# Patient Record
Sex: FEMALE | Race: WHITE | NOT HISPANIC OR LATINO | Employment: OTHER | URBAN - METROPOLITAN AREA
[De-identification: names, ages, dates, MRNs, and addresses within clinical notes are randomized per-mention and may not be internally consistent; named-entity substitution may affect disease eponyms.]

---

## 2017-08-22 ENCOUNTER — OFFICE VISIT (OUTPATIENT)
Dept: INTERNAL MEDICINE | Facility: CLINIC | Age: 62
End: 2017-08-22
Payer: COMMERCIAL

## 2017-08-22 VITALS
HEART RATE: 97 BPM | BODY MASS INDEX: 42.31 KG/M2 | DIASTOLIC BLOOD PRESSURE: 83 MMHG | HEIGHT: 62 IN | WEIGHT: 229.94 LBS | SYSTOLIC BLOOD PRESSURE: 130 MMHG | TEMPERATURE: 98 F

## 2017-08-22 DIAGNOSIS — M54.12 CERVICAL RADICULOPATHY: ICD-10-CM

## 2017-08-22 DIAGNOSIS — F17.200 SMOKER: ICD-10-CM

## 2017-08-22 DIAGNOSIS — M62.838 MUSCLE SPASMS OF NECK: ICD-10-CM

## 2017-08-22 DIAGNOSIS — Z11.59 NEED FOR HEPATITIS C SCREENING TEST: ICD-10-CM

## 2017-08-22 DIAGNOSIS — F17.210 CIGARETTE NICOTINE DEPENDENCE WITHOUT COMPLICATION: ICD-10-CM

## 2017-08-22 DIAGNOSIS — Z91.09 ENVIRONMENTAL ALLERGIES: ICD-10-CM

## 2017-08-22 DIAGNOSIS — Z12.11 SCREEN FOR COLON CANCER: ICD-10-CM

## 2017-08-22 DIAGNOSIS — E78.5 HYPERLIPIDEMIA, UNSPECIFIED HYPERLIPIDEMIA TYPE: ICD-10-CM

## 2017-08-22 DIAGNOSIS — Z00.00 ANNUAL PHYSICAL EXAM: Primary | ICD-10-CM

## 2017-08-22 DIAGNOSIS — E66.01 MORBID OBESITY, UNSPECIFIED OBESITY TYPE: ICD-10-CM

## 2017-08-22 DIAGNOSIS — J45.20 MILD INTERMITTENT ASTHMA WITHOUT COMPLICATION: ICD-10-CM

## 2017-08-22 DIAGNOSIS — I10 HYPERTENSION, ESSENTIAL: ICD-10-CM

## 2017-08-22 DIAGNOSIS — Z12.31 SCREENING MAMMOGRAM FOR HIGH-RISK PATIENT: ICD-10-CM

## 2017-08-22 PROCEDURE — 90732 PPSV23 VACC 2 YRS+ SUBQ/IM: CPT | Mod: S$GLB,,, | Performed by: INTERNAL MEDICINE

## 2017-08-22 PROCEDURE — 99999 PR PBB SHADOW E&M-EST. PATIENT-LVL IV: CPT | Mod: PBBFAC,,, | Performed by: INTERNAL MEDICINE

## 2017-08-22 PROCEDURE — 90471 IMMUNIZATION ADMIN: CPT | Mod: S$GLB,,, | Performed by: INTERNAL MEDICINE

## 2017-08-22 PROCEDURE — 99396 PREV VISIT EST AGE 40-64: CPT | Mod: S$GLB,,, | Performed by: INTERNAL MEDICINE

## 2017-08-22 RX ORDER — HYDROCODONE BITARTRATE AND ACETAMINOPHEN 5; 325 MG/1; MG/1
1 TABLET ORAL EVERY 8 HOURS PRN
Qty: 30 TABLET | Refills: 0 | Status: SHIPPED | OUTPATIENT
Start: 2017-08-22 | End: 2017-11-22 | Stop reason: SDUPTHER

## 2017-08-22 RX ORDER — FEXOFENADINE HCL 60 MG
60 TABLET ORAL DAILY PRN
COMMUNITY
End: 2020-11-11

## 2017-08-22 RX ORDER — IBUPROFEN 200 MG
1 TABLET ORAL DAILY
Qty: 28 PATCH | Refills: 3 | Status: SHIPPED | OUTPATIENT
Start: 2017-08-22 | End: 2019-08-05

## 2017-08-22 RX ORDER — ALBUTEROL SULFATE 90 UG/1
2 AEROSOL, METERED RESPIRATORY (INHALATION) EVERY 6 HOURS PRN
Qty: 18 G | Refills: 11 | Status: SHIPPED | OUTPATIENT
Start: 2017-08-22 | End: 2019-11-05 | Stop reason: SDUPTHER

## 2017-08-22 RX ORDER — LISINOPRIL AND HYDROCHLOROTHIAZIDE 10; 12.5 MG/1; MG/1
1 TABLET ORAL DAILY
COMMUNITY
End: 2017-08-22 | Stop reason: ALTCHOICE

## 2017-08-22 RX ORDER — HYDROCHLOROTHIAZIDE 25 MG/1
25 TABLET ORAL DAILY
Qty: 30 TABLET | Refills: 2 | Status: SHIPPED | OUTPATIENT
Start: 2017-08-22 | End: 2017-12-08

## 2017-08-22 RX ORDER — HYDROXYZINE HYDROCHLORIDE 25 MG/1
25 TABLET, FILM COATED ORAL 3 TIMES DAILY PRN
Qty: 30 TABLET | Refills: 2 | Status: SHIPPED | OUTPATIENT
Start: 2017-08-22 | End: 2018-01-11 | Stop reason: SDUPTHER

## 2017-08-22 RX ORDER — LOSARTAN POTASSIUM 50 MG/1
50 TABLET ORAL DAILY
Qty: 30 TABLET | Refills: 0 | Status: SHIPPED | OUTPATIENT
Start: 2017-08-22 | End: 2017-12-08

## 2017-08-22 RX ORDER — METHOCARBAMOL 500 MG/1
500 TABLET, FILM COATED ORAL 3 TIMES DAILY PRN
Qty: 60 TABLET | Refills: 0 | Status: SHIPPED | OUTPATIENT
Start: 2017-08-22 | End: 2018-04-30

## 2017-08-22 RX ORDER — FLUOCINONIDE 0.5 MG/G
CREAM TOPICAL DAILY PRN
Qty: 60 G | Refills: 2 | Status: ON HOLD | OUTPATIENT
Start: 2017-08-22 | End: 2018-01-24

## 2017-08-22 RX ORDER — CETIRIZINE HYDROCHLORIDE 10 MG/1
10 TABLET ORAL DAILY PRN
COMMUNITY
End: 2022-05-18

## 2017-08-22 RX ORDER — FAMOTIDINE 20 MG/1
20 TABLET, FILM COATED ORAL 2 TIMES DAILY PRN
COMMUNITY
End: 2022-05-18

## 2017-08-22 NOTE — PROGRESS NOTES
Subjective:      Kaleigh Solo is a 62 y.o. female who presents for annual exam.    Family History:  family history includes Aneurysm in her father; Cancer in her mother; Heart disease in her father.    Health Maintenance:  Health Maintenance       Date Due Completion Date    Hepatitis C Screening 1955 ---    TETANUS VACCINE 06/29/1975 ---    Pap Smear with HPV Cotest 06/29/1978 ---    Colonoscopy 06/29/2007 ---    Mammogram 03/25/2016 3/25/2015    Lipid Panel 10/13/2016 10/13/2015    Zoster Vaccine 06/29/2017 ---    Influenza Vaccine 08/01/2017 ---        Eye exam: 2 years  Dental Exam: 1 year ago    Exercise: minimal  Body mass index is 42.06 kg/m².      Meds:   Current Outpatient Prescriptions:     albuterol (PROAIR HFA) 90 mcg/actuation inhaler, Inhale 2 puffs into the lungs every 6 (six) hours as needed for Wheezing., Disp: 18 g, Rfl: 11    cetirizine (ZYRTEC) 10 MG tablet, Take 10 mg by mouth once daily., Disp: , Rfl:     diphenhydrAMINE (BENADRYL) 50 MG tablet, Take 50 mg by mouth nightly as needed for Itching., Disp: , Rfl:     famotidine (PEPCID) 20 MG tablet, Take 20 mg by mouth 2 (two) times daily., Disp: , Rfl:     fexofenadine (ALLEGRA) 60 MG tablet, Take 60 mg by mouth once daily., Disp: , Rfl:     fluocinonide 0.05% (LIDEX) 0.05 % cream, Apply topically daily as needed., Disp: 60 g, Rfl: 2    hydrOXYzine HCl (ATARAX) 25 MG tablet, Take 1 tablet (25 mg total) by mouth 3 (three) times daily as needed for Itching., Disp: 30 tablet, Rfl: 2    MULTIVITAMIN W-MINERALS/LUTEIN (CENTRUM SILVER ORAL), Take 1 tablet by mouth once daily. Centrum Silver 50 plus, Disp: , Rfl:     potassium 99 mg Tab, Take by mouth., Disp: , Rfl:     hydrochlorothiazide (HYDRODIURIL) 25 MG tablet, Take 1 tablet (25 mg total) by mouth once daily., Disp: 30 tablet, Rfl: 2    hydrocodone-acetaminophen 5-325mg (NORCO) 5-325 mg per tablet, Take 1 tablet by mouth every 8 (eight) hours as needed for Pain., Disp: 30  tablet, Rfl: 0    hydrocortisone 1 % cream, Apply topically 2 (two) times daily., Disp: , Rfl:     losartan (COZAAR) 50 MG tablet, Take 1 tablet (50 mg total) by mouth once daily., Disp: 30 tablet, Rfl: 0    methocarbamol (ROBAXIN) 500 MG Tab, Take 1 tablet (500 mg total) by mouth 3 (three) times daily as needed., Disp: 60 tablet, Rfl: 0    nicotine (NICODERM CQ) 21 mg/24 hr, Place 1 patch onto the skin once daily., Disp: 28 patch, Rfl: 3    pramoxine-hydrocortisone 1-1 % lotion, Apply topically 3 (three) times daily., Disp: , Rfl:     PMHx:   Past Medical History:   Diagnosis Date    Arthritis     Asthma     Cervicalgia     2013 tx by chiropractor    Closed dislocation of acromioclavicular joint 2013    Degenerative disc disease     Fibromyalgia     HPV (human papilloma virus) anogenital infection     conization in past-remote history-1990    HTN (hypertension)     Sciatica     Tobacco use     Ulcer 2007    stomach ulcer       PSHx:  Past Surgical History:   Procedure Laterality Date    acdf  4/2014    C5-6    CERVICAL CONIZATION   W/ LASER      KNEE ARTHROSCOPY      right shoulder surger      arthroscopic surgery Dec 2013       SocHx:   Social History     Social History    Marital status:      Spouse name: N/A    Number of children: N/A    Years of education: N/A     Social History Main Topics    Smoking status: Current Some Day Smoker     Packs/day: 0.00     Years: 40.00     Types: Cigarettes    Smokeless tobacco: Never Used      Comment: tobacco use for 40 years- 1 pack per day average    Alcohol use Yes      Comment: occasional    Drug use: No    Sexual activity: Not Asked     Other Topics Concern    None     Social History Narrative    , not working, 3 children (1 passed at 9 mo old), tobacco daily, ETOH socially, GYN 2013 [facility]         Review of Systems   Constitutional: Negative for activity change, chills, fatigue, fever and unexpected weight change.    HENT: Positive for rhinorrhea. Negative for congestion, dental problem, ear discharge, ear pain, hearing loss, mouth sores, sinus pressure, tinnitus and trouble swallowing.    Eyes: Negative for discharge and visual disturbance.   Respiratory: Positive for cough (dry) and wheezing. Negative for chest tightness and shortness of breath.    Cardiovascular: Negative for chest pain, palpitations and leg swelling.   Gastrointestinal: Negative for abdominal pain, constipation, diarrhea, nausea and vomiting.   Endocrine: Negative for polydipsia and polyuria.   Genitourinary: Negative for difficulty urinating, dysuria, hematuria and menstrual problem.   Musculoskeletal: Positive for arthralgias, joint swelling and neck pain. Negative for myalgias.   Skin: Positive for rash (reports hives at times).   Allergic/Immunologic: Positive for environmental allergies and food allergies.   Neurological: Positive for weakness and headaches (improving). Negative for dizziness, light-headedness and numbness (in fingertips).   Hematological: Negative for adenopathy.   Psychiatric/Behavioral: Positive for dysphoric mood (has tried zoloft, wellbutrin and lexapro in past). Negative for confusion. The patient is not nervous/anxious.        Objective:      Physical Exam   Constitutional: She is oriented to person, place, and time. Vital signs are normal. She appears well-developed and well-nourished. No distress.   HENT:   Head: Normocephalic and atraumatic.   Right Ear: Hearing, tympanic membrane, external ear and ear canal normal. Tympanic membrane is not erythematous and not bulging.   Left Ear: Hearing, tympanic membrane, external ear and ear canal normal. Tympanic membrane is not erythematous and not bulging.   Nose: Nose normal.   Mouth/Throat: Uvula is midline, oropharynx is clear and moist and mucous membranes are normal. No oropharyngeal exudate or posterior oropharyngeal erythema.   Eyes: Conjunctivae, EOM and lids are normal.  Pupils are equal, round, and reactive to light. No scleral icterus.   Neck: Normal range of motion. Neck supple. No thyroid mass and no thyromegaly present.   Cardiovascular: Normal rate, regular rhythm, normal heart sounds and intact distal pulses.    No murmur heard.  Pulmonary/Chest: Effort normal and breath sounds normal. She has no wheezes.   Abdominal: Soft. Bowel sounds are normal. She exhibits no distension. There is no hepatosplenomegaly. There is no tenderness. There is no rigidity, no rebound and no guarding.   Musculoskeletal: Normal range of motion. She exhibits no edema.   Lymphadenopathy:     She has no cervical adenopathy.        Right: No supraclavicular adenopathy present.        Left: No supraclavicular adenopathy present.   Neurological: She is alert and oriented to person, place, and time. She has normal strength and normal reflexes. No sensory deficit. Coordination and gait normal.   Skin: Skin is warm, dry and intact. No rash noted. She is not diaphoretic.   Psychiatric: She has a normal mood and affect.   Vitals reviewed.      Assessment:       1. Annual physical exam    2. Hypertension, essential    3. Hyperlipidemia, unspecified hyperlipidemia type    4. Cervical radiculopathy    5. Muscle spasms of neck    6. Mild intermittent asthma without complication    7. Cigarette nicotine dependence without complication    8. Smoker    9. Morbid obesity, unspecified obesity type    10. Screen for colon cancer    11. Need for hepatitis C screening test    12. Screening mammogram for high-risk patient        Plan:       1,11,12,13. Ordered CBC, CMP, TSH, HbA1c, screening hepatitis C, lipid panel and U/A. Given Zostavax prescription. Ordered MMG and colonoscopy.Given pneumococcal vaccine.   2.  BP controlled with lisinopril-HCTZ but has persistent cough, will change to losartan 50mg daily and HCTZ 25mg daily. Advised to monitor BP very closely and call with readings, will adjust as needed.  3. Elevated  total cholesterol in the past, previously on pravastatin but stopped. Will check lipid panel  4,5. Uses Norco infrequently as needed for neck pain, refilled. Sees chiropractor  6. Albuterol as needed, no recent flare ups  7. In smoking cessation program, uses nicotine patches, planning to try hypnosis for smoking cessation  8. Low dose screening CT  9. Discussed need to increase physical activity  10. Hydrocortisone, famotidine, cetirizine for allergic reactions, sees allergy specialist who is also a chiroprator, Dr. Sadie Ozuna    RTC in 3 months or sooner if needed    Felicity Middleton MD          Answers for HPI/ROS submitted by the patient on 8/22/2017   activity change: No  unexpected weight change: No  neck pain: Yes  hearing loss: No  rhinorrhea: Yes  trouble swallowing: No  eye discharge: No  visual disturbance: No  chest tightness: No  wheezing: Yes  chest pain: No  palpitations: No  blood in stool: No  constipation: No  vomiting: No  diarrhea: No  polydipsia: No  polyuria: No  difficulty urinating: No  hematuria: No  menstrual problem: No  dysuria: No  joint swelling: Yes  arthralgias: Yes  headaches: Yes  weakness: Yes  confusion: No  dysphoric mood: Yes

## 2017-08-23 ENCOUNTER — LAB VISIT (OUTPATIENT)
Dept: LAB | Facility: HOSPITAL | Age: 62
End: 2017-08-23
Attending: INTERNAL MEDICINE
Payer: COMMERCIAL

## 2017-08-23 DIAGNOSIS — Z11.59 NEED FOR HEPATITIS C SCREENING TEST: ICD-10-CM

## 2017-08-23 DIAGNOSIS — Z00.00 ANNUAL PHYSICAL EXAM: ICD-10-CM

## 2017-08-23 LAB
ALBUMIN SERPL BCP-MCNC: 3.6 G/DL
ALP SERPL-CCNC: 90 U/L
ALT SERPL W/O P-5'-P-CCNC: 20 U/L
ANION GAP SERPL CALC-SCNC: 11 MMOL/L
AST SERPL-CCNC: 13 U/L
BASOPHILS # BLD AUTO: 0.04 K/UL
BASOPHILS NFR BLD: 0.7 %
BILIRUB SERPL-MCNC: 0.5 MG/DL
BUN SERPL-MCNC: 9 MG/DL
CALCIUM SERPL-MCNC: 9.5 MG/DL
CHLORIDE SERPL-SCNC: 104 MMOL/L
CHOLEST/HDLC SERPL: 4.3 {RATIO}
CO2 SERPL-SCNC: 27 MMOL/L
CREAT SERPL-MCNC: 0.8 MG/DL
DIFFERENTIAL METHOD: ABNORMAL
EOSINOPHIL # BLD AUTO: 0.2 K/UL
EOSINOPHIL NFR BLD: 3.1 %
ERYTHROCYTE [DISTWIDTH] IN BLOOD BY AUTOMATED COUNT: 13.7 %
EST. GFR  (AFRICAN AMERICAN): >60 ML/MIN/1.73 M^2
EST. GFR  (NON AFRICAN AMERICAN): >60 ML/MIN/1.73 M^2
ESTIMATED AVG GLUCOSE: 97 MG/DL
GLUCOSE SERPL-MCNC: 100 MG/DL
HBA1C MFR BLD HPLC: 5 %
HCT VFR BLD AUTO: 44.3 %
HDL/CHOLESTEROL RATIO: 23.1 %
HDLC SERPL-MCNC: 221 MG/DL
HDLC SERPL-MCNC: 51 MG/DL
HGB BLD-MCNC: 14.7 G/DL
LDLC SERPL CALC-MCNC: 147.6 MG/DL
LYMPHOCYTES # BLD AUTO: 1.4 K/UL
LYMPHOCYTES NFR BLD: 23.1 %
MCH RBC QN AUTO: 31.5 PG
MCHC RBC AUTO-ENTMCNC: 33.2 G/DL
MCV RBC AUTO: 95 FL
MONOCYTES # BLD AUTO: 0.5 K/UL
MONOCYTES NFR BLD: 7.8 %
NEUTROPHILS # BLD AUTO: 4 K/UL
NEUTROPHILS NFR BLD: 65 %
NONHDLC SERPL-MCNC: 170 MG/DL
PLATELET # BLD AUTO: 217 K/UL
PMV BLD AUTO: 9.8 FL
POTASSIUM SERPL-SCNC: 4 MMOL/L
PROT SERPL-MCNC: 6.8 G/DL
RBC # BLD AUTO: 4.66 M/UL
SODIUM SERPL-SCNC: 142 MMOL/L
TRIGL SERPL-MCNC: 112 MG/DL
TSH SERPL DL<=0.005 MIU/L-ACNC: 2.63 UIU/ML
WBC # BLD AUTO: 6.15 K/UL

## 2017-08-23 PROCEDURE — 86803 HEPATITIS C AB TEST: CPT

## 2017-08-23 PROCEDURE — 84443 ASSAY THYROID STIM HORMONE: CPT

## 2017-08-23 PROCEDURE — 83036 HEMOGLOBIN GLYCOSYLATED A1C: CPT

## 2017-08-23 PROCEDURE — 80053 COMPREHEN METABOLIC PANEL: CPT

## 2017-08-23 PROCEDURE — 80061 LIPID PANEL: CPT

## 2017-08-23 PROCEDURE — 36415 COLL VENOUS BLD VENIPUNCTURE: CPT | Mod: PO

## 2017-08-23 PROCEDURE — 85025 COMPLETE CBC W/AUTO DIFF WBC: CPT

## 2017-08-24 LAB — HCV AB SERPL QL IA: NEGATIVE

## 2017-08-28 PROBLEM — Z91.89 CANDIDATE FOR STATIN THERAPY DUE TO RISK OF FUTURE CARDIOVASCULAR EVENT: Status: ACTIVE | Noted: 2017-08-28

## 2017-11-20 NOTE — PROGRESS NOTES
CC: LBP and HTN     62 y.o. female presents today for LBP  Started-2006  Trauma- s/p Louise carrying furniture and equipment around  Pain level- 8-10/ most days  Now on disability    Tx-vibrating pad ( similar TENS unit)  Associated factors- sees Dr. Ortiz, arm numbness and decreased strength  Numbness LE alt right and left leg w/ radiation into both legs  Seeing chiropractor    HTN, tx HCTZ 25mg, losartan 50mg  ( has taken avapro, valsartan, and lisinopril)  MM spasms started after the Avapro, ? Co-relation  BP quite elevated, denied HA or dizzness    Answers for HPI/ROS submitted by the patient on 11/16/2017   Back pain  Chronicity: chronic  Onset: more than 1 year ago  Frequency: constantly  Progression since onset: waxing and waning  Pain location: gluteal  Pain quality: aching  Radiates to: left foot, left knee, right knee  Pain - numeric: 6/10  Pain is: the same all the time  Aggravated by: position, lying down, sitting, standing, twisting  abdominal pain: No  bladder incontinence: No  bowel incontinence: No  chest pain: No  dysuria: No  fever: No  headaches: Yes  leg pain: Yes  numbness: Yes  paresis: No  paresthesias: Yes  pelvic pain: No  perianal numbness: No  tingling: Yes  weakness: Yes  weight loss: No  genital pain: No  hematuria: No  Risk factors: history of steroid use  Pain severity: severe  Treatments tried: chiropractic manipulation  Improvement on treatment: moderate    MEDCARD: Reviewed    ROS:  No fever, chills,or night sweats  No dysphagia or chest pain  No GERD or abdominal pain  No change in bowel or bladder function  No paresthesias or limb numbness or weakness  Remainder of review negative except as previously noted    PAST MEDICAL HX: Reviewed  PAST SURGICAL HX:Reviewed  SOCIAL HX: Reviewed  FAMILY HX: Reviewed    PHYSICAL EXAM:  VS: As noted  GENERAL: WDWN, A&O, NAD, conversant and co-operative  EYES: Conj/lids unremarkable, sclera anicteric, PERRL, EOMI  NECK: Supple w/o  lymphadenopathy or thyromegaly  RESPIRATORY: Efforts unlabored. Lungs CTA  CARDIOVASCULAR: Heart RRR. 1+ pedal pulses. No LE edema  GASTROINTESTINAL: BS+, soft, NT/ND, -HSM noted  MUSCULOSKELETAL: Gait normal. No CCE noted  Back: Spine NT, paraspinal mm tender   Negative SLR   NEUROLOGIC: BLAKELY. No tremor noted  SKIN: Warm and dry    IMPRESSION:  LBP radiculopathy w/ intermittent sciatic sx  HTN, elevated off BP meds    PLAN:  Rx amlodipine 5mg - reviewed potential LE edema  Avoid salt  MRI- lumbar  Consult in progress PM  Rx Hydrocodone- pt requested, last rfl 8/2017- takes sparingly  Calll prn  RTC ` 2 wks

## 2017-11-22 ENCOUNTER — OFFICE VISIT (OUTPATIENT)
Dept: INTERNAL MEDICINE | Facility: CLINIC | Age: 62
End: 2017-11-22
Payer: COMMERCIAL

## 2017-11-22 VITALS
RESPIRATION RATE: 15 BRPM | HEIGHT: 62 IN | WEIGHT: 223.13 LBS | TEMPERATURE: 98 F | HEART RATE: 93 BPM | SYSTOLIC BLOOD PRESSURE: 172 MMHG | DIASTOLIC BLOOD PRESSURE: 83 MMHG | BODY MASS INDEX: 41.06 KG/M2

## 2017-11-22 DIAGNOSIS — I10 HYPERTENSION, ESSENTIAL: ICD-10-CM

## 2017-11-22 DIAGNOSIS — M54.16 LUMBAR RADICULOPATHY: Primary | ICD-10-CM

## 2017-11-22 PROCEDURE — 99999 PR PBB SHADOW E&M-EST. PATIENT-LVL III: CPT | Mod: PBBFAC,,, | Performed by: INTERNAL MEDICINE

## 2017-11-22 PROCEDURE — 99214 OFFICE O/P EST MOD 30 MIN: CPT | Mod: S$GLB,,, | Performed by: INTERNAL MEDICINE

## 2017-11-22 RX ORDER — AMLODIPINE BESYLATE 5 MG/1
5 TABLET ORAL DAILY
Qty: 90 TABLET | Refills: 1 | Status: SHIPPED | OUTPATIENT
Start: 2017-11-22 | End: 2017-12-08 | Stop reason: SDUPTHER

## 2017-11-22 RX ORDER — HYDROCODONE BITARTRATE AND ACETAMINOPHEN 5; 325 MG/1; MG/1
1 TABLET ORAL EVERY 8 HOURS PRN
Qty: 30 TABLET | Refills: 0 | Status: SHIPPED | OUTPATIENT
Start: 2017-11-22 | End: 2018-01-15 | Stop reason: ALTCHOICE

## 2017-11-22 RX ORDER — LISINOPRIL AND HYDROCHLOROTHIAZIDE 10; 12.5 MG/1; MG/1
TABLET ORAL
COMMUNITY
Start: 2017-11-16 | End: 2017-11-22

## 2017-11-22 RX ORDER — DULOXETIN HYDROCHLORIDE 60 MG/1
CAPSULE, DELAYED RELEASE ORAL
COMMUNITY
Start: 2017-11-16 | End: 2018-08-07

## 2017-11-28 ENCOUNTER — CLINICAL SUPPORT (OUTPATIENT)
Dept: SMOKING CESSATION | Facility: CLINIC | Age: 62
End: 2017-11-28
Payer: COMMERCIAL

## 2017-11-28 ENCOUNTER — HOSPITAL ENCOUNTER (OUTPATIENT)
Dept: RADIOLOGY | Facility: HOSPITAL | Age: 62
Discharge: HOME | End: 2017-11-28
Attending: INTERNAL MEDICINE
Payer: COMMERCIAL

## 2017-11-28 ENCOUNTER — HOSPITAL ENCOUNTER (OUTPATIENT)
Dept: RADIOLOGY | Facility: HOSPITAL | Age: 62
Discharge: HOME | End: 2017-11-28
Attending: INTERNAL MEDICINE

## 2017-11-28 DIAGNOSIS — M54.16 LUMBAR RADICULOPATHY: ICD-10-CM

## 2017-11-28 DIAGNOSIS — F17.200 NICOTINE DEPENDENCE: Primary | ICD-10-CM

## 2017-11-28 DIAGNOSIS — F17.200 SMOKER: ICD-10-CM

## 2017-11-28 DIAGNOSIS — Z12.31 ENCOUNTER FOR SCREENING MAMMOGRAM FOR HIGH-RISK PATIENT: ICD-10-CM

## 2017-11-28 PROCEDURE — 72148 MRI LUMBAR SPINE W/O DYE: CPT | Mod: TC

## 2017-11-28 PROCEDURE — 99406 BEHAV CHNG SMOKING 3-10 MIN: CPT | Mod: S$GLB,,, | Performed by: INTERNAL MEDICINE

## 2017-11-28 PROCEDURE — 77067 SCR MAMMO BI INCL CAD: CPT | Mod: TC,PO

## 2017-11-28 PROCEDURE — 76497 UNLISTED CT PROCEDURE: CPT | Mod: TC

## 2017-11-28 PROCEDURE — 77067 SCR MAMMO BI INCL CAD: CPT | Mod: 26,,, | Performed by: RADIOLOGY

## 2017-11-28 PROCEDURE — 77063 BREAST TOMOSYNTHESIS BI: CPT | Mod: 26,,, | Performed by: RADIOLOGY

## 2017-11-28 PROCEDURE — 72148 MRI LUMBAR SPINE W/O DYE: CPT | Mod: 26,,, | Performed by: RADIOLOGY

## 2017-11-29 PROBLEM — R91.1 PULMONARY NODULE: Status: ACTIVE | Noted: 2017-11-29

## 2017-11-29 PROBLEM — Z86.79 PERSONAL HISTORY OF CORONARY ATHEROSCLEROSIS: Status: ACTIVE | Noted: 2017-11-29

## 2017-11-30 ENCOUNTER — TELEPHONE (OUTPATIENT)
Dept: INTERNAL MEDICINE | Facility: CLINIC | Age: 62
End: 2017-11-30

## 2017-11-30 NOTE — TELEPHONE ENCOUNTER
----- Message from Abril Doan MD sent at 11/30/2017 10:17 AM CST -----  Pain Management should review your MRI tomorrow at your office visit.  It appears you have extensive degenerative disease, but no disc  herniation was noted.  Abril Martinez

## 2017-12-01 ENCOUNTER — OFFICE VISIT (OUTPATIENT)
Dept: PAIN MEDICINE | Facility: CLINIC | Age: 62
End: 2017-12-01
Attending: ANESTHESIOLOGY
Payer: COMMERCIAL

## 2017-12-01 VITALS
HEIGHT: 62 IN | DIASTOLIC BLOOD PRESSURE: 89 MMHG | WEIGHT: 222.69 LBS | BODY MASS INDEX: 40.98 KG/M2 | HEART RATE: 92 BPM | RESPIRATION RATE: 18 BRPM | SYSTOLIC BLOOD PRESSURE: 158 MMHG | TEMPERATURE: 98 F

## 2017-12-01 DIAGNOSIS — M51.16 LUMBAR DISC HERNIATION WITH RADICULOPATHY: ICD-10-CM

## 2017-12-01 DIAGNOSIS — M48.07 FORAMINAL STENOSIS OF LUMBOSACRAL REGION: ICD-10-CM

## 2017-12-01 DIAGNOSIS — M53.3 SACROILIAC JOINT PAIN: ICD-10-CM

## 2017-12-01 DIAGNOSIS — M47.899 FACET SYNDROME: ICD-10-CM

## 2017-12-01 DIAGNOSIS — M54.16 LUMBAR RADICULOPATHY: Primary | ICD-10-CM

## 2017-12-01 PROCEDURE — 99999 PR PBB SHADOW E&M-EST. PATIENT-LVL III: CPT | Mod: PBBFAC,,, | Performed by: ANESTHESIOLOGY

## 2017-12-01 PROCEDURE — 99214 OFFICE O/P EST MOD 30 MIN: CPT | Mod: S$GLB,,, | Performed by: ANESTHESIOLOGY

## 2017-12-01 NOTE — PROGRESS NOTES
Referring Physician: No ref. provider found    Chief Complaint:   Chief Complaint   Patient presents with    Back Pain        SUBJECTIVE:     Interval history 12/01/2017   The patient returns to the clinic for a follow up visit, she is reporting pain of 5 /10 today.  She has been doing better since cervical surgery but has been having occasional radicular symptoms.  Currently she comes in for lower extremities radiculopathy predominantly in the left lower extremity in the L4-5 distribution.  She had an MRI of the lumbar spine which shows  cyst at the encroachment on the left L5 nerve root.  Additionally the patient has significant facet arthropathy throughout the lumbar spine    Interval history 04/02/2015:  Since previous encounter patient reports low back pain radiating down both lower extremities. Patient stated that she had Synvisc injection on 03/20/15 and this helped with her knee pain. Patient stated that she still has neck pain that radiates to both upper extremities. She stated that she needs a refill on her Gabapentin and Flexeril these medications have been helping with the pain. Patient also reported that she sees a chiropractor for her back pain. Patient reports no other health changes since her last visit. Patient reports her pain 6/10 today.  Interval history 03/02/2015:  Since previous encounter the patient reported having had knee arthroscopy and was told to try euflexxa injections versus total knee replacement by her orthopedist.  She was originally scheduled to have her orthopedist inject her knee with corticosteroids this morning but due to scheduling conflict the patient was placed on my schedule for an injection.  Unfortunately the patient has also been actively treated for upper respiratory infection and is currently on antibiotics.  She has been continuing home exercises with regularity stating good days and bad days with regards to her knee pain.  Additionally she continues to have upper  neck and bilateral shoulder pain upper extremity radiculopathy and lower back pain.  Since her arthroscopic surgery the patient has been having intermittent hives and has been placed by an allergist on hydroxyzine and famotidine but has not had complete relief of these symptoms.    Interval history 10/21/2014:  Since previous encounter patient was scheduled for an CIRILO but cancelled due to her having a cold. Patient reports she still has a cold. Patient reports neck radiating down her right arm with numbness and tingling. Patient stated on the way over her arm went numb. She states that driving is becoming harder as she feels she has a decreased hand  with opening of jars. Patient reports that she's been taking Mucinex and Thera Flu for her cold symptoms. Patient reports she been having hives which has been followed by an allergist with testing pending.  Additionally she has been seen by rheumatologist who diagnosed her with fibromyalgia.She states that she takes 6 caps of Gabapentin a day however she was titrating down down she states that when she gets to 4 caps a day she breaks out in hives and then quickly re escalates back to 6 tablets per day. Patient states that she's starting to have pain at the bottom of her feet when walking on her rugs in her home she states that just started yesterday. Patient reports her pain 8/10.  She says that she has been increasing her protein intake and has been continuing to do exercises improving her range of motion in her shoulder and neck and states that overall she feels as if she is doing better.  Patient reports no other health changes since her last visit.   Interval history 8/25/2014:  Patient continues to have radicular symptoms in the neck radiating down to the right upper extremity.  Additionally since previous encounter she had a dental abscess which is being treated with accommodation of antibiotics and having had her tooth pulled.  She is still antibiotics and  "has a followup visit in mid-September with her dentist to evaluate efficacy of treatment.    Interval history 7/25/2014:  Since previous encounter the patient is on gabapentin 1800 mg per day and this is helping with her shoulder pain although she does state that she is having restless legs at night which is preventing her from being able to sleep.  Additionally she is having anterior thigh radicular pain.  She has had no other health changes she continues to do physical therapy exercises at home.  She reports her pain level is a 6/10 today.  Interval history 7/2/2014:  Patient is status post ACDF in 4/2014.  She is healing very well from surgery, but continues to have right shoulder pain which she had prior to her surgery despite having a labral repair.  Additionally she continues to have neuropathic symptoms in bilateral arms and in the axilla bilaterally.  She has been undergoing physical therapy and states that it does help her including heat ultrasound to the muscles of the neck.  She states that this is only temporary relief.  She continues to use the topical compounded pain cream which does help her.  After her surgery she discontinued taking gabapentin which was previously helping her and which she was tolerating without side effects.  She reports her pain as a 3/10 today.  She had been taking hydrocodone/acetaminophen 10/325 3 times a day when necessary as prescribed by her surgeon, but this is being discontinued.      Interval history 3/11/2014:  Since previous encounter patient reports that she had an episode where she felt as if her neck "locked up" in that now when she is waking up in the morning she has bilateral upper extremity numbness which improves throughout the day.  She does not report any weakness.  She states that she has been having persistent daily headaches from the occiput over the vertex of the supraorbital ridge bilaterally, and continues to have pain in the right side of her neck and " shoulder.  She reports that she is still taking the gabapentin 1800 mg per day, and hydrocodone/acetaminophen when necessary which helps a little.    Interval history 2/24/2014:  Patient is status post cervical interlaminar epidural steroid injection by 2 on 1/24/2014 and 2/5/2014.  Patient has persisting radicular symptoms into her right upper extremity and shoulder the anterior aspect of the neck and base of the jaw.  Patient reported approximately 10% improvement in her pain symptoms after the first injection similar improvement in her pain symptoms after the second injection no sustained relief she states that the pain relief lasted her approximately one week and will go away.  She is taking gabapentin 300 mg 3 times a day, hydrocodone/acetaminophen 7.5/325 one tablet approximately 2-3 times per day.  She states that the Vicodin helps dull the pain but the does not eliminate it entirely, and she's not having any adverse problems in the gabapentin.  Patient continues in physical therapy for her right shoulder status post arthroscopic surgery, and has good range of motion in the shoulder.  Since previous exam the patient has a sinus infection with congestion.    Interval history 1/21/2014:  Since reducing her patient was only able to have one of the cervical intralaminar epidural steroid injections which approximately help her 80% in her pain symptoms in the back of her neck, but she was also having symptoms into the right shoulder and had a rotator cuff tear which was repaired arthroscopically which prevented us from being able to perform the second cervical intralaminar epidural steroid injection on 12/23/2013.  Patient reports that she continues to do home exercises for her shoulder surgery but was unable to perform PT secondary to pain.  She states that her pain was actually better up until 1/12/2014 where she developed severe pain on the right side of her neck and posterior side of her neck going down the  back of the scapula and also across the trapezius muscles of the shoulder.  Her MRI which was done in outside facility reports that she has severe C5-6 neural foraminal stenosis.  For her pain the patient has been taking hydrocodone/acetaminophen 7.5/325 as needed she reports there are occasional days where she takes it every 4 hours and some days where she doesn't take it at all.  Resting her head on the pillow helps alleviate the pain.  Sitting and working at computers when her pain is the worst.  She denies any problems with movement of her hands or weakness in her arms.  She has had no other health changes since previous encounter.    Initial history of present illness 10/2013:    Kaleigh Solo presents to the clinic for the evaluation of neck pain with radiation in right upper shoulder to the elbow, and down the right side of the chest wall. The pain started insidiously in the back of the neck in july  and symptoms have been worsening.  The pain has begun going into the shoulder, and there was swelling in the arm that began after starting Robaxin for muscle aches, and then took predisone taper and bactrim for the possibility of infection which did not help.  Patient stopped her medications and the swelling improved.  US of the upper extremity was negative for DVT.   Patient takes vicodin for pain which she states helps only a little bit, and doesn't take the pain go away.  MRI of cervical spine revealed severe neuroforaminal stenosis on the right at C5-6 with central to right neural foraminal disc protrusion with generalized bulging discs and uncovertebral joint osteophytic change.  No abnormal signal in the cord.      Pain Description:    The pain is located in the neck and radiates to the right shoulder .  The pain is described as aching and tight band      Symptoms interfere with daily activity and sleeping.     Exacerbating factors: Sitting, Bending, Touching, Coughing/Sneezing, Eating, Night Time,  Morning, Flexing and Lifting.      Mitigating factors heat, laying down and medications.     She reports 2 hours of uninterrupted sleep per night.    Patient denies night fever/night sweats, urinary incontinence, bowel incontinence, significant weight loss, significant motor weakness and loss of sensations.  Patient denies any suicidal or homicidal ideations    Pain Medications:  Current:  cymbalta recently started    Tried in Past:  Gabapentin  Hydrocodone  ibuprofen    Physical Therapy/Home Exercise: yes       report:  Not applicable    Pain Procedures: None    Chiropractor -No treatments yet.  Right shoulder arthroscopy 12/10/2013    Imaging:     10/7/2013  MRI of cervical spine revealed severe neuroforaminal stenosis on the right at C5-6 with central to right neural foraminal disc protrusion with generalized bulging discs and uncovertebral joint osteophytic change.  No abnormal signal in the cord.  Minor disc bulging at C3-4, C4-5 without central canal stenosis, spinal cord impingement or neural foraminal stenosis.    CT chest: 10/02/2013  No definite acute process or obvious etiology of the right-sided chest pain, axillary pain, neck and supraclavicular pain.  (full report scanned into record)    Right upper extremity venous ultrasound 10/02/2013  No evidence of DVT  lumbar spine 11/28/2017  Impression     MRI LUMBAR SPINE    TECHNIQUE: MRI lumbar spine was performed without contrast. The following sequences were obtained: Localizer; sagittal T1, T2, STIR; axial T1 and T2.    COMPARISON: None.    FINDINGS:    There are 5 lumbar vertebrae.  Vertebral body heights and alignment are maintained.  Bone marrow signal is preserved.  There is multilevel disc desiccation with preservation of disc heights. Conus terminates at L1 and appears unremarkable. Limited evaluation of posterior abdominal structures is unremarkable.  Paraspinal musculature is within normal limits.  Evaluation of sacroiliac joints is  unremarkable.    L1-L2: No spinal canal stenosis or neuroforaminal narrowing.    L2-L3: Mild bilateral facet arthropathy noted.  No spinal canal stenosis or neuroforaminal narrowing.    L3-L4: Mild bilateral facet arthropathy and circumferential disc bulge noted.  No spinal canal stenosis or neuroforaminal narrowing.    L4-L5: Severe bilateral facet arthropathy and circumferential disc bulge result in moderate spinal canal stenosis.    L5-S1: Circumferential disc bulge and severe bilateral facet arthropathy result in severe spinal canal stenosis.  Note made of synovial cyst in the left foramen, contacting the exiting L5 nerve root.       Past Medical History:   Diagnosis Date    Arthritis     Asthma     Cervicalgia     2013 tx by chiropractor    Closed dislocation of acromioclavicular joint 2013    Degenerative disc disease     Fibromyalgia     HPV (human papilloma virus) anogenital infection     conization in past-remote history-1990    HTN (hypertension)     Sciatica     Tobacco use     Ulcer 2007    stomach ulcer     Past Surgical History:   Procedure Laterality Date    acdf  4/2014    C5-6    CERVICAL CONIZATION   W/ LASER      KNEE ARTHROSCOPY      right shoulder surger      arthroscopic surgery Dec 2013     Social History     Social History    Marital status:      Spouse name: N/A    Number of children: N/A    Years of education: N/A     Occupational History    Not on file.     Social History Main Topics    Smoking status: Current Some Day Smoker     Packs/day: 0.00     Years: 40.00     Types: Cigarettes    Smokeless tobacco: Never Used      Comment: tobacco use for 40 years- 1 pack per day average    Alcohol use Yes      Comment: occasional    Drug use: No    Sexual activity: Not on file     Other Topics Concern    Not on file     Social History Narrative    , not working, 3 children (1 passed at 9 mo old), tobacco daily, ETOH socially, GYN 2013 [facility]      Family History   Problem Relation Age of Onset    Cancer Mother      lung-was tobacco user  of lung cancer at 78    Aneurysm Father      abdominal burst-  of stroke at 71 years    Heart disease Father      had HTN       Allergies   Allergen Reactions    Ambien [Zolpidem] Other (See Comments)     Pt hyperactive    Lunesta [Eszopiclone] Other (See Comments)     Severely depressed    Shellfish Containing Products Swelling and Other (See Comments)     Metallic taste in mouth  Swells all over,including the tongue and throat  Feels likes insides are swollen  Allergic to crawfish,Shrimp    Ancef [Cefazolin]      Facial swelling  Swelling abdomen    Flexeril [Cyclobenzaprine] Swelling    Gabapentin Hives    Sulfa (Sulfonamide Antibiotics) Nausea Only       Current Outpatient Prescriptions   Medication Sig    albuterol (PROAIR HFA) 90 mcg/actuation inhaler Inhale 2 puffs into the lungs every 6 (six) hours as needed for Wheezing.    amLODIPine (NORVASC) 5 MG tablet Take 1 tablet (5 mg total) by mouth once daily.    cetirizine (ZYRTEC) 10 MG tablet Take 10 mg by mouth once daily.    diphenhydrAMINE (BENADRYL) 50 MG tablet Take 50 mg by mouth nightly as needed for Itching.    DULoxetine (CYMBALTA) 60 MG capsule     famotidine (PEPCID) 20 MG tablet Take 20 mg by mouth 2 (two) times daily.    fexofenadine (ALLEGRA) 60 MG tablet Take 60 mg by mouth once daily.    fluocinonide 0.05% (LIDEX) 0.05 % cream Apply topically daily as needed.    hydrocodone-acetaminophen 5-325mg (NORCO) 5-325 mg per tablet Take 1 tablet by mouth every 8 (eight) hours as needed for Pain.    hydrOXYzine HCl (ATARAX) 25 MG tablet Take 1 tablet (25 mg total) by mouth 3 (three) times daily as needed for Itching.    nicotine (NICODERM CQ) 21 mg/24 hr Place 1 patch onto the skin once daily.    pramoxine-hydrocortisone 1-1 % lotion Apply topically 3 (three) times daily.    hydrochlorothiazide (HYDRODIURIL) 25 MG tablet Take 1  "tablet (25 mg total) by mouth once daily.    hydrocortisone 1 % cream Apply topically 2 (two) times daily.    losartan (COZAAR) 50 MG tablet Take 1 tablet (50 mg total) by mouth once daily.    methocarbamol (ROBAXIN) 500 MG Tab Take 1 tablet (500 mg total) by mouth 3 (three) times daily as needed.    MULTIVITAMIN W-MINERALS/LUTEIN (CENTRUM SILVER ORAL) Take 1 tablet by mouth once daily. Centrum Silver 50 plus     No current facility-administered medications for this visit.        REVIEW OF SYSTEMS:    GENERAL:  No weight loss, malaise or fevers.  NECK:  Negative for lumps, no difficulty with swallowing.   RESPIRATORY:  No recent URI  CARDIOVASCULAR:  Negative for chest pain, leg swelling or palpitations.  GI:  Negative for abdominal discomfort, blood in stools or black stools or change in bowel habits.  Patient had a history of stomach ulcer, never bleeding.    MUSCULOSKELETAL:  See HPI.  Patient reports having restless legs at night preventing her from being able to sleep.  SKIN: no new lesions  HEMATOLOGY/LYMPHOLOGY:  Negative for prolonged bleeding, bruising easily or swollen nodes.  Patient is not currently taking any anti-coagulants  NEURO:   No history of syncope, paralysis, seizures or tremors.  All other reviewed and negative other than HPI.    OBJECTIVE:    BP (!) 158/89   Pulse 92   Temp 98.3 °F (36.8 °C) (Oral)   Resp 18   Ht 5' 2" (1.575 m)   Wt 101 kg (222 lb 10.6 oz)   BMI 40.73 kg/m²     PHYSICAL EXAMINATION:    GENERAL: Well appearing, in no acute distress, alert and oriented x3.  PSYCH:  Mood and affect appropriate.  SKIN: Skin color, texture, turgor normal, no rashes or lesions.  HEAD/FACE:  Normocephalic, atraumatic. Cranial nerves grossly intact.  CV: RRR with palpation of the radial artery.  PULM: No evidence of respiratory difficulty, symmetric chest rise.  BACK: Straight leg raising in the sitting and supine positions is negative to radicular pain. There is pain with palpation over " the facet joints of the lumbar spine bilaterally. There is decreased range of motion with extension to 15 degrees, and facet loading maneuvers cause reproducible pain.    EXTREMITIES: Peripheral joint ROM is full and pain free without obvious instability or laxity in all four extremities. No deformities, edema, or skin discoloration. Good capillary refill.  MUSCULOSKELETAL: Hip, and knee provocative maneuvers are negative.  There is  pain with palpation over the sacroiliac joints bilaterally.  There is no pain to palpation over the greater trochanteric bursa bilaterally.  FABERs test is positive.  FADIRs test is negative.   5/5 strength in right ankle with plantar and dorsiflexion, 5/5 strength in left ankle with plantar and 4/5 dorsiflexion, 5/5 strength with right knee flexion extension, 5/5 strength with knee flexion extension on the left .  No atrophy or tone abnormalities are noted.  NEURO: Bilateral lower extremity coordination and muscle stretch reflexes are physiologic and symmetric.  Plantar response are downgoing. No clonus.  No loss of sensation is noted.  GAIT: Antalgic, ambulates without assistance    ASSESSMENT: 62 y.o. year old female with neck pain, consistent with     1. Lumbar radiculopathy     2. Facet syndrome     3. Sacroiliac joint pain     4. Lumbar disc herniation with radiculopathy     5. Foraminal stenosis of lumbosacral region       PLAN:   I'll schedule patient for left-sided L4 and L5 transforaminal epidural steroid injection by one time    In the future we can consider trialing gabapentin with gradual escalation to 1800 mg per day     F/u 3 weeks after injection with APC    If > 50% improvement with functional impairment will send for second injection        Candice Lopez  12/01/2017

## 2017-12-08 ENCOUNTER — OFFICE VISIT (OUTPATIENT)
Dept: INTERNAL MEDICINE | Facility: CLINIC | Age: 62
End: 2017-12-08
Payer: COMMERCIAL

## 2017-12-08 VITALS
WEIGHT: 225.75 LBS | HEART RATE: 74 BPM | RESPIRATION RATE: 16 BRPM | DIASTOLIC BLOOD PRESSURE: 88 MMHG | TEMPERATURE: 98 F | HEIGHT: 62 IN | SYSTOLIC BLOOD PRESSURE: 152 MMHG | BODY MASS INDEX: 41.54 KG/M2

## 2017-12-08 DIAGNOSIS — E27.8 ADRENAL MASS: ICD-10-CM

## 2017-12-08 DIAGNOSIS — F17.200 SMOKER: ICD-10-CM

## 2017-12-08 DIAGNOSIS — I10 HYPERTENSION, ESSENTIAL: Primary | ICD-10-CM

## 2017-12-08 DIAGNOSIS — Z91.89 CANDIDATE FOR STATIN THERAPY DUE TO RISK OF FUTURE CARDIOVASCULAR EVENT: ICD-10-CM

## 2017-12-08 DIAGNOSIS — E78.00 HYPERCHOLESTEROLEMIA: ICD-10-CM

## 2017-12-08 DIAGNOSIS — M54.16 LUMBAR RADICULOPATHY: ICD-10-CM

## 2017-12-08 DIAGNOSIS — I25.10 ATHEROSCLEROSIS OF NATIVE CORONARY ARTERY OF NATIVE HEART WITHOUT ANGINA PECTORIS: ICD-10-CM

## 2017-12-08 DIAGNOSIS — R91.1 PULMONARY NODULE: ICD-10-CM

## 2017-12-08 PROCEDURE — 99214 OFFICE O/P EST MOD 30 MIN: CPT | Mod: S$GLB,,, | Performed by: INTERNAL MEDICINE

## 2017-12-08 PROCEDURE — 99999 PR PBB SHADOW E&M-EST. PATIENT-LVL IV: CPT | Mod: PBBFAC,,, | Performed by: INTERNAL MEDICINE

## 2017-12-08 RX ORDER — HYDROCORTISONE 1 %
CREAM (GRAM) TOPICAL 2 TIMES DAILY
Qty: 30 G | Refills: 2 | Status: SHIPPED | OUTPATIENT
Start: 2017-12-08 | End: 2018-12-13

## 2017-12-08 RX ORDER — ATORVASTATIN CALCIUM 10 MG/1
10 TABLET, FILM COATED ORAL DAILY
Qty: 90 TABLET | Refills: 0 | Status: SHIPPED | OUTPATIENT
Start: 2017-12-08 | End: 2018-01-11 | Stop reason: SDUPTHER

## 2017-12-08 RX ORDER — AMLODIPINE BESYLATE 10 MG/1
10 TABLET ORAL DAILY
Qty: 90 TABLET | Refills: 0 | Status: SHIPPED | OUTPATIENT
Start: 2017-12-08 | End: 2018-01-11 | Stop reason: SDUPTHER

## 2017-12-08 NOTE — PROGRESS NOTES
Subjective:       Patient ID: Kaleigh Solo is a 62 y.o. female who presents for Follow-up; Hypertension; Hyperlipidemia; and Back Pain      Hypertension   This is a chronic problem. The current episode started more than 1 month ago. The problem is unchanged. The problem is uncontrolled. Pertinent negatives include no chest pain, headaches, palpitations, peripheral edema or shortness of breath. There are no associated agents to hypertension. Risk factors for coronary artery disease include obesity, smoking/tobacco exposure and dyslipidemia. Past treatments include ACE inhibitors, calcium channel blockers and diuretics. The current treatment provides mild improvement. Compliance problems include exercise.  There is no history of kidney disease, heart failure or a thyroid problem. There is no history of chronic renal disease.   Back Pain   This is a chronic problem. The current episode started more than 1 month ago. The problem occurs constantly. The problem is unchanged. The quality of the pain is described as aching. The pain is severe. The symptoms are aggravated by bending. Associated symptoms include leg pain and numbness. Pertinent negatives include no abdominal pain, chest pain, fever, headaches or weakness. Treatments tried: takes Norco as needed. The treatment provided moderate relief.   Hyperlipidemia   This is a chronic problem. The current episode started more than 1 year ago. Recent lipid tests were reviewed and are high. Exacerbating diseases include obesity. She has no history of chronic renal disease or liver disease. Associated symptoms include leg pain. Pertinent negatives include no chest pain, myalgias or shortness of breath. Treatments tried: previously took pravastatin but stopped due to concern about side effects, denies experiencing side effects. There are no compliance problems.  Risk factors for coronary artery disease include obesity and hypertension.      Reports muscle aching and spasms  of abdominal wall with losartan-HCTZ medication so changed to amlodipine 5mg daily and tolerated well. Reports no history of cough with lisinopril as previous cough was allergy-related. BP at home in the 150-160's.       Review of Systems   Constitutional: Negative for chills, fatigue and fever.   HENT: Negative for congestion, rhinorrhea, sinus pressure and sore throat.    Eyes: Negative for visual disturbance.   Respiratory: Positive for chest tightness and wheezing. Negative for cough and shortness of breath.    Cardiovascular: Negative for chest pain, palpitations and leg swelling.        Denies ankle swelling   Gastrointestinal: Negative for abdominal pain, constipation, diarrhea and nausea.   Musculoskeletal: Positive for back pain. Negative for arthralgias and myalgias.   Skin: Negative for rash.   Allergic/Immunologic: Positive for environmental allergies and food allergies.   Neurological: Positive for numbness. Negative for dizziness, weakness, light-headedness and headaches.   Hematological: Negative for adenopathy.       Objective:      Physical Exam   Constitutional: She is oriented to person, place, and time. Vital signs are normal. She appears well-developed and well-nourished. No distress.   HENT:   Head: Normocephalic and atraumatic.   Right Ear: Hearing and external ear normal.   Left Ear: Hearing and external ear normal.   Nose: Nose normal.   Mouth/Throat: Uvula is midline and oropharynx is clear and moist. No oropharyngeal exudate.   Eyes: EOM and lids are normal.   Neck: Normal range of motion.   Cardiovascular: Normal rate, regular rhythm, normal heart sounds and intact distal pulses.    No murmur heard.  Pulmonary/Chest: Effort normal and breath sounds normal. She has no wheezes.   Abdominal: Soft. Bowel sounds are normal. She exhibits no distension. There is no tenderness.   Musculoskeletal: Normal range of motion. She exhibits no edema.   Neurological: She is alert and oriented to person,  place, and time. She has normal strength.   Skin: Skin is warm, dry and intact. No rash noted. She is not diaphoretic.   Psychiatric: She has a normal mood and affect.   Vitals reviewed.      Assessment:       1. Hypertension, essential    2. Pulmonary nodule    3. Smoker    4. Hypercholesterolemia    5. Candidate for statin therapy due to risk of future cardiovascular event    6. Atherosclerosis of native coronary artery of native heart without angina pectoris    7. Adrenal mass    8. Lumbar radiculopathy        Plan:       1. Hypertension, essential  - amLODIPine (NORVASC) 10 MG tablet; Take 1 tablet (10 mg total) by mouth once daily.  Dispense: 90 tablet; Refill: 0  - patient stopped losartan and HCTZ, increase amlodipine to 10mg daily, call early next week with BG readings    2. Pulmonary nodule  - Ambulatory Referral to Pulmonology  - no masses identified on chest CT in 2013 per report, unable to open disc    3. Smoker  - Ambulatory Referral to Pulmonology    4. Hypercholesterolemia  - atorvastatin (LIPITOR) 10 MG tablet; Take 1 tablet (10 mg total) by mouth once daily.  Dispense: 90 tablet; Refill: 0  - high ASCVD score, evidence of coronary atherosclerosis  - lipid panel and CMP in 3 months    5. Candidate for statin therapy due to risk of future cardiovascular event  - atorvastatin (LIPITOR) 10 MG tablet; Take 1 tablet (10 mg total) by mouth once daily.  Dispense: 90 tablet; Refill: 0    6. Atherosclerosis of native coronary artery of native heart without angina pectoris  - atorvastatin (LIPITOR) 10 MG tablet; Take 1 tablet (10 mg total) by mouth once daily.  Dispense: 90 tablet; Refill: 0    7. Adrenal mass  - reviewed CT from [facility] 10/2/13, 2.2cm left adrenal mass that is stable    8. Lumbar radiculopathy  - CIRILO scheduled 12/20    RTC in 3 months or sooner if needed    Felicity Middleton MD

## 2017-12-12 ENCOUNTER — OFFICE VISIT (OUTPATIENT)
Dept: PULMONOLOGY | Facility: CLINIC | Age: 62
End: 2017-12-12
Payer: COMMERCIAL

## 2017-12-12 VITALS
WEIGHT: 226.19 LBS | DIASTOLIC BLOOD PRESSURE: 82 MMHG | OXYGEN SATURATION: 98 % | BODY MASS INDEX: 41.62 KG/M2 | HEIGHT: 62 IN | SYSTOLIC BLOOD PRESSURE: 130 MMHG | HEART RATE: 105 BPM

## 2017-12-12 DIAGNOSIS — R91.1 PULMONARY NODULE: Primary | ICD-10-CM

## 2017-12-12 DIAGNOSIS — Z72.0 TOBACCO ABUSE DISORDER: ICD-10-CM

## 2017-12-12 PROCEDURE — 99999 PR PBB SHADOW E&M-EST. PATIENT-LVL III: CPT | Mod: PBBFAC,,, | Performed by: INTERNAL MEDICINE

## 2017-12-12 PROCEDURE — 99203 OFFICE O/P NEW LOW 30 MIN: CPT | Mod: S$GLB,,, | Performed by: INTERNAL MEDICINE

## 2017-12-12 RX ORDER — FLUCONAZOLE 150 MG/1
TABLET ORAL
Status: ON HOLD | COMMUNITY
Start: 2017-12-09 | End: 2018-01-24

## 2017-12-12 RX ORDER — HYDROCHLOROTHIAZIDE 25 MG/1
TABLET ORAL
Status: ON HOLD | COMMUNITY
Start: 2017-12-09 | End: 2018-01-24

## 2017-12-12 NOTE — PROGRESS NOTES
Subjective:       Patient ID: Kaleigh Solo is a 62 y.o. female.    Chief Complaint: Abnormal Ct Scan    HPI:   Kaleigh Solo is a 62 y.o. female who presents with for evaluation of an abnormal CT Chest.    Patient is a current smoker.  45 year smoking history/1ppd  Tried chantix previously without success, depression.  Currently enrolled in the smoking cessation program.  She is have low dose CT Chest surveillance.    No unintended weight loss.  No hemoptysis.  Family history of Lung CA-  Mom dx'd in her 60s (smoker).  Currently on disability. No prior pertinent occupational exposures.    Review of Systems   Constitutional: Negative for fever, chills and activity change.   HENT: Negative for postnasal drip, rhinorrhea, sinus pressure and congestion.    Respiratory: Negative for cough, hemoptysis, shortness of breath and dyspnea on extertion.    Cardiovascular: Negative for chest pain and leg swelling.   Genitourinary: Negative for difficulty urinating.   Endocrine: Negative for cold intolerance and heat intolerance.    Musculoskeletal: Positive for back pain (past spinal fusion). Negative for joint swelling and myalgias.        Nerve damage in the right arm.   Gastrointestinal: Negative for nausea, vomiting and abdominal pain.   Neurological: Negative for headaches.   Hematological: Negative for adenopathy. No excessive bruising.   Psychiatric/Behavioral: Negative for confusion and sleep disturbance.         Social History   Substance Use Topics    Smoking status: Current Some Day Smoker     Packs/day: 0.00     Years: 40.00     Types: Cigarettes    Smokeless tobacco: Never Used      Comment: tobacco use for 40 years- 1 pack per day average    Alcohol use Yes      Comment: occasional       Review of patient's allergies indicates:   Allergen Reactions    Ambien [zolpidem] Other (See Comments)     Pt hyperactive    Lunesta [eszopiclone] Other (See Comments)     Severely depressed    Shellfish containing  products Swelling and Other (See Comments)     Metallic taste in mouth  Swells all over,including the tongue and throat  Feels likes insides are swollen  Allergic to crawfish,Shrimp    Ancef [cefazolin]      Facial swelling  Swelling abdomen    Flexeril [cyclobenzaprine] Swelling    Gabapentin Hives    Sulfa (sulfonamide antibiotics) Nausea Only     Past Medical History:   Diagnosis Date    Arthritis     Asthma     Cervicalgia     2013 tx by chiropractor    Closed dislocation of acromioclavicular joint 2013    Degenerative disc disease     Fibromyalgia     HPV (human papilloma virus) anogenital infection     conization in past-remote history-1990    HTN (hypertension)     Sciatica     Tobacco use     Ulcer 2007    stomach ulcer     Past Surgical History:   Procedure Laterality Date    acdf  4/2014    C5-6    CERVICAL CONIZATION   W/ LASER      KNEE ARTHROSCOPY      right shoulder surger      arthroscopic surgery Dec 2013     Current Outpatient Prescriptions on File Prior to Visit   Medication Sig    albuterol (PROAIR HFA) 90 mcg/actuation inhaler Inhale 2 puffs into the lungs every 6 (six) hours as needed for Wheezing.    amLODIPine (NORVASC) 10 MG tablet Take 1 tablet (10 mg total) by mouth once daily.    atorvastatin (LIPITOR) 10 MG tablet Take 1 tablet (10 mg total) by mouth once daily.    cetirizine (ZYRTEC) 10 MG tablet Take 10 mg by mouth once daily.    diphenhydrAMINE (BENADRYL) 50 MG tablet Take 50 mg by mouth nightly as needed for Itching.    DULoxetine (CYMBALTA) 60 MG capsule     famotidine (PEPCID) 20 MG tablet Take 20 mg by mouth 2 (two) times daily.    fexofenadine (ALLEGRA) 60 MG tablet Take 60 mg by mouth once daily.    fluocinonide 0.05% (LIDEX) 0.05 % cream Apply topically daily as needed.    hydrocodone-acetaminophen 5-325mg (NORCO) 5-325 mg per tablet Take 1 tablet by mouth every 8 (eight) hours as needed for Pain.    hydrocortisone 1 % cream Apply topically 2 (two)  times daily.    hydrOXYzine HCl (ATARAX) 25 MG tablet Take 1 tablet (25 mg total) by mouth 3 (three) times daily as needed for Itching.    methocarbamol (ROBAXIN) 500 MG Tab Take 1 tablet (500 mg total) by mouth 3 (three) times daily as needed.    MULTIVITAMIN W-MINERALS/LUTEIN (CENTRUM SILVER ORAL) Take 1 tablet by mouth once daily. Centrum Silver 50 plus    nicotine (NICODERM CQ) 21 mg/24 hr Place 1 patch onto the skin once daily.    pramoxine-hydrocortisone 1-1 % lotion Apply topically 3 (three) times daily.     No current facility-administered medications on file prior to visit.        Objective:      Vitals:    12/12/17 1607   BP: 130/82   Pulse: 105     Physical Exam   Constitutional: She is oriented to person, place, and time. She appears well-developed and well-nourished. She appears distressed.   HENT:   Head: Normocephalic.   Mouth/Throat: No oropharyngeal exudate. Mallampati Score: III.   Neck: Normal range of motion. Neck supple. No tracheal deviation present.   Cardiovascular: Normal rate and regular rhythm.    No murmur heard.  Pulmonary/Chest: Normal expansion, effort normal and breath sounds normal. She has no wheezes. She has no rales.   Abdominal: Soft. Bowel sounds are normal. She exhibits no distension. There is no tenderness.   Musculoskeletal: Normal range of motion. She exhibits edema (non-pitting).   Lymphadenopathy: No supraclavicular adenopathy is present.     She has no cervical adenopathy.   Neurological: She is alert and oriented to person, place, and time.   Skin: Skin is warm and dry.   Psychiatric: She has a normal mood and affect. Her behavior is normal.   Vitals reviewed.    Personal Diagnostic Review  CT of chest performed on 11/28/17 without contrast revealed 0.2cm nodule in the right upper lobe and a stable adrenal nodule.        Assessment:     Orders Placed This Encounter   Procedures    CT Chest Without Contrast     Standing Status:   Future     Standing Expiration Date:    12/12/2018     Order Specific Question:   May the Radiologist modify the order per protocol to meet the clinical needs of the patient?     Answer:   Yes     1. Pulmonary nodule    2. Tobacco abuse disorder        Plan:       · 0.2cm nodule in a 'high risk' patient.  Per [employer] Society guideline will plan for 12 month repeat scan.  · Encouraged smoking cessation efforts.    Follow up in 12 months.

## 2017-12-20 ENCOUNTER — HOSPITAL ENCOUNTER (OUTPATIENT)
Facility: OTHER | Age: 62
Discharge: HOME | End: 2017-12-20
Attending: ANESTHESIOLOGY | Admitting: ANESTHESIOLOGY
Payer: COMMERCIAL

## 2017-12-20 ENCOUNTER — SURGERY (OUTPATIENT)
Age: 62
End: 2017-12-20

## 2017-12-20 VITALS
HEIGHT: 62 IN | DIASTOLIC BLOOD PRESSURE: 67 MMHG | SYSTOLIC BLOOD PRESSURE: 119 MMHG | WEIGHT: 230 LBS | BODY MASS INDEX: 42.33 KG/M2 | HEART RATE: 85 BPM | TEMPERATURE: 98 F | OXYGEN SATURATION: 94 % | RESPIRATION RATE: 18 BRPM

## 2017-12-20 DIAGNOSIS — M54.16 LUMBAR RADICULOPATHY: Primary | ICD-10-CM

## 2017-12-20 DIAGNOSIS — G89.29 CHRONIC PAIN: ICD-10-CM

## 2017-12-20 PROCEDURE — 63600175 PHARM REV CODE 636 W HCPCS: Performed by: ANESTHESIOLOGY

## 2017-12-20 PROCEDURE — 64484 NJX AA&/STRD TFRM EPI L/S EA: CPT | Performed by: ANESTHESIOLOGY

## 2017-12-20 PROCEDURE — 25500020 PHARM REV CODE 255: Performed by: ANESTHESIOLOGY

## 2017-12-20 PROCEDURE — 64483 NJX AA&/STRD TFRM EPI L/S 1: CPT | Mod: LT,,, | Performed by: ANESTHESIOLOGY

## 2017-12-20 PROCEDURE — 25000003 PHARM REV CODE 250: Performed by: ANESTHESIOLOGY

## 2017-12-20 PROCEDURE — 64484 NJX AA&/STRD TFRM EPI L/S EA: CPT | Mod: LT,,, | Performed by: ANESTHESIOLOGY

## 2017-12-20 PROCEDURE — 64483 NJX AA&/STRD TFRM EPI L/S 1: CPT | Performed by: ANESTHESIOLOGY

## 2017-12-20 RX ORDER — ALPRAZOLAM 0.5 MG/1
1 TABLET, ORALLY DISINTEGRATING ORAL ONCE
Status: COMPLETED | OUTPATIENT
Start: 2017-12-20 | End: 2017-12-20

## 2017-12-20 RX ORDER — LIDOCAINE HYDROCHLORIDE 10 MG/ML
INJECTION INFILTRATION; PERINEURAL
Status: DISCONTINUED | OUTPATIENT
Start: 2017-12-20 | End: 2017-12-20 | Stop reason: HOSPADM

## 2017-12-20 RX ORDER — SODIUM CHLORIDE 9 MG/ML
500 INJECTION, SOLUTION INTRAVENOUS CONTINUOUS
Status: DISCONTINUED | OUTPATIENT
Start: 2017-12-20 | End: 2017-12-20 | Stop reason: HOSPADM

## 2017-12-20 RX ORDER — METHYLPREDNISOLONE ACETATE 40 MG/ML
INJECTION, SUSPENSION INTRA-ARTICULAR; INTRALESIONAL; INTRAMUSCULAR; SOFT TISSUE
Status: DISCONTINUED | OUTPATIENT
Start: 2017-12-20 | End: 2017-12-20 | Stop reason: HOSPADM

## 2017-12-20 RX ORDER — BUPIVACAINE HYDROCHLORIDE 2.5 MG/ML
INJECTION, SOLUTION EPIDURAL; INFILTRATION; INTRACAUDAL
Status: DISCONTINUED | OUTPATIENT
Start: 2017-12-20 | End: 2017-12-20 | Stop reason: HOSPADM

## 2017-12-20 RX ADMIN — IOHEXOL 5 ML: 300 INJECTION, SOLUTION INTRAVENOUS at 09:12

## 2017-12-20 RX ADMIN — BUPIVACAINE HYDROCHLORIDE 10 ML: 2.5 INJECTION, SOLUTION EPIDURAL; INFILTRATION; INTRACAUDAL; PERINEURAL at 09:12

## 2017-12-20 RX ADMIN — METHYLPREDNISOLONE ACETATE 80 MG: 40 INJECTION, SUSPENSION INTRA-ARTICULAR; INTRALESIONAL; INTRAMUSCULAR; SOFT TISSUE at 09:12

## 2017-12-20 RX ADMIN — LIDOCAINE HYDROCHLORIDE 10 ML: 10 INJECTION, SOLUTION INFILTRATION; PERINEURAL at 09:12

## 2017-12-20 RX ADMIN — ALPRAZOLAM 1 MG: 0.5 TABLET, ORALLY DISINTEGRATING ORAL at 09:12

## 2017-12-20 NOTE — DISCHARGE INSTRUCTIONS
Home Care Instructions Pain Management:    1. DIET:   You may resume your normal diet today.   2. BATHING:   You may shower with luke warm water.  3. DRESSING:   You may remove your bandage today.   4. ACTIVITY LEVEL:   You may resume your normal activities 24 hrs after your procedure.  5. MEDICATIONS:   You may resume your normal medications today.   6. SPECIAL INSTRUCTIONS:   No heat to the injection site for 24 hrs including, bath or shower, heating pad, moist heat, or hot tubs.    Use ice pack to injection site for any pain or discomfort.  Apply ice packs for 20 minute intervals as needed.   If you have received any sedatives by mouth today you may not drive for 12 hours.    If you have received any sedation through your IV, you may not drive for 24 hrs.     PLEASE CALL YOUR DOCTOR IF:  1. Redness or swelling around the injection site.  2. Fever of 101 degrees  3. Drainage (pus) from the injection site.  4. For any continuous bleeding (some dried blood over the incision is normal.)    FOR EMERGENCIES:   If any unusual problems or difficulties occur during clinic hours, call [phone] or [phone].     Thank you for allowing us to care for you today. You may receive a survey about the care we provided. Your feedback is valuable and helps us provide excellent care throughout the community.

## 2017-12-20 NOTE — OP NOTE
INFORMED CONSENT: The procedure, risks, benefits and options were discussed with patient. There are no contraindications to the procedure. The patient expressed understanding and agreed to proceed. The personnel performing the procedure was discussed.    12/20/2017    Surgeon: Candice Lopez MD    Assistants:   Gucci Johnson DO, PGY-5, Pain Fellow  I was present and supervising all critical portions of the procedure      PROCEDURE:    1)  Left  L5 TRANSFORAMINAL EPIDURAL STEROID INJECTION    2)  Left  L4 TRANSFORAMINAL EPIDURAL STEROID INJECTION    Pre Procedure diagnosis:    Left  L4 and L5  Lumbar radiculopathy [M54.16]    Post-Procedure diagnosis:   same    Complications: None    Specimens: None      DESCRIPTION OF PROCEDURE: The patient was brought to the procedure room. IV access was obtained prior to the procedure. The patient was positioned prone on the fluoroscopy table. Continuous hemodynamic monitoring was initiated including blood pressure, EKG, and pulse oximetry. . The skin was prepped with chlorhexidine and draped in a sterile fashion. Skin anesthesia was achieved using a total of 10mL of lidocaine, 5mL over each respective injection site.     The  L4 and L5  transforaminal spaces were identified with fluoroscopy in the  AP, oblique, and lateral views.  A 22 gauge spinal quinke needle was then advanced into the area of the trans foraminal spaces bilaterally with confirmation of proper needle position using AP, oblique, and lateral fluoroscopic views. Once the needle tip was in the area of the transforaminal space, and there was no blood, CSF or paraesthesias,  1.5 mL of Omnipaque 300mg/ml was injected on each side for a total of 3mL.  Fluoroscopic imaging in the AP and lateral views revealed a clear outline of the spinal nerve with proximal spread of agent through the neural foramen into the epidural space. A total combination of 1 mL of Bupivicaine 0.25% and 40 mg depo medrol was injected on each side  for a total of 4mL of injected medications with displacement of the contrast dye confirming that the medication went into the area of the transforaminal spaces bilaterally. A sterile dressing was applied.   Patient tolerated the procedure well.    Patient was taken back to the recovery room for further observation.     The patient was discharged to home in stable condition

## 2017-12-20 NOTE — OP NOTE
INFORMED CONSENT: The procedure, risks, benefits and options were discussed with patient. There are no contraindications to the procedure. The patient expressed understanding and agreed to proceed. The personnel performing the procedure was discussed.    12/20/2017    Surgeon: Candice Lopez MD    Assistants:   Gucci Johnson DO, PGY-5, Pain Fellow  I was present and supervising all critical portions of the procedure      PROCEDURE:    1)  Left  L4 TRANSFORAMINAL EPIDURAL STEROID INJECTION    2)  Left  L5 TRANSFORAMINAL EPIDURAL STEROID INJECTION    Pre Procedure diagnosis:    Left  L4 and L5  Lumbar radiculopathy [M54.16]    Post-Procedure diagnosis:   same    Complications: None    Specimens: None      DESCRIPTION OF PROCEDURE: The patient was brought to the procedure room. IV access was obtained prior to the procedure. The patient was positioned prone on the fluoroscopy table. Continuous hemodynamic monitoring was initiated including blood pressure, EKG, and pulse oximetry. . The skin was prepped with chlorhexidine and draped in a sterile fashion. Skin anesthesia was achieved using a total of 10mL of lidocaine, 5mL over each respective injection site.     The  L4 and L5  transforaminal spaces were identified with fluoroscopy in the  AP, oblique, and lateral views.  A 22 gauge spinal quinke needle was then advanced into the area of the trans foraminal spaces bilaterally with confirmation of proper needle position using AP, oblique, and lateral fluoroscopic views. Once the needle tip was in the area of the transforaminal space, and there was no blood, CSF or paraesthesias,  1.5 mL of Omnipaque 300mg/ml was injected on each side for a total of 3mL.  Fluoroscopic imaging in the AP and lateral views revealed a clear outline of the spinal nerve with proximal spread of agent through the neural foramen into the epidural space. A total combination of 1 mL of Bupivicaine 0.25% and 40 mg depo medrol was injected on each side  for a total of 4mL of injected medications with displacement of the contrast dye confirming that the medication went into the area of the transforaminal spaces bilaterally. A sterile dressing was applied.   Patient tolerated the procedure well.    Patient was taken back to the recovery room for further observation.     The patient was discharged to home in stable condition

## 2017-12-20 NOTE — DISCHARGE SUMMARY
Discharge Note  Short Stay      SUMMARY     Admit Date: 12/20/2017    Attending Physician: Candice Lopez    Discharge Diagnosis: Lumbar radiculopathy [M54.16]    Discharge Physician: Candice Lopez      Discharge Date: 12/20/2017 10:21 AM     PROCEDURE:    1)  Left  L4 TRANSFORAMINAL EPIDURAL STEROID INJECTION    2)  Left  L5 TRANSFORAMINAL EPIDURAL STEROID INJECTION    Pre Procedure diagnosis:    Left  L4 and L5  Lumbar radiculopathy [M54.16]    Disposition: Home or self care    Patient Instructions:   Current Discharge Medication List      CONTINUE these medications which have NOT CHANGED    Details   cetirizine (ZYRTEC) 10 MG tablet Take 10 mg by mouth once daily.      hydrOXYzine HCl (ATARAX) 25 MG tablet Take 1 tablet (25 mg total) by mouth 3 (three) times daily as needed for Itching.  Qty: 30 tablet, Refills: 2      albuterol (PROAIR HFA) 90 mcg/actuation inhaler Inhale 2 puffs into the lungs every 6 (six) hours as needed for Wheezing.  Qty: 18 g, Refills: 11      amLODIPine (NORVASC) 10 MG tablet Take 1 tablet (10 mg total) by mouth once daily.  Qty: 90 tablet, Refills: 0    Associated Diagnoses: Hypertension, essential      atorvastatin (LIPITOR) 10 MG tablet Take 1 tablet (10 mg total) by mouth once daily.  Qty: 90 tablet, Refills: 0    Associated Diagnoses: Candidate for statin therapy due to risk of future cardiovascular event; Hypercholesterolemia; Atherosclerosis of native coronary artery of native heart without angina pectoris      diphenhydrAMINE (BENADRYL) 50 MG tablet Take 50 mg by mouth nightly as needed for Itching.      DULoxetine (CYMBALTA) 60 MG capsule       famotidine (PEPCID) 20 MG tablet Take 20 mg by mouth 2 (two) times daily.      fexofenadine (ALLEGRA) 60 MG tablet Take 60 mg by mouth once daily.      fluconazole (DIFLUCAN) 150 MG Tab       fluocinonide 0.05% (LIDEX) 0.05 % cream Apply topically daily as needed.  Qty: 60 g, Refills: 2      hydroCHLOROthiazide (HYDRODIURIL) 25 MG  tablet       hydrocodone-acetaminophen 5-325mg (NORCO) 5-325 mg per tablet Take 1 tablet by mouth every 8 (eight) hours as needed for Pain.  Qty: 30 tablet, Refills: 0      hydrocortisone 1 % cream Apply topically 2 (two) times daily.  Qty: 30 g, Refills: 2      methocarbamol (ROBAXIN) 500 MG Tab Take 1 tablet (500 mg total) by mouth 3 (three) times daily as needed.  Qty: 60 tablet, Refills: 0      MULTIVITAMIN W-MINERALS/LUTEIN (CENTRUM SILVER ORAL) Take 1 tablet by mouth once daily. Centrum Silver 50 plus      nicotine (NICODERM CQ) 21 mg/24 hr Place 1 patch onto the skin once daily.  Qty: 28 patch, Refills: 3    Associated Diagnoses: Cigarette nicotine dependence without complication      pramoxine-hydrocortisone 1-1 % lotion Apply topically 3 (three) times daily.             Resume home diet and activity

## 2017-12-20 NOTE — DISCHARGE SUMMARY
Discharge Note  Short Stay      SUMMARY     Admit Date: 12/20/2017    Attending Physician: Candice Lopez    Discharge Diagnosis: Lumbar radiculopathy [M54.16]    Discharge Physician: Candice Lopez      Discharge Date: 12/20/2017 9:53 AM     PROCEDURE:    1)  Left  L5 TRANSFORAMINAL EPIDURAL STEROID INJECTION    2)  Left  L4 TRANSFORAMINAL EPIDURAL STEROID INJECTION    Pre Procedure diagnosis:    Left  L4 and L5  Lumbar radiculopathy [M54.16]    Post-Procedure diagnosis:   same    Disposition: Home or self care    Patient Instructions:   Current Discharge Medication List      CONTINUE these medications which have NOT CHANGED    Details   cetirizine (ZYRTEC) 10 MG tablet Take 10 mg by mouth once daily.      hydrOXYzine HCl (ATARAX) 25 MG tablet Take 1 tablet (25 mg total) by mouth 3 (three) times daily as needed for Itching.  Qty: 30 tablet, Refills: 2      albuterol (PROAIR HFA) 90 mcg/actuation inhaler Inhale 2 puffs into the lungs every 6 (six) hours as needed for Wheezing.  Qty: 18 g, Refills: 11      amLODIPine (NORVASC) 10 MG tablet Take 1 tablet (10 mg total) by mouth once daily.  Qty: 90 tablet, Refills: 0    Associated Diagnoses: Hypertension, essential      atorvastatin (LIPITOR) 10 MG tablet Take 1 tablet (10 mg total) by mouth once daily.  Qty: 90 tablet, Refills: 0    Associated Diagnoses: Candidate for statin therapy due to risk of future cardiovascular event; Hypercholesterolemia; Atherosclerosis of native coronary artery of native heart without angina pectoris      diphenhydrAMINE (BENADRYL) 50 MG tablet Take 50 mg by mouth nightly as needed for Itching.      DULoxetine (CYMBALTA) 60 MG capsule       famotidine (PEPCID) 20 MG tablet Take 20 mg by mouth 2 (two) times daily.      fexofenadine (ALLEGRA) 60 MG tablet Take 60 mg by mouth once daily.      fluconazole (DIFLUCAN) 150 MG Tab       fluocinonide 0.05% (LIDEX) 0.05 % cream Apply topically daily as needed.  Qty: 60 g, Refills: 2       hydroCHLOROthiazide (HYDRODIURIL) 25 MG tablet       hydrocodone-acetaminophen 5-325mg (NORCO) 5-325 mg per tablet Take 1 tablet by mouth every 8 (eight) hours as needed for Pain.  Qty: 30 tablet, Refills: 0      hydrocortisone 1 % cream Apply topically 2 (two) times daily.  Qty: 30 g, Refills: 2      methocarbamol (ROBAXIN) 500 MG Tab Take 1 tablet (500 mg total) by mouth 3 (three) times daily as needed.  Qty: 60 tablet, Refills: 0      MULTIVITAMIN W-MINERALS/LUTEIN (CENTRUM SILVER ORAL) Take 1 tablet by mouth once daily. Centrum Silver 50 plus      nicotine (NICODERM CQ) 21 mg/24 hr Place 1 patch onto the skin once daily.  Qty: 28 patch, Refills: 3    Associated Diagnoses: Cigarette nicotine dependence without complication      pramoxine-hydrocortisone 1-1 % lotion Apply topically 3 (three) times daily.             Resume home diet and activity

## 2018-01-11 DIAGNOSIS — I10 HYPERTENSION, ESSENTIAL: ICD-10-CM

## 2018-01-11 DIAGNOSIS — I25.10 ATHEROSCLEROSIS OF NATIVE CORONARY ARTERY OF NATIVE HEART WITHOUT ANGINA PECTORIS: ICD-10-CM

## 2018-01-11 DIAGNOSIS — Z91.89 CANDIDATE FOR STATIN THERAPY DUE TO RISK OF FUTURE CARDIOVASCULAR EVENT: ICD-10-CM

## 2018-01-11 DIAGNOSIS — E78.00 HYPERCHOLESTEROLEMIA: ICD-10-CM

## 2018-01-11 RX ORDER — HYDROXYZINE HYDROCHLORIDE 25 MG/1
25 TABLET, FILM COATED ORAL 3 TIMES DAILY PRN
Qty: 60 TABLET | Refills: 2 | Status: SHIPPED | OUTPATIENT
Start: 2018-01-11 | End: 2018-10-01 | Stop reason: SDUPTHER

## 2018-01-11 RX ORDER — ATORVASTATIN CALCIUM 10 MG/1
10 TABLET, FILM COATED ORAL DAILY
Qty: 90 TABLET | Refills: 0 | Status: SHIPPED | OUTPATIENT
Start: 2018-01-11 | End: 2018-03-07 | Stop reason: SDUPTHER

## 2018-01-11 RX ORDER — AMLODIPINE BESYLATE 10 MG/1
10 TABLET ORAL DAILY
Qty: 90 TABLET | Refills: 0 | Status: SHIPPED | OUTPATIENT
Start: 2018-01-11 | End: 2018-03-07 | Stop reason: SDUPTHER

## 2018-01-15 ENCOUNTER — TELEPHONE (OUTPATIENT)
Dept: NEUROSURGERY | Facility: CLINIC | Age: 63
End: 2018-01-15

## 2018-01-15 ENCOUNTER — OFFICE VISIT (OUTPATIENT)
Dept: PAIN MEDICINE | Facility: CLINIC | Age: 63
End: 2018-01-15
Attending: ANESTHESIOLOGY
Payer: COMMERCIAL

## 2018-01-15 VITALS
TEMPERATURE: 98 F | WEIGHT: 228.38 LBS | SYSTOLIC BLOOD PRESSURE: 148 MMHG | BODY MASS INDEX: 42.03 KG/M2 | DIASTOLIC BLOOD PRESSURE: 89 MMHG | HEART RATE: 88 BPM | HEIGHT: 62 IN

## 2018-01-15 DIAGNOSIS — M48.07 FORAMINAL STENOSIS OF LUMBOSACRAL REGION: ICD-10-CM

## 2018-01-15 DIAGNOSIS — M51.16 LUMBAR DISC HERNIATION WITH RADICULOPATHY: ICD-10-CM

## 2018-01-15 DIAGNOSIS — M47.899 FACET SYNDROME: Primary | ICD-10-CM

## 2018-01-15 DIAGNOSIS — M54.16 LUMBAR RADICULOPATHY: ICD-10-CM

## 2018-01-15 PROCEDURE — 99999 PR PBB SHADOW E&M-EST. PATIENT-LVL IV: CPT | Mod: PBBFAC,,, | Performed by: ANESTHESIOLOGY

## 2018-01-15 PROCEDURE — 99214 OFFICE O/P EST MOD 30 MIN: CPT | Mod: S$GLB,,, | Performed by: ANESTHESIOLOGY

## 2018-01-15 RX ORDER — OXYCODONE AND ACETAMINOPHEN 5; 325 MG/1; MG/1
1 TABLET ORAL EVERY 8 HOURS PRN
Qty: 30 TABLET | Refills: 0 | Status: SHIPPED | OUTPATIENT
Start: 2018-01-15 | End: 2018-03-07

## 2018-01-15 NOTE — TELEPHONE ENCOUNTER
Called pt. She saw Dr Lopez today and he recommended she see Dr Rashid re: surgery. Scheduled for Wednesday.    Pt is calling to schedule a surgery consult, per Dr. Lopez @ [facility]. Pt confirmed MRI completed @ [facility] on 11/22/17. Pls call.   ----- Message from Jerod Rodriguez sent at 1/15/2018  2:09 PM CST -----  Contact: Pt  Pt would like to schedule a Surgery consult with dr Rashid, I was not able to sched  Could someone call pt and sched consult  [phone]

## 2018-01-15 NOTE — PROGRESS NOTES
Referring Physician: No ref. provider found    Chief Complaint:   No chief complaint on file.       SUBJECTIVE:   Interval history 01/15/2018  The patient returns to the clinic for a follow up visit, she is reporting pain of 10 /10 today.  She is s/p Left L4 and L5 TFESI performed on 12/20/17.  She estimates 100% pain relief for ~ 2 weeks.  Was able to be physically active during that time period, with significantly improved functional mobility.  Within past 2 weeks she has had recurrence of pain in same location, quality, intensity.   Continued pain in lumbar area radiating to the lateral/posterolateral thigh and to the posterior calf. Leg pain is worse than back pain. Additionally has developed pain at lateral hip area - greater trochanteric bursa. Now walking ~ 1 block.    Denies infection, new weakness, bowel/bladder dysfunction/incontinence, saddle anesthesia.       Interval history 12/01/2017   The patient returns to the clinic for a follow up visit, she is reporting pain of 5 /10 today.  She has been doing better since cervical surgery but has been having occasional radicular symptoms.  Currently she comes in for lower extremities radiculopathy predominantly in the left lower extremity in the L4-5 distribution.  She had an MRI of the lumbar spine which shows  cyst at the encroachment on the left L5 nerve root.  Additionally the patient has significant facet arthropathy throughout the lumbar spine    Interval history 04/02/2015:  Since previous encounter patient reports low back pain radiating down both lower extremities. Patient stated that she had Synvisc injection on 03/20/15 and this helped with her knee pain. Patient stated that she still has neck pain that radiates to both upper extremities. She stated that she needs a refill on her Gabapentin and Flexeril these medications have been helping with the pain. Patient also reported that she sees a chiropractor for her back pain. Patient reports no other  health changes since her last visit. Patient reports her pain 6/10 today.  Interval history 03/02/2015:  Since previous encounter the patient reported having had knee arthroscopy and was told to try euflexxa injections versus total knee replacement by her orthopedist.  She was originally scheduled to have her orthopedist inject her knee with corticosteroids this morning but due to scheduling conflict the patient was placed on my schedule for an injection.  Unfortunately the patient has also been actively treated for upper respiratory infection and is currently on antibiotics.  She has been continuing home exercises with regularity stating good days and bad days with regards to her knee pain.  Additionally she continues to have upper neck and bilateral shoulder pain upper extremity radiculopathy and lower back pain.  Since her arthroscopic surgery the patient has been having intermittent hives and has been placed by an allergist on hydroxyzine and famotidine but has not had complete relief of these symptoms.    Interval history 10/21/2014:  Since previous encounter patient was scheduled for an CIRILO but cancelled due to her having a cold. Patient reports she still has a cold. Patient reports neck radiating down her right arm with numbness and tingling. Patient stated on the way over her arm went numb. She states that driving is becoming harder as she feels she has a decreased hand  with opening of jars. Patient reports that she's been taking Mucinex and Thera Flu for her cold symptoms. Patient reports she been having hives which has been followed by an allergist with testing pending.  Additionally she has been seen by rheumatologist who diagnosed her with fibromyalgia.She states that she takes 6 caps of Gabapentin a day however she was titrating down down she states that when she gets to 4 caps a day she breaks out in hives and then quickly re escalates back to 6 tablets per day. Patient states that she's starting  to have pain at the bottom of her feet when walking on her rugs in her home she states that just started yesterday. Patient reports her pain 8/10.  She says that she has been increasing her protein intake and has been continuing to do exercises improving her range of motion in her shoulder and neck and states that overall she feels as if she is doing better.  Patient reports no other health changes since her last visit.   Interval history 8/25/2014:  Patient continues to have radicular symptoms in the neck radiating down to the right upper extremity.  Additionally since previous encounter she had a dental abscess which is being treated with accommodation of antibiotics and having had her tooth pulled.  She is still antibiotics and has a followup visit in mid-September with her dentist to evaluate efficacy of treatment.    Interval history 7/25/2014:  Since previous encounter the patient is on gabapentin 1800 mg per day and this is helping with her shoulder pain although she does state that she is having restless legs at night which is preventing her from being able to sleep.  Additionally she is having anterior thigh radicular pain.  She has had no other health changes she continues to do physical therapy exercises at home.  She reports her pain level is a 6/10 today.  Interval history 7/2/2014:  Patient is status post ACDF in 4/2014.  She is healing very well from surgery, but continues to have right shoulder pain which she had prior to her surgery despite having a labral repair.  Additionally she continues to have neuropathic symptoms in bilateral arms and in the axilla bilaterally.  She has been undergoing physical therapy and states that it does help her including heat ultrasound to the muscles of the neck.  She states that this is only temporary relief.  She continues to use the topical compounded pain cream which does help her.  After her surgery she discontinued taking gabapentin which was previously helping  "her and which she was tolerating without side effects.  She reports her pain as a 3/10 today.  She had been taking hydrocodone/acetaminophen 10/325 3 times a day when necessary as prescribed by her surgeon, but this is being discontinued.      Interval history 3/11/2014:  Since previous encounter patient reports that she had an episode where she felt as if her neck "locked up" in that now when she is waking up in the morning she has bilateral upper extremity numbness which improves throughout the day.  She does not report any weakness.  She states that she has been having persistent daily headaches from the occiput over the vertex of the supraorbital ridge bilaterally, and continues to have pain in the right side of her neck and shoulder.  She reports that she is still taking the gabapentin 1800 mg per day, and hydrocodone/acetaminophen when necessary which helps a little.    Interval history 2/24/2014:  Patient is status post cervical interlaminar epidural steroid injection by 2 on 1/24/2014 and 2/5/2014.  Patient has persisting radicular symptoms into her right upper extremity and shoulder the anterior aspect of the neck and base of the jaw.  Patient reported approximately 10% improvement in her pain symptoms after the first injection similar improvement in her pain symptoms after the second injection no sustained relief she states that the pain relief lasted her approximately one week and will go away.  She is taking gabapentin 300 mg 3 times a day, hydrocodone/acetaminophen 7.5/325 one tablet approximately 2-3 times per day.  She states that the Vicodin helps dull the pain but the does not eliminate it entirely, and she's not having any adverse problems in the gabapentin.  Patient continues in physical therapy for her right shoulder status post arthroscopic surgery, and has good range of motion in the shoulder.  Since previous exam the patient has a sinus infection with congestion.    Interval history " 1/21/2014:  Since reducing her patient was only able to have one of the cervical intralaminar epidural steroid injections which approximately help her 80% in her pain symptoms in the back of her neck, but she was also having symptoms into the right shoulder and had a rotator cuff tear which was repaired arthroscopically which prevented us from being able to perform the second cervical intralaminar epidural steroid injection on 12/23/2013.  Patient reports that she continues to do home exercises for her shoulder surgery but was unable to perform PT secondary to pain.  She states that her pain was actually better up until 1/12/2014 where she developed severe pain on the right side of her neck and posterior side of her neck going down the back of the scapula and also across the trapezius muscles of the shoulder.  Her MRI which was done in outside facility reports that she has severe C5-6 neural foraminal stenosis.  For her pain the patient has been taking hydrocodone/acetaminophen 7.5/325 as needed she reports there are occasional days where she takes it every 4 hours and some days where she doesn't take it at all.  Resting her head on the pillow helps alleviate the pain.  Sitting and working at computers when her pain is the worst.  She denies any problems with movement of her hands or weakness in her arms.  She has had no other health changes since previous encounter.    Initial history of present illness 10/2013:    Kaleigh Solo presents to the clinic for the evaluation of neck pain with radiation in right upper shoulder to the elbow, and down the right side of the chest wall. The pain started insidiously in the back of the neck in july  and symptoms have been worsening.  The pain has begun going into the shoulder, and there was swelling in the arm that began after starting Robaxin for muscle aches, and then took predisone taper and bactrim for the possibility of infection which did not help.  Patient stopped  her medications and the swelling improved.  US of the upper extremity was negative for DVT.   Patient takes vicodin for pain which she states helps only a little bit, and doesn't take the pain go away.  MRI of cervical spine revealed severe neuroforaminal stenosis on the right at C5-6 with central to right neural foraminal disc protrusion with generalized bulging discs and uncovertebral joint osteophytic change.  No abnormal signal in the cord.      Pain Description:    The pain is located in the neck and radiates to the right shoulder .  The pain is described as aching and tight band      Symptoms interfere with daily activity and sleeping.     Exacerbating factors: Sitting, Bending, Touching, Coughing/Sneezing, Eating, Night Time, Morning, Flexing and Lifting.      Mitigating factors heat, laying down and medications.     She reports 2 hours of uninterrupted sleep per night.    Patient denies night fever/night sweats, urinary incontinence, bowel incontinence, significant weight loss, significant motor weakness and loss of sensations.  Patient denies any suicidal or homicidal ideations    Pain Medications:  Current:  cymbalta recently started 60 mg daily  Robaxin 500 mg  Naproxen  norco 5-325 as needed    Tried in Past:  Gabapentin  Hydrocodone  ibuprofen    Physical Therapy/Home Exercise: yes       report:  Not applicable    Pain Procedures:   Left L4 and L5 TFESI - 12/20/17    Chiropractor -No treatments yet.  Right shoulder arthroscopy 12/10/2013    Imaging:     10/7/2013  MRI of cervical spine revealed severe neuroforaminal stenosis on the right at C5-6 with central to right neural foraminal disc protrusion with generalized bulging discs and uncovertebral joint osteophytic change.  No abnormal signal in the cord.  Minor disc bulging at C3-4, C4-5 without central canal stenosis, spinal cord impingement or neural foraminal stenosis.    CT chest: 10/02/2013  No definite acute process or obvious etiology of the  right-sided chest pain, axillary pain, neck and supraclavicular pain.  (full report scanned into record)    Right upper extremity venous ultrasound 10/02/2013  No evidence of DVT  lumbar spine 11/28/2017  Impression     MRI LUMBAR SPINE    TECHNIQUE: MRI lumbar spine was performed without contrast. The following sequences were obtained: Localizer; sagittal T1, T2, STIR; axial T1 and T2.    COMPARISON: None.    FINDINGS:    There are 5 lumbar vertebrae.  Vertebral body heights and alignment are maintained.  Bone marrow signal is preserved.  There is multilevel disc desiccation with preservation of disc heights. Conus terminates at L1 and appears unremarkable. Limited evaluation of posterior abdominal structures is unremarkable.  Paraspinal musculature is within normal limits.  Evaluation of sacroiliac joints is unremarkable.    L1-L2: No spinal canal stenosis or neuroforaminal narrowing.    L2-L3: Mild bilateral facet arthropathy noted.  No spinal canal stenosis or neuroforaminal narrowing.    L3-L4: Mild bilateral facet arthropathy and circumferential disc bulge noted.  No spinal canal stenosis or neuroforaminal narrowing.    L4-L5: Severe bilateral facet arthropathy and circumferential disc bulge result in moderate spinal canal stenosis.    L5-S1: Circumferential disc bulge and severe bilateral facet arthropathy result in severe spinal canal stenosis.  Note made of synovial cyst in the left foramen, contacting the exiting L5 nerve root.       Past Medical History:   Diagnosis Date    Arthritis     Asthma     Cervicalgia     2013 tx by chiropractor    Closed dislocation of acromioclavicular joint 2013    Degenerative disc disease     Fibromyalgia     HPV (human papilloma virus) anogenital infection     conization in past-remote history-1990    HTN (hypertension)     Sciatica     Tobacco use     Ulcer 2007    stomach ulcer     Past Surgical History:   Procedure Laterality Date    acdf  4/2014    C5-6     CERVICAL CONIZATION   W/ LASER      KNEE ARTHROSCOPY      right shoulder surger      arthroscopic surgery Dec 2013     Social History     Social History    Marital status:      Spouse name: N/A    Number of children: N/A    Years of education: N/A     Occupational History    Not on file.     Social History Main Topics    Smoking status: Current Some Day Smoker     Packs/day: 0.00     Years: 40.00     Types: Cigarettes    Smokeless tobacco: Never Used      Comment: tobacco use for 40 years- 1 pack per day average    Alcohol use Yes      Comment: occasional    Drug use: No    Sexual activity: Not on file     Other Topics Concern    Not on file     Social History Narrative    , not working, 3 children (1 passed at 9 mo old), tobacco daily, ETOH socially, GYN  dtrs of cesar     Family History   Problem Relation Age of Onset    Cancer Mother      lung-was tobacco user  of lung cancer at 78    Aneurysm Father      abdominal burst-  of stroke at 71 years    Heart disease Father      had HTN       Allergies   Allergen Reactions    Ambien [Zolpidem] Other (See Comments)     Pt hyperactive    Lunesta [Eszopiclone] Other (See Comments)     Severely depressed    Shellfish Containing Products Swelling and Other (See Comments)     Metallic taste in mouth  Swells all over,including the tongue and throat  Feels likes insides are swollen  Allergic to crawfish,Shrimp    Ancef [Cefazolin]      Facial swelling  Swelling abdomen    Flexeril [Cyclobenzaprine] Swelling    Gabapentin Hives    Sulfa (Sulfonamide Antibiotics) Nausea Only       Current Outpatient Prescriptions   Medication Sig    albuterol (PROAIR HFA) 90 mcg/actuation inhaler Inhale 2 puffs into the lungs every 6 (six) hours as needed for Wheezing.    amLODIPine (NORVASC) 10 MG tablet Take 1 tablet (10 mg total) by mouth once daily.    atorvastatin (LIPITOR) 10 MG tablet Take 1 tablet (10 mg total) by mouth once daily.     cetirizine (ZYRTEC) 10 MG tablet Take 10 mg by mouth once daily.    diphenhydrAMINE (BENADRYL) 50 MG tablet Take 50 mg by mouth nightly as needed for Itching.    DULoxetine (CYMBALTA) 60 MG capsule     famotidine (PEPCID) 20 MG tablet Take 20 mg by mouth 2 (two) times daily.    fexofenadine (ALLEGRA) 60 MG tablet Take 60 mg by mouth once daily.    fluconazole (DIFLUCAN) 150 MG Tab     fluocinonide 0.05% (LIDEX) 0.05 % cream Apply topically daily as needed.    hydroCHLOROthiazide (HYDRODIURIL) 25 MG tablet     hydrocortisone 1 % cream Apply topically 2 (two) times daily.    hydrOXYzine HCl (ATARAX) 25 MG tablet Take 1 tablet (25 mg total) by mouth 3 (three) times daily as needed for Itching.    methocarbamol (ROBAXIN) 500 MG Tab Take 1 tablet (500 mg total) by mouth 3 (three) times daily as needed.    MULTIVITAMIN W-MINERALS/LUTEIN (CENTRUM SILVER ORAL) Take 1 tablet by mouth once daily. Centrum Silver 50 plus    nicotine (NICODERM CQ) 21 mg/24 hr Place 1 patch onto the skin once daily.    pramoxine-hydrocortisone 1-1 % lotion Apply topically 3 (three) times daily.    oxyCODONE-acetaminophen (PERCOCET) 5-325 mg per tablet Take 1 tablet by mouth every 8 (eight) hours as needed for Pain.     No current facility-administered medications for this visit.        REVIEW OF SYSTEMS:    GENERAL:  No weight loss, malaise or fevers.  NECK:  Negative for lumps, no difficulty with swallowing.   RESPIRATORY:  No recent URI  CARDIOVASCULAR:  Negative for chest pain, leg swelling or palpitations.  GI:  Negative for abdominal discomfort, blood in stools or black stools or change in bowel habits.  Patient had a history of stomach ulcer, never bleeding.    MUSCULOSKELETAL:  See HPI.  Patient reports having restless legs at night preventing her from being able to sleep.  SKIN: no new lesions  HEMATOLOGY/LYMPHOLOGY:  Negative for prolonged bleeding, bruising easily or swollen nodes.  Patient is not currently taking any  "anti-coagulants  NEURO:   No history of syncope, paralysis, seizures or tremors.  All other reviewed and negative other than HPI.    OBJECTIVE:    BP (!) 148/89   Pulse 88   Temp 98.3 °F (36.8 °C)   Ht 5' 2" (1.575 m)   Wt 103.6 kg (228 lb 6.3 oz)   BMI 41.77 kg/m²     PHYSICAL EXAMINATION:    GENERAL: Well appearing, in no acute distress, alert and oriented x3.  PSYCH:  Mood and affect appropriate.  SKIN: Skin color, texture, turgor normal, no rashes or lesions.  HEAD/FACE:  Normocephalic, atraumatic. Cranial nerves grossly intact.  CV: RRR with palpation of the radial artery.  PULM: No evidence of respiratory difficulty, symmetric chest rise.  BACK: Straight leg raising in the sitting and supine positions is negative to radicular pain. There is pain with palpation over the facet joints of the lumbar spine bilaterally. There is decreased range of motion with extension to 15 degrees, and facet loading maneuvers cause reproducible pain.    EXTREMITIES: Peripheral joint ROM is full and pain free without obvious instability or laxity in all four extremities. No deformities, edema, or skin discoloration. Good capillary refill.  MUSCULOSKELETAL: Hip, and knee provocative maneuvers are negative.  There is  pain with palpation over the sacroiliac joints bilaterally.  There is no pain to palpation over the greater trochanteric bursa bilaterally.  FABERs test is positive.  FADIRs test is negative.   5/5 strength in right ankle with plantar and dorsiflexion, 5/5 strength in left ankle with plantar and 4/5 dorsiflexion, 5/5 strength with right knee flexion extension, 5/5 strength with knee flexion extension on the left .  No atrophy or tone abnormalities are noted.  NEURO: Right patella 2+, achilles 2+;  Left patella 2+, Left achilles 1+. Plantar response are downgoing. No clonus.  No loss of sensation is noted.  GAIT: Antalgic, ambulates without assistance    ASSESSMENT: 62 y.o. year old female with neck pain, consistent " with     1. Facet syndrome     2. Foraminal stenosis of lumbosacral region     3. Lumbar disc herniation with radiculopathy     4. Lumbar radiculopathy          PLAN:     -Will schedule patient for repeat injection - Left sided L5 and S1 transforaminal epidural steroid injection.    -Previous procedure at L4 and L5 provided 100% relief of pain for ~ 2 weeks with improvement in functional mobility during that time.    -Patient with progressively worsening radicular symptoms, with development of neurologic deficits including LLE weakness and decreased reflex.    -Will refer patient to NSGY for surgical evaluation for synovial cyst contacting Left L5 nerve root. She was previously seen by Dr. Rashid.     - Rx written for Percocet 5/325 mg q6 hr prn for severe pain.      - In the future we can consider trialing gabapentin with gradual escalation to 1800 mg per day     - F/u 2-3 weeks after injection with APC        Candice Lopez  01/16/2018

## 2018-01-17 ENCOUNTER — OFFICE VISIT (OUTPATIENT)
Dept: NEUROSURGERY | Facility: CLINIC | Age: 63
End: 2018-01-17
Payer: COMMERCIAL

## 2018-01-17 VITALS
DIASTOLIC BLOOD PRESSURE: 91 MMHG | WEIGHT: 227.38 LBS | HEIGHT: 62 IN | HEART RATE: 86 BPM | SYSTOLIC BLOOD PRESSURE: 168 MMHG | BODY MASS INDEX: 41.84 KG/M2

## 2018-01-17 DIAGNOSIS — M54.16 LUMBAR RADICULOPATHY: ICD-10-CM

## 2018-01-17 DIAGNOSIS — M51.16 LUMBAR DISC HERNIATION WITH RADICULOPATHY: Primary | ICD-10-CM

## 2018-01-17 PROCEDURE — 99999 PR PBB SHADOW E&M-EST. PATIENT-LVL III: CPT | Mod: PBBFAC,,, | Performed by: NEUROLOGICAL SURGERY

## 2018-01-17 PROCEDURE — 99214 OFFICE O/P EST MOD 30 MIN: CPT | Mod: S$GLB,,, | Performed by: NEUROLOGICAL SURGERY

## 2018-01-17 NOTE — PATIENT INSTRUCTIONS
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 Moderate stenosis at L5/S1. Left side facet arthropathy.      I recommend the patient to receive the scheduled SI joint injection. I will schedule the patient a 3 week FU to discuss if there was any improvement with her pain after injection.

## 2018-01-17 NOTE — LETTER
January 17, 2018      Felicity Middleton MD  [address]  [place] [address]           [facility] - Neurosurgery 7th Fl  [address]  [place] [address]  Phone: [phone]          Patient: Kaleigh Solo   MR Number: [id number]   YOB: 1955   Date of Visit: 1/17/2018       Dear Dr. Felicity Middleton:    Thank you for referring Kaleigh Solo to me for evaluation. Attached you will find relevant portions of my assessment and plan of care.    If you have questions, please do not hesitate to call me. I look forward to following Kaleigh Solo along with you.    Sincerely,    Turner Rashid MD    Enclosure  CC:  No Recipients    If you would like to receive this communication electronically, please contact [email or web address] or [phone] to request more information on [facility] Link access.    For providers and/or their staff who would like to refer a patient to [facility], please contact us through our one-stop-shop provider referral line, Camilla Deshpande, at [phone].    If you feel you have received this communication in error or would no longer like to receive these types of communications, please e-mail [email or web address]

## 2018-01-24 ENCOUNTER — HOSPITAL ENCOUNTER (OUTPATIENT)
Facility: OTHER | Age: 63
Discharge: HOME | End: 2018-01-24
Attending: ANESTHESIOLOGY | Admitting: ANESTHESIOLOGY
Payer: COMMERCIAL

## 2018-01-24 ENCOUNTER — TELEPHONE (OUTPATIENT)
Dept: INTERNAL MEDICINE | Facility: CLINIC | Age: 63
End: 2018-01-24

## 2018-01-24 ENCOUNTER — SURGERY (OUTPATIENT)
Age: 63
End: 2018-01-24

## 2018-01-24 VITALS
TEMPERATURE: 98 F | RESPIRATION RATE: 18 BRPM | HEART RATE: 89 BPM | OXYGEN SATURATION: 96 % | DIASTOLIC BLOOD PRESSURE: 67 MMHG | SYSTOLIC BLOOD PRESSURE: 137 MMHG

## 2018-01-24 DIAGNOSIS — G89.29 CHRONIC PAIN: ICD-10-CM

## 2018-01-24 DIAGNOSIS — M47.26 OSTEOARTHRITIS OF SPINE WITH RADICULOPATHY, LUMBAR REGION: Primary | ICD-10-CM

## 2018-01-24 PROCEDURE — 63600175 PHARM REV CODE 636 W HCPCS: Performed by: ANESTHESIOLOGY

## 2018-01-24 PROCEDURE — 64483 NJX AA&/STRD TFRM EPI L/S 1: CPT | Performed by: ANESTHESIOLOGY

## 2018-01-24 PROCEDURE — 25500020 PHARM REV CODE 255: Performed by: ANESTHESIOLOGY

## 2018-01-24 PROCEDURE — 64484 NJX AA&/STRD TFRM EPI L/S EA: CPT | Performed by: ANESTHESIOLOGY

## 2018-01-24 PROCEDURE — 64483 NJX AA&/STRD TFRM EPI L/S 1: CPT | Mod: LT,,, | Performed by: ANESTHESIOLOGY

## 2018-01-24 PROCEDURE — 64484 NJX AA&/STRD TFRM EPI L/S EA: CPT | Mod: LT,,, | Performed by: ANESTHESIOLOGY

## 2018-01-24 PROCEDURE — 25000003 PHARM REV CODE 250: Performed by: ANESTHESIOLOGY

## 2018-01-24 RX ORDER — CARVEDILOL 12.5 MG/1
12.5 TABLET ORAL 2 TIMES DAILY WITH MEALS
Qty: 60 TABLET | Refills: 0
Start: 2018-01-24 | End: 2018-01-24 | Stop reason: SDUPTHER

## 2018-01-24 RX ORDER — LIDOCAINE HYDROCHLORIDE 10 MG/ML
INJECTION INFILTRATION; PERINEURAL
Status: DISCONTINUED | OUTPATIENT
Start: 2018-01-24 | End: 2018-01-24 | Stop reason: HOSPADM

## 2018-01-24 RX ORDER — BUPIVACAINE HYDROCHLORIDE 2.5 MG/ML
INJECTION, SOLUTION EPIDURAL; INFILTRATION; INTRACAUDAL
Status: DISCONTINUED | OUTPATIENT
Start: 2018-01-24 | End: 2018-01-24 | Stop reason: HOSPADM

## 2018-01-24 RX ORDER — ALPRAZOLAM 0.5 MG/1
1 TABLET, ORALLY DISINTEGRATING ORAL
Status: COMPLETED | OUTPATIENT
Start: 2018-01-24 | End: 2018-01-24

## 2018-01-24 RX ORDER — METHYLPREDNISOLONE ACETATE 40 MG/ML
INJECTION, SUSPENSION INTRA-ARTICULAR; INTRALESIONAL; INTRAMUSCULAR; SOFT TISSUE
Status: DISCONTINUED | OUTPATIENT
Start: 2018-01-24 | End: 2018-01-24 | Stop reason: HOSPADM

## 2018-01-24 RX ORDER — SODIUM CHLORIDE 9 MG/ML
500 INJECTION, SOLUTION INTRAVENOUS CONTINUOUS
Status: DISCONTINUED | OUTPATIENT
Start: 2018-01-24 | End: 2018-01-24 | Stop reason: HOSPADM

## 2018-01-24 RX ORDER — CARVEDILOL 12.5 MG/1
12.5 TABLET ORAL 2 TIMES DAILY WITH MEALS
Qty: 60 TABLET | Refills: 0 | Status: SHIPPED | OUTPATIENT
Start: 2018-01-24 | End: 2018-03-07 | Stop reason: SDUPTHER

## 2018-01-24 RX ADMIN — ALPRAZOLAM 1 MG: 0.5 TABLET, ORALLY DISINTEGRATING ORAL at 09:01

## 2018-01-24 RX ADMIN — BUPIVACAINE HYDROCHLORIDE 10 ML: 2.5 INJECTION, SOLUTION EPIDURAL; INFILTRATION; INTRACAUDAL; PERINEURAL at 09:01

## 2018-01-24 RX ADMIN — LIDOCAINE HYDROCHLORIDE 10 ML: 10 INJECTION, SOLUTION INFILTRATION; PERINEURAL at 09:01

## 2018-01-24 RX ADMIN — IOHEXOL 10 ML: 300 INJECTION, SOLUTION INTRAVENOUS at 09:01

## 2018-01-24 RX ADMIN — METHYLPREDNISOLONE ACETATE 40 MG: 40 INJECTION, SUSPENSION INTRA-ARTICULAR; INTRALESIONAL; INTRAMUSCULAR; SOFT TISSUE at 09:01

## 2018-01-24 NOTE — PROGRESS NOTES
Increased Amlodipine to 10mg daily but still elevated. Has multiple medication allergies.     Would like to begin Coreg 12.5mg twice daily unless patient had problems with this medication in the past.

## 2018-01-24 NOTE — DISCHARGE SUMMARY
Discharge Note  Short Stay      SUMMARY     Admit Date: 1/24/2018    Attending Physician: Candice Lopez    Discharge Diagnosis: Lumbar radiculopathy [M54.16]    Discharge Physician: Candice Lopez      Discharge Date: 1/24/2018 10:30 AM     PROCEDURE:    1)  Left  S1 TRANSFORAMINAL EPIDURAL STEROID INJECTION    2)  Left  L5 TRANSFORAMINAL EPIDURAL STEROID INJECTION    Pre Procedure diagnosis:    Left  L5 and S1  Lumbar radiculopathy [M54.16]    Disposition: Home or self care    Patient Instructions:   Current Discharge Medication List      CONTINUE these medications which have NOT CHANGED    Details   albuterol (PROAIR HFA) 90 mcg/actuation inhaler Inhale 2 puffs into the lungs every 6 (six) hours as needed for Wheezing.  Qty: 18 g, Refills: 11      amLODIPine (NORVASC) 10 MG tablet Take 1 tablet (10 mg total) by mouth once daily.  Qty: 90 tablet, Refills: 0    Associated Diagnoses: Hypertension, essential      atorvastatin (LIPITOR) 10 MG tablet Take 1 tablet (10 mg total) by mouth once daily.  Qty: 90 tablet, Refills: 0    Associated Diagnoses: Candidate for statin therapy due to risk of future cardiovascular event; Hypercholesterolemia; Atherosclerosis of native coronary artery of native heart without angina pectoris      cetirizine (ZYRTEC) 10 MG tablet Take 10 mg by mouth once daily.      diphenhydrAMINE (BENADRYL) 50 MG tablet Take 50 mg by mouth nightly as needed for Itching.      DULoxetine (CYMBALTA) 60 MG capsule       famotidine (PEPCID) 20 MG tablet Take 20 mg by mouth 2 (two) times daily.      fexofenadine (ALLEGRA) 60 MG tablet Take 60 mg by mouth once daily.      hydrocortisone 1 % cream Apply topically 2 (two) times daily.  Qty: 30 g, Refills: 2      hydrOXYzine HCl (ATARAX) 25 MG tablet Take 1 tablet (25 mg total) by mouth 3 (three) times daily as needed for Itching.  Qty: 60 tablet, Refills: 2      methocarbamol (ROBAXIN) 500 MG Tab Take 1 tablet (500 mg total) by mouth 3 (three) times daily  as needed.  Qty: 60 tablet, Refills: 0      MULTIVITAMIN W-MINERALS/LUTEIN (CENTRUM SILVER ORAL) Take 1 tablet by mouth once daily. Centrum Silver 50 plus      nicotine (NICODERM CQ) 21 mg/24 hr Place 1 patch onto the skin once daily.  Qty: 28 patch, Refills: 3    Associated Diagnoses: Cigarette nicotine dependence without complication      oxyCODONE-acetaminophen (PERCOCET) 5-325 mg per tablet Take 1 tablet by mouth every 8 (eight) hours as needed for Pain.  Qty: 30 tablet, Refills: 0      pramoxine-hydrocortisone 1-1 % lotion Apply topically 3 (three) times daily.             Resume home diet and activity

## 2018-01-24 NOTE — TELEPHONE ENCOUNTER
Patient agreeable to trying coreg. Will call with b/p readings Friday. Had epidural today at L5-S1 so will start med tomorrow when she is able to drive.

## 2018-01-24 NOTE — DISCHARGE INSTRUCTIONS
Thank you for allowing us to care for you today. You may receive a survey about the care we provided. Your feedback is valuable and helps us provide excellent care throughout the community.   Home Care Instructions Pain Management:    1. DIET:   You may resume your normal diet today.   2. BATHING:   You may shower with luke warm water. No soaking in tub.  3. DRESSING:   You may remove your bandage today.   4. ACTIVITY LEVEL:   You may resume your normal activities 24 hrs after your procedure.  5. MEDICATIONS:   You may resume your normal medications today.   6. SPECIAL INSTRUCTIONS:   No heat to the injection site for 24 hrs including, bath or shower, heating pad, moist heat, or hot tubs.    Use ice pack to injection site for any pain or discomfort.  Apply ice packs for 20 minute intervals as needed.   If you have received any sedatives by mouth today you may not drive for 12 hours.    If you have received any sedation through your IV, you may not drive for 24 hrs.     PLEASE CALL YOUR DOCTOR IF:  1. Redness or swelling around the injection site.  2. Fever of 101 degrees  3. Drainage (pus) from the injection site.  4. For any continuous bleeding (some dried blood over the incision is normal.)  5. For severe headache that is relieved when lying flat.    FOR EMERGENCIES:   If any unusual problems or difficulties occur during clinic hours, call [phone] or [phone].   Procedural Sedation  Procedural sedation is medicine to ease discomfort, pain, and anxiety during a procedure. The medicine is often given through an intravenous (IV) line in your arm or hand. In some cases, the medicine may be taken by mouth or inhaled. While you are under sedation, you will likely be awake. But you may not remember anything afterward.  Why procedural sedation is used  Sedation is used for many types of procedures. The goal is to reduce pain, anxiety, and stressful memories of a procedure. It can also help your health care provider  treat you. For example, having a broken bone fixed may be easier if you feel relaxed.  Procedural sedation is used only for short, basic procedures. It is not used for complex surgeries. Some procedures that use this type of sedation include:  · Dental surgery  · Breast biopsy, to take a sample of breast tissue  · Endoscopy, to look at gastrointestinal problems  · Bronchoscopy, to check for lung problems  · Bone or joint realignment, to fix a broken bone or dislocated joint  · Minor foot or skin surgery  · Electrical cardioversion, to restore a normal heart rhythm  · Lumbar puncture, to assess neurological disease  Risks of procedural sedation  Procedural sedation has some risks and possible side effects, such as:  · Headache  · Nausea and vomiting  · Unpleasant memory of the procedure  · Lowered rate of breathing  · Changes in heart rate and blood pressure (rare)  · Inhalation of stomach contents into your lungs (rare)  Side effects will likely go away shortly after the procedure. Your health care team will watch your heart rate and breathing during your sedation. This is to help prevent problems.  Your own risks may vary based on your age and your overall health. They also depend on the type of sedation you are given. Talk with your health care provider about the risks that apply most to you.  Getting ready for procedural sedation  Talk with your health care provider how to get ready for your procedure. Tell him or her about all the medicines you take. This includes over-the-counter medicines such as ibuprofen. It also includes vitamins, herbs, and other supplements. You may need to stop taking some medicines before the procedure, such as blood thinners and aspirin. If you smoke, you may need to stop. Talk with your health care provider if you need help to stop smoking.  Tell your health care provider if you:  · Have had any problems in the past with sedation or anesthesia  · Have had any recent changes in your  health, such as an infection or fever  · Are pregnant or think you may be pregnant  Also, make sure to:  · Ask a family member or friend to take you home after the procedure. You cannot drive on the day you receive sedation.  · Not eat or drink after midnight the night before your procedure, if advised.  · Follow all other instructions from your health care provider.  During your procedural sedation  You may have your procedure in a hospital or a medical clinic. Sedation is done by a trained health care provider. In general, you can expect the following:  · You will be given medicine through an IV line in your arm or hand. Or you may receive a shot. The medicine may also be given by mouth. Or you may inhale it through a mask.  · If you receive medicine through an IV, you may feel the effects very quickly. You will start to feel relaxed and drowsy.  · During the procedure, your heart rate, breathing, and blood pressure will be closely watched. Your breathing and blood pressure may decrease a little. But you will likely not need help with your breathing. You may receive a little extra oxygen through a mask.  · You will probably be awake the entire time. If you do fall asleep, you should be easy to wake up, if needed. You should feel little or no pain.  · When your procedure is over, the sedative medicine will be stopped.  After your procedural sedation  You will begin to feel more awake and aware. But you will likely be drowsy for a while afterward. You will be closely watched as you become more alert. You may have a faint memory of the procedure. Or you may not remember it at all.  You should be able to return home within an hour or two after your procedure. Plan to have someone stay with you for a few hours. Side effects such as headache and nausea may go away quickly. Tell your health care provider if they continue.  Dont drive or make any important decisions for at least 24 hours. Be sure to follow all after-care  instructions.      When to call your health care provider  Have someone call your health care provider right away if you have any of these:  · Drowsiness that gets worse  · Weakness or dizziness that gets worse  · Repeated vomiting  · You cant be awakened   Date Last Reviewed: 2/6/2015  ©

## 2018-01-24 NOTE — OP NOTE
INFORMED CONSENT: The procedure, risks, benefits and options were discussed with patient. There are no contraindications to the procedure. The patient expressed understanding and agreed to proceed. The personnel performing the procedure was discussed.    01/24/2018    Surgeon: Candice Lopez MD    Assistants:   Fortunato Magana, PGY-5, Pain Fellow  I was present and supervising all critical portions of the procedure      PROCEDURE:    1)  Left  S1 TRANSFORAMINAL EPIDURAL STEROID INJECTION    2)  Left  L5 TRANSFORAMINAL EPIDURAL STEROID INJECTION    Pre Procedure diagnosis:    Left  L5 and S1  Lumbar radiculopathy [M54.16]    Post-Procedure diagnosis:   same    Complications: None    Specimens: None      DESCRIPTION OF PROCEDURE: The patient was brought to the procedure room. IV access was obtained prior to the procedure. The patient was positioned prone on the fluoroscopy table. Continuous hemodynamic monitoring was initiated including blood pressure, EKG, and pulse oximetry. . The skin was prepped with chlorhexidine and draped in a sterile fashion. Skin anesthesia was achieved using a total of 10mL of lidocaine, 5mL over each respective injection site.     The  L5 and S1  transforaminal spaces were identified with fluoroscopy in the  AP, oblique, and lateral views.  A 22 gauge spinal quinke needle was then advanced into the area of the trans foraminal spaces bilaterally with confirmation of proper needle position using AP, oblique, and lateral fluoroscopic views. Once the needle tip was in the area of the transforaminal space, and there was no blood, CSF or paraesthesias,  1.5 mL of Omnipaque 300mg/ml was injected on each side for a total of 3mL.  Fluoroscopic imaging in the AP and lateral views revealed a clear outline of the spinal nerve with proximal spread of agent through the neural foramen into the epidural space. A total combination of 1 mL of Bupivicaine 0.25% and 40 mg depo medrol was injected on each side for  a total of 4mL of injected medications with displacement of the contrast dye confirming that the medication went into the area of the transforaminal spaces bilaterally. A sterile dressing was applied.   Patient tolerated the procedure well.    Patient was taken back to the recovery room for further observation.     The patient was discharged to home in stable condition

## 2018-01-26 ENCOUNTER — PATIENT MESSAGE (OUTPATIENT)
Dept: PAIN MEDICINE | Facility: CLINIC | Age: 63
End: 2018-01-26

## 2018-01-30 RX ORDER — HYDROCODONE BITARTRATE AND ACETAMINOPHEN 5; 325 MG/1; MG/1
1 TABLET ORAL EVERY 8 HOURS PRN
Qty: 30 TABLET | Refills: 0 | Status: SHIPPED | OUTPATIENT
Start: 2018-01-30 | End: 2018-02-09

## 2018-02-12 ENCOUNTER — PATIENT MESSAGE (OUTPATIENT)
Dept: PAIN MEDICINE | Facility: CLINIC | Age: 63
End: 2018-02-12

## 2018-02-14 ENCOUNTER — OFFICE VISIT (OUTPATIENT)
Dept: NEUROSURGERY | Facility: CLINIC | Age: 63
End: 2018-02-14
Payer: COMMERCIAL

## 2018-02-14 VITALS
TEMPERATURE: 98 F | HEIGHT: 62 IN | HEART RATE: 69 BPM | BODY MASS INDEX: 41.04 KG/M2 | DIASTOLIC BLOOD PRESSURE: 91 MMHG | SYSTOLIC BLOOD PRESSURE: 141 MMHG | WEIGHT: 223 LBS

## 2018-02-14 DIAGNOSIS — M48.061 SPINAL STENOSIS OF LUMBAR REGION WITHOUT NEUROGENIC CLAUDICATION: Primary | ICD-10-CM

## 2018-02-14 PROCEDURE — 3008F BODY MASS INDEX DOCD: CPT | Mod: S$GLB,,, | Performed by: NEUROLOGICAL SURGERY

## 2018-02-14 PROCEDURE — 99214 OFFICE O/P EST MOD 30 MIN: CPT | Mod: S$GLB,,, | Performed by: NEUROLOGICAL SURGERY

## 2018-02-14 PROCEDURE — 99999 PR PBB SHADOW E&M-EST. PATIENT-LVL IV: CPT | Mod: PBBFAC,,, | Performed by: NEUROLOGICAL SURGERY

## 2018-02-14 NOTE — PATIENT INSTRUCTIONS
I do not recommend surgery at this time due to MRI L-spine findings , however, I recommend the patient Physical Therapy with aqua therapy. I will refer the patient to Physical Medicine.      If the patient experiences any new/ worsening symptoms the patient should contact us.

## 2018-02-14 NOTE — PROGRESS NOTES
"Subjective:    I, Ceci Manley, am scribing for, and in the presence of, Dr. Turner Rashid.     Patient ID: Kaleigh Solo is a 62 y.o. female.    Chief Complaint: Follow-up    HPI   Pt is a 61 yo female with lumbar disc herniation with radiculopathy who presents today for 3 week FU. Pt complains of intermittent midline lower back pain (L5/S1) and BLE pain, with LLE worsening. Pt states her pain is intolerable at times that she is unable to complete her daily activities. In the past, pt states she has FU with a Chiropractor for 6 months, but has only received temporary relief. Pt has also received a total of 5 CIRILO, which she denies receiving relief for her symptoms.     Review of Systems   Constitutional: Negative for activity change, fatigue and fever.   HENT: Negative for facial swelling.    Eyes: Negative.    Respiratory: Negative.    Cardiovascular: Negative.    Gastrointestinal: Negative for diarrhea, nausea and vomiting.   Genitourinary: Negative.    Musculoskeletal: Positive for arthralgias, back pain and myalgias. Negative for joint swelling.   Neurological: Negative for seizures, weakness, numbness and headaches.   Psychiatric/Behavioral: Negative.        Past Medical History:   Diagnosis Date    Arthritis     Asthma     Cervicalgia     2013 tx by chiropractor    Closed dislocation of acromioclavicular joint 2013    Degenerative disc disease     Fibromyalgia     HPV (human papilloma virus) anogenital infection     conization in past-remote history-1990    HTN (hypertension)     Sciatica     Tobacco use     Ulcer 2007    stomach ulcer       Objective:     BP (!) 141/91   Pulse 69   Temp 98.4 °F (36.9 °C)   Ht 5' 2" (1.575 m)   Wt 101.2 kg (223 lb)   BMI 40.79 kg/m²     Physical Exam   Constitutional: She is oriented to person, place, and time. She appears well-developed and well-nourished.   HENT:   Head: Normocephalic and atraumatic.   Neck: Neck supple.   Neurological: She is alert and " oriented to person, place, and time. No cranial nerve deficit. She displays a negative Romberg sign. GCS eye subscore is 4. GCS verbal subscore is 5. GCS motor subscore is 6.       Imaging:  MRI L-spine Without Contrast 11/28/2017 shows congenital stenosis and facet arthropathy. Left-side perinerual cyst.    I have personally reviewed the images with the pt.        I, Dr. Turner Rashid, personally performed the services described in this documentation. All medical record entries made by the scribe were at my direction and in my presence.  I have reviewed the chart and agree that the record reflects my personal performance and is accurate and complete. Turner Rashid MD.  9:30 AM 02/14/2018    Assessment:       1. Spinal stenosis of lumbar region without neurogenic claudication        Plan:   I do not recommend surgery at this time due to MRI L-spine findings , however, I recommend the patient Physical Therapy with aqua therapy. I will refer the patient to Physical Medicine.      If the patient experiences any new/ worsening symptoms the patient should contact us.

## 2018-02-15 ENCOUNTER — TELEPHONE (OUTPATIENT)
Dept: PAIN MEDICINE | Facility: CLINIC | Age: 63
End: 2018-02-15

## 2018-02-15 NOTE — TELEPHONE ENCOUNTER
Staff contacted the patient to reschedule her 02/27/18 2:30pm appointment due to Dr. Lopez being out that day. Staff offered to reschedule at the time of call.    Patient states she was unable to reschedule at this time and that she will either reschedule through [facility] or call to reschedule her appointment.    Staff informed the patient that the 2/27/18 appointment will be cancelled.    Patient verbalized understanding and expressed thanks for the call.

## 2018-03-07 ENCOUNTER — OFFICE VISIT (OUTPATIENT)
Dept: INTERNAL MEDICINE | Facility: CLINIC | Age: 63
End: 2018-03-07
Payer: COMMERCIAL

## 2018-03-07 VITALS
HEART RATE: 82 BPM | SYSTOLIC BLOOD PRESSURE: 142 MMHG | WEIGHT: 220.69 LBS | BODY MASS INDEX: 40.61 KG/M2 | TEMPERATURE: 99 F | DIASTOLIC BLOOD PRESSURE: 88 MMHG | HEIGHT: 62 IN | RESPIRATION RATE: 16 BRPM

## 2018-03-07 DIAGNOSIS — E78.00 HYPERCHOLESTEROLEMIA: ICD-10-CM

## 2018-03-07 DIAGNOSIS — I10 HYPERTENSION, ESSENTIAL: Primary | ICD-10-CM

## 2018-03-07 DIAGNOSIS — G47.00 INSOMNIA, UNSPECIFIED TYPE: ICD-10-CM

## 2018-03-07 DIAGNOSIS — M48.07 FORAMINAL STENOSIS OF LUMBOSACRAL REGION: ICD-10-CM

## 2018-03-07 DIAGNOSIS — F41.9 ANXIETY: ICD-10-CM

## 2018-03-07 PROCEDURE — 3077F SYST BP >= 140 MM HG: CPT | Mod: S$GLB,,, | Performed by: INTERNAL MEDICINE

## 2018-03-07 PROCEDURE — 3079F DIAST BP 80-89 MM HG: CPT | Mod: S$GLB,,, | Performed by: INTERNAL MEDICINE

## 2018-03-07 PROCEDURE — 99214 OFFICE O/P EST MOD 30 MIN: CPT | Mod: S$GLB,,, | Performed by: INTERNAL MEDICINE

## 2018-03-07 PROCEDURE — 99999 PR PBB SHADOW E&M-EST. PATIENT-LVL IV: CPT | Mod: PBBFAC,,, | Performed by: INTERNAL MEDICINE

## 2018-03-07 RX ORDER — ATORVASTATIN CALCIUM 10 MG/1
10 TABLET, FILM COATED ORAL DAILY
Qty: 30 TABLET | Refills: 2 | Status: SHIPPED | OUTPATIENT
Start: 2018-03-07 | End: 2018-05-08 | Stop reason: SDUPTHER

## 2018-03-07 RX ORDER — CARVEDILOL 25 MG/1
25 TABLET ORAL 2 TIMES DAILY WITH MEALS
Qty: 60 TABLET | Refills: 2 | Status: SHIPPED | OUTPATIENT
Start: 2018-03-07 | End: 2018-10-01

## 2018-03-07 RX ORDER — HYDROCODONE BITARTRATE AND ACETAMINOPHEN 5; 325 MG/1; MG/1
1 TABLET ORAL EVERY 8 HOURS PRN
COMMUNITY
End: 2018-03-07 | Stop reason: SDUPTHER

## 2018-03-07 RX ORDER — HYDROCODONE BITARTRATE AND ACETAMINOPHEN 5; 325 MG/1; MG/1
1 TABLET ORAL EVERY 8 HOURS PRN
Qty: 28 TABLET | Refills: 0 | Status: SHIPPED | OUTPATIENT
Start: 2018-03-07 | End: 2018-03-20 | Stop reason: SDUPTHER

## 2018-03-07 RX ORDER — ALPRAZOLAM 0.25 MG/1
0.25 TABLET ORAL 2 TIMES DAILY PRN
Qty: 60 TABLET | Refills: 0 | Status: SHIPPED | OUTPATIENT
Start: 2018-03-07 | End: 2018-05-08 | Stop reason: SDUPTHER

## 2018-03-07 RX ORDER — AMLODIPINE BESYLATE 5 MG/1
TABLET ORAL
COMMUNITY
Start: 2018-01-30 | End: 2018-03-07

## 2018-03-07 RX ORDER — AMLODIPINE BESYLATE 10 MG/1
10 TABLET ORAL DAILY
Qty: 30 TABLET | Refills: 2 | Status: SHIPPED | OUTPATIENT
Start: 2018-03-07 | End: 2018-05-08 | Stop reason: ALTCHOICE

## 2018-03-07 NOTE — PROGRESS NOTES
Subjective:       Patient ID: Kaleigh Solo is a 62 y.o. female who presents for Follow-up; Hypertension; Anxiety; and Low-back Pain      Hypertension   This is a chronic problem. The current episode started more than 1 year ago. The problem is unchanged. The problem is uncontrolled. Associated symptoms include anxiety, headaches (started after last epidural steroid injection, relieved by Tylenol) and neck pain. Pertinent negatives include no chest pain, palpitations, peripheral edema or shortness of breath. There are no associated agents to hypertension. Risk factors for coronary artery disease include obesity and sedentary lifestyle. Past treatments include calcium channel blockers and beta blockers. The current treatment provides moderate improvement. Compliance problems include exercise.  There is no history of kidney disease or heart failure. There is no history of chronic renal disease or a thyroid problem.   Anxiety   Presents for initial visit. Onset was at an unknown time. The problem has been gradually worsening. Symptoms include excessive worry, insomnia, irritability and nervous/anxious behavior. Patient reports no chest pain, decreased concentration, dizziness, nausea, palpitations, panic or shortness of breath. Symptoms occur most days. The severity of symptoms is moderate. The symptoms are aggravated by family issues. The quality of sleep is poor.     Her past medical history is significant for anxiety/panic attacks. There is no history of hyperthyroidism. Past treatments include non-SSRI antidepressants. Compliance with prior treatments has been good.   Back Pain   This is a chronic problem. The current episode started more than 1 month ago. The problem occurs constantly. The problem is unchanged. The pain is present in the lumbar spine. The quality of the pain is described as aching. The pain does not radiate. The pain is severe. Associated symptoms include headaches (started after last epidural  steroid injection, relieved by Tylenol). Pertinent negatives include no abdominal pain, chest pain, fever, numbness, perianal numbness or weakness. She has tried NSAIDs (uses Norco) for the symptoms. The treatment provided mild relief.     BP range 121-153/ 70-91 with pulse 70-91      Answers for HPI/ROS submitted by the patient on 3/1/2018   Hypertension  Chronicity: recurrent  Onset: more than 1 year ago  Progression since onset: rapidly improving  Condition status: controlled  anxiety: No  blurred vision: No  chest pain: No  headaches: Yes  malaise/fatigue: Yes  neck pain: Yes  orthopnea: No  palpitations: No  peripheral edema: No  PND: No  shortness of breath: No  sweats: No  Agents associated with hypertension: decongestants, NSAIDs, steroids  CAD risks: dyslipidemia, obesity, post-menopausal state, sedentary lifestyle, smoking/tobacco exposure, stress  Compliance problems: diet, exercise, medication side effects  Improvement on treatment: significant      Review of Systems   Constitutional: Positive for irritability. Negative for chills, fatigue and fever.   HENT: Negative for congestion, ear discharge, ear pain and sinus pressure.    Eyes: Negative for visual disturbance.   Respiratory: Negative for cough, chest tightness and shortness of breath.    Cardiovascular: Negative for chest pain, palpitations and leg swelling.   Gastrointestinal: Negative for abdominal pain, diarrhea, nausea and vomiting.   Musculoskeletal: Positive for back pain and neck pain. Negative for arthralgias and myalgias.   Skin: Negative for rash.   Neurological: Positive for headaches (started after last epidural steroid injection, relieved by Tylenol). Negative for dizziness, weakness, light-headedness and numbness.   Hematological: Negative for adenopathy.   Psychiatric/Behavioral: Positive for sleep disturbance. Negative for decreased concentration and dysphoric mood. The patient is nervous/anxious and has insomnia.        Objective:       Physical Exam   Constitutional: She is oriented to person, place, and time. Vital signs are normal. She appears well-developed and well-nourished. No distress.   HENT:   Head: Normocephalic and atraumatic.   Right Ear: Hearing and external ear normal.   Left Ear: Hearing and external ear normal.   Nose: Nose normal.   Mouth/Throat: Uvula is midline and mucous membranes are normal.   Eyes: Lids are normal.   Neck: Full passive range of motion without pain.   Cardiovascular: Normal rate, regular rhythm, normal heart sounds and intact distal pulses.    No murmur heard.  Pulmonary/Chest: Effort normal and breath sounds normal. She has no wheezes.   Abdominal: Soft. Bowel sounds are normal. She exhibits no distension. There is no tenderness.   Musculoskeletal: Normal range of motion. She exhibits no edema.   Neurological: She is alert and oriented to person, place, and time.   Skin: Skin is warm, dry and intact. No rash noted. She is not diaphoretic.   Psychiatric: She has a normal mood and affect.   Vitals reviewed.      Assessment:       1. Hypertension, essential    2. Hypercholesterolemia    3. Anxiety    4. Insomnia, unspecified type    5. Foraminal stenosis of lumbosacral region        Plan:       1. Hypertension, essential  - amLODIPine (NORVASC) 10 MG tablet; Take 1 tablet (10 mg total) by mouth once daily.  Dispense: 30 tablet; Refill: 2  - carvedilol (COREG) 25 MG tablet; Take 1 tablet (25 mg total) by mouth 2 (two) times daily with meals.  Dispense: 60 tablet; Refill: 2  - increase Coreg to 25mg bid, monitor BP closely    2. Hypercholesterolemia  - atorvastatin (LIPITOR) 10 MG tablet; Take 1 tablet (10 mg total) by mouth once daily.  Dispense: 30 tablet; Refill: 2    3. Anxiety  - ALPRAZolam (XANAX) 0.25 MG tablet; Take 1 tablet (0.25 mg total) by mouth 2 (two) times daily as needed for Anxiety.  Dispense: 60 tablet; Refill: 0    4. Insomnia, unspecified type  - ALPRAZolam (XANAX) 0.25 MG tablet; Take 1  tablet (0.25 mg total) by mouth 2 (two) times daily as needed for Anxiety.  Dispense: 60 tablet; Refill: 0    5. Foraminal stenosis of lumbosacral region  - hydrocodone-acetaminophen 5-325mg (NORCO) 5-325 mg per tablet; Take 1 tablet by mouth every 8 (eight) hours as needed for Pain.  Dispense: 28 tablet; Refill: 0  - refilled Tobias until patient is seen by PM&R for continued management of back pain, reviewed [facility] website    RTC for 3 months or sooner if needed    Felicity Middleton MD

## 2018-03-20 ENCOUNTER — OFFICE VISIT (OUTPATIENT)
Dept: PHYSICAL MEDICINE AND REHAB | Facility: CLINIC | Age: 63
End: 2018-03-20
Payer: COMMERCIAL

## 2018-03-20 VITALS
HEART RATE: 70 BPM | DIASTOLIC BLOOD PRESSURE: 85 MMHG | SYSTOLIC BLOOD PRESSURE: 142 MMHG | BODY MASS INDEX: 40.48 KG/M2 | WEIGHT: 220 LBS | HEIGHT: 62 IN

## 2018-03-20 DIAGNOSIS — M48.07 FORAMINAL STENOSIS OF LUMBOSACRAL REGION: ICD-10-CM

## 2018-03-20 DIAGNOSIS — M54.12 CERVICAL RADICULOPATHY: ICD-10-CM

## 2018-03-20 DIAGNOSIS — M54.16 LUMBAR RADICULOPATHY: ICD-10-CM

## 2018-03-20 DIAGNOSIS — M47.22 CERVICAL SPONDYLOSIS WITH RADICULOPATHY: ICD-10-CM

## 2018-03-20 DIAGNOSIS — G89.29 CHRONIC BILATERAL LOW BACK PAIN WITH LEFT-SIDED SCIATICA: ICD-10-CM

## 2018-03-20 DIAGNOSIS — M51.16 LUMBAR DISC HERNIATION WITH RADICULOPATHY: ICD-10-CM

## 2018-03-20 DIAGNOSIS — M47.899 FACET SYNDROME: ICD-10-CM

## 2018-03-20 DIAGNOSIS — M48.062 SPINAL STENOSIS OF LUMBAR REGION WITH NEUROGENIC CLAUDICATION: Primary | ICD-10-CM

## 2018-03-20 DIAGNOSIS — M54.42 CHRONIC BILATERAL LOW BACK PAIN WITH LEFT-SIDED SCIATICA: ICD-10-CM

## 2018-03-20 PROCEDURE — 3079F DIAST BP 80-89 MM HG: CPT | Mod: CPTII,S$GLB,, | Performed by: PHYSICAL MEDICINE & REHABILITATION

## 2018-03-20 PROCEDURE — 3077F SYST BP >= 140 MM HG: CPT | Mod: CPTII,S$GLB,, | Performed by: PHYSICAL MEDICINE & REHABILITATION

## 2018-03-20 PROCEDURE — 99999 PR PBB SHADOW E&M-EST. PATIENT-LVL III: CPT | Mod: PBBFAC,,, | Performed by: PHYSICAL MEDICINE & REHABILITATION

## 2018-03-20 PROCEDURE — 99204 OFFICE O/P NEW MOD 45 MIN: CPT | Mod: S$GLB,,, | Performed by: PHYSICAL MEDICINE & REHABILITATION

## 2018-03-20 RX ORDER — AMITRIPTYLINE HYDROCHLORIDE 10 MG/1
20 TABLET, FILM COATED ORAL NIGHTLY PRN
Qty: 60 TABLET | Refills: 2 | Status: SHIPPED | OUTPATIENT
Start: 2018-03-20 | End: 2018-04-30 | Stop reason: SINTOL

## 2018-03-20 RX ORDER — HYDROCODONE BITARTRATE AND ACETAMINOPHEN 5; 325 MG/1; MG/1
1 TABLET ORAL EVERY 8 HOURS PRN
Qty: 90 TABLET | Refills: 0 | Status: SHIPPED | OUTPATIENT
Start: 2018-03-20 | End: 2018-04-19

## 2018-03-20 RX ORDER — PREGABALIN 25 MG/1
25 CAPSULE ORAL 3 TIMES DAILY
Qty: 90 CAPSULE | Refills: 2 | Status: SHIPPED | OUTPATIENT
Start: 2018-03-20 | End: 2018-04-30

## 2018-03-20 NOTE — PROGRESS NOTES
Subjective:       Patient ID: Kaleigh Solo is a 62 y.o. female.    Chief Complaint: Back Pain (lower); Leg Pain; and Neck Pain    HPI   Mrs. Solo is a 63 yo female  Who is coming first time to clinic for back pain.   Referred by Dr. Rashid.  She has known  lumbar disc herniation with lumbar radiculopathy.      Back Pain Description:  Length: pain is chronic pain. Length >  Since 2006    No past, recent injury, falls.  Intensity:  CURRENT   2-3/10,  AVERAGE  Pain   2-3/10. at BEST  2-3/10 , At WORST   10/10 on the WORST day.   Location: pain is localized at mid of back  , that would run down the  Left leg  It is more Back >> leg pain.  Radiation: Positive to Lt buttocks. Positive to Lt  Legs ( all before the second week of March..   Timing : constant  nighttime pain ,she would get night muscle cramps in Lt leg.  worse with activity, in evening  QUALITY:  Sharp/grabbing pain  She had neuropathic : burning, tingling, numbness, in Rt foot, ari in left foot, now she does not have left leg pain since second week of March.  Positive leg weakness.   Worsening factors:: standing, bending, twisting , activity    Alleviating factors:  Laying in recliner  Symptoms interfere with daily activity, sleeping and work.   Current medications:  Hydrocodone , Cymbalta 60 mg daily.   Failed medications : Gabapentine   Prior procedures: she received CIRILO , one in 12/20/17 , s/p Lt L4, L5, that did not help at all, and second injection, on 1/24/18 , s/p Lt L5, S1 , that helped ~80 % after 6-7 weeks ( in second week of march, 2018.   So now only back hurts.   PT/OT:  Had multiple times to PT.   Patient denies night fever/night sweats, bowel incontinence, significant weight loss and significant motor weakness ( red flags).  Patient denies any suicidal or homicidal ideations.   She is here for evaluation and treatment.      Past Medical History:   Diagnosis Date    Arthritis     Asthma     Cervicalgia     2013 tx by chiropractor     Closed dislocation of acromioclavicular joint     Degenerative disc disease     Fibromyalgia     HPV (human papilloma virus) anogenital infection     conization in past-remote history-    HTN (hypertension)     Sciatica     Tobacco use     Ulcer     stomach ulcer       Past Surgical History:   Procedure Laterality Date    acdf  2014    C5-6    CERVICAL CONIZATION   W/ LASER      KNEE ARTHROSCOPY      right shoulder surger      arthroscopic surgery Dec 2013       Family History   Problem Relation Age of Onset    Cancer Mother      lung-was tobacco user  of lung cancer at 78    Aneurysm Father      abdominal burst-  of stroke at 71 years    Heart disease Father      had HTN       Social History     Social History    Marital status:      Spouse name: N/A    Number of children: N/A    Years of education: N/A     Social History Main Topics    Smoking status: Current Some Day Smoker     Packs/day: 1.00     Years: 48.00     Types: Cigarettes    Smokeless tobacco: Never Used    Alcohol use Yes      Comment: occasional    Drug use: No    Sexual activity: Not Asked     Other Topics Concern    None     Social History Narrative    , not working, 3 children (1 passed at 9 mo old), tobacco daily, ETOH socially, GYN  [facility]       Current Outpatient Prescriptions   Medication Sig Dispense Refill    albuterol (PROAIR HFA) 90 mcg/actuation inhaler Inhale 2 puffs into the lungs every 6 (six) hours as needed for Wheezing. 18 g 11    ALPRAZolam (XANAX) 0.25 MG tablet Take 1 tablet (0.25 mg total) by mouth 2 (two) times daily as needed for Anxiety. 60 tablet 0    amLODIPine (NORVASC) 10 MG tablet Take 1 tablet (10 mg total) by mouth once daily. 30 tablet 2    atorvastatin (LIPITOR) 10 MG tablet Take 1 tablet (10 mg total) by mouth once daily. 30 tablet 2    carvedilol (COREG) 25 MG tablet Take 1 tablet (25 mg total) by mouth 2 (two) times daily with meals. 60  tablet 2    cetirizine (ZYRTEC) 10 MG tablet Take 10 mg by mouth once daily.      diphenhydrAMINE (BENADRYL) 50 MG tablet Take 50 mg by mouth nightly as needed for Itching.      DULoxetine (CYMBALTA) 60 MG capsule       famotidine (PEPCID) 20 MG tablet Take 20 mg by mouth 2 (two) times daily.      fexofenadine (ALLEGRA) 60 MG tablet Take 60 mg by mouth once daily.      hydrocodone-acetaminophen 5-325mg (NORCO) 5-325 mg per tablet Take 1 tablet by mouth every 8 (eight) hours as needed for Pain. 90 tablet 0    hydrocortisone 1 % cream Apply topically 2 (two) times daily. 30 g 2    hydrOXYzine HCl (ATARAX) 25 MG tablet Take 1 tablet (25 mg total) by mouth 3 (three) times daily as needed for Itching. 60 tablet 2    methocarbamol (ROBAXIN) 500 MG Tab Take 1 tablet (500 mg total) by mouth 3 (three) times daily as needed. 60 tablet 0    MULTIVITAMIN W-MINERALS/LUTEIN (CENTRUM SILVER ORAL) Take 1 tablet by mouth once daily. Centrum Silver 50 plus      nicotine (NICODERM CQ) 21 mg/24 hr Place 1 patch onto the skin once daily. 28 patch 3    pramoxine-hydrocortisone 1-1 % lotion Apply topically 3 (three) times daily.      amitriptyline (ELAVIL) 10 MG tablet Take 2 tablets (20 mg total) by mouth nightly as needed for Insomnia or Pain. 60 tablet 2    pregabalin (LYRICA) 25 MG capsule Take 1 capsule (25 mg total) by mouth 3 (three) times daily. 90 capsule 2     No current facility-administered medications for this visit.        Review of patient's allergies indicates:   Allergen Reactions    Ambien [zolpidem] Other (See Comments)     Pt hyperactive    Lunesta [eszopiclone] Other (See Comments)     Severely depressed    Shellfish containing products Swelling and Other (See Comments)     Metallic taste in mouth  Swells all over,including the tongue and throat  Feels likes insides are swollen  Allergic to crawfish,Shrimp    Ancef [cefazolin]      Facial swelling  Swelling abdomen    Flexeril [cyclobenzaprine]  Swelling    Gabapentin Hives    Sulfa (sulfonamide antibiotics) Nausea Only       Review of Systems   Constitutional: Negative for appetite change, chills, fatigue, fever and unexpected weight change.   HENT: Negative for drooling, trouble swallowing and voice change.    Eyes: Negative for pain and visual disturbance.   Respiratory: Negative for shortness of breath and wheezing.    Cardiovascular: Negative for chest pain and palpitations.   Gastrointestinal: Negative for abdominal distention, abdominal pain, constipation and diarrhea.   Genitourinary: Negative for difficulty urinating.   Musculoskeletal: Positive for back pain and neck pain. Negative for arthralgias, gait problem, joint swelling, myalgias and neck stiffness.               Skin: Negative for color change and rash.   Neurological: Negative for dizziness, facial asymmetry, speech difficulty, weakness, light-headedness and numbness. Headaches:     Hematological: Negative for adenopathy.   Psychiatric/Behavioral: Negative for behavioral problems, confusion and sleep disturbance. The patient is not nervous/anxious.            Objective:      Physical Exam    GENERAL: The patient is alert, oriented, pleasant.  HEENT: PERRLA  NECK: supple, no masses,    CV: S1S2, RRR, no murmurs, rales.  LUNGS: CTA bilateral.   ABDOMEN: soft, non-tender, non distended, bowel sounds nl.  EXTREMITIES: no edema, +2 pulses DP, b/l  SKIN: no skin rash, no skin breakdowns.  MUSCULOSKELETAL:   Gait : normal, walks with no AD.  Cervical spine: decreased AROM in cervical spine, flexion to 60, extension  to 0,   side bending and rotation  to 30-35 degrees without pain,b/l.   + mild-mod paravertebral cervical musculature tenderness, b/l.  + mid -mod  tenderness in upper trapezius muscles, b/l.   Lumbar spine, decreased active range of motion in all planes, flexion to 90 degrees , ext. 0.  Side bending and rotation to 35-40 degrees, b/l.  positive facet loading b/l.  Straight leg  raising Negative bilaterally.   Full range of motion in all joints x4 extremities.   Muscle strength 5/5 throughout x4 extremities.   No  joint laxity throughout x4 extremities.   NEUROLOGIC: Cranial nerves II through XII intact.   Deep tendon reflexes is normal, +2 in the upper and lower extremities bilaterally.   Muscle tone is normal.   Sensory is intact to light touch and pinprick throughout x4 extremities.     MRI of Lumbar spine ( 11/28/17) showed:  L1-L2: No spinal canal stenosis or neuroforaminal narrowing.  L2-L3: Mild bilateral facet arthropathy noted.  No spinal canal stenosis or neuroforaminal narrowing.  L3-L4: Mild bilateral facet arthropathy and circumferential disc bulge noted.  No spinal canal stenosis or neuroforaminal narrowing.  L4-L5: Severe bilateral facet arthropathy and circumferential disc bulge result in moderate spinal canal stenosis.  L5-S1: Circumferential disc bulge and severe bilateral facet arthropathy result in severe spinal canal stenosis.  Note made of synovial cyst in the left foramen, contacting the exiting L5 nerve root.  Impression:   Degenerative changes of the lower lumbar spine as detailed above.      Assessment:       1. Chronic bilateral low back pain with left-sided sciatica    2. Lumbar disc herniation with radiculopathy    3. Lumbar radiculopathy    4. Spinal stenosis of lumbar region with neurogenic claudication    5. Facet syndrome    6. Foraminal stenosis of lumbosacral region    7. Cervical spondylosis with radiculopathy    8. Cervical radiculopathy        Plan:       Spinal stenosis of lumbar region with neurogenic claudication  -     hydrocodone-acetaminophen 5-325mg (NORCO) 5-325 mg per tablet; Take 1 tablet by mouth every 8 (eight) hours as needed for Pain.  Dispense: 90 tablet; Refill: 0  -     amitriptyline (ELAVIL) 10 MG tablet; Take 2 tablets (20 mg total) by mouth nightly as needed for Insomnia or Pain.  Dispense: 60 tablet; Refill: 2  -     pregabalin  (LYRICA) 25 MG capsule; Take 1 capsule (25 mg total) by mouth 3 (three) times daily.  Dispense: 90 capsule; Refill: 2  -     Ambulatory Referral to Physical/Occupational Therapy    Chronic bilateral low back pain with left-sided sciatica  -     hydrocodone-acetaminophen 5-325mg (NORCO) 5-325 mg per tablet; Take 1 tablet by mouth every 8 (eight) hours as needed for Pain.  Dispense: 90 tablet; Refill: 0  -     amitriptyline (ELAVIL) 10 MG tablet; Take 2 tablets (20 mg total) by mouth nightly as needed for Insomnia or Pain.  Dispense: 60 tablet; Refill: 2  -     pregabalin (LYRICA) 25 MG capsule; Take 1 capsule (25 mg total) by mouth 3 (three) times daily.  Dispense: 90 capsule; Refill: 2  -     Ambulatory Referral to Physical/Occupational Therapy    Lumbar disc herniation with radiculopathy  -     hydrocodone-acetaminophen 5-325mg (NORCO) 5-325 mg per tablet; Take 1 tablet by mouth every 8 (eight) hours as needed for Pain.  Dispense: 90 tablet; Refill: 0  -     amitriptyline (ELAVIL) 10 MG tablet; Take 2 tablets (20 mg total) by mouth nightly as needed for Insomnia or Pain.  Dispense: 60 tablet; Refill: 2  -     pregabalin (LYRICA) 25 MG capsule; Take 1 capsule (25 mg total) by mouth 3 (three) times daily.  Dispense: 90 capsule; Refill: 2  -     Ambulatory Referral to Physical/Occupational Therapy    Lumbar radiculopathy  -     hydrocodone-acetaminophen 5-325mg (NORCO) 5-325 mg per tablet; Take 1 tablet by mouth every 8 (eight) hours as needed for Pain.  Dispense: 90 tablet; Refill: 0  -     amitriptyline (ELAVIL) 10 MG tablet; Take 2 tablets (20 mg total) by mouth nightly as needed for Insomnia or Pain.  Dispense: 60 tablet; Refill: 2  -     pregabalin (LYRICA) 25 MG capsule; Take 1 capsule (25 mg total) by mouth 3 (three) times daily.  Dispense: 90 capsule; Refill: 2  -     Ambulatory Referral to Physical/Occupational Therapy    Facet syndrome  -     hydrocodone-acetaminophen 5-325mg (NORCO) 5-325 mg per tablet;  Take 1 tablet by mouth every 8 (eight) hours as needed for Pain.  Dispense: 90 tablet; Refill: 0  -     amitriptyline (ELAVIL) 10 MG tablet; Take 2 tablets (20 mg total) by mouth nightly as needed for Insomnia or Pain.  Dispense: 60 tablet; Refill: 2  -     pregabalin (LYRICA) 25 MG capsule; Take 1 capsule (25 mg total) by mouth 3 (three) times daily.  Dispense: 90 capsule; Refill: 2  -     Ambulatory Referral to Physical/Occupational Therapy    Foraminal stenosis of lumbosacral region  -     hydrocodone-acetaminophen 5-325mg (NORCO) 5-325 mg per tablet; Take 1 tablet by mouth every 8 (eight) hours as needed for Pain.  Dispense: 90 tablet; Refill: 0  -     amitriptyline (ELAVIL) 10 MG tablet; Take 2 tablets (20 mg total) by mouth nightly as needed for Insomnia or Pain.  Dispense: 60 tablet; Refill: 2  -     pregabalin (LYRICA) 25 MG capsule; Take 1 capsule (25 mg total) by mouth 3 (three) times daily.  Dispense: 90 capsule; Refill: 2  -     Ambulatory Referral to Physical/Occupational Therapy    Cervical spondylosis with radiculopathy  -     hydrocodone-acetaminophen 5-325mg (NORCO) 5-325 mg per tablet; Take 1 tablet by mouth every 8 (eight) hours as needed for Pain.  Dispense: 90 tablet; Refill: 0  -     amitriptyline (ELAVIL) 10 MG tablet; Take 2 tablets (20 mg total) by mouth nightly as needed for Insomnia or Pain.  Dispense: 60 tablet; Refill: 2  -     pregabalin (LYRICA) 25 MG capsule; Take 1 capsule (25 mg total) by mouth 3 (three) times daily.  Dispense: 90 capsule; Refill: 2  -     Ambulatory Referral to Physical/Occupational Therapy    Cervical radiculopathy  -     hydrocodone-acetaminophen 5-325mg (NORCO) 5-325 mg per tablet; Take 1 tablet by mouth every 8 (eight) hours as needed for Pain.  Dispense: 90 tablet; Refill: 0  -     amitriptyline (ELAVIL) 10 MG tablet; Take 2 tablets (20 mg total) by mouth nightly as needed for Insomnia or Pain.  Dispense: 60 tablet; Refill: 2  -     pregabalin (LYRICA) 25 MG  capsule; Take 1 capsule (25 mg total) by mouth 3 (three) times daily.  Dispense: 90 capsule; Refill: 2  -     Ambulatory Referral to Physical/Occupational Therapy    RTC in 4 weeks.    Total time spent face to face with patient was 45 minutes.   More than 50% of that time was spent in counseling on diagnosis , prognosis and treatment options.   I also  patient  on common and most usual side effect of prescribed medications. Risk and benefits of opiates, possible risk of developing opiate dependence and tolerance, need of strict compliance with prescribed medications.  I reviewed Primary care , and other specialty's notes to better coordinate patient's  care.   All questions were answered, and patient voiced understanding.

## 2018-03-22 ENCOUNTER — PATIENT MESSAGE (OUTPATIENT)
Dept: PHYSICAL MEDICINE AND REHAB | Facility: CLINIC | Age: 63
End: 2018-03-22

## 2018-03-28 ENCOUNTER — PATIENT MESSAGE (OUTPATIENT)
Dept: NEUROSURGERY | Facility: CLINIC | Age: 63
End: 2018-03-28

## 2018-04-11 ENCOUNTER — CLINICAL SUPPORT (OUTPATIENT)
Dept: REHABILITATION | Facility: HOSPITAL | Age: 63
End: 2018-04-11
Attending: PHYSICAL MEDICINE & REHABILITATION
Payer: COMMERCIAL

## 2018-04-11 DIAGNOSIS — R29.898 WEAKNESS OF BOTH LOWER EXTREMITIES: ICD-10-CM

## 2018-04-11 PROBLEM — G89.29 CHRONIC BILATERAL LOW BACK PAIN WITH LEFT-SIDED SCIATICA: Chronic | Status: ACTIVE | Noted: 2018-03-20

## 2018-04-11 PROBLEM — M54.42 CHRONIC BILATERAL LOW BACK PAIN WITH LEFT-SIDED SCIATICA: Chronic | Status: ACTIVE | Noted: 2018-03-20

## 2018-04-11 PROCEDURE — 97161 PT EVAL LOW COMPLEX 20 MIN: CPT

## 2018-04-11 NOTE — PATIENT INSTRUCTIONS
Abdominal Bracing (Hook-Lying)        With neutral spine, tighten pelvic floor and abdominals. Repeat _20_ times, hold for  5  seconds. Do _2__ times a day.      Copyright © [employer]. All rights reserved.   Bracing With Knee Fallout (Hook-Lying)        With neutral spine, tighten pelvic floor and abdominals and hold. Alternating legs, drop knee out to side. Keep opposite hip still. Repeat _20_ times, hold for  5  seconds. Do _2__ times a day.    Copyright © [employer]. All rights reserved.   (Home) Flexion: Pelvic Tilt        Lie with neck supported, knees bent, feet flat. Tighten and suck stomach in, pushing back down against surface. Do not push down with legs.  Repeat _20_ times, hold for  5  seconds. Do _2__ times a day.    Copyright © [employer]. All rights reserved.

## 2018-04-13 ENCOUNTER — TELEPHONE (OUTPATIENT)
Dept: PAIN MEDICINE | Facility: CLINIC | Age: 63
End: 2018-04-13

## 2018-04-13 NOTE — TELEPHONE ENCOUNTER
Left voice message asking for return call to reschedule appointment on 4-27-18 at 3:00pm sunshine Lopez who will be in surgery.

## 2018-04-18 ENCOUNTER — CLINICAL SUPPORT (OUTPATIENT)
Dept: REHABILITATION | Facility: HOSPITAL | Age: 63
End: 2018-04-18
Attending: PHYSICAL MEDICINE & REHABILITATION
Payer: COMMERCIAL

## 2018-04-18 DIAGNOSIS — M53.3 SACROILIAC JOINT PAIN: ICD-10-CM

## 2018-04-18 DIAGNOSIS — M54.42 CHRONIC BILATERAL LOW BACK PAIN WITH LEFT-SIDED SCIATICA: Primary | ICD-10-CM

## 2018-04-18 DIAGNOSIS — G89.29 CHRONIC BILATERAL LOW BACK PAIN WITH LEFT-SIDED SCIATICA: Primary | ICD-10-CM

## 2018-04-18 DIAGNOSIS — R29.898 WEAKNESS OF BOTH LOWER EXTREMITIES: ICD-10-CM

## 2018-04-18 PROCEDURE — 97113 AQUATIC THERAPY/EXERCISES: CPT

## 2018-04-18 NOTE — PROGRESS NOTES
Time In:  1620  Time Out: 1720    Subjective  Pt reports: moderate central low back pain and BLE pain.      Objective  Treatment: Pt was instructed in and performed therapeutic exercises to develop core/hip stabilization, BLE flexibility/strengthening and posture correction for 60 minutes. Patient performed therapeutic exercises consisting of the following exercises:    Warm-up Laps 2 x each  fwd/bkw/lat     Stretches: 2 x 30 sec  HS  Quad     LE exs: 10x each   Mini Squats with QS  Heel Raise with GS  HS curl/Hip ext  Hip flex/LAQ  Hip abd/add  Lunges fwd/lat     UE exs: 20x each  Shld flex/ext apo   Shld abd/add apo  Lat pull ytb  Horiz Row ytc     Endurance: 2 min  Marching    Cool down laps 1 x each  fwd/bkw/lat    Patient was not issued HEP for pool.    Assessment  Pt's tolerated initial aquatic tx well w/ no increase in symptoms. Tx focused on BLE flexibility/strengthening and hip/core stabilization.  Will progress as tolerated.  Patient will continue to benefit from skilled PT intervention.    Kaleigh Solo is making good progress towards established goals.    Anticipated barriers to physical therapy: None    Medical necessity is demonstrated by the following  IMPAIRMENTS:  - poor posture  - pain  - decreased flexibility  - decreased muscle strength  - poor cardiovascular conditioning  - impaired function  - decreased work ability     GOALS: 6-8 weeks (6/11/18). Pt agrees with goals set.  1. Independent with HEP.  2. Report decreased lumbar pain < or =  4/10 with adls such as twisting, vacuuming home, and prolonged standing.   3. Increased MMT for B LE by 1 muscle grade to promote proper pelvic stability to decrease lumbar pain < or =  4/10 with adls such as twisting, vacuuming home, and prolonged standing.   4. Increased flexibility in B hamstrings to -20 deg with 90/90 test to promote proper pelvic stability to decrease lumbar pain < or =  4/10 with adls such as twisting, vacuuming home, and prolonged  standing.   5. Increased flexibility in R piriformis, B hip flexors, and B quads to promote proper pelvic stability to decrease lumbar pain < or =  4/10 with adls such as twisting, vacuuming home, and prolonged standing.   6. Patient to achieve CK (at least 40% < 60% impaired, limited or restricted) level on the FOTO Outcomes Measurement System.    Plan  Continue with current POC established by PT.

## 2018-04-20 ENCOUNTER — CLINICAL SUPPORT (OUTPATIENT)
Dept: REHABILITATION | Facility: HOSPITAL | Age: 63
End: 2018-04-20
Attending: PHYSICAL MEDICINE & REHABILITATION
Payer: COMMERCIAL

## 2018-04-20 DIAGNOSIS — M54.42 CHRONIC BILATERAL LOW BACK PAIN WITH LEFT-SIDED SCIATICA: Primary | ICD-10-CM

## 2018-04-20 DIAGNOSIS — G89.29 CHRONIC BILATERAL LOW BACK PAIN WITH LEFT-SIDED SCIATICA: Primary | ICD-10-CM

## 2018-04-20 PROCEDURE — 97113 AQUATIC THERAPY/EXERCISES: CPT

## 2018-04-20 NOTE — PROGRESS NOTES
"                                                                                                 Aquatic Progress Note      Total treatment time: 60    Time In: 2:00  Time Out: 3:00    Subjective  Kaleigh states "that her LBP is 3/10 today and was sore for a day post last tx      Objective    Treatment: Kaleigh was instructed in and performed therapeutic exercises to develop strength, endurance, ROM, flexibility, balance, posture and core stabilization for 60 minutes. Patient performed therapeutic exercises consisting of warm up laps without resistance, PROM/Stretching, UE strengthening, LE strengthening, lumbar stablization, balance exercises, isometrics, Endurance, march, theraband and cool down.    Warm-up Laps 3 x each  fwd/bkw/lat     Stretches: 2 x 30 sec  HS  Quad     LE exs: 30x each   Mini Squats with QS  Heel Raise with GS  HS curl/Hip ext  Hip flex/LAQ  Lunges fwd/lat     UE exs: 30x each  Shld flex/ext    Shld abd/add   Lat pull ytb  Horiz Row ytc     Endurance: 4 min  Marching     Cool down laps 1 x each   Patient was not issued HEP for pool.      Assessment  Patient's tolerance to treatment  with progression of ther e was good. PT diagnosis: low back pain, weak core, decreased B LE strength and flexibility   Patient can benefit from outpatient physical therapy and a home program.  Treatment will be directed at improving the following impairments.  Prognosis is excellent/ good / fair / guarded  Patient will continue to benefit from skilled PT intervention.    Kaleigh is making good progress towards established goals.      Plan  Continue 2x per week.    "

## 2018-04-24 ENCOUNTER — CLINICAL SUPPORT (OUTPATIENT)
Dept: REHABILITATION | Facility: HOSPITAL | Age: 63
End: 2018-04-24
Attending: PHYSICAL MEDICINE & REHABILITATION
Payer: COMMERCIAL

## 2018-04-24 DIAGNOSIS — G89.29 CHRONIC BILATERAL LOW BACK PAIN WITH LEFT-SIDED SCIATICA: Primary | ICD-10-CM

## 2018-04-24 DIAGNOSIS — R29.898 WEAKNESS OF BOTH LOWER EXTREMITIES: ICD-10-CM

## 2018-04-24 DIAGNOSIS — M54.42 CHRONIC BILATERAL LOW BACK PAIN WITH LEFT-SIDED SCIATICA: Primary | ICD-10-CM

## 2018-04-24 PROCEDURE — 97113 AQUATIC THERAPY/EXERCISES: CPT

## 2018-04-24 NOTE — PROGRESS NOTES
"                                                                                                 Aquatic Progress Note      Total treatment time: 60    Time In: 1:00  Time Out: 2:00    Subjective  Kaleigh states "that she is w/o c/o LBP at this time and reports no adverse effects from last tx.      Objective    Treatment: Kaleigh was instructed in and performed therapeutic exercises to develop strength, endurance, ROM, flexibility, balance, posture and core stabilization for 60 minutes. Patient performed therapeutic exercises consisting of warm up laps without resistance, PROM/Stretching, UE strengthening, LE strengthening, lumbar stablization, balance exercises, isometrics, Endurance, march, theraband and cool down.    Warm-up Laps 3 x each  fwd/bkw/lat     Stretches: 2 x 30 sec  HS  Quad     LE exs: 30x each 2#  Mini Squats with QS  Heel Raise with GS  HS curl/Hip ext  Hip flex/LAQ  Lunges fwd/lat     UE exs: 30x each  Shld flex/ext  APO  Shld abd/add APO  Lat pull otb  Horiz Row rtc     Endurance: 5 min  Marching     Cool down laps 1 x each   Patient was not issued HEP for pool.      Assessment  Patient's tolerance to treatment  with progression of ther ex was good.No c/o pn throughout tx.  PT diagnosis: low back pain, weak core, decreased B LE strength and flexibility   Patient can benefit from outpatient physical therapy and a home program.  Treatment will be directed at improving the following impairments.  Prognosis is excellent/ good / fair / guarded  Patient will continue to benefit from skilled PT intervention.    Kaleigh is making good progress towards established goals.      Plan  Continue 2x per week.  "

## 2018-04-26 ENCOUNTER — CLINICAL SUPPORT (OUTPATIENT)
Dept: REHABILITATION | Facility: HOSPITAL | Age: 63
End: 2018-04-26
Attending: PHYSICAL MEDICINE & REHABILITATION
Payer: COMMERCIAL

## 2018-04-26 ENCOUNTER — PATIENT MESSAGE (OUTPATIENT)
Dept: PHYSICAL MEDICINE AND REHAB | Facility: CLINIC | Age: 63
End: 2018-04-26

## 2018-04-26 DIAGNOSIS — M54.42 CHRONIC BILATERAL LOW BACK PAIN WITH LEFT-SIDED SCIATICA: Primary | ICD-10-CM

## 2018-04-26 DIAGNOSIS — G89.29 CHRONIC BILATERAL LOW BACK PAIN WITH LEFT-SIDED SCIATICA: Primary | ICD-10-CM

## 2018-04-26 PROCEDURE — 97113 AQUATIC THERAPY/EXERCISES: CPT

## 2018-04-26 NOTE — PROGRESS NOTES
"                                                                                                 Aquatic Progress Note      Total treatment time: 60    Time In: 1:00  Time Out: 2:00    Subjective  Kaleigh states "that she woke up with 5/10 "from my hair to my feet".       Objective    Treatment: Kaleigh was instructed in and performed therapeutic exercises to develop strength, endurance, ROM, flexibility, balance, posture and core stabilization for 60 minutes. Patient performed therapeutic exercises consisting of warm up laps without resistance, PROM/Stretching, UE strengthening, LE strengthening, lumbar stablization, balance exercises, isometrics, Endurance, march, theraband and cool down.    Warm-up Laps 3 x each  fwd/bkw/lat     Stretches: 2 x 30 sec  HS  Quad     LE exs: 30x each 2#  Mini Squats with QS  Heel Raise with GS  HS curl/Hip ext  Hip flex/LAQ  Lunges fwd/lat     UE exs: 30x each  Shld flex/ext  APO  Shld abd/add APO  Lat pull otb  Horiz Row rtc     Endurance: 5 min  Marching     Cool down laps 1 x each   Patient was not issued HEP for pool.      Assessment  Patient's tolerance to treatment  with ther ex was good.No c/o pn throughout tx.  PT diagnosis: low back pain, weak core, decreased B LE strength and flexibility   Patient can benefit from outpatient physical therapy and a home program.  Treatment will be directed at improving the following impairments.  Prognosis is excellent/ good / fair / guarded  Patient will continue to benefit from skilled PT intervention.    Kaleigh is making good progress towards established goals.      Plan  Continue 2x per week.  "

## 2018-04-27 ENCOUNTER — PATIENT MESSAGE (OUTPATIENT)
Dept: PHYSICAL MEDICINE AND REHAB | Facility: CLINIC | Age: 63
End: 2018-04-27

## 2018-04-30 ENCOUNTER — OFFICE VISIT (OUTPATIENT)
Dept: PHYSICAL MEDICINE AND REHAB | Facility: CLINIC | Age: 63
End: 2018-04-30
Payer: COMMERCIAL

## 2018-04-30 VITALS
SYSTOLIC BLOOD PRESSURE: 147 MMHG | BODY MASS INDEX: 41.49 KG/M2 | WEIGHT: 225.5 LBS | HEART RATE: 80 BPM | HEIGHT: 62 IN | DIASTOLIC BLOOD PRESSURE: 92 MMHG

## 2018-04-30 DIAGNOSIS — M48.062 SPINAL STENOSIS OF LUMBAR REGION WITH NEUROGENIC CLAUDICATION: ICD-10-CM

## 2018-04-30 DIAGNOSIS — M54.16 LUMBAR RADICULOPATHY: ICD-10-CM

## 2018-04-30 DIAGNOSIS — G89.4 CHRONIC PAIN SYNDROME: ICD-10-CM

## 2018-04-30 DIAGNOSIS — M51.16 LUMBAR DISC HERNIATION WITH RADICULOPATHY: Primary | ICD-10-CM

## 2018-04-30 DIAGNOSIS — G89.29 CHRONIC BILATERAL LOW BACK PAIN WITH LEFT-SIDED SCIATICA: Chronic | ICD-10-CM

## 2018-04-30 DIAGNOSIS — M54.42 CHRONIC BILATERAL LOW BACK PAIN WITH LEFT-SIDED SCIATICA: Chronic | ICD-10-CM

## 2018-04-30 PROCEDURE — 99214 OFFICE O/P EST MOD 30 MIN: CPT | Mod: S$GLB,,, | Performed by: PHYSICAL MEDICINE & REHABILITATION

## 2018-04-30 PROCEDURE — 3080F DIAST BP >= 90 MM HG: CPT | Mod: CPTII,S$GLB,, | Performed by: PHYSICAL MEDICINE & REHABILITATION

## 2018-04-30 PROCEDURE — 3077F SYST BP >= 140 MM HG: CPT | Mod: CPTII,S$GLB,, | Performed by: PHYSICAL MEDICINE & REHABILITATION

## 2018-04-30 PROCEDURE — 3008F BODY MASS INDEX DOCD: CPT | Mod: CPTII,S$GLB,, | Performed by: PHYSICAL MEDICINE & REHABILITATION

## 2018-04-30 PROCEDURE — 99999 PR PBB SHADOW E&M-EST. PATIENT-LVL III: CPT | Mod: PBBFAC,,, | Performed by: PHYSICAL MEDICINE & REHABILITATION

## 2018-04-30 RX ORDER — GABAPENTIN 600 MG/1
600 TABLET ORAL
Qty: 120 TABLET | Refills: 2 | Status: SHIPPED | OUTPATIENT
Start: 2018-04-30 | End: 2018-08-01 | Stop reason: SDUPTHER

## 2018-04-30 NOTE — PROGRESS NOTES
Subjective:       Patient ID: Kaleigh Solo is a 62 y.o. female.    Chief Complaint: Back Pain    Back Pain   Pertinent negatives include no abdominal pain, chest pain, fever, numbness or weakness. Headaches:        Mrs. Solo is a 61 yo female who returns to clinic for back pain.   She was referred by Dr. Rashid for chronic pain management.   [facility] 3/20/18.  She has known  lumbar disc herniation with lumbar radiculopathy.   Today, she reports some improvement of back pain with Physical therapy. On [facility], she was prescribed Lyrica for neuropathic pain, but unfortunately her insurance did not have a good coverage for Lyrica, and was too expensive for her. Therefore she communicated  With me about changing to Gabapentin, that she was taking before, in daily dose of 1600 mg/day. I electronically sent her a prescription, but she did not started, yet.  Today she still has the same CC, of back pain and leg cramps. She is going to PT, that helps her.  She reports that CIRILO, L5-S1, that she had on Jan 24th, 2018, started working 7 weeks later,  the 2nd week of March,and is still lasting.   Nevertheless, recently, she started getting spasms in left side leg from siatica. This was the first spasm since  March after an epidural. I has been in aqua therapy which she loves but had severe spasms in the pool; for the first time since March. She sent me a e-mail informing me,   But still undecided about Gabapentin.  She is concerned about her vacation in [place] ( she leaves on May 10th).  She reports that she can walk 1.5-2 miles w/o need to stop, or sit down, and can tolerate a pain.   Standing still seems to be harder, she is able to stand 10-15 minutes before pain becomes intense to the point that she needs to sit down.    Today he rCC is  back pain.  Back Pain Description:  Length: pain is chronic pain. Length >  Since 2006    No past, recent injury, falls.  Intensity:  CURRENT   2-3/10,  AVERAGE  Pain   2-3/10. at BEST  2-3/10 , At  WORST  10/10 on the WORST day.   Location: pain is localized at mid of back  , that would run down the  Left leg  It is more Back >> leg pain.  Radiation: Positive to Lt buttocks. Positive to Lt  Legs ( all before the second week of March..   Timing : constant  nighttime pain ,she would get night muscle cramps in Lt leg.  worse with activity, in evening  QUALITY:  Sharp/grabbing pain  She had neuropathic : burning, tingling, numbness, in Rt foot, ari in left foot, now she does not have left leg pain since second week of March.  Positive leg weakness.   Worsening factors:: standing, bending, twisting , activity    Alleviating factors:  Laying in recliner  Symptoms interfere with daily activity, sleeping and work.   Current medications:  Hydrocodone ( takes rarely), Cymbalta 60 mg daily.   Failed medications : Gabapentin, was taking 1600 mg/day, but does not believe that they were helping.   Prior procedures: she received CIRILO , one in 12/20/17 , s/p Lt L4, L5, that did not help at all, and second injection, on 1/24/18 , s/p Lt L5, S1 , that helped ~80 % for legs, after 6-7 weeks ( started working in second week of march, 2018). So now only back hurts.   PT/OT:  Had multiple times to PT, and attending currently  aqua therapy.  Patient denies night fever/night sweats, bowel incontinence, significant weight loss and significant motor weakness ( red flags).  Patient denies any suicidal or homicidal ideations.   She is here for follow up and treatment.      Past Medical History:   Diagnosis Date    Arthritis     Asthma     Cervicalgia     2013 tx by chiropractor    Closed dislocation of acromioclavicular joint 2013    Degenerative disc disease     Fibromyalgia     HPV (human papilloma virus) anogenital infection     conization in past-remote history-1990    HTN (hypertension)     Sciatica     Tobacco use     Ulcer 2007    stomach ulcer       Past Surgical History:   Procedure Laterality Date    acdf  4/2014     C5-6    CERVICAL CONIZATION   W/ LASER      KNEE ARTHROSCOPY      right shoulder surger      arthroscopic surgery Dec 2013       Family History   Problem Relation Age of Onset    Cancer Mother         lung-was tobacco user  of lung cancer at 78    Aneurysm Father         abdominal burst-  of stroke at 71 years    Heart disease Father         had HTN       Social History     Social History    Marital status:      Spouse name: N/A    Number of children: N/A    Years of education: N/A     Social History Main Topics    Smoking status: Current Some Day Smoker     Packs/day: 1.00     Years: 48.00     Types: Cigarettes    Smokeless tobacco: Never Used    Alcohol use Yes      Comment: occasional    Drug use: No    Sexual activity: Not Asked     Other Topics Concern    None     Social History Narrative    , not working, 3 children (1 passed at 9 mo old), tobacco daily, ETOH socially, GYN  dtrs of [facility]       Current Outpatient Prescriptions   Medication Sig Dispense Refill    albuterol (PROAIR HFA) 90 mcg/actuation inhaler Inhale 2 puffs into the lungs every 6 (six) hours as needed for Wheezing. 18 g 11    ALPRAZolam (XANAX) 0.25 MG tablet Take 1 tablet (0.25 mg total) by mouth 2 (two) times daily as needed for Anxiety. 60 tablet 0    amLODIPine (NORVASC) 10 MG tablet Take 1 tablet (10 mg total) by mouth once daily. 30 tablet 2    atorvastatin (LIPITOR) 10 MG tablet Take 1 tablet (10 mg total) by mouth once daily. 30 tablet 2    carvedilol (COREG) 25 MG tablet Take 1 tablet (25 mg total) by mouth 2 (two) times daily with meals. 60 tablet 2    cetirizine (ZYRTEC) 10 MG tablet Take 10 mg by mouth once daily.      diphenhydrAMINE (BENADRYL) 50 MG tablet Take 50 mg by mouth nightly as needed for Itching.      DULoxetine (CYMBALTA) 60 MG capsule       famotidine (PEPCID) 20 MG tablet Take 20 mg by mouth 2 (two) times daily.      fexofenadine (ALLEGRA) 60 MG tablet Take 60 mg by  mouth once daily.      gabapentin (NEURONTIN) 600 MG tablet Take 1 tablet (600 mg total) by mouth 4 (four) times daily with meals and nightly. 120 tablet 2    hydrocortisone 1 % cream Apply topically 2 (two) times daily. 30 g 2    hydrOXYzine HCl (ATARAX) 25 MG tablet Take 1 tablet (25 mg total) by mouth 3 (three) times daily as needed for Itching. 60 tablet 2    MULTIVITAMIN W-MINERALS/LUTEIN (CENTRUM SILVER ORAL) Take 1 tablet by mouth once daily. Centrum Silver 50 plus      nicotine (NICODERM CQ) 21 mg/24 hr Place 1 patch onto the skin once daily. 28 patch 3    pramoxine-hydrocortisone 1-1 % lotion Apply topically 3 (three) times daily.       No current facility-administered medications for this visit.        Review of patient's allergies indicates:   Allergen Reactions    Ambien [zolpidem] Other (See Comments)     Pt hyperactive    Lunesta [eszopiclone] Other (See Comments)     Severely depressed    Shellfish containing products Swelling and Other (See Comments)     Metallic taste in mouth  Swells all over,including the tongue and throat  Feels likes insides are swollen  Allergic to crawfish,Shrimp    Ancef [cefazolin]      Facial swelling  Swelling abdomen    Flexeril [cyclobenzaprine] Swelling    Gabapentin Hives    Sulfa (sulfonamide antibiotics) Nausea Only       Review of Systems   Constitutional: Negative for appetite change, chills, fatigue, fever and unexpected weight change.   HENT: Negative for drooling, trouble swallowing and voice change.    Eyes: Negative for pain and visual disturbance.   Respiratory: Negative for shortness of breath and wheezing.    Cardiovascular: Negative for chest pain and palpitations.   Gastrointestinal: Negative for abdominal distention, abdominal pain, constipation and diarrhea.   Genitourinary: Negative for difficulty urinating.   Musculoskeletal: Positive for back pain and neck pain. Negative for arthralgias, gait problem, joint swelling, myalgias and neck  stiffness.               Skin: Negative for color change and rash.   Neurological: Negative for dizziness, facial asymmetry, speech difficulty, weakness, light-headedness and numbness. Headaches:     Hematological: Negative for adenopathy.   Psychiatric/Behavioral: Negative for behavioral problems, confusion and sleep disturbance. The patient is not nervous/anxious.            Objective:      Physical Exam    GENERAL: The patient is alert, oriented, pleasant.  HEENT: PERRLA  NECK: supple, no masses,    CV: S1S2, RRR, no murmurs, rales.  LUNGS: CTA bilateral.   ABDOMEN: soft, non-tender, non distended, bowel sounds nl.  EXTREMITIES: no edema, +2 pulses DP, b/l  SKIN: no skin rash, no skin breakdowns.  MUSCULOSKELETAL:   Gait : normal, walks with no AD.  Cervical spine: decreased AROM in cervical spine, flexion to 60, extension  to 0,   side bending and rotation  to 30-35 degrees without pain,b/l.   + mild-mod paravertebral cervical musculature tenderness, b/l.  + mid -mod  tenderness in upper trapezius muscles, b/l.   Lumbar spine, decreased active range of motion in all planes, flexion to 90 degrees , ext. 0.  Side bending and rotation to 35-40 degrees, b/l.  positive facet loading b/l.  Straight leg raising Negative bilaterally.   Full range of motion in all joints x4 extremities.   Muscle strength 5/5 throughout x4 extremities.   No  joint laxity throughout x4 extremities.   NEUROLOGIC: Cranial nerves II through XII intact.   Deep tendon reflexes is normal, +2 in the upper and lower extremities bilaterally.   Muscle tone is normal.   Sensory is intact to light touch and pinprick throughout x4 extremities.     MRI of Lumbar spine ( 11/28/17) showed:  L1-L2: No spinal canal stenosis or neuroforaminal narrowing.  L2-L3: Mild bilateral facet arthropathy noted.  No spinal canal stenosis or neuroforaminal narrowing.  L3-L4: Mild bilateral facet arthropathy and circumferential disc bulge noted.  No spinal canal stenosis  or neuroforaminal narrowing.  L4-L5: Severe bilateral facet arthropathy and circumferential disc bulge result in moderate spinal canal stenosis.  L5-S1: Circumferential disc bulge and severe bilateral facet arthropathy result in severe spinal canal stenosis.  Note made of synovial cyst in the left foramen, contacting the exiting L5 nerve root.  Impression:   Degenerative changes of the lower lumbar spine as detailed above.      Assessment:       1. Lumbar disc herniation with radiculopathy    2. Spinal stenosis of lumbar region with neurogenic claudication    3. Lumbar radiculopathy    4. Chronic pain syndrome    5. Chronic bilateral low back pain with left-sided sciatica        Plan:       Lumbar disc herniation with radiculopathy  -     gabapentin (NEURONTIN) 600 MG tablet; Take 1 tablet (600 mg total) by mouth 4 (four) times daily with meals and nightly.  Dispense: 120 tablet; Refill: 2    Spinal stenosis of lumbar region with neurogenic claudication  -     gabapentin (NEURONTIN) 600 MG tablet; Take 1 tablet (600 mg total) by mouth 4 (four) times daily with meals and nightly.  Dispense: 120 tablet; Refill: 2    Lumbar radiculopathy  -     gabapentin (NEURONTIN) 600 MG tablet; Take 1 tablet (600 mg total) by mouth 4 (four) times daily with meals and nightly.  Dispense: 120 tablet; Refill: 2    Chronic pain syndrome  -     gabapentin (NEURONTIN) 600 MG tablet; Take 1 tablet (600 mg total) by mouth 4 (four) times daily with meals and nightly.  Dispense: 120 tablet; Refill: 2    Chronic bilateral low back pain with left-sided sciatica  -     gabapentin (NEURONTIN) 600 MG tablet; Take 1 tablet (600 mg total) by mouth 4 (four) times daily with meals and nightly.  Dispense: 120 tablet; Refill: 2    Patient with severe spinal canal stenosis, at L5-S1, and moderate at L4-5, complicated with severe bilateral facet arthropathy, at both lowest levels,and Left lumbar radiculopathy, secondary to synovial cyst in the left  foramen, contacting the exiting L5 nerve root.    -- discussed MRI of LS results in details, on spine model.     -- Pain management:   Recommend to start Gabapentin at 600 mg po qid, that would be a higher dose that before 1600 mg/day, that she tolerated well, but was ineffective.  Hydrocodone as needed ( takes rarely, no need for another refill).   -- will resume PT, aqua therapy, until her vacation, and than will resume home exercise program.    RTC in 3-4 months.     Total time spent face to face with patient was 25 minutes.   More than 50% of that time was spent in counseling on diagnosis , prognosis and treatment options.   I also  patient  on common and most usual side effect of prescribed medications. Risk and benefits of opiates, possible risk of developing opiate dependence and tolerance, need of strict compliance with prescribed medications.  I reviewed Primary care , and other specialty's notes to better coordinate patient's  care.   All questions were answered, and patient voiced understanding.

## 2018-05-08 ENCOUNTER — LAB VISIT (OUTPATIENT)
Dept: LAB | Facility: HOSPITAL | Age: 63
End: 2018-05-08
Attending: INTERNAL MEDICINE
Payer: COMMERCIAL

## 2018-05-08 ENCOUNTER — OFFICE VISIT (OUTPATIENT)
Dept: INTERNAL MEDICINE | Facility: CLINIC | Age: 63
End: 2018-05-08
Payer: COMMERCIAL

## 2018-05-08 VITALS
BODY MASS INDEX: 41.02 KG/M2 | WEIGHT: 222.88 LBS | RESPIRATION RATE: 16 BRPM | HEART RATE: 82 BPM | HEIGHT: 62 IN | SYSTOLIC BLOOD PRESSURE: 131 MMHG | TEMPERATURE: 98 F | DIASTOLIC BLOOD PRESSURE: 86 MMHG

## 2018-05-08 DIAGNOSIS — F41.9 ANXIETY: ICD-10-CM

## 2018-05-08 DIAGNOSIS — R60.0 BILATERAL LEG EDEMA: ICD-10-CM

## 2018-05-08 DIAGNOSIS — G47.00 INSOMNIA, UNSPECIFIED TYPE: ICD-10-CM

## 2018-05-08 DIAGNOSIS — I10 ESSENTIAL HYPERTENSION: Primary | ICD-10-CM

## 2018-05-08 DIAGNOSIS — E78.00 HYPERCHOLESTEROLEMIA: ICD-10-CM

## 2018-05-08 LAB
ALBUMIN SERPL BCP-MCNC: 4.2 G/DL
ALP SERPL-CCNC: 107 U/L
ALT SERPL W/O P-5'-P-CCNC: 23 U/L
ANION GAP SERPL CALC-SCNC: 10 MMOL/L
AST SERPL-CCNC: 17 U/L
BILIRUB SERPL-MCNC: 0.6 MG/DL
BNP SERPL-MCNC: 32 PG/ML
BUN SERPL-MCNC: 13 MG/DL
CALCIUM SERPL-MCNC: 9.9 MG/DL
CHLORIDE SERPL-SCNC: 102 MMOL/L
CO2 SERPL-SCNC: 27 MMOL/L
CREAT SERPL-MCNC: 0.8 MG/DL
EST. GFR  (AFRICAN AMERICAN): >60 ML/MIN/1.73 M^2
EST. GFR  (NON AFRICAN AMERICAN): >60 ML/MIN/1.73 M^2
GLUCOSE SERPL-MCNC: 95 MG/DL
POTASSIUM SERPL-SCNC: 4.2 MMOL/L
PROT SERPL-MCNC: 7.7 G/DL
SODIUM SERPL-SCNC: 139 MMOL/L
TSH SERPL DL<=0.005 MIU/L-ACNC: 1.89 UIU/ML

## 2018-05-08 PROCEDURE — 83880 ASSAY OF NATRIURETIC PEPTIDE: CPT

## 2018-05-08 PROCEDURE — 99214 OFFICE O/P EST MOD 30 MIN: CPT | Mod: S$GLB,,, | Performed by: INTERNAL MEDICINE

## 2018-05-08 PROCEDURE — 3008F BODY MASS INDEX DOCD: CPT | Mod: CPTII,S$GLB,, | Performed by: INTERNAL MEDICINE

## 2018-05-08 PROCEDURE — 3075F SYST BP GE 130 - 139MM HG: CPT | Mod: CPTII,S$GLB,, | Performed by: INTERNAL MEDICINE

## 2018-05-08 PROCEDURE — 84443 ASSAY THYROID STIM HORMONE: CPT

## 2018-05-08 PROCEDURE — 99999 PR PBB SHADOW E&M-EST. PATIENT-LVL III: CPT | Mod: PBBFAC,,, | Performed by: INTERNAL MEDICINE

## 2018-05-08 PROCEDURE — 36415 COLL VENOUS BLD VENIPUNCTURE: CPT | Mod: PO

## 2018-05-08 PROCEDURE — 3079F DIAST BP 80-89 MM HG: CPT | Mod: CPTII,S$GLB,, | Performed by: INTERNAL MEDICINE

## 2018-05-08 PROCEDURE — 80053 COMPREHEN METABOLIC PANEL: CPT

## 2018-05-08 RX ORDER — ATORVASTATIN CALCIUM 10 MG/1
10 TABLET, FILM COATED ORAL DAILY
Qty: 30 TABLET | Refills: 5 | Status: SHIPPED | OUTPATIENT
Start: 2018-05-08 | End: 2018-09-13 | Stop reason: SDUPTHER

## 2018-05-08 RX ORDER — VALSARTAN AND HYDROCHLOROTHIAZIDE 160; 25 MG/1; MG/1
1 TABLET ORAL DAILY
Qty: 30 TABLET | Refills: 2 | Status: SHIPPED | OUTPATIENT
Start: 2018-05-08 | End: 2018-07-31 | Stop reason: SDUPTHER

## 2018-05-08 RX ORDER — ALPRAZOLAM 0.25 MG/1
0.25 TABLET ORAL 2 TIMES DAILY PRN
Qty: 60 TABLET | Refills: 1 | Status: SHIPPED | OUTPATIENT
Start: 2018-05-08 | End: 2018-10-01

## 2018-05-08 NOTE — PROGRESS NOTES
Subjective:       Patient ID: Kaleigh Solo is a 62 y.o. female who presents for Hypertension (bp check); Leg Swelling; and Anxiety    Patient will be leaving for a trip to [place] on 5/10/18.    Hypertension   This is a chronic problem. The current episode started more than 1 year ago. The problem is unchanged. The problem is uncontrolled. Associated symptoms include anxiety and peripheral edema (resolving, possibly associated with increase in amlodipine dose). Pertinent negatives include no chest pain, headaches, orthopnea, palpitations or shortness of breath. There are no associated agents to hypertension. Risk factors for coronary artery disease include dyslipidemia and obesity. Past treatments include calcium channel blockers. The current treatment provides moderate improvement. Compliance problems include exercise.  There is no history of kidney disease or heart failure. There is no history of chronic renal disease.   Edema   This is a new problem. The current episode started in the past 7 days. The problem has been gradually improving. Pertinent negatives include no abdominal pain, arthralgias, chest pain, chills, congestion, coughing, diaphoresis, fever, headaches, myalgias, nausea, numbness, rash, urinary symptoms, visual change, vomiting or weakness. Nothing aggravates the symptoms. She has tried rest for the symptoms. The treatment provided mild relief.   Anxiety   Presents for follow-up visit. Symptoms include insomnia and nervous/anxious behavior. Patient reports no chest pain, dizziness, excessive worry, nausea, palpitations, panic or shortness of breath. Symptoms occur occasionally. The severity of symptoms is mild. The quality of sleep is fair.     Compliance with medications is %.        Review of Systems   Constitutional: Positive for appetite change (decreased). Negative for chills, diaphoresis and fever.   HENT: Negative for congestion, postnasal drip and sinus pressure.    Eyes: Negative  for redness and visual disturbance.   Respiratory: Negative for cough, chest tightness and shortness of breath.    Cardiovascular: Positive for leg swelling. Negative for chest pain, palpitations and orthopnea.   Gastrointestinal: Negative for abdominal pain, constipation, diarrhea, nausea and vomiting.   Endocrine: Negative for cold intolerance and heat intolerance.   Musculoskeletal: Negative for arthralgias and myalgias.   Skin: Negative for rash.   Neurological: Negative for dizziness, weakness, numbness and headaches.   Hematological: Negative for adenopathy.   Psychiatric/Behavioral: Positive for sleep disturbance (better with xanax as needed). The patient is nervous/anxious and has insomnia.        Objective:      Physical Exam   Constitutional: She is oriented to person, place, and time. Vital signs are normal. She appears well-developed and well-nourished. No distress.   HENT:   Head: Normocephalic and atraumatic.   Right Ear: Hearing and external ear normal.   Left Ear: Hearing and external ear normal.   Nose: Nose normal.   Mouth/Throat: Uvula is midline and mucous membranes are normal.   Eyes: Lids are normal.   Cardiovascular: Normal rate, regular rhythm, normal heart sounds and intact distal pulses.    No murmur heard.  Pulmonary/Chest: Effort normal and breath sounds normal. She has no wheezes.   Abdominal: Soft. Bowel sounds are normal. She exhibits no distension. There is no tenderness.   Musculoskeletal: Normal range of motion. She exhibits edema (trace BLE edema).   Neurological: She is alert and oriented to person, place, and time.   Skin: Skin is warm, dry and intact. No rash noted. She is not diaphoretic.   Psychiatric: She has a normal mood and affect.   Vitals reviewed.      Assessment:       1. Essential hypertension    2. Bilateral leg edema    3. Anxiety    4. Hypercholesterolemia    5. Insomnia, unspecified type        Plan:       1. Essential hypertension  - stop amlodipine  - start  Diovan HCT  - valsartan-hydrochlorothiazide (DIOVAN-HCT) 160-25 mg per tablet; Take 1 tablet by mouth once daily.  Dispense: 30 tablet; Refill: 2  - monitor BP closely, call with readings before trip    2. Bilateral leg edema  - could be related to amlodipine, elevate legs  - Comprehensive metabolic panel; Future  - Brain natriuretic peptide; Future  - TSH; Future    3. Anxiety  - controlled, continue xanax  - ALPRAZolam (XANAX) 0.25 MG tablet; Take 1 tablet (0.25 mg total) by mouth 2 (two) times daily as needed for Anxiety.  Dispense: 60 tablet; Refill: 1    4. Hypercholesterolemia  - stable, refilled lipitor  - atorvastatin (LIPITOR) 10 MG tablet; Take 1 tablet (10 mg total) by mouth once daily.  Dispense: 30 tablet; Refill: 5    5. Insomnia, unspecified type  - controlled, continue  - ALPRAZolam (XANAX) 0.25 MG tablet; Take 1 tablet (0.25 mg total) by mouth 2 (two) times daily as needed for Anxiety.  Dispense: 60 tablet; Refill: 1    RTC in 3 months for annual exam    Felicity Middleton MD

## 2018-05-10 ENCOUNTER — PATIENT MESSAGE (OUTPATIENT)
Dept: INTERNAL MEDICINE | Facility: CLINIC | Age: 63
End: 2018-05-10

## 2018-06-07 ENCOUNTER — OFFICE VISIT (OUTPATIENT)
Dept: INTERNAL MEDICINE | Facility: CLINIC | Age: 63
End: 2018-06-07
Payer: COMMERCIAL

## 2018-06-07 VITALS
HEIGHT: 62 IN | BODY MASS INDEX: 41.78 KG/M2 | DIASTOLIC BLOOD PRESSURE: 83 MMHG | SYSTOLIC BLOOD PRESSURE: 131 MMHG | HEART RATE: 91 BPM | WEIGHT: 227.06 LBS | RESPIRATION RATE: 16 BRPM | TEMPERATURE: 98 F

## 2018-06-07 DIAGNOSIS — I10 HYPERTENSION, ESSENTIAL: ICD-10-CM

## 2018-06-07 DIAGNOSIS — R05.9 COUGH: ICD-10-CM

## 2018-06-07 DIAGNOSIS — J01.41 ACUTE RECURRENT PANSINUSITIS: Primary | ICD-10-CM

## 2018-06-07 PROCEDURE — 3075F SYST BP GE 130 - 139MM HG: CPT | Mod: CPTII,S$GLB,, | Performed by: INTERNAL MEDICINE

## 2018-06-07 PROCEDURE — 99999 PR PBB SHADOW E&M-EST. PATIENT-LVL III: CPT | Mod: PBBFAC,,, | Performed by: INTERNAL MEDICINE

## 2018-06-07 PROCEDURE — 3079F DIAST BP 80-89 MM HG: CPT | Mod: CPTII,S$GLB,, | Performed by: INTERNAL MEDICINE

## 2018-06-07 PROCEDURE — 3008F BODY MASS INDEX DOCD: CPT | Mod: CPTII,S$GLB,, | Performed by: INTERNAL MEDICINE

## 2018-06-07 PROCEDURE — 99214 OFFICE O/P EST MOD 30 MIN: CPT | Mod: 25,S$GLB,, | Performed by: INTERNAL MEDICINE

## 2018-06-07 PROCEDURE — 96372 THER/PROPH/DIAG INJ SC/IM: CPT | Mod: S$GLB,,, | Performed by: INTERNAL MEDICINE

## 2018-06-07 RX ORDER — TRIAMCINOLONE ACETONIDE 40 MG/ML
40 INJECTION, SUSPENSION INTRA-ARTICULAR; INTRAMUSCULAR
Status: COMPLETED | OUTPATIENT
Start: 2018-06-07 | End: 2018-06-07

## 2018-06-07 RX ORDER — AZELASTINE 1 MG/ML
1 SPRAY, METERED NASAL 2 TIMES DAILY
Qty: 30 ML | Refills: 0 | Status: SHIPPED | OUTPATIENT
Start: 2018-06-07 | End: 2018-08-07

## 2018-06-07 RX ORDER — AMOXICILLIN AND CLAVULANATE POTASSIUM 875; 125 MG/1; MG/1
1 TABLET, FILM COATED ORAL 2 TIMES DAILY
Qty: 20 TABLET | Refills: 0 | Status: SHIPPED | OUTPATIENT
Start: 2018-06-07 | End: 2018-06-17

## 2018-06-07 RX ORDER — BENZONATATE 200 MG/1
200 CAPSULE ORAL 3 TIMES DAILY PRN
Qty: 30 CAPSULE | Refills: 0 | Status: SHIPPED | OUTPATIENT
Start: 2018-06-07 | End: 2018-06-17

## 2018-06-07 RX ADMIN — TRIAMCINOLONE ACETONIDE 40 MG: 40 INJECTION, SUSPENSION INTRA-ARTICULAR; INTRAMUSCULAR at 11:06

## 2018-06-07 NOTE — PROGRESS NOTES
Subjective:       Patient ID: Kaleigh Solo is a 62 y.o. female who presents for Cough and Nasal Congestion      Cough   This is a new problem. The current episode started 1 to 4 weeks ago (started ~ 10 days ago). The problem has been gradually worsening. The problem occurs every few minutes. The cough is productive of sputum. Associated symptoms include chills, nasal congestion, postnasal drip, rhinorrhea, a sore throat (scratchy) and sweats. Pertinent negatives include no chest pain, ear pain, eye redness, fever, headaches, heartburn, myalgias, rash or shortness of breath. Nothing aggravates the symptoms. She has tried OTC cough suppressant for the symptoms. The treatment provided mild relief. Her past medical history is significant for environmental allergies.   Sinus Problem   This is a recurrent problem. The current episode started 1 to 4 weeks ago. The problem has been waxing and waning since onset. There has been no fever. Associated symptoms include chills, congestion, coughing, sinus pressure and a sore throat (scratchy). Pertinent negatives include no diaphoresis, ear pain, headaches, hoarse voice, shortness of breath or swollen glands. Past treatments include oral decongestants. The treatment provided mild relief.   Hypertension   This is a chronic problem. The current episode started more than 1 year ago. The problem is unchanged. The problem is controlled. Associated symptoms include sweats. Pertinent negatives include no anxiety, chest pain, headaches, malaise/fatigue, palpitations or shortness of breath. Risk factors for coronary artery disease include dyslipidemia. Past treatments include angiotensin blockers and diuretics. The current treatment provides moderate improvement. There is no history of kidney disease or heart failure. There is no history of a thyroid problem.        Review of Systems   Constitutional: Positive for chills and fatigue. Negative for diaphoresis, fever and  malaise/fatigue.   HENT: Positive for congestion, postnasal drip, rhinorrhea, sinus pain, sinus pressure and sore throat (scratchy). Negative for ear discharge, ear pain, hearing loss and hoarse voice.    Eyes: Negative for redness and visual disturbance.   Respiratory: Positive for cough. Negative for chest tightness and shortness of breath.    Cardiovascular: Negative for chest pain and palpitations.   Gastrointestinal: Negative for abdominal pain, diarrhea, heartburn, nausea and vomiting.   Genitourinary: Negative for dysuria, hematuria and urgency.   Musculoskeletal: Negative for arthralgias and myalgias.   Skin: Negative for rash.   Allergic/Immunologic: Positive for environmental allergies.   Neurological: Negative for dizziness, weakness, light-headedness and headaches.   Hematological: Negative for adenopathy.       Objective:      Physical Exam   Constitutional: She is oriented to person, place, and time. Vital signs are normal. She appears well-developed and well-nourished. No distress.   HENT:   Head: Normocephalic and atraumatic.   Right Ear: Hearing, tympanic membrane, external ear and ear canal normal. Tympanic membrane is not erythematous and not bulging.   Left Ear: Hearing, tympanic membrane, external ear and ear canal normal. Tympanic membrane is not erythematous and not bulging.   Nose: Mucosal edema and rhinorrhea present. Right sinus exhibits maxillary sinus tenderness and frontal sinus tenderness. Left sinus exhibits maxillary sinus tenderness and frontal sinus tenderness.   Mouth/Throat: Uvula is midline and oropharynx is clear and moist. No oropharyngeal exudate or posterior oropharyngeal erythema.   Eyes: Lids are normal.   Neck: Full passive range of motion without pain.   Cardiovascular: Normal rate, regular rhythm, normal heart sounds and intact distal pulses.    No murmur heard.  Pulmonary/Chest: Effort normal and breath sounds normal. No tachypnea and no bradypnea. She has no decreased  breath sounds. She has no wheezes.   Abdominal: Soft. Bowel sounds are normal. She exhibits no distension. There is no tenderness.   Musculoskeletal: Normal range of motion. She exhibits no edema.   Lymphadenopathy:        Head (right side): No submandibular, no preauricular and no posterior auricular adenopathy present.        Head (left side): No submandibular, no preauricular and no posterior auricular adenopathy present.     She has no cervical adenopathy.   Neurological: She is alert and oriented to person, place, and time.   Skin: Skin is warm, dry and intact. No rash noted. She is not diaphoretic.   Psychiatric: She has a normal mood and affect.   Vitals reviewed.      Assessment:       1. Acute recurrent pansinusitis    2. Cough    3. Hypertension, essential        Plan:       1. Acute recurrent pansinusitis  - triamcinolone acetonide injection 40 mg; Inject 1 mL (40 mg total) into the muscle one time.  - amoxicillin-clavulanate 875-125mg (AUGMENTIN) 875-125 mg per tablet; Take 1 tablet by mouth 2 (two) times daily.  Dispense: 20 tablet; Refill: 0  - azelastine (ASTELIN) 137 mcg (0.1 %) nasal spray; 1 spray (137 mcg total) by Nasal route 2 (two) times daily.  Dispense: 30 mL; Refill: 0    2. Cough  - benzonatate (TESSALON) 200 MG capsule; Take 1 capsule (200 mg total) by mouth 3 (three) times daily as needed.  Dispense: 30 capsule; Refill: 0    3. Hypertension, essential  - ankle swelling resolved off amlodipine  - stable, controlled with valsartan-HCTZ    Felicity Middleton MD

## 2018-06-13 ENCOUNTER — PATIENT MESSAGE (OUTPATIENT)
Dept: INTERNAL MEDICINE | Facility: CLINIC | Age: 63
End: 2018-06-13

## 2018-07-31 DIAGNOSIS — I10 ESSENTIAL HYPERTENSION: ICD-10-CM

## 2018-07-31 RX ORDER — VALSARTAN AND HYDROCHLOROTHIAZIDE 160; 25 MG/1; MG/1
TABLET ORAL
Qty: 30 TABLET | Refills: 0 | Status: SHIPPED | OUTPATIENT
Start: 2018-07-31 | End: 2018-08-07

## 2018-08-01 ENCOUNTER — OFFICE VISIT (OUTPATIENT)
Dept: PHYSICAL MEDICINE AND REHAB | Facility: CLINIC | Age: 63
End: 2018-08-01
Payer: COMMERCIAL

## 2018-08-01 VITALS
WEIGHT: 232.94 LBS | BODY MASS INDEX: 39.77 KG/M2 | SYSTOLIC BLOOD PRESSURE: 166 MMHG | DIASTOLIC BLOOD PRESSURE: 90 MMHG | HEART RATE: 90 BPM | HEIGHT: 64 IN

## 2018-08-01 DIAGNOSIS — M54.16 LUMBAR RADICULOPATHY: ICD-10-CM

## 2018-08-01 DIAGNOSIS — G89.29 CHRONIC BILATERAL LOW BACK PAIN WITH LEFT-SIDED SCIATICA: Chronic | ICD-10-CM

## 2018-08-01 DIAGNOSIS — M48.07 FORAMINAL STENOSIS OF LUMBOSACRAL REGION: ICD-10-CM

## 2018-08-01 DIAGNOSIS — M51.16 LUMBAR DISC HERNIATION WITH RADICULOPATHY: ICD-10-CM

## 2018-08-01 DIAGNOSIS — R29.898 WEAKNESS OF BOTH LOWER EXTREMITIES: ICD-10-CM

## 2018-08-01 DIAGNOSIS — M54.42 CHRONIC BILATERAL LOW BACK PAIN WITH LEFT-SIDED SCIATICA: Chronic | ICD-10-CM

## 2018-08-01 DIAGNOSIS — G89.4 CHRONIC PAIN SYNDROME: ICD-10-CM

## 2018-08-01 DIAGNOSIS — M48.062 SPINAL STENOSIS OF LUMBAR REGION WITH NEUROGENIC CLAUDICATION: Primary | ICD-10-CM

## 2018-08-01 PROCEDURE — 99214 OFFICE O/P EST MOD 30 MIN: CPT | Mod: S$GLB,,, | Performed by: PHYSICAL MEDICINE & REHABILITATION

## 2018-08-01 PROCEDURE — 3080F DIAST BP >= 90 MM HG: CPT | Mod: CPTII,S$GLB,, | Performed by: PHYSICAL MEDICINE & REHABILITATION

## 2018-08-01 PROCEDURE — 3077F SYST BP >= 140 MM HG: CPT | Mod: CPTII,S$GLB,, | Performed by: PHYSICAL MEDICINE & REHABILITATION

## 2018-08-01 PROCEDURE — 3008F BODY MASS INDEX DOCD: CPT | Mod: CPTII,S$GLB,, | Performed by: PHYSICAL MEDICINE & REHABILITATION

## 2018-08-01 PROCEDURE — 99999 PR PBB SHADOW E&M-EST. PATIENT-LVL III: CPT | Mod: PBBFAC,,, | Performed by: PHYSICAL MEDICINE & REHABILITATION

## 2018-08-01 RX ORDER — HYDROCODONE BITARTRATE AND ACETAMINOPHEN 5; 325 MG/1; MG/1
1 TABLET ORAL EVERY 8 HOURS PRN
Qty: 90 TABLET | Refills: 0 | Status: SHIPPED | OUTPATIENT
Start: 2018-10-01 | End: 2018-08-24

## 2018-08-01 RX ORDER — GABAPENTIN 600 MG/1
600 TABLET ORAL
Qty: 120 TABLET | Refills: 3 | Status: SHIPPED | OUTPATIENT
Start: 2018-08-01 | End: 2018-10-01

## 2018-08-01 RX ORDER — HYDROCODONE BITARTRATE AND ACETAMINOPHEN 5; 325 MG/1; MG/1
1 TABLET ORAL EVERY 8 HOURS PRN
Qty: 90 TABLET | Refills: 0 | Status: SHIPPED | OUTPATIENT
Start: 2018-09-01 | End: 2018-08-24

## 2018-08-01 RX ORDER — HYDROCODONE BITARTRATE AND ACETAMINOPHEN 5; 325 MG/1; MG/1
1 TABLET ORAL EVERY 8 HOURS PRN
Qty: 90 TABLET | Refills: 0 | Status: SHIPPED | OUTPATIENT
Start: 2018-08-01 | End: 2018-08-24

## 2018-08-01 NOTE — PROGRESS NOTES
Subjective:       Patient ID: Kaleigh Solo is a 63 y.o. female.    Chief Complaint: Back Pain and Leg Pain    Back Pain   Associated symptoms include leg pain. Pertinent negatives include no abdominal pain, chest pain, fever, numbness or weakness. Headaches:     Leg Pain    Pertinent negatives include no numbness.      Mrs. Solo is a 62 yo female who returns to clinic for back pain.   She was referred by Dr. Rashid for chronic pain management.   [facility] 04/30/18.  She has known  lumbar disc herniation with lumbar radiculopathy.   Today, she reports some improvement of back pain with Physical therapy.   Since [facility], she went [place]and reports that she was in lot of pain.  She was able to walk only 2 trips off the boat, since she could not walk long hrs.  She has had a severe back pain with neurogenic claudications to both legs.  She is now taking Gabapentin, at daily dose of 1600 mg/day.  Tolerates it well, but it does not help.  Today she still has the same CC, of back pain and leg cramps.    She reports that CIRILO, L5-S1, that she had on Jan 24th, 2018, started working 7 weeks later,  the 2nd week of March,and is still lasting.   Nevertheless, recently, she started getting spasms in left side leg from siatica.   This was the first spasm since  March after she had an epidural injection to lower back.  She reports that she can walk 1.5-2 miles w/o need to stop, or sit down, and can tolerate a pain.   Standing still seems to be harder, she is able to stand 10-15 minutes before pain becomes intense to the point that she needs to sit down.    Today her CC is  back pain.  Back Pain Description:  Length: pain is chronic pain. Length >  Since 2006    No past, recent injury, falls.  Intensity:  CURRENT   2-3/10,  AVERAGE  Pain   2-3/10. at BEST  2-3/10 , At WORST  10/10 on the WORST day.   Location: pain is localized at mid of back  , that would run down the  Left leg  It is more Back >> leg pain.  Radiation:  Positive to Lt buttocks.   Positive to Lt  Legs (all before the second week of March..   Timing : constant  nighttime pain ,she would get night muscle cramps in Lt leg.  worse with activity, in evening  QUALITY:  Sharp/grabbing pain  She had neuropathic : burning, tingling, numbness, in Rt foot, ari in left foot, now she does not have left leg pain since second week of March.  Positive leg weakness.   Worsening factors:: standing, bending, twisting , activity    Alleviating factors:  Laying in recliner  Symptoms interfere with daily activity, sleeping and work.   Current medications:  Hydrocodone ( takes rarely), Cymbalta 60 mg daily.   Failed medications : Gabapentin, was taking 1600 mg/day, but does not believe that they were helping.   Prior procedures: she received CIRILO , one in 12/20/17 , s/p Lt L4, L5, that did not help at all, and second injection, on 1/24/18 , s/p Lt L5, S1 , that helped ~80 % for legs, after 6-7 weeks ( started working in second week of march, 2018).  Was seen by , who sis not recommend surgery as of now, but rather wanted to see her response to PT and CIRILO.  PT/OT:  Had multiple times to PT, and attending currently  aqua therapy.  Patient denies night fever/night sweats,no bladder, nor bowel incontinence,   No significant weight loss and significant motor weakness ( red flags).  Patient denies any suicidal or homicidal ideations.   She is here for follow up and treatment.      Past Medical History:   Diagnosis Date    Arthritis     Asthma     Cervicalgia     2013 tx by chiropractor    Closed dislocation of acromioclavicular joint 2013    Degenerative disc disease     Fibromyalgia     HPV (human papilloma virus) anogenital infection     conization in past-remote history-1990    HTN (hypertension)     Sciatica     Tobacco use     Ulcer 2007    stomach ulcer       Past Surgical History:   Procedure Laterality Date    acdf  4/2014    C5-6    CERVICAL CONIZATION   W/ LASER       KNEE ARTHROSCOPY      right shoulder surger      arthroscopic surgery Dec 2013       Family History   Problem Relation Age of Onset    Cancer Mother         lung-was tobacco user  of lung cancer at 78    Aneurysm Father         abdominal burst-  of stroke at 71 years    Heart disease Father         had HTN       Social History     Social History    Marital status:      Spouse name: N/A    Number of children: N/A    Years of education: N/A     Social History Main Topics    Smoking status: Current Every Day Smoker     Packs/day: 1.00     Years: 48.00     Types: Cigarettes    Smokeless tobacco: Never Used    Alcohol use Yes      Comment: occasional    Drug use: No    Sexual activity: Not on file     Other Topics Concern    Not on file     Social History Narrative    , not working, 3 children (1 passed at 9 mo old), tobacco daily, ETOH socially, GYN  dtrs of [facility]       Current Outpatient Prescriptions   Medication Sig Dispense Refill    albuterol (PROAIR HFA) 90 mcg/actuation inhaler Inhale 2 puffs into the lungs every 6 (six) hours as needed for Wheezing. 18 g 11    ALPRAZolam (XANAX) 0.25 MG tablet Take 1 tablet (0.25 mg total) by mouth 2 (two) times daily as needed for Anxiety. 60 tablet 1    atorvastatin (LIPITOR) 10 MG tablet Take 1 tablet (10 mg total) by mouth once daily. 30 tablet 5    azelastine (ASTELIN) 137 mcg (0.1 %) nasal spray 1 spray (137 mcg total) by Nasal route 2 (two) times daily. 30 mL 0    carvedilol (COREG) 25 MG tablet Take 1 tablet (25 mg total) by mouth 2 (two) times daily with meals. 60 tablet 2    cetirizine (ZYRTEC) 10 MG tablet Take 10 mg by mouth once daily.      clotrimazole-betamethasone 1-0.05% (LOTRISONE) cream Apply topically 2 (two) times daily. 45 g 1    diphenhydrAMINE (BENADRYL) 50 MG tablet Take 50 mg by mouth nightly as needed for Itching.      DULoxetine (CYMBALTA) 30 MG capsule       DULoxetine (CYMBALTA) 60 MG capsule        famotidine (PEPCID) 20 MG tablet Take 20 mg by mouth 2 (two) times daily.      fexofenadine (ALLEGRA) 60 MG tablet Take 60 mg by mouth once daily.      fluocinonide 0.05% (LIDEX) 0.05 % cream       FLUoxetine 20 MG tablet       gabapentin (NEURONTIN) 600 MG tablet Take 1 tablet (600 mg total) by mouth 4 (four) times daily with meals and nightly. 120 tablet 3    HYDROcodone-acetaminophen (NORCO) 5-325 mg per tablet Take 1 tablet by mouth every 8 (eight) hours as needed for Pain. 90 tablet 0    [START ON 9/1/2018] HYDROcodone-acetaminophen (NORCO) 5-325 mg per tablet Take 1 tablet by mouth every 8 (eight) hours as needed for Pain. 90 tablet 0    [START ON 10/1/2018] HYDROcodone-acetaminophen (NORCO) 5-325 mg per tablet Take 1 tablet by mouth every 8 (eight) hours as needed for Pain. 90 tablet 0    hydrocortisone 1 % cream Apply topically 2 (two) times daily. 30 g 2    hydrOXYzine HCl (ATARAX) 25 MG tablet Take 1 tablet (25 mg total) by mouth 3 (three) times daily as needed for Itching. 60 tablet 2    methylphenidate HCl (RITALIN) 20 MG tablet       MULTIVITAMIN W-MINERALS/LUTEIN (CENTRUM SILVER ORAL) Take 1 tablet by mouth once daily. Centrum Silver 50 plus      nicotine (NICODERM CQ) 21 mg/24 hr Place 1 patch onto the skin once daily. 28 patch 3    OLANZapine (ZYPREXA) 2.5 MG tablet       pramoxine-hydrocortisone 1-1 % lotion Apply topically 3 (three) times daily.      valsartan-hydrochlorothiazide (DIOVAN-HCT) 160-25 mg per tablet TAKE 1 TABLET BY MOUTH EVERY DAY 30 tablet 0     No current facility-administered medications for this visit.        Review of patient's allergies indicates:   Allergen Reactions    Ambien [zolpidem] Other (See Comments)     Pt hyperactive    Lunesta [eszopiclone] Other (See Comments)     Severely depressed    Shellfish containing products Swelling and Other (See Comments)     Metallic taste in mouth  Swells all over,including the tongue and throat  Feels likes  insides are swollen  Allergic to crawfish,Shrimp    Ancef [cefazolin]      Facial swelling  Swelling abdomen    Flexeril [cyclobenzaprine] Swelling    Gabapentin Hives    Sulfa (sulfonamide antibiotics) Nausea Only       Review of Systems   Constitutional: Negative for appetite change, chills, fatigue, fever and unexpected weight change.   HENT: Negative for drooling, trouble swallowing and voice change.    Eyes: Negative for pain and visual disturbance.   Respiratory: Negative for shortness of breath and wheezing.    Cardiovascular: Negative for chest pain and palpitations.   Gastrointestinal: Negative for abdominal distention, abdominal pain, constipation and diarrhea.   Genitourinary: Negative for difficulty urinating.   Musculoskeletal: Positive for back pain and neck pain. Negative for arthralgias, gait problem, joint swelling, myalgias and neck stiffness.               Skin: Negative for color change and rash.   Neurological: Negative for dizziness, facial asymmetry, speech difficulty, weakness, light-headedness and numbness. Headaches:     Hematological: Negative for adenopathy.   Psychiatric/Behavioral: Negative for behavioral problems, confusion and sleep disturbance. The patient is not nervous/anxious.            Objective:      Physical Exam    GENERAL: The patient is alert, oriented, pleasant.  HEENT: PERRLA  NECK: supple, no masses,    CV: S1S2, RRR, no murmurs, rales.  LUNGS: CTA bilateral.   ABDOMEN: soft, non-tender, non distended, bowel sounds nl.  EXTREMITIES: no edema, +2 pulses DP, b/l  SKIN: no skin rash, no skin breakdowns.  MUSCULOSKELETAL:   Gait : normal, walks with no AD.  Cervical spine: decreased AROM in cervical spine, flexion to 60, extension  to 0,   side bending and rotation  to 30-35 degrees without pain,b/l.   + mild-mod paravertebral cervical musculature tenderness, b/l.  + mid -mod  tenderness in upper trapezius muscles, b/l.   Lumbar spine, decreased active range of motion in  all planes, flexion to 90 degrees , ext. 0.  Side bending and rotation to 35-40 degrees, b/l.  positive facet loading b/l.  Straight leg raising Negative bilaterally.   Full range of motion in all joints x4 extremities.   Muscle strength 5/5 throughout x4 extremities.   No  joint laxity throughout x4 extremities.   NEUROLOGIC: Cranial nerves II through XII intact.   Deep tendon reflexes is normal, +2 in the upper and lower extremities bilaterally.   Muscle tone is normal.   Sensory is intact to light touch and pinprick throughout x4 extremities.     MRI of Lumbar spine ( 11/28/17) showed:  L1-L2: No spinal canal stenosis or neuroforaminal narrowing.  L2-L3: Mild bilateral facet arthropathy noted.  No spinal canal stenosis or neuroforaminal narrowing.  L3-L4: Mild bilateral facet arthropathy and circumferential disc bulge noted.  No spinal canal stenosis or neuroforaminal narrowing.  L4-L5: Severe bilateral facet arthropathy and circumferential disc bulge result in moderate spinal canal stenosis.  L5-S1: Circumferential disc bulge and severe bilateral facet arthropathy result in severe spinal canal stenosis.  Note made of synovial cyst in the left foramen, contacting the exiting L5 nerve root.  Impression:   Degenerative changes of the lower lumbar spine as detailed above.      Assessment:       1. Spinal stenosis of lumbar region with neurogenic claudication    2. Lumbar disc herniation with radiculopathy    3. Chronic pain syndrome    4. Foraminal stenosis of lumbosacral region    5. Weakness of both lower extremities    6. Lumbar radiculopathy    7. Chronic bilateral low back pain with left-sided sciatica      Plan:        Spinal stenosis of lumbar region with neurogenic claudication  -     gabapentin (NEURONTIN) 600 MG tablet; Take 1 tablet (600 mg total) by mouth 4 (four) times daily with meals and nightly.  Dispense: 120 tablet; Refill: 3    Lumbar disc herniation with radiculopathy  -     gabapentin  (NEURONTIN) 600 MG tablet; Take 1 tablet (600 mg total) by mouth 4 (four) times daily with meals and nightly.  Dispense: 120 tablet; Refill: 3    Chronic pain syndrome  -     gabapentin (NEURONTIN) 600 MG tablet; Take 1 tablet (600 mg total) by mouth 4 (four) times daily with meals and nightly.  Dispense: 120 tablet; Refill: 3    Foraminal stenosis of lumbosacral region  -     gabapentin (NEURONTIN) 600 MG tablet; Take 1 tablet (600 mg total) by mouth 4 (four) times daily with meals and nightly.  Dispense: 120 tablet; Refill: 3    Weakness of both lower extremities  -     gabapentin (NEURONTIN) 600 MG tablet; Take 1 tablet (600 mg total) by mouth 4 (four) times daily with meals and nightly.  Dispense: 120 tablet; Refill: 3    Lumbar radiculopathy  -     gabapentin (NEURONTIN) 600 MG tablet; Take 1 tablet (600 mg total) by mouth 4 (four) times daily with meals and nightly.  Dispense: 120 tablet; Refill: 3    Chronic bilateral low back pain with left-sided sciatica  -     gabapentin (NEURONTIN) 600 MG tablet; Take 1 tablet (600 mg total) by mouth 4 (four) times daily with meals and nightly.  Dispense: 120 tablet; Refill: 3    Other orders  -     HYDROcodone-acetaminophen (NORCO) 5-325 mg per tablet; Take 1 tablet by mouth every 8 (eight) hours as needed for Pain.  Dispense: 90 tablet; Refill: 0  -     HYDROcodone-acetaminophen (NORCO) 5-325 mg per tablet; Take 1 tablet by mouth every 8 (eight) hours as needed for Pain.  Dispense: 90 tablet; Refill: 0  -     HYDROcodone-acetaminophen (NORCO) 5-325 mg per tablet; Take 1 tablet by mouth every 8 (eight) hours as needed for Pain.  Dispense: 90 tablet; Refill: 0      Patient with severe spinal canal stenosis, at L5-S1, and moderate at L4-5, complicated with severe bilateral facet arthropathy, at both lowest levels,and   Left lumbar radiculopathy, secondary to synovial cyst in the left foramen, contacting the exiting L5 nerve root.    -- discussed MRI of LS results in  details, on spine model.     -- Pain management:   Recommend to resume Gabapentin at 600 mg po qid, that would be a higher dose that before, from 1600 to increase to 2400 mg/day, since she tolerated well,   but was ineffective.  Hydrocodone as needed.   reviewed and appropriate.  Hydrocodone refills on 4/1, 3/8.     -- will resume home exercise program as tolerated.    RTC in 3  months.     Total time spent face to face with patient was 25 minutes.   More than 50% of that time was spent in counseling on diagnosis , prognosis and treatment options.   I also  patient  on common and most usual side effect of prescribed medications. Risk and benefits of opiates, possible risk of developing opiate dependence and tolerance, need of strict compliance with prescribed medications.  I reviewed Primary care , and other specialty's notes to better coordinate patient's  care.   All questions were answered, and patient voiced understanding.

## 2018-08-03 ENCOUNTER — OFFICE VISIT (OUTPATIENT)
Dept: INTERNAL MEDICINE | Facility: CLINIC | Age: 63
End: 2018-08-03
Payer: COMMERCIAL

## 2018-08-03 VITALS
WEIGHT: 229.25 LBS | DIASTOLIC BLOOD PRESSURE: 76 MMHG | HEART RATE: 80 BPM | SYSTOLIC BLOOD PRESSURE: 118 MMHG | RESPIRATION RATE: 18 BRPM | HEIGHT: 62 IN | BODY MASS INDEX: 42.19 KG/M2 | TEMPERATURE: 99 F

## 2018-08-03 DIAGNOSIS — B37.9 CANDIDA INFECTION: ICD-10-CM

## 2018-08-03 DIAGNOSIS — R60.9 DEPENDENT EDEMA: Primary | ICD-10-CM

## 2018-08-03 PROCEDURE — 3078F DIAST BP <80 MM HG: CPT | Mod: CPTII,S$GLB,, | Performed by: INTERNAL MEDICINE

## 2018-08-03 PROCEDURE — 3008F BODY MASS INDEX DOCD: CPT | Mod: CPTII,S$GLB,, | Performed by: INTERNAL MEDICINE

## 2018-08-03 PROCEDURE — 99999 PR PBB SHADOW E&M-EST. PATIENT-LVL III: CPT | Mod: PBBFAC,,, | Performed by: INTERNAL MEDICINE

## 2018-08-03 PROCEDURE — 3074F SYST BP LT 130 MM HG: CPT | Mod: CPTII,S$GLB,, | Performed by: INTERNAL MEDICINE

## 2018-08-03 PROCEDURE — 99214 OFFICE O/P EST MOD 30 MIN: CPT | Mod: S$GLB,,, | Performed by: INTERNAL MEDICINE

## 2018-08-03 RX ORDER — OLANZAPINE 2.5 MG/1
TABLET ORAL
COMMUNITY
Start: 2018-07-20 | End: 2018-08-07

## 2018-08-03 RX ORDER — DULOXETIN HYDROCHLORIDE 30 MG/1
CAPSULE, DELAYED RELEASE ORAL
COMMUNITY
Start: 2018-07-20 | End: 2018-08-07

## 2018-08-03 RX ORDER — FLUOCINONIDE 0.5 MG/G
CREAM TOPICAL 2 TIMES DAILY PRN
COMMUNITY
Start: 2018-06-12 | End: 2019-05-09 | Stop reason: SDUPTHER

## 2018-08-03 RX ORDER — METHYLPHENIDATE HYDROCHLORIDE 20 MG/1
TABLET ORAL
COMMUNITY
Start: 2018-07-20 | End: 2018-12-21

## 2018-08-03 RX ORDER — CLOTRIMAZOLE AND BETAMETHASONE DIPROPIONATE 10; .64 MG/G; MG/G
CREAM TOPICAL 2 TIMES DAILY
Qty: 45 G | Refills: 1 | Status: SHIPPED | OUTPATIENT
Start: 2018-08-03 | End: 2019-11-05

## 2018-08-03 RX ORDER — FLUOXETINE 20 MG/1
TABLET ORAL
COMMUNITY
Start: 2018-07-20 | End: 2018-12-13

## 2018-08-03 NOTE — PROGRESS NOTES
Subjective:       Patient ID: Kaleigh Solo is a 63 y.o. female.    Chief Complaint: Allergic Reaction (psych meds)    HPI   Pt here for evaluation of 3 days of intermittent B/L LE swelling which gets worse as the day progresses. Elevation improves the swelling.      She is also c/o erythematous rash of her B/L groin which has been persistent for the last few days.   Review of Systems   Constitutional: Negative for activity change, appetite change, chills, diaphoresis, fatigue, fever and unexpected weight change.   HENT: Negative for postnasal drip, rhinorrhea, sinus pressure, sneezing, sore throat, trouble swallowing and voice change.    Respiratory: Negative for cough, shortness of breath and wheezing.    Cardiovascular: Positive for leg swelling. Negative for chest pain and palpitations.   Gastrointestinal: Negative for abdominal pain, blood in stool, constipation, diarrhea, nausea and vomiting.   Genitourinary: Negative for dysuria.   Musculoskeletal: Negative for arthralgias and myalgias.   Skin: Positive for rash. Negative for wound.   Allergic/Immunologic: Negative for environmental allergies and food allergies.   Hematological: Negative for adenopathy. Does not bruise/bleed easily.       Objective:      Physical Exam   Constitutional: She is oriented to person, place, and time. She appears well-developed and well-nourished. No distress.   HENT:   Head: Normocephalic and atraumatic.   Eyes: Conjunctivae and EOM are normal. Pupils are equal, round, and reactive to light. Right eye exhibits no discharge. Left eye exhibits no discharge. No scleral icterus.   Neck: Neck supple. No JVD present.   Cardiovascular: Normal rate, regular rhythm, normal heart sounds and intact distal pulses.    Pulmonary/Chest: Effort normal and breath sounds normal. No respiratory distress. She has no wheezes. She has no rales.   Musculoskeletal: She exhibits no edema.   Lymphadenopathy:     She has no cervical adenopathy.    Neurological: She is alert and oriented to person, place, and time.   Skin: Skin is warm and dry. Rash noted. She is not diaphoretic. There is erythema. No pallor.            Assessment:       1. Dependent edema    2. Candida infection        Plan:    1. Rx Compression stockings during the day   2. Rx Lotrisone cream BID

## 2018-08-06 ENCOUNTER — PATIENT MESSAGE (OUTPATIENT)
Dept: PAIN MEDICINE | Facility: CLINIC | Age: 63
End: 2018-08-06

## 2018-08-06 ENCOUNTER — TELEPHONE (OUTPATIENT)
Dept: PAIN MEDICINE | Facility: CLINIC | Age: 63
End: 2018-08-06

## 2018-08-06 NOTE — TELEPHONE ENCOUNTER
----- Message from Court Harrison sent at 8/6/2018 10:19 AM CDT -----  Regarding: scheduling of repeat injection  Pt would like to be scheduled for a repeat injection with Dr. Lopez; however the last clinic visit was in January, clinic notes do not state may repeat. Please advise if patient needs to see NP or schedule for procedure. Please advise what procedure.

## 2018-08-07 ENCOUNTER — OFFICE VISIT (OUTPATIENT)
Dept: INTERNAL MEDICINE | Facility: CLINIC | Age: 63
End: 2018-08-07
Payer: COMMERCIAL

## 2018-08-07 ENCOUNTER — HOSPITAL ENCOUNTER (OUTPATIENT)
Dept: RADIOLOGY | Facility: HOSPITAL | Age: 63
Discharge: HOME | End: 2018-08-07
Attending: INTERNAL MEDICINE
Payer: COMMERCIAL

## 2018-08-07 VITALS
BODY MASS INDEX: 42.84 KG/M2 | TEMPERATURE: 98 F | HEIGHT: 62 IN | SYSTOLIC BLOOD PRESSURE: 127 MMHG | WEIGHT: 232.81 LBS | RESPIRATION RATE: 16 BRPM | DIASTOLIC BLOOD PRESSURE: 104 MMHG | HEART RATE: 79 BPM

## 2018-08-07 DIAGNOSIS — R60.0 BILATERAL LEG EDEMA: ICD-10-CM

## 2018-08-07 DIAGNOSIS — R53.81 MALAISE: ICD-10-CM

## 2018-08-07 DIAGNOSIS — M48.062 SPINAL STENOSIS OF LUMBAR REGION WITH NEUROGENIC CLAUDICATION: ICD-10-CM

## 2018-08-07 DIAGNOSIS — M79.605 LEFT LEG PAIN: ICD-10-CM

## 2018-08-07 DIAGNOSIS — I10 HYPERTENSION, ESSENTIAL: Primary | ICD-10-CM

## 2018-08-07 DIAGNOSIS — R06.2 WHEEZING: ICD-10-CM

## 2018-08-07 DIAGNOSIS — R06.02 SOB (SHORTNESS OF BREATH): ICD-10-CM

## 2018-08-07 DIAGNOSIS — M79.672 LEFT FOOT PAIN: ICD-10-CM

## 2018-08-07 PROBLEM — I70.0 AORTIC CALCIFICATION: Status: ACTIVE | Noted: 2018-08-07

## 2018-08-07 PROCEDURE — 71046 X-RAY EXAM CHEST 2 VIEWS: CPT | Mod: 26,,, | Performed by: RADIOLOGY

## 2018-08-07 PROCEDURE — 73630 X-RAY EXAM OF FOOT: CPT | Mod: 26,LT,, | Performed by: RADIOLOGY

## 2018-08-07 PROCEDURE — 99214 OFFICE O/P EST MOD 30 MIN: CPT | Mod: 25,S$GLB,, | Performed by: INTERNAL MEDICINE

## 2018-08-07 PROCEDURE — 71046 X-RAY EXAM CHEST 2 VIEWS: CPT | Mod: TC,PO

## 2018-08-07 PROCEDURE — 73630 X-RAY EXAM OF FOOT: CPT | Mod: TC,PO,LT

## 2018-08-07 PROCEDURE — 96372 THER/PROPH/DIAG INJ SC/IM: CPT | Mod: S$GLB,,, | Performed by: INTERNAL MEDICINE

## 2018-08-07 PROCEDURE — 3008F BODY MASS INDEX DOCD: CPT | Mod: CPTII,S$GLB,, | Performed by: INTERNAL MEDICINE

## 2018-08-07 PROCEDURE — 3080F DIAST BP >= 90 MM HG: CPT | Mod: CPTII,S$GLB,, | Performed by: INTERNAL MEDICINE

## 2018-08-07 PROCEDURE — 99999 PR PBB SHADOW E&M-EST. PATIENT-LVL IV: CPT | Mod: PBBFAC,,, | Performed by: INTERNAL MEDICINE

## 2018-08-07 PROCEDURE — 3074F SYST BP LT 130 MM HG: CPT | Mod: CPTII,S$GLB,, | Performed by: INTERNAL MEDICINE

## 2018-08-07 RX ORDER — KETOROLAC TROMETHAMINE 30 MG/ML
30 INJECTION, SOLUTION INTRAMUSCULAR; INTRAVENOUS
Status: DISCONTINUED | OUTPATIENT
Start: 2018-08-07 | End: 2018-08-07

## 2018-08-07 RX ORDER — KETOROLAC TROMETHAMINE 30 MG/ML
30 INJECTION, SOLUTION INTRAMUSCULAR; INTRAVENOUS
Status: DISCONTINUED | OUTPATIENT
Start: 2018-08-07 | End: 2018-10-01

## 2018-08-07 RX ORDER — OLANZAPINE 5 MG/1
TABLET ORAL
COMMUNITY
Start: 2018-08-03 | End: 2018-12-13

## 2018-08-07 RX ORDER — TRIAMCINOLONE ACETONIDE 40 MG/ML
40 INJECTION, SUSPENSION INTRA-ARTICULAR; INTRAMUSCULAR
Status: COMPLETED | OUTPATIENT
Start: 2018-08-07 | End: 2018-08-07

## 2018-08-07 RX ORDER — VALSARTAN AND HYDROCHLOROTHIAZIDE 320; 25 MG/1; MG/1
1 TABLET, FILM COATED ORAL DAILY
Qty: 90 TABLET | Refills: 0 | Status: SHIPPED | OUTPATIENT
Start: 2018-08-07 | End: 2018-09-13 | Stop reason: SDUPTHER

## 2018-08-07 RX ORDER — AZITHROMYCIN 250 MG/1
TABLET, FILM COATED ORAL
Qty: 6 TABLET | Refills: 0 | Status: SHIPPED | OUTPATIENT
Start: 2018-08-07 | End: 2018-08-17

## 2018-08-07 RX ORDER — KETOROLAC TROMETHAMINE 30 MG/ML
60 INJECTION, SOLUTION INTRAMUSCULAR; INTRAVENOUS
Status: DISCONTINUED | OUTPATIENT
Start: 2018-08-07 | End: 2018-08-07

## 2018-08-07 RX ADMIN — TRIAMCINOLONE ACETONIDE 40 MG: 40 INJECTION, SUSPENSION INTRA-ARTICULAR; INTRAMUSCULAR at 02:08

## 2018-08-07 NOTE — PROGRESS NOTES
Subjective:       Patient ID: Kaleigh Solo is a 63 y.o. female who presents for Hypertension; Medication Refill; Leg Pain (left); and Pain (foot)      Hypertension   This is a recurrent problem. The current episode started more than 1 year ago. The problem has been rapidly improving since onset. The problem is controlled. Associated symptoms include peripheral edema and shortness of breath (with exertion). Pertinent negatives include no anxiety, blurred vision, chest pain, headaches, malaise/fatigue, neck pain, palpitations, PND or sweats. Agents associated with hypertension include decongestants. Risk factors for coronary artery disease include post-menopausal state and smoking/tobacco exposure. Past treatments include angiotensin blockers, diuretics and beta blockers. The current treatment provides moderate improvement. Compliance problems include psychosocial issues.  There is no history of kidney disease. There is no history of chronic renal disease.   Leg Pain    There was no injury mechanism. The pain is present in the left leg. The pain is moderate. The pain has been intermittent since onset. Associated symptoms include numbness (left toes). Pertinent negatives include no inability to bear weight, muscle weakness or tingling. She has tried acetaminophen for the symptoms. The treatment provided mild relief.   Foot Injury    Incident onset: left foot pain. There was no injury mechanism. The pain is present in the left foot. The quality of the pain is described as aching. The pain is severe. The pain has been intermittent since onset. Associated symptoms include numbness (left toes). Pertinent negatives include no inability to bear weight, muscle weakness or tingling. The symptoms are aggravated by movement. She has tried acetaminophen and rest for the symptoms.        Answers for HPI/ROS submitted by the patient on 8/7/2018   Hypertension  Chronicity: recurrent  Onset: more than 1 year ago  Progression  since onset: rapidly improving  Condition status: controlled  anxiety: No  blurred vision: No  chest pain: No  headaches: No  malaise/fatigue: No  neck pain: No  palpitations: No  peripheral edema: No  PND: No  shortness of breath: No  sweats: No  Agents associated with hypertension: decongestants  CAD risks: post-menopausal state, smoking/tobacco exposure  Compliance problems: psychosocial issues  Improvement on treatment: significant          Review of Systems   Constitutional: Negative for chills, diaphoresis, fever and malaise/fatigue.   HENT: Negative for congestion, postnasal drip, rhinorrhea, sinus pressure and sore throat.    Eyes: Negative for blurred vision, redness and visual disturbance.   Respiratory: Positive for shortness of breath (with exertion) and wheezing. Negative for cough and chest tightness.    Cardiovascular: Negative for chest pain, palpitations and PND.   Gastrointestinal: Negative for abdominal pain, constipation, diarrhea, nausea and vomiting.   Genitourinary: Negative for dysuria and hematuria.   Musculoskeletal: Positive for back pain (10/10 in severity). Negative for arthralgias, myalgias and neck pain.   Skin: Negative for rash.   Neurological: Positive for numbness (left toes). Negative for dizziness, tingling, weakness, light-headedness and headaches.   Psychiatric/Behavioral: Positive for dysphoric mood. The patient is nervous/anxious.        Objective:      Physical Exam   Constitutional: She is oriented to person, place, and time. Vital signs are normal. She appears well-developed and well-nourished. No distress.   HENT:   Head: Normocephalic and atraumatic.   Right Ear: Hearing and external ear normal.   Left Ear: Hearing and external ear normal.   Nose: Nose normal.   Mouth/Throat: Uvula is midline.   Eyes: Lids are normal.   Neck: Full passive range of motion without pain.   Cardiovascular: Normal rate, regular rhythm, normal heart sounds and intact distal pulses.    No  murmur heard.  Pulses:       Dorsalis pedis pulses are 2+ on the left side.   Pulmonary/Chest: Effort normal and breath sounds normal. She has no wheezes.   Abdominal: Soft. Bowel sounds are normal. She exhibits no distension. There is no tenderness.   Musculoskeletal: Normal range of motion. She exhibits no edema.        Cervical back: She exhibits normal range of motion and no pain.        Thoracic back: She exhibits normal range of motion and no pain.        Lumbar back: She exhibits bony tenderness and pain.        Left foot: There is normal range of motion.   Feet:   Left Foot:   Skin Integrity: Negative for ulcer or warmth.   Neurological: She is alert and oriented to person, place, and time.   Skin: Skin is warm, dry and intact. No rash noted. She is not diaphoretic.   Psychiatric: She has a normal mood and affect.   Vitals reviewed.      Assessment/Plan:       1. Hypertension, essential  - increase valsartan HCTZ to 320mg/12.5mg daily  - continue coreg  - monitor BP closely, call with readings    2. Spinal stenosis of lumbar region with neurogenic claudication  - triamcinolone acetonide injection 40 mg; Inject 1 mL (40 mg total) into the muscle one time.  - WHEELCHAIR FOR HOME USE  - WALKER FOR HOME USE  - ketorolac injection 30 mg; Inject 1 mL (30 mg total) into the muscle one time.  - increase Norco to 1-1/2 tablets every 8 hours as needed for pain    3. Left leg pain  - WHEELCHAIR FOR HOME USE  - WALKER FOR HOME USE  - ketorolac injection 30 mg; Inject 1 mL (30 mg total) into the muscle one time.    4. Left foot pain  - Ambulatory Referral to Podiatry  - ketorolac injection 30 mg; Inject 1 mL (30 mg total) into the muscle one time.    5. Wheezing  - X-Ray Chest PA And Lateral; Future  - use albuterol inhaler regularly    6. Malaise  - X-Ray Chest PA And Lateral; Future  - CBC auto differential; Future  - Comprehensive metabolic panel; Future  - Brain natriuretic peptide; Future    7. SOB (shortness of  breath)  - X-Ray Chest PA And Lateral; Future  - BNP    8. Bilateral leg edema  - Brain natriuretic peptide; Future    Felicity Middleton MD

## 2018-08-10 ENCOUNTER — OFFICE VISIT (OUTPATIENT)
Dept: PAIN MEDICINE | Facility: CLINIC | Age: 63
End: 2018-08-10
Payer: COMMERCIAL

## 2018-08-10 VITALS
BODY MASS INDEX: 42.28 KG/M2 | TEMPERATURE: 98 F | WEIGHT: 229.75 LBS | HEART RATE: 84 BPM | DIASTOLIC BLOOD PRESSURE: 88 MMHG | SYSTOLIC BLOOD PRESSURE: 134 MMHG | HEIGHT: 62 IN

## 2018-08-10 DIAGNOSIS — G89.4 CHRONIC PAIN DISORDER: Primary | ICD-10-CM

## 2018-08-10 DIAGNOSIS — M51.16 LUMBAR DISC HERNIATION WITH RADICULOPATHY: ICD-10-CM

## 2018-08-10 DIAGNOSIS — M96.1 CERVICAL POST-LAMINECTOMY SYNDROME: ICD-10-CM

## 2018-08-10 DIAGNOSIS — M54.16 LUMBAR RADICULOPATHY: ICD-10-CM

## 2018-08-10 DIAGNOSIS — M47.26 OSTEOARTHRITIS OF SPINE WITH RADICULOPATHY, LUMBAR REGION: ICD-10-CM

## 2018-08-10 DIAGNOSIS — M48.07 FORAMINAL STENOSIS OF LUMBOSACRAL REGION: ICD-10-CM

## 2018-08-10 PROCEDURE — 99999 PR PBB SHADOW E&M-EST. PATIENT-LVL III: CPT | Mod: PBBFAC,,, | Performed by: NURSE PRACTITIONER

## 2018-08-10 PROCEDURE — 3008F BODY MASS INDEX DOCD: CPT | Mod: CPTII,S$GLB,, | Performed by: NURSE PRACTITIONER

## 2018-08-10 PROCEDURE — 3075F SYST BP GE 130 - 139MM HG: CPT | Mod: CPTII,S$GLB,, | Performed by: NURSE PRACTITIONER

## 2018-08-10 PROCEDURE — 3079F DIAST BP 80-89 MM HG: CPT | Mod: CPTII,S$GLB,, | Performed by: NURSE PRACTITIONER

## 2018-08-10 PROCEDURE — 99214 OFFICE O/P EST MOD 30 MIN: CPT | Mod: S$GLB,,, | Performed by: NURSE PRACTITIONER

## 2018-08-10 RX ORDER — TIZANIDINE 4 MG/1
4 TABLET ORAL EVERY 8 HOURS PRN
Qty: 90 TABLET | Refills: 0 | Status: SHIPPED | OUTPATIENT
Start: 2018-08-10 | End: 2018-09-09

## 2018-08-10 NOTE — PROGRESS NOTES
Referring Physician: No ref. provider found    Chief Complaint:   No chief complaint on file.       SUBJECTIVE:    Interval History 8/10/2018:  The patient presents today to discuss procedure for left sided back and leg pain.  Since her last OV, she underwent left L5 and S1 TF CIRILO in January.  Initially she thought that it did not help.  However, about one month later, she was having about 75% relief.  This lasted for about 2 months.  Prior to this, she had left L4 and L5 TF CIRILO in December.  At this point, she feels as though both injections have worn off.  Her pain is affecting her daily activities.  She states that she previously discussed SCS trial with Dr. Lopez and would like to proceed with this.  She has seen Dr. Rashid and was not deemed a candidate for surgery.  Her pain today is 8/10.  The patient denies any bowel or bladder incontinence or signs of saddle paresthesia.      Interval history 01/15/2018  The patient returns to the clinic for a follow up visit, she is reporting pain of 10 /10 today.  She is s/p Left L4 and L5 TFESI performed on 12/20/17.  She estimates 100% pain relief for ~ 2 weeks.  Was able to be physically active during that time period, with significantly improved functional mobility.  Within past 2 weeks she has had recurrence of pain in same location, quality, intensity.   Continued pain in lumbar area radiating to the lateral/posterolateral thigh and to the posterior calf. Leg pain is worse than back pain. Additionally has developed pain at lateral hip area - greater trochanteric bursa. Now walking ~ 1 block.    Denies infection, new weakness, bowel/bladder dysfunction/incontinence, saddle anesthesia.     Interval history 12/01/2017   The patient returns to the clinic for a follow up visit, she is reporting pain of 5 /10 today.  She has been doing better since cervical surgery but has been having occasional radicular symptoms.  Currently she comes in for lower extremities  radiculopathy predominantly in the left lower extremity in the L4-5 distribution.  She had an MRI of the lumbar spine which shows  cyst at the encroachment on the left L5 nerve root.  Additionally the patient has significant facet arthropathy throughout the lumbar spine    Interval history 04/02/2015:  Since previous encounter patient reports low back pain radiating down both lower extremities. Patient stated that she had Synvisc injection on 03/20/15 and this helped with her knee pain. Patient stated that she still has neck pain that radiates to both upper extremities. She stated that she needs a refill on her Gabapentin and Flexeril these medications have been helping with the pain. Patient also reported that she sees a chiropractor for her back pain. Patient reports no other health changes since her last visit. Patient reports her pain 6/10 today.    Interval history 03/02/2015:  Since previous encounter the patient reported having had knee arthroscopy and was told to try euflexxa injections versus total knee replacement by her orthopedist.  She was originally scheduled to have her orthopedist inject her knee with corticosteroids this morning but due to scheduling conflict the patient was placed on my schedule for an injection.  Unfortunately the patient has also been actively treated for upper respiratory infection and is currently on antibiotics.  She has been continuing home exercises with regularity stating good days and bad days with regards to her knee pain.  Additionally she continues to have upper neck and bilateral shoulder pain upper extremity radiculopathy and lower back pain.  Since her arthroscopic surgery the patient has been having intermittent hives and has been placed by an allergist on hydroxyzine and famotidine but has not had complete relief of these symptoms.      Interval history 10/21/2014:  Since previous encounter patient was scheduled for an CIRILO but cancelled due to her having a cold.  Patient reports she still has a cold. Patient reports neck radiating down her right arm with numbness and tingling. Patient stated on the way over her arm went numb. She states that driving is becoming harder as she feels she has a decreased hand  with opening of jars. Patient reports that she's been taking Mucinex and Thera Flu for her cold symptoms. Patient reports she been having hives which has been followed by an allergist with testing pending.  Additionally she has been seen by rheumatologist who diagnosed her with fibromyalgia.She states that she takes 6 caps of Gabapentin a day however she was titrating down down she states that when she gets to 4 caps a day she breaks out in hives and then quickly re escalates back to 6 tablets per day. Patient states that she's starting to have pain at the bottom of her feet when walking on her rugs in her home she states that just started yesterday. Patient reports her pain 8/10.  She says that she has been increasing her protein intake and has been continuing to do exercises improving her range of motion in her shoulder and neck and states that overall she feels as if she is doing better.  Patient reports no other health changes since her last visit.     Interval history 8/25/2014:  Patient continues to have radicular symptoms in the neck radiating down to the right upper extremity.  Additionally since previous encounter she had a dental abscess which is being treated with accommodation of antibiotics and having had her tooth pulled.  She is still antibiotics and has a followup visit in mid-September with her dentist to evaluate efficacy of treatment.    Interval history 7/25/2014:  Since previous encounter the patient is on gabapentin 1800 mg per day and this is helping with her shoulder pain although she does state that she is having restless legs at night which is preventing her from being able to sleep.  Additionally she is having anterior thigh radicular pain.   "She has had no other health changes she continues to do physical therapy exercises at home.  She reports her pain level is a 6/10 today.    Interval history 7/2/2014:  Patient is status post ACDF in 4/2014.  She is healing very well from surgery, but continues to have right shoulder pain which she had prior to her surgery despite having a labral repair.  Additionally she continues to have neuropathic symptoms in bilateral arms and in the axilla bilaterally.  She has been undergoing physical therapy and states that it does help her including heat ultrasound to the muscles of the neck.  She states that this is only temporary relief.  She continues to use the topical compounded pain cream which does help her.  After her surgery she discontinued taking gabapentin which was previously helping her and which she was tolerating without side effects.  She reports her pain as a 3/10 today.  She had been taking hydrocodone/acetaminophen 10/325 3 times a day when necessary as prescribed by her surgeon, but this is being discontinued.    Interval history 3/11/2014:  Since previous encounter patient reports that she had an episode where she felt as if her neck "locked up" in that now when she is waking up in the morning she has bilateral upper extremity numbness which improves throughout the day.  She does not report any weakness.  She states that she has been having persistent daily headaches from the occiput over the vertex of the supraorbital ridge bilaterally, and continues to have pain in the right side of her neck and shoulder.  She reports that she is still taking the gabapentin 1800 mg per day, and hydrocodone/acetaminophen when necessary which helps a little.    Interval history 2/24/2014:  Patient is status post cervical interlaminar epidural steroid injection by 2 on 1/24/2014 and 2/5/2014.  Patient has persisting radicular symptoms into her right upper extremity and shoulder the anterior aspect of the neck and base of " the jaw.  Patient reported approximately 10% improvement in her pain symptoms after the first injection similar improvement in her pain symptoms after the second injection no sustained relief she states that the pain relief lasted her approximately one week and will go away.  She is taking gabapentin 300 mg 3 times a day, hydrocodone/acetaminophen 7.5/325 one tablet approximately 2-3 times per day.  She states that the Vicodin helps dull the pain but the does not eliminate it entirely, and she's not having any adverse problems in the gabapentin.  Patient continues in physical therapy for her right shoulder status post arthroscopic surgery, and has good range of motion in the shoulder.  Since previous exam the patient has a sinus infection with congestion.    Interval history 1/21/2014:  Since reducing her patient was only able to have one of the cervical intralaminar epidural steroid injections which approximately help her 80% in her pain symptoms in the back of her neck, but she was also having symptoms into the right shoulder and had a rotator cuff tear which was repaired arthroscopically which prevented us from being able to perform the second cervical intralaminar epidural steroid injection on 12/23/2013.  Patient reports that she continues to do home exercises for her shoulder surgery but was unable to perform PT secondary to pain.  She states that her pain was actually better up until 1/12/2014 where she developed severe pain on the right side of her neck and posterior side of her neck going down the back of the scapula and also across the trapezius muscles of the shoulder.  Her MRI which was done in outside facility reports that she has severe C5-6 neural foraminal stenosis.  For her pain the patient has been taking hydrocodone/acetaminophen 7.5/325 as needed she reports there are occasional days where she takes it every 4 hours and some days where she doesn't take it at all.  Resting her head on the pillow  helps alleviate the pain.  Sitting and working at computers when her pain is the worst.  She denies any problems with movement of her hands or weakness in her arms.  She has had no other health changes since previous encounter.    Initial history of present illness 10/2013:    Kaleigh Solo presents to the clinic for the evaluation of neck pain with radiation in right upper shoulder to the elbow, and down the right side of the chest wall. The pain started insidiously in the back of the neck in july  and symptoms have been worsening.  The pain has begun going into the shoulder, and there was swelling in the arm that began after starting Robaxin for muscle aches, and then took predisone taper and bactrim for the possibility of infection which did not help.  Patient stopped her medications and the swelling improved.  US of the upper extremity was negative for DVT.   Patient takes vicodin for pain which she states helps only a little bit, and doesn't take the pain go away.  MRI of cervical spine revealed severe neuroforaminal stenosis on the right at C5-6 with central to right neural foraminal disc protrusion with generalized bulging discs and uncovertebral joint osteophytic change.  No abnormal signal in the cord.      Pain Description:    The pain is located in the neck and radiates to the right shoulder .  The pain is described as aching and tight band      Symptoms interfere with daily activity and sleeping.     Exacerbating factors: Sitting, Bending, Touching, Coughing/Sneezing, Eating, Night Time, Morning, Flexing and Lifting.      Mitigating factors heat, laying down and medications.     She reports 2 hours of uninterrupted sleep per night.    Patient denies night fever/night sweats, urinary incontinence, bowel incontinence, significant weight loss, significant motor weakness and loss of sensations.  Patient denies any suicidal or homicidal ideations    Pain Medications:  Current:  cymbalta recently started 60  mg daily  Robaxin 500 mg  Naproxen  norco 5-325 as needed  Gabapentin 2400 mg daily    Tried in Past:  Gabapentin  Hydrocodone  ibuprofen    Physical Therapy/Home Exercise: yes       report:  Not applicable    Pain Procedures:   12/20/17 Left L4 and L5 TF CIRILO- 75% relief  1/24/18 Left L5 and S1 TF CIRILO- 75% relief    Chiropractor -No treatments yet.  Right shoulder arthroscopy 12/10/2013  Right knee arthroscopy 12/19/14    Imaging:     10/7/2013  MRI of cervical spine revealed severe neuroforaminal stenosis on the right at C5-6 with central to right neural foraminal disc protrusion with generalized bulging discs and uncovertebral joint osteophytic change.  No abnormal signal in the cord.  Minor disc bulging at C3-4, C4-5 without central canal stenosis, spinal cord impingement or neural foraminal stenosis.    CT chest: 10/02/2013  No definite acute process or obvious etiology of the right-sided chest pain, axillary pain, neck and supraclavicular pain.  (full report scanned into record)    Right upper extremity venous ultrasound 10/02/2013  No evidence of DVT  lumbar spine 11/28/2017  Impression     MRI LUMBAR SPINE    TECHNIQUE: MRI lumbar spine was performed without contrast. The following sequences were obtained: Localizer; sagittal T1, T2, STIR; axial T1 and T2.    COMPARISON: None.    FINDINGS:    There are 5 lumbar vertebrae.  Vertebral body heights and alignment are maintained.  Bone marrow signal is preserved.  There is multilevel disc desiccation with preservation of disc heights. Conus terminates at L1 and appears unremarkable. Limited evaluation of posterior abdominal structures is unremarkable.  Paraspinal musculature is within normal limits.  Evaluation of sacroiliac joints is unremarkable.    L1-L2: No spinal canal stenosis or neuroforaminal narrowing.    L2-L3: Mild bilateral facet arthropathy noted.  No spinal canal stenosis or neuroforaminal narrowing.    L3-L4: Mild bilateral facet arthropathy and  circumferential disc bulge noted.  No spinal canal stenosis or neuroforaminal narrowing.    L4-L5: Severe bilateral facet arthropathy and circumferential disc bulge result in moderate spinal canal stenosis.    L5-S1: Circumferential disc bulge and severe bilateral facet arthropathy result in severe spinal canal stenosis.  Note made of synovial cyst in the left foramen, contacting the exiting L5 nerve root.     Lab Results   Component Value Date    WBC 8.89 08/07/2018    HGB 13.5 08/07/2018    HCT 42.6 08/07/2018     (H) 08/07/2018     08/07/2018       CMP  Sodium   Date Value Ref Range Status   08/07/2018 139 136 - 145 mmol/L Final     Potassium   Date Value Ref Range Status   08/07/2018 4.1 3.5 - 5.1 mmol/L Final     Chloride   Date Value Ref Range Status   08/07/2018 103 95 - 110 mmol/L Final     CO2   Date Value Ref Range Status   08/07/2018 27 23 - 29 mmol/L Final     Glucose   Date Value Ref Range Status   08/07/2018 121 (H) 70 - 110 mg/dL Final     BUN, Bld   Date Value Ref Range Status   08/07/2018 6 (L) 8 - 23 mg/dL Final     Creatinine   Date Value Ref Range Status   08/07/2018 0.8 0.5 - 1.4 mg/dL Final     Calcium   Date Value Ref Range Status   08/07/2018 9.8 8.7 - 10.5 mg/dL Final     Total Protein   Date Value Ref Range Status   08/07/2018 6.7 6.0 - 8.4 g/dL Final     Albumin   Date Value Ref Range Status   08/07/2018 3.7 3.5 - 5.2 g/dL Final     Total Bilirubin   Date Value Ref Range Status   08/07/2018 0.4 0.1 - 1.0 mg/dL Final     Comment:     For infants and newborns, interpretation of results should be based  on gestational age, weight and in agreement with clinical  observations.  Premature Infant recommended reference ranges:  Up to 24 hours.............<8.0 mg/dL  Up to 48 hours............<12.0 mg/dL  3-5 days..................<15.0 mg/dL  6-29 days.................<15.0 mg/dL       Alkaline Phosphatase   Date Value Ref Range Status   08/07/2018 78 55 - 135 U/L Final     AST    Date Value Ref Range Status   2018 31 10 - 40 U/L Final     ALT   Date Value Ref Range Status   2018 24 10 - 44 U/L Final     Anion Gap   Date Value Ref Range Status   2018 9 8 - 16 mmol/L Final     eGFR if    Date Value Ref Range Status   2018 >60.0 >60 mL/min/1.73 m^2 Final     eGFR if non    Date Value Ref Range Status   2018 >60.0 >60 mL/min/1.73 m^2 Final     Comment:     Calculation used to obtain the estimated glomerular filtration  rate (eGFR) is the CKD-EPI equation.            Past Medical History:   Diagnosis Date    Arthritis     Asthma     Cervicalgia      tx by chiropractor    Closed dislocation of acromioclavicular joint     Degenerative disc disease     Fibromyalgia     HPV (human papilloma virus) anogenital infection     conization in past-remote history-    HTN (hypertension)     Sciatica     Tobacco use     Ulcer     stomach ulcer     Past Surgical History:   Procedure Laterality Date    acdf  2014    C5-6    CERVICAL CONIZATION   W/ LASER      KNEE ARTHROSCOPY      right shoulder surger      arthroscopic surgery Dec 2013     Social History     Social History    Marital status:      Spouse name: N/A    Number of children: N/A    Years of education: N/A     Occupational History    Not on file.     Social History Main Topics    Smoking status: Current Every Day Smoker     Packs/day: 1.00     Years: 48.00     Types: Cigarettes    Smokeless tobacco: Never Used    Alcohol use Yes      Comment: occasional    Drug use: No    Sexual activity: Not on file     Other Topics Concern    Not on file     Social History Narrative    , not working, 3 children (1 passed at 9 mo old), tobacco daily, ETOH socially, GYN  dtrs of cesar     Family History   Problem Relation Age of Onset    Cancer Mother         lung-was tobacco user  of lung cancer at 78    Aneurysm Father          abdominal burst-  of stroke at 71 years    Heart disease Father         had HTN       Allergies   Allergen Reactions    Ambien [Zolpidem] Other (See Comments)     Pt hyperactive    Lunesta [Eszopiclone] Other (See Comments)     Severely depressed    Shellfish Containing Products Swelling and Other (See Comments)     Metallic taste in mouth  Swells all over,including the tongue and throat  Feels likes insides are swollen  Allergic to crawfish,Shrimp    Ancef [Cefazolin]      Facial swelling  Swelling abdomen    Flexeril [Cyclobenzaprine] Swelling    Gabapentin Hives    Sulfa (Sulfonamide Antibiotics) Nausea Only       Current Outpatient Prescriptions   Medication Sig    albuterol (PROAIR HFA) 90 mcg/actuation inhaler Inhale 2 puffs into the lungs every 6 (six) hours as needed for Wheezing.    ALPRAZolam (XANAX) 0.25 MG tablet Take 1 tablet (0.25 mg total) by mouth 2 (two) times daily as needed for Anxiety.    atorvastatin (LIPITOR) 10 MG tablet Take 1 tablet (10 mg total) by mouth once daily.    azithromycin (Z-IVET) 250 MG tablet Take 2 tablets by mouth on day 1; Take 1 tablet by mouth on days 2-5    carvedilol (COREG) 25 MG tablet Take 1 tablet (25 mg total) by mouth 2 (two) times daily with meals.    cetirizine (ZYRTEC) 10 MG tablet Take 10 mg by mouth once daily.    clotrimazole-betamethasone 1-0.05% (LOTRISONE) cream Apply topically 2 (two) times daily.    diphenhydrAMINE (BENADRYL) 50 MG tablet Take 50 mg by mouth nightly as needed for Itching.    famotidine (PEPCID) 20 MG tablet Take 20 mg by mouth 2 (two) times daily.    fexofenadine (ALLEGRA) 60 MG tablet Take 60 mg by mouth once daily.    fluocinonide 0.05% (LIDEX) 0.05 % cream     FLUoxetine 20 MG tablet     gabapentin (NEURONTIN) 600 MG tablet Take 1 tablet (600 mg total) by mouth 4 (four) times daily with meals and nightly.    HYDROcodone-acetaminophen (NORCO) 5-325 mg per tablet Take 1 tablet by mouth every 8 (eight) hours as  "needed for Pain.    [START ON 9/1/2018] HYDROcodone-acetaminophen (NORCO) 5-325 mg per tablet Take 1 tablet by mouth every 8 (eight) hours as needed for Pain.    [START ON 10/1/2018] HYDROcodone-acetaminophen (NORCO) 5-325 mg per tablet Take 1 tablet by mouth every 8 (eight) hours as needed for Pain.    hydrocortisone 1 % cream Apply topically 2 (two) times daily.    hydrOXYzine HCl (ATARAX) 25 MG tablet Take 1 tablet (25 mg total) by mouth 3 (three) times daily as needed for Itching.    methylphenidate HCl (RITALIN) 20 MG tablet     MULTIVITAMIN W-MINERALS/LUTEIN (CENTRUM SILVER ORAL) Take 1 tablet by mouth once daily. Centrum Silver 50 plus    nicotine (NICODERM CQ) 21 mg/24 hr Place 1 patch onto the skin once daily.    OLANZapine (ZYPREXA) 5 MG tablet     pramoxine-hydrocortisone 1-1 % lotion Apply topically 3 (three) times daily.    valsartan-hydrochlorothiazide (DIOVAN-HCT) 320-25 mg per tablet Take 1 tablet by mouth once daily.     Current Facility-Administered Medications   Medication    ketorolac injection 30 mg       REVIEW OF SYSTEMS:    GENERAL:  No weight loss, malaise or fevers.  NECK:  Negative for lumps, no difficulty with swallowing.   RESPIRATORY:  No recent URI  CARDIOVASCULAR:  Negative for chest pain, leg swelling or palpitations.  GI:  Negative for abdominal discomfort, blood in stools or black stools or change in bowel habits.  Patient had a history of stomach ulcer, never bleeding.    MUSCULOSKELETAL:  See HPI.  Patient reports having restless legs at night preventing her from being able to sleep.  SKIN: no new lesions  HEMATOLOGY/LYMPHOLOGY:  Negative for prolonged bleeding, bruising easily or swollen nodes.  Patient is not currently taking any anti-coagulants  NEURO:   No history of syncope, paralysis, seizures or tremors.  All other reviewed and negative other than HPI.    OBJECTIVE:    /88   Pulse 84   Temp 98.2 °F (36.8 °C)   Ht 5' 2" (1.575 m)   Wt 104.2 kg (229 lb " 11.5 oz)   BMI 42.02 kg/m²     PHYSICAL EXAMINATION:    GENERAL: Well appearing, in no acute distress, alert and oriented x3.  PSYCH:  Mood and affect appropriate.  SKIN: Skin color, texture, turgor normal, no rashes or lesions.  HEAD/FACE:  Normocephalic, atraumatic. Cranial nerves grossly intact.  CV: RRR with palpation of the radial artery.  PULM: No evidence of respiratory difficulty, symmetric chest rise.  BACK: Straight leg raising in the sitting and supine positions is positive to radicular pain at bilateral L5 distribution.  There is pain with palpation over the facet joints of the lumbar spine bilaterally. There is decreased range of motion with extension to 15 degrees, and facet loading maneuvers cause reproducible pain.    EXTREMITIES: Peripheral joint ROM is full and pain free without obvious instability or laxity in all four extremities. No deformities, edema, or skin discoloration. Good capillary refill.  MUSCULOSKELETAL: There is pain with palpation over the sacroiliac joints bilaterally.  There is no pain to palpation over the greater trochanteric bursa bilaterally.  FABERs test is positive.  FADIRs test is negative.   5/5 strength in right ankle with plantar and dorsiflexion, 5/5 strength in left ankle with plantar and 4/5 dorsiflexion, 5/5 strength with right knee flexion extension, 5/5 strength with knee flexion extension on the left .  No atrophy or tone abnormalities are noted.  NEURO: Right patella 2+, achilles 2+;  Left patella 2+, Left achilles 1+. Plantar response are downgoing. No clonus.  Decreased sensation to BLE.  GAIT: Antalgic, ambulates without assistance    ASSESSMENT: 63 y.o. year old female with neck and lower back pain, consistent with the following diagnoses:    1. Chronic pain disorder     2. Lumbar radiculopathy     3. Osteoarthritis of spine with radiculopathy, lumbar region     4. Lumbar disc herniation with radiculopathy     5. Foraminal stenosis of lumbosacral region           PLAN:     - Previous imaging was reviewed and discussed with the patient today.    - Will send for psych consult for SCS trial.  She has tried multiple procedures, medications and PT with limited benefit.  She has also had surgical evaluation for synovial cyst contacting Left L5 nerve root by Dr. Rashid and was not deemed a surgical candidate.    - Can continue current medications from Dr. Richardson.    - F/U or call after psych consult.    - Dr. Lopez was consulted on the patient and agrees with this plan.      The above plan and management options were discussed at length with patient. Patient is in agreement with the above and verbalized understanding.     Sadie Cowan  08/10/2018

## 2018-08-13 ENCOUNTER — PATIENT MESSAGE (OUTPATIENT)
Dept: PHYSICAL MEDICINE AND REHAB | Facility: CLINIC | Age: 63
End: 2018-08-13

## 2018-08-14 ENCOUNTER — PATIENT MESSAGE (OUTPATIENT)
Dept: PAIN MEDICINE | Facility: CLINIC | Age: 63
End: 2018-08-14

## 2018-08-14 ENCOUNTER — PATIENT MESSAGE (OUTPATIENT)
Dept: INTERNAL MEDICINE | Facility: CLINIC | Age: 63
End: 2018-08-14

## 2018-08-14 NOTE — TELEPHONE ENCOUNTER
Patient would like to get MRI prior to seeing her physician at [facility] orthopedics for left foot. Message sent to Dr Middleton

## 2018-08-17 ENCOUNTER — OFFICE VISIT (OUTPATIENT)
Dept: INTERNAL MEDICINE | Facility: CLINIC | Age: 63
End: 2018-08-17
Payer: COMMERCIAL

## 2018-08-17 VITALS
DIASTOLIC BLOOD PRESSURE: 74 MMHG | HEIGHT: 62 IN | HEART RATE: 103 BPM | BODY MASS INDEX: 41.3 KG/M2 | TEMPERATURE: 98 F | SYSTOLIC BLOOD PRESSURE: 130 MMHG | WEIGHT: 224.44 LBS | RESPIRATION RATE: 16 BRPM

## 2018-08-17 DIAGNOSIS — H60.93 OTITIS EXTERNA OF BOTH EARS, UNSPECIFIED CHRONICITY, UNSPECIFIED TYPE: Primary | ICD-10-CM

## 2018-08-17 DIAGNOSIS — G89.29 CHRONIC FOOT PAIN, LEFT: ICD-10-CM

## 2018-08-17 DIAGNOSIS — M79.672 CHRONIC FOOT PAIN, LEFT: ICD-10-CM

## 2018-08-17 DIAGNOSIS — H66.91 RIGHT OTITIS MEDIA, UNSPECIFIED OTITIS MEDIA TYPE: ICD-10-CM

## 2018-08-17 DIAGNOSIS — R05.8 PRODUCTIVE COUGH: ICD-10-CM

## 2018-08-17 PROCEDURE — 3075F SYST BP GE 130 - 139MM HG: CPT | Mod: CPTII,S$GLB,, | Performed by: INTERNAL MEDICINE

## 2018-08-17 PROCEDURE — 99999 PR PBB SHADOW E&M-EST. PATIENT-LVL IV: CPT | Mod: PBBFAC,,, | Performed by: INTERNAL MEDICINE

## 2018-08-17 PROCEDURE — 99214 OFFICE O/P EST MOD 30 MIN: CPT | Mod: S$GLB,,, | Performed by: INTERNAL MEDICINE

## 2018-08-17 PROCEDURE — 3078F DIAST BP <80 MM HG: CPT | Mod: CPTII,S$GLB,, | Performed by: INTERNAL MEDICINE

## 2018-08-17 PROCEDURE — 3008F BODY MASS INDEX DOCD: CPT | Mod: CPTII,S$GLB,, | Performed by: INTERNAL MEDICINE

## 2018-08-17 RX ORDER — CIPROFLOXACIN AND DEXAMETHASONE 3; 1 MG/ML; MG/ML
4 SUSPENSION/ DROPS AURICULAR (OTIC) 2 TIMES DAILY
Qty: 7.5 ML | Refills: 0 | Status: SHIPPED | OUTPATIENT
Start: 2018-08-17 | End: 2018-10-01

## 2018-08-17 RX ORDER — AMOXICILLIN AND CLAVULANATE POTASSIUM 875; 125 MG/1; MG/1
1 TABLET, FILM COATED ORAL 2 TIMES DAILY
Qty: 14 TABLET | Refills: 0 | Status: SHIPPED | OUTPATIENT
Start: 2018-08-17 | End: 2018-08-24

## 2018-08-17 RX ORDER — BENZONATATE 200 MG/1
200 CAPSULE ORAL 3 TIMES DAILY PRN
Qty: 30 CAPSULE | Refills: 0 | Status: SHIPPED | OUTPATIENT
Start: 2018-08-17 | End: 2018-08-27

## 2018-08-17 NOTE — PROGRESS NOTES
Subjective:       Patient ID: Kaleigh Solo is a 63 y.o. female who presents for Sore Throat; Otalgia; and Cough      Sore Throat    This is a new problem. The current episode started today. The problem has been unchanged. The pain is worse on the right side. There has been no fever. The fever has been present for less than 1 day. The pain is at a severity of 5/10. The pain is mild. Associated symptoms include congestion, coughing, ear pain, headaches, a plugged ear sensation, shortness of breath, stridor and swollen glands. Pertinent negatives include no abdominal pain, diarrhea, drooling, ear discharge, hoarse voice, neck pain, trouble swallowing or vomiting. She has had no exposure to strep or mono. She has tried acetaminophen for the symptoms. The treatment provided mild relief.   Cough   This is a new problem. The current episode started yesterday. The problem has been unchanged. The problem occurs constantly. The cough is productive of purulent sputum. Associated symptoms include ear congestion, ear pain, headaches, nasal congestion, a sore throat, shortness of breath, sweats and wheezing. Pertinent negatives include no chest pain, chills, eye redness, fever, heartburn, myalgias, postnasal drip, rash or rhinorrhea. Her past medical history is significant for environmental allergies.   Otalgia    There is pain in both ears. This is a recurrent problem. The current episode started yesterday. The problem occurs constantly. The problem has been unchanged. There has been no fever. The pain is moderate. Associated symptoms include coughing, headaches and a sore throat. Pertinent negatives include no abdominal pain, diarrhea, ear discharge, neck pain, rash, rhinorrhea or vomiting. She has tried nothing for the symptoms. There is no history of hearing loss.     Recently treated for ear infection but symptoms have returned.      Review of Systems   Constitutional: Positive for diaphoresis. Negative for chills,  fatigue and fever.   HENT: Positive for congestion, ear pain, sinus pressure and sore throat. Negative for drooling, ear discharge, hoarse voice, postnasal drip, rhinorrhea and trouble swallowing.    Eyes: Negative for redness and itching.   Respiratory: Positive for cough, shortness of breath, wheezing and stridor. Negative for chest tightness.    Cardiovascular: Negative for chest pain, palpitations and leg swelling.   Gastrointestinal: Negative for abdominal pain, constipation, diarrhea, heartburn, nausea and vomiting.   Musculoskeletal: Negative for myalgias and neck pain.   Skin: Negative for rash.   Allergic/Immunologic: Positive for environmental allergies and food allergies.   Neurological: Positive for headaches. Negative for dizziness and tremors.   Hematological: Negative for adenopathy.       Objective:      Physical Exam   Constitutional: She is oriented to person, place, and time. Vital signs are normal. She appears well-developed and well-nourished. No distress.   HENT:   Head: Normocephalic and atraumatic.   Right Ear: Hearing and external ear normal. There is swelling. No drainage. Tympanic membrane is erythematous. No middle ear effusion.   Left Ear: Hearing and external ear normal. There is drainage and swelling. Tympanic membrane is not erythematous. A middle ear effusion is present.   Nose: Mucosal edema present. Right sinus exhibits frontal sinus tenderness. Right sinus exhibits no maxillary sinus tenderness. Left sinus exhibits frontal sinus tenderness. Left sinus exhibits no maxillary sinus tenderness.   Mouth/Throat: Uvula is midline. Posterior oropharyngeal erythema present. No oropharyngeal exudate.   Eyes: Conjunctivae and lids are normal.   Cardiovascular: Normal rate, regular rhythm, normal heart sounds and intact distal pulses.   No murmur heard.  Pulmonary/Chest: Effort normal and breath sounds normal. She has no wheezes.   Abdominal: Soft. Bowel sounds are normal. She exhibits no  distension. There is no tenderness.   Musculoskeletal: Normal range of motion. She exhibits no edema.   Neurological: She is alert and oriented to person, place, and time.   Skin: Skin is warm, dry and intact. Lesion (small raised lesion left inner thigh, excoriated, some erythema surrounding lesion, no induration or drainage) noted. No rash noted. She is not diaphoretic.   Psychiatric: She has a normal mood and affect.   Vitals reviewed.      Assessment/Plan:       1. Otitis externa of both ears, unspecified chronicity, unspecified type  - start ear drops, may use tylenol as needed for pain  - ciprofloxacin-dexamethasone 0.3-0.1% (CIPRODEX) 0.3-0.1 % DrpS; Place 4 drops into both ears 2 (two) times daily.  Dispense: 7.5 mL; Refill: 0    2. Right otitis media, unspecified otitis media type  - amoxicillin-clavulanate 875-125mg (AUGMENTIN) 875-125 mg per tablet; Take 1 tablet by mouth 2 (two) times daily. for 7 days  Dispense: 14 tablet; Refill: 0    3. Chronic foot pain, left  - MRI Foot (Forefoot) Left W W/O Contrast; Future  - patient will request disc from [facility] to bring to Dr. Cason with [facility]    4. Productive cough  - benzonatate (TESSALON) 200 MG capsule; Take 1 capsule (200 mg total) by mouth 3 (three) times daily as needed.  Dispense: 30 capsule; Refill: 0  - may also use plain Mucinex as needed  - chloraseptic spray as needed for sore throat    RTC if no improvement in symptoms     Felicity Middleton MD

## 2018-08-23 ENCOUNTER — HOSPITAL ENCOUNTER (OUTPATIENT)
Dept: RADIOLOGY | Facility: HOSPITAL | Age: 63
Discharge: HOME | End: 2018-08-23
Attending: INTERNAL MEDICINE
Payer: COMMERCIAL

## 2018-08-23 DIAGNOSIS — M79.672 CHRONIC FOOT PAIN, LEFT: ICD-10-CM

## 2018-08-23 DIAGNOSIS — G89.29 CHRONIC FOOT PAIN, LEFT: ICD-10-CM

## 2018-08-23 PROCEDURE — 73720 MRI LWR EXTREMITY W/O&W/DYE: CPT | Mod: 26,LT,, | Performed by: RADIOLOGY

## 2018-08-23 PROCEDURE — 25500020 PHARM REV CODE 255: Performed by: INTERNAL MEDICINE

## 2018-08-23 PROCEDURE — A9585 GADOBUTROL INJECTION: HCPCS | Performed by: INTERNAL MEDICINE

## 2018-08-23 PROCEDURE — 73720 MRI LWR EXTREMITY W/O&W/DYE: CPT | Mod: TC,LT

## 2018-08-23 RX ORDER — GADOBUTROL 604.72 MG/ML
10 INJECTION INTRAVENOUS
Status: COMPLETED | OUTPATIENT
Start: 2018-08-23 | End: 2018-08-23

## 2018-08-23 RX ADMIN — GADOBUTROL 10 ML: 604.72 INJECTION INTRAVENOUS at 12:08

## 2018-08-26 ENCOUNTER — PATIENT MESSAGE (OUTPATIENT)
Dept: INTERNAL MEDICINE | Facility: CLINIC | Age: 63
End: 2018-08-26

## 2018-08-27 RX ORDER — FLUCONAZOLE 150 MG/1
150 TABLET ORAL DAILY
Qty: 1 TABLET | Refills: 0 | Status: SHIPPED | OUTPATIENT
Start: 2018-08-27 | End: 2018-08-28

## 2018-08-27 NOTE — TELEPHONE ENCOUNTER
Reports antibiotics have caused a yeast infection. Described as itching, burning to vaginal area. Would like to get rx called in to treat.

## 2018-08-28 ENCOUNTER — HOSPITAL ENCOUNTER (OUTPATIENT)
Dept: RADIOLOGY | Facility: HOSPITAL | Age: 63
Discharge: HOME | End: 2018-08-28
Attending: SPECIALIST
Payer: COMMERCIAL

## 2018-08-28 ENCOUNTER — OFFICE VISIT (OUTPATIENT)
Dept: PSYCHIATRY | Facility: CLINIC | Age: 63
End: 2018-08-28
Payer: COMMERCIAL

## 2018-08-28 DIAGNOSIS — G89.4 CHRONIC PAIN DISORDER: ICD-10-CM

## 2018-08-28 DIAGNOSIS — R10.2 PELVIC PAIN IN FEMALE: ICD-10-CM

## 2018-08-28 DIAGNOSIS — Z01.818 PRE-OP EXAM: Primary | ICD-10-CM

## 2018-08-28 DIAGNOSIS — Z12.31 VISIT FOR SCREENING MAMMOGRAM: Primary | ICD-10-CM

## 2018-08-28 DIAGNOSIS — M96.1 FAILED BACK SYNDROME, LUMBOSACRAL: ICD-10-CM

## 2018-08-28 DIAGNOSIS — M54.16 LUMBAR RADICULOPATHY: ICD-10-CM

## 2018-08-28 DIAGNOSIS — F32.A DEPRESSION, UNSPECIFIED DEPRESSION TYPE: ICD-10-CM

## 2018-08-28 DIAGNOSIS — R10.2 PELVIC PAIN IN FEMALE: Primary | ICD-10-CM

## 2018-08-28 PROCEDURE — 76830 TRANSVAGINAL US NON-OB: CPT | Mod: 26,,, | Performed by: RADIOLOGY

## 2018-08-28 PROCEDURE — 76856 US EXAM PELVIC COMPLETE: CPT | Mod: 26,,, | Performed by: RADIOLOGY

## 2018-08-28 PROCEDURE — 76830 TRANSVAGINAL US NON-OB: CPT | Mod: TC

## 2018-08-28 PROCEDURE — 96101 PR PSYCHOLOGIC TESTING BY PSYCH/PHYS: CPT | Mod: S$GLB,,, | Performed by: PSYCHOLOGIST

## 2018-08-28 PROCEDURE — 96102 PR PSYCHOLOGIC TESTING BY TECHNICIAN: CPT | Mod: 59,S$GLB,, | Performed by: PSYCHOLOGIST

## 2018-08-28 PROCEDURE — 76856 US EXAM PELVIC COMPLETE: CPT | Mod: TC

## 2018-08-28 PROCEDURE — 90791 PSYCH DIAGNOSTIC EVALUATION: CPT | Mod: S$GLB,,, | Performed by: PSYCHOLOGIST

## 2018-08-30 ENCOUNTER — TELEPHONE (OUTPATIENT)
Dept: PSYCHIATRY | Facility: CLINIC | Age: 63
End: 2018-08-30

## 2018-08-30 NOTE — PSYCH TESTING
[facility] [address] [place]  [address]  [phone]    REPORT OF PSYCHOLOGICAL TESTING    NAME: Kaleigh Solo  OC #:  [id number]  : 1955    REFERRED BY:  Candice Lopez M.D.    EVALUATED BY:  Carole Ramirez, Ph.D., Clinical Psychologist  Neva Gillespie, Psychometrician    DATES OF EVALUATION:  & 2018    EVALUATION PROCEDURES AND TIMES:  Conducted by Psychologist:  Interpretation and report of test data  Integration of information from diagnostic interview, medical record, and testing data  Conducted by Technician:  [place] Multiphasic Personality Inventory - 2 - Restructured Form (MMPI-2-RF)  [facility] Behavioral Medicine Diagnostic (MBMD)  [place] Depression Inventory - II (BDI-II)  CPT Codes and Time:  04120 - 2 hours; 21434 - 2 hours    EVALUATION FINDINGS:  The diagnostic interview revealed that Mrs. Kaleigh Solo is a 63 year-old white  female referred for Psychological Evaluation prior to surgical implantation of a spinal cord stimulator to address chronic pain. She reported she has chronic pain in her back and down both legs sometimes on the right and sometimes on the left. She has been struggling with pain for a couple of years. She has had a cervical fusion and has tried medications, injections, physical therapy, aquatherapy, and chiropractic without much relief from pain. Her activities are greatly limited. She is unable to walk very far, take a bath, drive any distance, water the garden with a gallon jug, vacuum, mop, or dance. When she has what she calls an attack of pain she has to just sit in the recliner. She is unable to go anywhere. She is not a surgical candidate. She is now interested in a trial of the spinal cord stimulator device. She has not yet reviewed the educational DVD.    Mrs. Solo reported she has had depression off and on most of her life. Her father was schizophrenic and her mother became depressed as a result. She reported that when  her mother was depressed, she got depressed as well. At age 14 she had severe headaches due to stress because her friends were smoking pot and she did not want to. She was placed on a tranquilizer which she took for several months. She  at age 18 to an older man and had two children. They  16 years and 2 children later because he was jealous. It was difficult being a single mom with 2 children. She had a nervous breakdown in the context of the divorce and began to see a psychiatrist. She has seen several psychiatrists through the years but has never been hospitalized. She began to see psychiatrist Dr. Kristel Pena 6 months ago in the context of pain, limitations caused by pain, and lack of effectiveness of psychiatric medication. She is quite pleased with the care she is receiving. She feels her current medication regimen (Prozac, Ritalin for energy, Zyprexa for sleep) is very good. She is not taking Xanax at this time. She went to counseling a couple of months ago due to anger issues but discontinued it when her medications became effective in regulating her mood. She reported she still gets depressed at times when the pain or muscle spasms are severe. She denied suicidal ideation or other symptoms of depression. She was pleasant and cooperative in interview and appeared to be in good spirits. She plans to continue to see Dr. Pena. The Mental Status Exam suggested reduced concentration and abstraction ability.    The medical record also revealed diagnoses of chronic pain disorder, lumbar radiculopathy, OA of spine, lumbar disc herniation, foraminal stenosis of lumbosacral region, cervical post-laminectomy syndrome, arthritis, asthma, cervicalgia, DDD, fibromyalgia, HPV, HTN, sciatica, and stomach ulcer.     .    TEST DATA:  Psychological Testing data revealed that she was fully cooperative and engaged in the assessment process. Mrs. Solo completed her GED. 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 Effort on all tests was satisfactory to produce valid results.    The MMPI-2-RF profile obtained was valid. She responded to the items relevantly on the basis of their content, and there were no indications of over- or under-reporting. Scores on the substantive scales indicated somatic and cognitive complaints, and emotional and interpersonal dysfunction. Somatic complaints included preoccupation with poor health, malaise, head pain, neurological symptoms, and gastrointestinal problems. These findings reflect her complex medical and pain history and are not surprising. Cognitive complaints include difficulties in memory and concentration. She is not having cognitive difficulties in her day to day life, however. Emotional-internalizing findings include suicidal ideation (not presently -  just when taking Lunesta), depression, helplessness and hopelessness, and self-doubt. Interpersonal difficulties relate to passivity.     The MBMD suggested she is experiencing substantially more depressive symptoms than the typical [facility] patient. Characteristically negative and inclined to view life as difficult, this patient appears to be significantly more contrary and distressed than usual. She may give up when faced with complex treatments. She thinks poorly of her abilities and feels she cannot participate in ordinary life activities. She appears generally sanchez and cranky, and her reaction to a physical illness is likely to be a mix of fear and depression. Healthcare personnel should direct their efforts at overcoming her characteristic depressiveness, while remaining unmoved by her occasional complaining. A sympathetic and supportive attitude should help maximize treatment progress. Test data suggested she may benefit from antidepressant medication and psychological counseling.    The BDI-II revealed the presence of mild depression with a total score of 16 and  highest scores in pessimism, tiredness or fatigue, and loss of interest in sex.     DIAGNOSTIC IMPRESSIONS:  Z01.818 Pre-op exam  G89.4 Chronic pain syndrome  M96.1 Failed back syndrome, lumboacral  M54.16 Lumbar radiculopathy  F32.9 Depression    SUMMARY AND RECOMMENDATIONS:  Mrs. Solo has a long history of mild depression off and on for most of her life. Test data consistently suggested the presence of depression. This reflects her prior history of depression as well as mild current symptoms of depression. Mrs. Solo has never been hospitalized. She was suicidal as a side effect of Lunesta but denied any other suicidality. She is presently under the care of a local psychiatrist with good results. She feels her current medication regimen is quite effective. She was pleasant in interview and appeared to be in good spirits. Since she is receiving appropriate care for depression, this evaluation revealed no contraindications to the spinal cord stimulator from the psychological perspective. She will continue to see Dr. Pena for psychiatric care.        Report and interpretation and coding were completed on 8/30/2018.

## 2018-08-30 NOTE — TELEPHONE ENCOUNTER
Report of Psychological Evaluation is completed. It can be accessed through the Chart Review activity under the Notes tab to the right of the Encounters tab).  It is titled Psych Testing.    Since she is receiving appropriate care for depression, this evaluation revealed no contraindications to the spinal cord stimulator from the psychological perspective. She will continue to see Dr. Pena for psychiatric care.  Thanks. GRZEGORZ

## 2018-08-30 NOTE — PROGRESS NOTES
Psychiatry Initial Visit (PhD/LCSW)    Date:   8/28/2018    CPT Code: 29949    Referred by:  Candice Lopez M.D.    Chief complaint/reason for encounter:  Psychological Evaluation prior to surgical implantation of a spinal cord stimulator to address chronic pain    Clinical status of patient:  Outpatient    Met with:  Patient    History of present illness: Mrs. Kaleigh Solo is a 63 year-old white  female referred for Psychological Evaluation prior to surgical implantation of a spinal cord stimulator to address chronic pain. She reported she has chronic pain in her back and down both legs sometimes on the right and sometimes on the left. She has been struggling with pain for a couple of years. She has had a cervical fusion and has tried medications, injections, physical therapy, aquatherapy, and chiropractic without much relief from pain. Her activities are greatly limited. She is unable to walk very far, take a bath, drive any distance, water the garden with a gallon jug, vacuum, mop, or dance. When she has what she calls an attack of pain she has to just sit in the recliner. She is unable to go anywhere. She is not a surgical candidate. She is now interested in a trial of the spinal cord stimulator device. She has not yet reviewed the educational DVD.    Mrs. Solo reported she has had depression off and on most of her life. Her father was schizophrenic and her mother became depressed as a result. She reported that when her mother was depressed, she got depressed as well. At age 14 she had severe headaches due to stress because her friends were smoking pot and she did not want to. She was placed on a tranquilizer which she took for several months. She  at age 18 to an older man and had two children. They  16 years and 2 children later because he was jealous. It was difficult being a single mom with 2 children. She had a nervous breakdown in the context of the divorce and began to see a  psychiatrist. She has seen several psychiatrists through the years but has never been hospitalized. She began to see psychiatrist Dr. Kristel Pena 6 months ago in the context of pain, limitations caused by pain, and lack of effectiveness of psychiatric medication. She is quite pleased with the care she is receiving. She feels her current medication regimen (Prozac, Ritalin for energy, Zyprexa for sleep) is very good. She is not taking Xanax at this time. She went to counseling a couple of months ago due to anger issues but discontinued it when her medications became effective in regulating her mood. She reported she still gets depressed at times when the pain or muscle spasms are severe. She denied suicidal ideation or other symptoms of depression. She was pleasant and cooperative in interview and appeared to be in good spirits. She plans to continue to see Dr. Pena.    Pain scale: 5/10 - muscle spasms today    Symptoms:  Mood: depressed mood on occasion  Anxiety:  denied  Substance abuse: denied  Cognitive functioning: denied    Psychiatric history: as above     Medical history: chronic pain disorder, lumbar radiculopathy, OA of spine, lumbar disc herniation, foraminal stenosis of lumbosacral region, cervical post-laminectomy syndrome, arthritis, asthma, cervicalgia, DDD, fibromyalgia, HPV, HTN, sciatica, and stomach ulcer.         Family history of psychiatric illness:  father schizophrenic, mother depression, 2 sisters OCD, 1 sister hoarder    Social history (marriage, employment, etc.):   Mrs. Solo completed her GED. 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  3 times.  twice. 2 children with first . Lives with third . He was an alcoholic but recently quit drinking due to medial problems. [employer]. Enjoys going to [place] with her daughter, seeing her grandchildren, visiting sister, going out to eat.     Substance use:   Alcohol:  4 drinks twice a month   Drugs: none   Tobacco: smokes 1 ppd   Caffeine: 2-3 cups coffee in AM    Strengths and Liabilities:   Strength:  Pt is expressive/articulate. Pt has good social support.   Liability:  Pt has chronic pain.       Mental Status Exam:  General appearance:  appears stated age, neatly dressed, well groomed  Speech:  normal rate and tone  Level of cooperation:  cooperative  Thought processes:  logical, goal-directed  Mood:  euthymic  Thought content:  no illusions, no visual hallucinations, no auditory hallucinations, no delusions, no active or passive homicidal thoughts, no active or passive suicidal ideation, no obsessions, no compulsions, no violence  Affect:  appropriate  Orientation:  oriented to person, place, and time  Memory:  Recent memory:  3 of 3 objects after brief delay.    Remote memory - intact  Attention span and concentration:  unable to spell HOUSE backwards  Fund of general knowledge: 4 of 4 recent presidents  Abstract reasoning:    Similarities: abstract.    Proverbs: no idea  Judgment and insight: fair  Language:  intact    Diagnostic impressions:  Z01.818 Pre-op exam  G89.4 Chronic pain disorder  M96.1 Failed back syndrome, lumboacral  M54.16 Lumbar radiculopathy  F32.9 Depression    Plan:  Pt will complete Psychological testing.  Report of Psychological Evaluation will follow in the Notes folder in the patients chart in the encounter titled Psychological Testing.  Pt will continue to see Dr. Pena for psychiatric care.    Length of time:    60 minutes

## 2018-09-05 ENCOUNTER — PATIENT MESSAGE (OUTPATIENT)
Dept: PAIN MEDICINE | Facility: CLINIC | Age: 63
End: 2018-09-05

## 2018-09-07 ENCOUNTER — TELEPHONE (OUTPATIENT)
Dept: PAIN MEDICINE | Facility: CLINIC | Age: 63
End: 2018-09-07

## 2018-09-07 DIAGNOSIS — M96.1 CERVICAL POST-LAMINECTOMY SYNDROME: ICD-10-CM

## 2018-09-07 DIAGNOSIS — G89.4 CHRONIC PAIN SYNDROME: Primary | ICD-10-CM

## 2018-09-07 DIAGNOSIS — M54.16 LUMBAR RADICULOPATHY: ICD-10-CM

## 2018-09-12 ENCOUNTER — TELEPHONE (OUTPATIENT)
Dept: RESEARCH | Facility: OTHER | Age: 63
End: 2018-09-12

## 2018-09-12 NOTE — TELEPHONE ENCOUNTER
Contacted patient to invite her to participate in [facility] (IRB# [id number]) clinical trial. Requested return call.

## 2018-09-13 ENCOUNTER — TELEPHONE (OUTPATIENT)
Dept: INTERNAL MEDICINE | Facility: CLINIC | Age: 63
End: 2018-09-13

## 2018-09-13 DIAGNOSIS — E78.00 HYPERCHOLESTEROLEMIA: ICD-10-CM

## 2018-09-13 DIAGNOSIS — Z00.00 ANNUAL PHYSICAL EXAM: Primary | ICD-10-CM

## 2018-09-13 DIAGNOSIS — R79.89 ABNORMAL CBC MEASUREMENT: ICD-10-CM

## 2018-09-13 RX ORDER — ATORVASTATIN CALCIUM 10 MG/1
10 TABLET, FILM COATED ORAL DAILY
Qty: 90 TABLET | Refills: 0 | Status: SHIPPED | OUTPATIENT
Start: 2018-09-13 | End: 2018-11-23 | Stop reason: SDUPTHER

## 2018-09-13 RX ORDER — VALSARTAN AND HYDROCHLOROTHIAZIDE 320; 25 MG/1; MG/1
1 TABLET, FILM COATED ORAL DAILY
Qty: 90 TABLET | Refills: 0 | Status: SHIPPED | OUTPATIENT
Start: 2018-09-13 | End: 2018-11-23 | Stop reason: SDUPTHER

## 2018-09-13 NOTE — TELEPHONE ENCOUNTER
Spoke with pt; Annual physical 10/1/24927@ 2:00    Labs scheduled 9/24/2018 @ 7am    Please enter orders for labs and alert staff so they can be linked to appt. Thank you.

## 2018-09-13 NOTE — TELEPHONE ENCOUNTER
----- Message from Felicity Middleton MD sent at 9/13/2018 12:56 PM CDT -----  Please call her to make an appt for physical exam.

## 2018-09-14 ENCOUNTER — PATIENT MESSAGE (OUTPATIENT)
Dept: PAIN MEDICINE | Facility: OTHER | Age: 63
End: 2018-09-14

## 2018-09-18 ENCOUNTER — RESEARCH ENCOUNTER (OUTPATIENT)
Dept: RESEARCH | Facility: OTHER | Age: 63
End: 2018-09-18

## 2018-09-18 NOTE — PROGRESS NOTES
Date: 18 September 2018  Study: [facility]  Study to Investigate the Efficacy of Technology in Pain Management        IRB/Protocol #: 2016.377.B/1  : Candice Lopez MD  Subject Number: [id number]    Consent:    The informed consent form was discussed extensively with ample time provided for subjects questions and answers. An emphasis was placed on HIPPA practices, voluntary participation, and confidentiality language. The IRB was discussed with the subject and contact information was reviewed.  was available to discuss the study indications, procedure(s), inclusion and exclusion criteria, alternative treatments, anticipated risks, expected/anticipated benefits, and to answer any additional questions if necessary. The subject was provided with adequate time to independently read and review the informed consent form. Subject expressed a clear understanding of the risks, benefits, indications, and alternatives to the investigational study and the associated procedure(s). All elements of the informed consent form, study requirements, and expectations were reviewed with the patient and all concerns were identified and addressed. The subject was provided with a signed copy of the form to take home and study staff contact information was noted. No study related procedures were performed prior to subjects signing of the consent. Subject was issued an activity tracker and instructions for use were explained. She was encouraged to contact the study team with any concerns regarding the device and/or the SMS text messages recording pain. All self-reported questionnaires assessing psychological factors of pain and functioning were completed by the subject. She is scheduled for her spinal cord stimulator trial procedure on 9/25/2018 and was reminded to bring her activity tracker with her to that visit.

## 2018-09-24 ENCOUNTER — LAB VISIT (OUTPATIENT)
Dept: LAB | Facility: HOSPITAL | Age: 63
End: 2018-09-24
Attending: INTERNAL MEDICINE
Payer: COMMERCIAL

## 2018-09-24 DIAGNOSIS — Z00.00 ANNUAL PHYSICAL EXAM: ICD-10-CM

## 2018-09-24 DIAGNOSIS — R79.89 ABNORMAL CBC MEASUREMENT: ICD-10-CM

## 2018-09-24 PROBLEM — R74.01 ELEVATED ALT MEASUREMENT: Status: ACTIVE | Noted: 2018-09-24

## 2018-09-24 LAB
ALBUMIN SERPL BCP-MCNC: 3.7 G/DL
ALP SERPL-CCNC: 130 U/L
ALT SERPL W/O P-5'-P-CCNC: 114 U/L
ANION GAP SERPL CALC-SCNC: 10 MMOL/L
AST SERPL-CCNC: 40 U/L
BASOPHILS # BLD AUTO: 0.06 K/UL
BASOPHILS NFR BLD: 0.7 %
BILIRUB SERPL-MCNC: 0.4 MG/DL
BUN SERPL-MCNC: 11 MG/DL
CALCIUM SERPL-MCNC: 10 MG/DL
CHLORIDE SERPL-SCNC: 103 MMOL/L
CHOLEST SERPL-MCNC: 238 MG/DL
CHOLEST/HDLC SERPL: 3.9 {RATIO}
CO2 SERPL-SCNC: 29 MMOL/L
CREAT SERPL-MCNC: 0.7 MG/DL
DIFFERENTIAL METHOD: ABNORMAL
EOSINOPHIL # BLD AUTO: 0.2 K/UL
EOSINOPHIL NFR BLD: 2.1 %
ERYTHROCYTE [DISTWIDTH] IN BLOOD BY AUTOMATED COUNT: 14.2 %
EST. GFR  (AFRICAN AMERICAN): >60 ML/MIN/1.73 M^2
EST. GFR  (NON AFRICAN AMERICAN): >60 ML/MIN/1.73 M^2
ESTIMATED AVG GLUCOSE: 103 MG/DL
FOLATE SERPL-MCNC: 9.1 NG/ML
GLUCOSE SERPL-MCNC: 97 MG/DL
HBA1C MFR BLD HPLC: 5.2 %
HCT VFR BLD AUTO: 45.3 %
HCYS SERPL-SCNC: 9.4 UMOL/L
HDLC SERPL-MCNC: 61 MG/DL
HDLC SERPL: 25.6 %
HGB BLD-MCNC: 14.7 G/DL
IMM GRANULOCYTES # BLD AUTO: 0.03 K/UL
IMM GRANULOCYTES NFR BLD AUTO: 0.4 %
LDLC SERPL CALC-MCNC: 155.6 MG/DL
LYMPHOCYTES # BLD AUTO: 1.8 K/UL
LYMPHOCYTES NFR BLD: 22.4 %
MCH RBC QN AUTO: 31.7 PG
MCHC RBC AUTO-ENTMCNC: 32.5 G/DL
MCV RBC AUTO: 98 FL
MONOCYTES # BLD AUTO: 0.5 K/UL
MONOCYTES NFR BLD: 6 %
NEUTROPHILS # BLD AUTO: 5.5 K/UL
NEUTROPHILS NFR BLD: 68.4 %
NONHDLC SERPL-MCNC: 177 MG/DL
NRBC BLD-RTO: 0 /100 WBC
PLATELET # BLD AUTO: 254 K/UL
PMV BLD AUTO: 9.5 FL
POTASSIUM SERPL-SCNC: 4.1 MMOL/L
PROT SERPL-MCNC: 7.2 G/DL
RBC # BLD AUTO: 4.64 M/UL
RETICS/RBC NFR AUTO: 1.7 %
SODIUM SERPL-SCNC: 142 MMOL/L
TRIGL SERPL-MCNC: 107 MG/DL
TSH SERPL DL<=0.005 MIU/L-ACNC: 2.14 UIU/ML
WBC # BLD AUTO: 8.04 K/UL

## 2018-09-24 PROCEDURE — 83090 ASSAY OF HOMOCYSTEINE: CPT

## 2018-09-24 PROCEDURE — 80061 LIPID PANEL: CPT

## 2018-09-24 PROCEDURE — 83036 HEMOGLOBIN GLYCOSYLATED A1C: CPT

## 2018-09-24 PROCEDURE — 80053 COMPREHEN METABOLIC PANEL: CPT

## 2018-09-24 PROCEDURE — 36415 COLL VENOUS BLD VENIPUNCTURE: CPT | Mod: PO

## 2018-09-24 PROCEDURE — 83921 ORGANIC ACID SINGLE QUANT: CPT

## 2018-09-24 PROCEDURE — 85025 COMPLETE CBC W/AUTO DIFF WBC: CPT

## 2018-09-24 PROCEDURE — 84443 ASSAY THYROID STIM HORMONE: CPT

## 2018-09-24 PROCEDURE — 82746 ASSAY OF FOLIC ACID SERUM: CPT

## 2018-09-24 PROCEDURE — 85045 AUTOMATED RETICULOCYTE COUNT: CPT

## 2018-09-25 ENCOUNTER — RESEARCH ENCOUNTER (OUTPATIENT)
Dept: RESEARCH | Facility: OTHER | Age: 63
End: 2018-09-25

## 2018-09-25 ENCOUNTER — HOSPITAL ENCOUNTER (OUTPATIENT)
Facility: OTHER | Age: 63
Discharge: HOME | End: 2018-09-25
Attending: ANESTHESIOLOGY | Admitting: ANESTHESIOLOGY
Payer: COMMERCIAL

## 2018-09-25 VITALS
RESPIRATION RATE: 18 BRPM | TEMPERATURE: 98 F | DIASTOLIC BLOOD PRESSURE: 72 MMHG | OXYGEN SATURATION: 95 % | SYSTOLIC BLOOD PRESSURE: 119 MMHG | WEIGHT: 220 LBS | HEART RATE: 71 BPM | BODY MASS INDEX: 40.48 KG/M2 | HEIGHT: 62 IN

## 2018-09-25 DIAGNOSIS — G89.4 CHRONIC PAIN SYNDROME: ICD-10-CM

## 2018-09-25 DIAGNOSIS — M54.16 LUMBAR RADICULOPATHY: Primary | ICD-10-CM

## 2018-09-25 DIAGNOSIS — G89.29 CHRONIC PAIN: ICD-10-CM

## 2018-09-25 PROCEDURE — 63650 IMPLANT NEUROELECTRODES: CPT | Mod: ,,, | Performed by: ANESTHESIOLOGY

## 2018-09-25 PROCEDURE — 25000003 PHARM REV CODE 250: Performed by: ANESTHESIOLOGY

## 2018-09-25 PROCEDURE — 63650 IMPLANT NEUROELECTRODES: CPT | Performed by: ANESTHESIOLOGY

## 2018-09-25 PROCEDURE — 63600175 PHARM REV CODE 636 W HCPCS: Performed by: ANESTHESIOLOGY

## 2018-09-25 PROCEDURE — C1778 LEAD, NEUROSTIMULATOR: HCPCS | Performed by: ANESTHESIOLOGY

## 2018-09-25 PROCEDURE — 99152 MOD SED SAME PHYS/QHP 5/>YRS: CPT | Mod: ,,, | Performed by: ANESTHESIOLOGY

## 2018-09-25 PROCEDURE — 95971 ALYS SMPL SP/PN NPGT W/PRGRM: CPT | Mod: ,,, | Performed by: ANESTHESIOLOGY

## 2018-09-25 PROCEDURE — 25000003 PHARM REV CODE 250: Performed by: PHYSICAL MEDICINE & REHABILITATION

## 2018-09-25 RX ORDER — LIDOCAINE HYDROCHLORIDE 10 MG/ML
INJECTION INFILTRATION; PERINEURAL
Status: DISCONTINUED | OUTPATIENT
Start: 2018-09-25 | End: 2018-09-25 | Stop reason: HOSPADM

## 2018-09-25 RX ORDER — FENTANYL CITRATE 50 UG/ML
INJECTION, SOLUTION INTRAMUSCULAR; INTRAVENOUS
Status: DISCONTINUED | OUTPATIENT
Start: 2018-09-25 | End: 2018-09-25 | Stop reason: HOSPADM

## 2018-09-25 RX ORDER — MIDAZOLAM HYDROCHLORIDE 5 MG/ML
INJECTION INTRAMUSCULAR; INTRAVENOUS
Status: DISCONTINUED | OUTPATIENT
Start: 2018-09-25 | End: 2018-09-25 | Stop reason: HOSPADM

## 2018-09-25 RX ORDER — BUPIVACAINE HYDROCHLORIDE 2.5 MG/ML
INJECTION, SOLUTION EPIDURAL; INFILTRATION; INTRACAUDAL
Status: DISCONTINUED | OUTPATIENT
Start: 2018-09-25 | End: 2018-09-25 | Stop reason: HOSPADM

## 2018-09-25 RX ORDER — SODIUM CHLORIDE 9 MG/ML
500 INJECTION, SOLUTION INTRAVENOUS CONTINUOUS
Status: DISCONTINUED | OUTPATIENT
Start: 2018-09-25 | End: 2018-09-25 | Stop reason: HOSPADM

## 2018-09-25 NOTE — DISCHARGE INSTRUCTIONS
Spinal Cord Stimulation  Pain messages travel over nerve pathways to the spinal cord, inside the spine. The spinal cord carries the messages to the brain. Constant pain messages can cause long-term pain that is hard to treat. This is known as chronic pain. Spinal cord stimulation uses a medical device to send signals to the nerve pathways inside your jordan cord. These signals help block the pain.    Keeping a pain log  Your doctor may ask you to keep a pain log for a certain amount of time. In it, you may answer certain questions: When do you feel pain? What does it feel like? How long does the pain last? What makes it better or worse? When the stimulation is on, is your pain relieved? Your answers help show how well spinal cord stimulation may work for you. Your doctor will give you guidelines for your pain log. During the time you write the log, you may not be able to take pain medications. Be sure to discuss this with your doctor.    Spinal cord stimulation may help  Spinal cord stimulation is one treatment for chronic pain. Certain criteria need to be met to be a good candidate for spinal cord stimulation. A small medical device sends signals to your spinal cord. These signals keep the chronic pain messages from being sent to your brain. Instead, you may feel tingling from the electrical signals. A trial stimulator that is worn outside the body in tried first to see if it will work. If it does, the permanent stimulator system may be used. This device can be removed at any time.  The stimulator system  The stimulator system has several parts. A power source makes the signals. This power source may be worn outside the body or implanted under the skin on your abdomen or buttocks. One or more leads (flexible, plastic-covered wires or paddle) are placed inside the body to carry the signals to the spinal cord. Your doctor can explain the system youll be using in more detail.  Risks and possible  complications  · Infection  · Bleeding  · Nerve damage  · Spinal cord damage  · Failure to relieve pain    © 1959-2459 [employer]. [address] [place] [address], [place] [address]. All rights reserved. This information is not intended as a substitute for professional medical care. Always follow your healthcare professional's instructions.      Spinal Cord Stimulation: Your Experience  Pain messages travel over nerve pathways to the spinal cord, inside the spine. The spinal cord carries the messages to the brain. Constant pain messages can cause long-term pain that is hard to treat. This is known as chronic pain. Spinal cord stimulation uses a medical device to send signals to the nerve pathways inside your spinal cord. These signals help block the pain.  Your healthcare provider does a stimulator placement in two stages. He or she does a test (trial) stage to see how well spinal cord stimulation works for you. If the trial stage is a success, the permanent stimulator system is put into place.      Discuss the results of the trial stage with your doctor.    Getting ready at home  Your doctor will give you guidelines on how to get ready for the procedures. Tell your doctor what medicines you take, and ask if you should stop taking any of them. Do not eat or drink for 8 hours before you check in for the procedures. Certain criteria must be met to be a good candidate for the spinal cord stimulation device.  Placing the trial leads  Your doctor will place the trial leads under the skin on your back through a small incision. He or she will place one end of the leads near your spinal cord. Your doctor will attach the other end of the leads to the stimulator power source. He or she will then adjust the stimulator to the right level. For the trial stage, you wear the power source outside your body.  The trial stage  Your doctor will instruct you to keep a second pain log during the trial stage. You can compare this  log with your first pain log to show how well the stimulator system is working for you.  Placing the permanent system  If the trial stimulator works well for you, a permanent system might be indicated. This must be done in the hospital. Prepare for it as instructed. The  or the power source is implanted under the skin on your abdomen or buttocks. The power source is small, so it wont show under your clothing. Some devices are rechargeable After the system is in place, the settings are checked to make sure they are at the right level for you. If necessary, the spinal cord stimulation device can be removed at any time. Not all these systems are MRI compatible. Find out from your doctor if you still can have an MRI once the system is installed.  After the procedures  You may stay in the hospital overnight. The implant site will be sore for a few days. The leads need some time to become fixed so they dont move around. Your doctor will tell you what activities to avoid for the next month or so.  When to call your doctor  Call your doctor right away if you:  · Have fever over 100.4°F (38°C)  · Have chills  · Have pain, drainage, or increased redness at the implant site  · If you don't feel the stimulation anymore  Also call your doctor if the pain symptoms return.           Adult Procedural Sedation Instructions    Recovery After Procedural Sedation (Adult)  You have been given medicine by vein to make you sleep during your surgery. This may have included both a pain medicine and sleeping medicine. Most of the effects have worn off. But you may still have some drowsiness for the next 6 to 8 hours.  Home care  Follow these guidelines when you get home:  · For the next 8 hours, you should be watched by a responsible adult. This person should make sure your condition is not getting worse.  · Don't drink any alcohol for the next 24 hours.  · Don't drive, operate dangerous machinery, or make important business or  personal decisions during the next 24 hours.  Note: Your healthcare provider may tell you not to take any medicine by mouth for pain or sleep in the next 4 hours. These medicines may react with the medicines you were given in the hospital. This could cause a much stronger response than usual.  Follow-up care  Follow up with your healthcare provider if you are not alert and back to your usual level of activity within 12 hours.  When to seek medical advice  Call your healthcare provider right away if any of these occur:  · Drowsiness gets worse  · Weakness or dizziness gets worse  · Repeated vomiting  · You can't be awakened   Date Last Reviewed: 10/18/2016  © 5046-7395 [employer]. [address] [place] [address], [place] [address]. All rights reserved. This information is not intended as a substitute for professional medical care. Always follow your healthcare professional's instructions.       Thank you for allowing us to care for you today. You may receive a survey about the care we provided. Your feedback is valuable and helps us provide excellent care throughout the community.     Home Care Instructions for Pain Management:    1. DIET:   You may resume your normal diet today.   2. BATHING:   You may shower with luke warm water. No tub baths or anything that will soak injection sites under water for the next 24 hours.  3. DRESSING:   You may remove your bandage today.   4. ACTIVITY LEVEL:   You may resume your normal activities 24 hrs after your procedure. Nothing strenuous today.  5. MEDICATIONS:   You may resume your normal medications today. To restart blood thinners, ask your doctor.  6. DRIVING    If you have received any sedatives by mouth today, you may not drive for 12 hours.    If you have received any sedation through your IV, you may not drive for 24 hrs.   7. SPECIAL INSTRUCTIONS:   No heat to the injection site for 24 hrs including, hot bath or shower, heating pad, moist heat, or hot tubs.     Use ice pack to injection site for any pain or discomfort.  Apply ice packs for 20 minute intervals as needed.    IF you have diabetes, be sure to monitor your blood sugar more closely. IF your injection contained steroids your blood sugar levels may become higher than normal.    If you are still having pain upon discharge:  Your pain may improve over the next 48 hours. The anesthetic (numbing medication) works immediately to 48 hours. IF your injection contained a steroid (anti-inflammatory medication), it takes approximately 3 days to start feeling relief and 7-10 days to see your greatest results from the medication. It is possible you may need subsequent injections. This would be discussed at your follow up appointment with pain management or your referring doctor.      PLEASE CALL YOUR DOCTOR IF:  1. Redness or swelling around the injection site.  2. Fever of 101 degrees or more  3. Drainage (pus) from the injection site.  4. For any continuous bleeding (some dried blood over the incision is normal.)    FOR EMERGENCIES:   If any unusual problems or difficulties occur during clinic hours, call [phone] or [phone].

## 2018-09-25 NOTE — DISCHARGE SUMMARY
Discharge Note  Short Stay      SUMMARY     Admit Date: 9/25/2018    Attending Physician: Candice Lopez      Discharge Physician: Candice Lopez      Discharge Date: 9/25/2018 10:29 AM    Procedure(s) (LRB):  TRIAL, NEUROSTIMULATOR, SPINAL CORD (N/A)    Final Diagnosis: Lumbar radiculopathy [M54.16]  Chronic pain syndrome [G89.4]  Cervical post-laminectomy syndrome [M96.1]    Disposition: Home or self care    Patient Instructions:   Current Discharge Medication List      CONTINUE these medications which have NOT CHANGED    Details   albuterol (PROAIR HFA) 90 mcg/actuation inhaler Inhale 2 puffs into the lungs every 6 (six) hours as needed for Wheezing.  Qty: 18 g, Refills: 11      ALPRAZolam (XANAX) 0.25 MG tablet Take 1 tablet (0.25 mg total) by mouth 2 (two) times daily as needed for Anxiety.  Qty: 60 tablet, Refills: 1    Associated Diagnoses: Anxiety; Insomnia, unspecified type      atorvastatin (LIPITOR) 10 MG tablet Take 1 tablet (10 mg total) by mouth once daily.  Qty: 90 tablet, Refills: 0    Associated Diagnoses: Hypercholesterolemia      carvedilol (COREG) 25 MG tablet Take 1 tablet (25 mg total) by mouth 2 (two) times daily with meals.  Qty: 60 tablet, Refills: 2    Associated Diagnoses: Hypertension, essential      cetirizine (ZYRTEC) 10 MG tablet Take 10 mg by mouth once daily.      ciprofloxacin-dexamethasone 0.3-0.1% (CIPRODEX) 0.3-0.1 % DrpS Place 4 drops into both ears 2 (two) times daily.  Qty: 7.5 mL, Refills: 0    Associated Diagnoses: Otitis externa of both ears, unspecified chronicity, unspecified type      clotrimazole-betamethasone 1-0.05% (LOTRISONE) cream Apply topically 2 (two) times daily.  Qty: 45 g, Refills: 1    Associated Diagnoses: Dependent edema      diphenhydrAMINE (BENADRYL) 50 MG tablet Take 50 mg by mouth nightly as needed for Itching.      famotidine (PEPCID) 20 MG tablet Take 20 mg by mouth 2 (two) times daily.      fexofenadine (ALLEGRA) 60 MG tablet Take 60 mg by mouth  once daily.      fluocinonide 0.05% (LIDEX) 0.05 % cream       FLUoxetine 20 MG tablet       gabapentin (NEURONTIN) 600 MG tablet Take 1 tablet (600 mg total) by mouth 4 (four) times daily with meals and nightly.  Qty: 120 tablet, Refills: 3    Associated Diagnoses: Lumbar disc herniation with radiculopathy; Spinal stenosis of lumbar region with neurogenic claudication; Lumbar radiculopathy; Chronic pain syndrome; Chronic bilateral low back pain with left-sided sciatica; Foraminal stenosis of lumbosacral region; Weakness of both lower extremities      hydrocortisone 1 % cream Apply topically 2 (two) times daily.  Qty: 30 g, Refills: 2      hydrOXYzine HCl (ATARAX) 25 MG tablet Take 1 tablet (25 mg total) by mouth 3 (three) times daily as needed for Itching.  Qty: 60 tablet, Refills: 2      methylphenidate HCl (RITALIN) 20 MG tablet       nicotine (NICODERM CQ) 21 mg/24 hr Place 1 patch onto the skin once daily.  Qty: 28 patch, Refills: 3    Associated Diagnoses: Cigarette nicotine dependence without complication      OLANZapine (ZYPREXA) 5 MG tablet       valsartan-hydrochlorothiazide (DIOVAN-HCT) 320-25 mg per tablet Take 1 tablet by mouth once daily.  Qty: 90 tablet, Refills: 0                 Discharge Diagnosis: Lumbar radiculopathy [M54.16]  Chronic pain syndrome [G89.4]  Cervical post-laminectomy syndrome [M96.1]  Condition on Discharge: Stable with no complications to procedure   Diet on Discharge: Same as before.  Activity: as per instruction sheet.  Discharge to: Home with a responsible adult.  Follow up: 2-4 weeks

## 2018-09-25 NOTE — OP NOTE
Spinal cord stimulator trial [employer] leads    Preoperative diagnosis: Lumbar radiculopathy [M54.16]  Chronic pain syndrome [G89.4]  Cervical post-laminectomy syndrome [M96.1]    09/25/2018    Postoperative diagnosis: Lumbar radiculopathy [M54.16]  Chronic pain syndrome [G89.4]  Cervical post-laminectomy syndrome [M96.1]     Surgeon: Candice Lopez     Assistants:   Flor Gates MD, PGY-5, Pain Fellow  I was present and supervising all critical portions of the procedure      EBL: Minimal     Complications: None     Specimens: None     Procedure   1) placement of 16 contact electrode by 2   2) intraoperative and postoperative programming, simple   3) spinal cord stimulator trial     Description of procedure:   The patient was placed in the prone position on the OR table, the area overlying the lumbar spine was prepped and draped in usual sterile fashion using chlorhexidine. After an appropriate timeout the position over the entry site for the lumbar spine was identified at the L1-2 interspace and an entry point over the L3 interspace was marked and anesthetized using a 27-gauge needle followed by a spinal needle to the lamina. A total of 20 mL of a mixture of 1% lidocaine and 0.5% bupivacaine was used. This was followed by advancement of a 2 14-gauge  needles into the epidural space at L1/2 Using fluoroscopy in the AP and lateral view for guidance of the needle and loss-of-resistance to air to enter the epidural space. Once this was entered 2 16 contact electrodes were advanced under live fluoroscopy to the T7/8 interlaminar space And were programmed by the representative from the stimulator company. The patient reported having sensation in the areas of usual pain. This was followed by removal of the needles and stylets with securing the leads to the skin using Dermabond and Steri-Strips. The areas were covered with Tegaderm dressings and an abdominal binder was placed around the abdomen.  The  patient was taken to recovery room in stable condition where postoperative programming was performed.     The patient tolerated the procedure well     Conscious sedation provided by M.D   The patient was monitored with continuous pulse oximetry, EKG, and intermittent blood pressure monitors. The patient was hemodynamically stable throughout the entire process was responsive to voice, and breathing spontaneously. Supplemental O2 was provided at 2L/min via nasal cannula. Patient was comfortable for the duration of the procedure. (See nurse documentation and case log for sedation time)    There was a total of 2mg IV Midazolam and 50mcg fentanyl was given for the procedure

## 2018-09-25 NOTE — PROGRESS NOTES
Date: 25 September 2018  Study: [employer]  Study to Investigate the Efficacy of Technology in Pain Management        IRB/Protocol #: 2016.377.B/1  : Candice Lopez MD  Subject Number: [id number]     Trial Procedure:     Subject presented to the Pain Management 2nd floor procedures waiting room for her spinal cord stimulator trial procedure. Her activity tracker was collected prior to her scheduled appointment and was synced to the study tablet for baseline data capture. The SHINE device was returned to the subject, and she was directed to wear the activity tracker 24/7 for the remainder of the trial period following her procedure. She was encouraged to contact study staff with any additional questions.

## 2018-09-25 NOTE — H&P
HPI  63 y.o. female with chronic pain syndrome, lumbar radiculopathy with mostly low back, left leg pain with occasional right leg pain here for spinal cord stimulator trial. No recent changes in pain, no medical changes. Denies fevers, infections.         Past Medical History:   Diagnosis Date    Arthritis     Asthma     Cervicalgia     2013 tx by chiropractor    Closed dislocation of acromioclavicular joint 2013    Degenerative disc disease     Fibromyalgia     HPV (human papilloma virus) anogenital infection     conization in past-remote history-1990    HTN (hypertension)     Sciatica     Tobacco use     Ulcer 2007    stomach ulcer     Past Surgical History:   Procedure Laterality Date    acdf  4/2014    C5-6    ARTHROSCOPY, SHOULDER Right 12/10/2013    [facility]    ARTHROSCOPY, SHOULDER, WITH SUBACROMIAL SPACE DECOMPRESSION Right 12/10/2013    [facility] at [facility] OR    ARTHROSCOPY-KNEE Right 12/19/2014    Per[facility] at [facility] OR    CERVICAL CONIZATION   W/ LASER      CHONDROPLASTY-KNEE Right 12/19/2014    Per[facility] at [facility] OR    DISCECTOMY, SPINE, CERVICAL, ANTERIOR APPROACH, WITH FUSION N/A 4/7/2014    [facility] 2ND FLR    INJECTION, STEROID, SPINE, CERVICAL, EPIDURAL N/A 2/5/2014    Performed by Candice Lopez [facility] at [facility] PAIN MGT    INJECTION, STEROID, SPINE, CERVICAL, EPIDURAL N/A 1/24/2014    Performed by [facility] at [facility] PAIN MGT    INJECTION, STEROID, SPINE, CERVICAL, EPIDURAL N/A 11/27/2013    Performed by Cand[facility] at [facility] PAIN MGT    INJECTION-STEROID-EPIDURAL-TRANSFORAMINAL Left 1/24/2018    Per[facility] at [facility] PAIN MGT    INJECTION-STEROID-EPIDURAL-TRANSFORAMINAL Left 12/20/2017    Performed by [facility] at [facility] PAIN MGT    KNEE ARTHROSCOPY      REPAIR, LABRUM, HIP Right  "12/10/2013    Per[facility] at [facility] OR    right shoulder surger      arthroscopic surgery Dec 2013     Review of patient's allergies indicates:   Allergen Reactions    Ambien [zolpidem] Other (See Comments)     Pt hyperactive    Lunesta [eszopiclone] Other (See Comments)     Severely depressed    Shellfish containing products Swelling and Other (See Comments)     Metallic taste in mouth  Swells all over,including the tongue and throat  Feels likes insides are swollen  Allergic to crawfish,Shrimp    Ancef [cefazolin]      Facial swelling  Swelling abdomen    Egg derived Hives and Swelling    Flexeril [cyclobenzaprine] Swelling    Pork/porcine containing products Hives and Swelling    Sulfa (sulfonamide antibiotics) Nausea Only        PMHx, PSHx, Allergies, Medications reviewed in epic      ROS negative except pain complaints in HPI    OBJECTIVE:    BP (!) 141/90 (BP Location: Right arm, Patient Position: Lying)   Pulse 97   Temp 98 °F (36.7 °C) (Oral)   Resp 18   Ht 5' 2" (1.575 m)   Wt 99.8 kg (220 lb)   SpO2 95%   BMI 40.24 kg/m²     PHYSICAL EXAMINATION:    GENERAL: Well appearing, in no acute distress, alert, oriented to name, situation, date   PSYCH:  Mood and affect appropriate.  SKIN: Skin color, texture, turgor normal, no rashes or lesions.  CV: RRR with palpation of the radial artery.  PULM: No evidence of respiratory difficulty, symmetric chest rise. Clear to auscultation.  NEURO: Cranial nerves grossly intact.    Plan:    Proceed with procedure as planned    Flor Gates  09/25/2018    "

## 2018-10-01 ENCOUNTER — PATIENT MESSAGE (OUTPATIENT)
Dept: INTERNAL MEDICINE | Facility: CLINIC | Age: 63
End: 2018-10-01

## 2018-10-01 ENCOUNTER — OFFICE VISIT (OUTPATIENT)
Dept: PAIN MEDICINE | Facility: CLINIC | Age: 63
End: 2018-10-01
Payer: COMMERCIAL

## 2018-10-01 ENCOUNTER — RESEARCH ENCOUNTER (OUTPATIENT)
Dept: RESEARCH | Facility: OTHER | Age: 63
End: 2018-10-01

## 2018-10-01 ENCOUNTER — OFFICE VISIT (OUTPATIENT)
Dept: INTERNAL MEDICINE | Facility: CLINIC | Age: 63
End: 2018-10-01
Payer: COMMERCIAL

## 2018-10-01 VITALS
RESPIRATION RATE: 18 BRPM | WEIGHT: 231.5 LBS | SYSTOLIC BLOOD PRESSURE: 146 MMHG | BODY MASS INDEX: 42.6 KG/M2 | TEMPERATURE: 98 F | HEIGHT: 62 IN | DIASTOLIC BLOOD PRESSURE: 89 MMHG | HEART RATE: 92 BPM

## 2018-10-01 VITALS
HEART RATE: 113 BPM | SYSTOLIC BLOOD PRESSURE: 113 MMHG | WEIGHT: 233 LBS | HEIGHT: 62 IN | RESPIRATION RATE: 16 BRPM | BODY MASS INDEX: 42.88 KG/M2 | TEMPERATURE: 99 F | DIASTOLIC BLOOD PRESSURE: 84 MMHG

## 2018-10-01 DIAGNOSIS — F17.200 SMOKER: ICD-10-CM

## 2018-10-01 DIAGNOSIS — Z00.00 ANNUAL PHYSICAL EXAM: Primary | ICD-10-CM

## 2018-10-01 DIAGNOSIS — I10 ESSENTIAL HYPERTENSION: ICD-10-CM

## 2018-10-01 DIAGNOSIS — G89.29 CHRONIC BILATERAL LOW BACK PAIN WITH LEFT-SIDED SCIATICA: Chronic | ICD-10-CM

## 2018-10-01 DIAGNOSIS — R74.01 ELEVATED ALT MEASUREMENT: ICD-10-CM

## 2018-10-01 DIAGNOSIS — G89.4 CHRONIC PAIN SYNDROME: Primary | ICD-10-CM

## 2018-10-01 DIAGNOSIS — M48.062 SPINAL STENOSIS OF LUMBAR REGION WITH NEUROGENIC CLAUDICATION: ICD-10-CM

## 2018-10-01 DIAGNOSIS — E78.5 HYPERLIPIDEMIA, UNSPECIFIED HYPERLIPIDEMIA TYPE: ICD-10-CM

## 2018-10-01 DIAGNOSIS — D22.9 CHANGE IN MOLE: ICD-10-CM

## 2018-10-01 DIAGNOSIS — M54.16 LUMBAR RADICULOPATHY: ICD-10-CM

## 2018-10-01 DIAGNOSIS — M54.42 CHRONIC BILATERAL LOW BACK PAIN WITH LEFT-SIDED SCIATICA: Chronic | ICD-10-CM

## 2018-10-01 DIAGNOSIS — Z12.11 SCREEN FOR COLON CANCER: ICD-10-CM

## 2018-10-01 LAB — METHYLMALONATE SERPL-SCNC: 0.1 UMOL/L

## 2018-10-01 PROCEDURE — 3079F DIAST BP 80-89 MM HG: CPT | Mod: CPTII,S$GLB,, | Performed by: INTERNAL MEDICINE

## 2018-10-01 PROCEDURE — 99999 PR PBB SHADOW E&M-EST. PATIENT-LVL IV: CPT | Mod: PBBFAC,,, | Performed by: INTERNAL MEDICINE

## 2018-10-01 PROCEDURE — 3074F SYST BP LT 130 MM HG: CPT | Mod: CPTII,S$GLB,, | Performed by: INTERNAL MEDICINE

## 2018-10-01 PROCEDURE — 99024 POSTOP FOLLOW-UP VISIT: CPT | Mod: S$GLB,,, | Performed by: NURSE PRACTITIONER

## 2018-10-01 PROCEDURE — 99396 PREV VISIT EST AGE 40-64: CPT | Mod: S$GLB,,, | Performed by: INTERNAL MEDICINE

## 2018-10-01 PROCEDURE — 99999 PR PBB SHADOW E&M-EST. PATIENT-LVL III: CPT | Mod: PBBFAC,,, | Performed by: NURSE PRACTITIONER

## 2018-10-01 RX ORDER — HYDROCODONE BITARTRATE AND ACETAMINOPHEN 5; 325 MG/1; MG/1
1 TABLET ORAL EVERY 6 HOURS PRN
Qty: 120 TABLET | Refills: 0 | Status: SHIPPED | OUTPATIENT
Start: 2018-10-01 | End: 2018-10-01 | Stop reason: SDUPTHER

## 2018-10-01 RX ORDER — HYDROCODONE BITARTRATE AND ACETAMINOPHEN 5; 325 MG/1; MG/1
1 TABLET ORAL EVERY 8 HOURS PRN
Qty: 90 TABLET | Refills: 0 | Status: SHIPPED | OUTPATIENT
Start: 2018-10-01 | End: 2018-12-13

## 2018-10-01 RX ORDER — FLUOXETINE 10 MG/1
TABLET ORAL
COMMUNITY
Start: 2018-08-31 | End: 2018-10-01

## 2018-10-01 RX ORDER — HYDROXYZINE HYDROCHLORIDE 25 MG/1
25 TABLET, FILM COATED ORAL 3 TIMES DAILY PRN
Qty: 60 TABLET | Refills: 2 | Status: SHIPPED | OUTPATIENT
Start: 2018-10-01 | End: 2019-11-05

## 2018-10-01 RX ORDER — HYDROCODONE BITARTRATE AND ACETAMINOPHEN 5; 325 MG/1; MG/1
TABLET ORAL
COMMUNITY
Start: 2018-09-02 | End: 2018-10-01 | Stop reason: SDUPTHER

## 2018-10-01 NOTE — PROGRESS NOTES
"Date: 01 October 2018  Study: [employer]  Study to Investigate the Efficacy of Technology in Pain Management        IRB/Protocol #: 2016.377.B/1  : Candice Lopez MD  Subject Number: [id number]      End of Trial Lead Removal:     Subject presented to the Pain Management Department with her spouse for an end of trial procedure visit. She reports "100% pain relief" and increased ability to sit comfortably for longer periods of time post-trial. All self-reported questionnaires assessing psychological factors of pain and functioning were completed by the subject prior to her lead removal by Sadie Cowan NP. After successful removal of the leads, subject returned the SHINE device to study staff for sleep and activity data syncing. The activity tracker was returned to subject as compensation for study participation and directions for use were provided.   "

## 2018-10-01 NOTE — PROGRESS NOTES
Subjective:      Kaleigh Solo is a 63 y.o. female who presents for annual exam.    Family History:  family history includes Aneurysm in her father; Cancer in her mother; Heart disease in her father.    Health Maintenance:  Health Maintenance       Date Due Completion Date    TETANUS VACCINE 1975 ---    Pap Smear with HPV Cotest 1978 ---    Colonoscopy 2007 ---    Zoster Vaccine 2017 ---    Influenza Vaccine 2018 ---    Mammogram 2018    Lipid Panel 2019    High Dose Statin 10/01/2019 10/1/2018    Pneumococcal PPSV23 (Medium Risk) (2) 2022        Eye exam: due  Dental Exam: due  OB/GYN: Dr. Cunha  MM2017    Influenza: due  Pneumococcal 2017    Body mass index is 42.62 kg/m².    Meds:   Current Outpatient Medications:     albuterol (PROAIR HFA) 90 mcg/actuation inhaler, Inhale 2 puffs into the lungs every 6 (six) hours as needed for Wheezing., Disp: 18 g, Rfl: 11    atorvastatin (LIPITOR) 10 MG tablet, Take 1 tablet (10 mg total) by mouth once daily., Disp: 90 tablet, Rfl: 0    cetirizine (ZYRTEC) 10 MG tablet, Take 10 mg by mouth once daily., Disp: , Rfl:     clotrimazole-betamethasone 1-0.05% (LOTRISONE) cream, Apply topically 2 (two) times daily., Disp: 45 g, Rfl: 1    diphenhydrAMINE (BENADRYL) 50 MG tablet, Take 50 mg by mouth nightly as needed for Itching., Disp: , Rfl:     famotidine (PEPCID) 20 MG tablet, Take 20 mg by mouth 2 (two) times daily., Disp: , Rfl:     fexofenadine (ALLEGRA) 60 MG tablet, Take 60 mg by mouth once daily., Disp: , Rfl:     fluocinonide 0.05% (LIDEX) 0.05 % cream, , Disp: , Rfl:     FLUoxetine 20 MG tablet, , Disp: , Rfl:     HYDROcodone-acetaminophen (NORCO) 5-325 mg per tablet, Take 1 tablet by mouth every 8 (eight) hours as needed for Pain., Disp: 90 tablet, Rfl: 0    hydrocortisone 1 % cream, Apply topically 2 (two) times daily., Disp: 30 g, Rfl: 2    hydrOXYzine HCl  (ATARAX) 25 MG tablet, Take 1 tablet (25 mg total) by mouth 3 (three) times daily as needed for Itching., Disp: 60 tablet, Rfl: 2    methylphenidate HCl (RITALIN) 20 MG tablet, , Disp: , Rfl:     nicotine (NICODERM CQ) 21 mg/24 hr, Place 1 patch onto the skin once daily., Disp: 28 patch, Rfl: 3    OLANZapine (ZYPREXA) 5 MG tablet, , Disp: , Rfl:     valsartan-hydrochlorothiazide (DIOVAN-HCT) 320-25 mg per tablet, Take 1 tablet by mouth once daily., Disp: 90 tablet, Rfl: 0  No current facility-administered medications for this visit.     PMHx:   Past Medical History:   Diagnosis Date    Arthritis     Asthma     Cervicalgia     2013 tx by chiropractor    Closed dislocation of acromioclavicular joint 2013    Degenerative disc disease     Fibromyalgia     HPV (human papilloma virus) anogenital infection     conization in past-remote history-1990    HTN (hypertension)     Sciatica     Tobacco use     Ulcer 2007    stomach ulcer       PSHx:  Past Surgical History:   Procedure Laterality Date    acdf  4/2014    C5-6    ARTHROSCOPY, SHOULDER Right 12/10/2013    [facility]    ARTHROSCOPY, SHOULDER, WITH SUBACROMIAL SPACE DECOMPRESSION Right 12/10/2013    Per[facility]    ARTHROSCOPY-KNEE Right 12/19/2014    Per[facility]    CERVICAL CONIZATION   W/ LASER      CHONDROPLASTY-KNEE Right 12/19/2014    Per[facility]    DISCECTOMY, SPINE, CERVICAL, ANTERIOR APPROACH, WITH FUSION N/A 4/7/2014    Performed by [facility] 2ND [facility]    INJECTION, STEROID, SPINE, CERVICAL, EPIDURAL N/A 2/5/2014    Performed by Candice Lopez [facility]    INJECTION, STEROID, SPINE, CERVICAL, EPIDURAL N/A 1/24/2014    Performed by [facility]    INJECTION, STEROID, SPINE, CERVICAL, EPIDURAL N/A 11/27/2013    Performed by Candice Lopez MD at  [facility]    INJECTION-STEROID-EPIDURAL-TRANSFORAMINAL Left 1/24/2018    Performed by [facility] at [facility]    INJECTION-STEROID-EPIDURAL-TRANSFORAMINAL Left 12/20/2017    Performed by Candice Lopez [facility] at [facility]    KNEE ARTHROSCOPY      REPAIR, LABRUM, HIP Right 12/10/2013    Performed [facility]rain [facility] at [facility]    right shoulder surger      arthroscopic surgery Dec 2013    TRIAL OF SPINAL CORD NERVE STIMULATOR N/A 9/25/2018    Procedure: TRIAL, NEUROSTIMULATOR, SPINAL CORD;  Surgeon: Candice Lopez MD;  Location: [facility];  Service: Pain Management;  Laterality: N/A;  SCS Trial  [facility] notified of date and time    TRIAL, NEUROSTIMULATOR, SPINAL CORD N/A 9/25/2018    Performed by Candice Lopez MD at [facility]       SocHx:   Social History     Socioeconomic History    Marital status:      Spouse name: None    Number of children: None    Years of education: None    Highest education level: None   Social Needs    Financial resource strain: None    Food insecurity - worry: None    Food insecurity - inability: None    Transportation needs - medical: None    Transportation needs - non-medical: None   Occupational History    None   Tobacco Use    Smoking status: Current Every Day Smoker     Packs/day: 1.00     Years: 48.00     Pack years: 48.00     Types: Cigarettes    Smokeless tobacco: Never Used   Substance and Sexual Activity    Alcohol use: Yes     Comment: occasional    Drug use: No    Sexual activity: None   Other Topics Concern    None   Social History Narrative    , not working, 3 children (1 passed at 9 mo old), tobacco daily, ETOH socially, GYN 2013 [facility]       Review of Systems   Constitutional: Negative for activity change, chills, diaphoresis, fever and unexpected weight change.   HENT: Negative for congestion, ear discharge, ear pain, hearing loss, rhinorrhea, sinus pressure, sneezing and  trouble swallowing.    Eyes: Negative for discharge, redness and visual disturbance.   Respiratory: Negative for cough, chest tightness, shortness of breath and wheezing.    Cardiovascular: Negative for chest pain and palpitations.   Gastrointestinal: Negative for abdominal pain, blood in stool, constipation, diarrhea and vomiting.   Endocrine: Negative for polydipsia and polyuria.   Genitourinary: Negative for difficulty urinating, dysuria, frequency, hematuria and menstrual problem.   Musculoskeletal: Positive for neck pain. Negative for arthralgias and joint swelling.   Skin: Positive for color change (scattered skin lesions on neck and chest). Negative for rash.   Allergic/Immunologic: Positive for environmental allergies.   Neurological: Negative for dizziness, weakness, numbness and headaches.   Psychiatric/Behavioral: Negative for confusion and dysphoric mood. The patient is nervous/anxious.          Answers for HPI/ROS submitted by the patient on 9/30/2018   activity change: No  unexpected weight change: No  neck pain: Yes  hearing loss: No  rhinorrhea: No  trouble swallowing: No  eye discharge: No  visual disturbance: No  chest tightness: No  wheezing: No  chest pain: No  palpitations: No  blood in stool: No  constipation: No  vomiting: No  diarrhea: No  polydipsia: No  polyuria: No  difficulty urinating: No  hematuria: No  menstrual problem: No  dysuria: No  joint swelling: No  arthralgias: No  headaches: No  weakness: No  confusion: No  dysphoric mood: No      Objective:      Physical Exam   Constitutional: She is oriented to person, place, and time. Vital signs are normal. She appears well-developed and well-nourished. No distress.   HENT:   Head: Normocephalic and atraumatic.   Right Ear: Hearing, tympanic membrane, external ear and ear canal normal. Tympanic membrane is not erythematous and not bulging.   Left Ear: Hearing, tympanic membrane, external ear and ear canal normal. Tympanic membrane is not  erythematous and not bulging.   Nose: Nose normal.   Mouth/Throat: Uvula is midline, oropharynx is clear and moist and mucous membranes are normal. No oropharyngeal exudate or posterior oropharyngeal erythema.   Eyes: Conjunctivae, EOM and lids are normal. Pupils are equal, round, and reactive to light. No scleral icterus.   Neck: Normal range of motion. Neck supple. No thyroid mass and no thyromegaly present.   Cardiovascular: Normal rate, regular rhythm, normal heart sounds and intact distal pulses.   No murmur heard.  Pulmonary/Chest: Effort normal and breath sounds normal. She has no wheezes.   Abdominal: Soft. Bowel sounds are normal. She exhibits no distension. There is no hepatosplenomegaly. There is no tenderness. There is no rigidity, no rebound and no guarding.   Musculoskeletal: Normal range of motion. She exhibits no edema.   Lymphadenopathy:     She has no cervical adenopathy.        Right: No supraclavicular adenopathy present.        Left: No supraclavicular adenopathy present.   Neurological: She is alert and oriented to person, place, and time. She has normal reflexes. Coordination and gait normal.   Skin: Skin is warm, dry and intact. No rash noted. She is not diaphoretic.   Psychiatric: She has a normal mood and affect.   Vitals reviewed.      Assessment:       1. Essential hypertension    2. Hyperlipidemia, unspecified hyperlipidemia type    3. Chronic bilateral low back pain with left-sided sciatica    4. Spinal stenosis of lumbar region with neurogenic claudication    5. Elevated ALT measurement    6. Smoker    7. Change in mole    8. Screen for colon cancer        Plan:       1. Annual physical exam  - reviewed recent labs  - flu shot    2. Essential hypertension  - stable, continue diovan HCT    3. Hyperlipidemia, unspecified hyperlipidemia type   - Tchol 238, continue Lipitor    4. Chronic bilateral low back pain with left-sided sciatica  - HYDROcodone-acetaminophen (NORCO) 5-325 mg per  tablet; Take 1 tablet by mouth every 8 (eight) hours as needed for Pain.  Dispense: 90 tablet; Refill: 0  - neurostimulator to be placed soon    5. Spinal stenosis of lumbar region with neurogenic claudication  - reviewed la   - HYDROcodone-acetaminophen (NORCO) 5-325 mg per tablet; Take 1 tablet by mouth every 8 (eight) hours as needed for Pain.  Dispense: 90 tablet; Refill: 0    6. Elevated ALT measurement  - repeat labs in 1- 2 weeks  - hepatic function panel    7. Smoker  - not yet ready to quit    8. Change in mole  - Ambulatory Referral to Dermatology for mole check    9. Screen for colon cancer  - Fecal Immunochemical Test (iFOBT); Future      RTC in 3 months or sooner if needed      Felicity Middleton MD

## 2018-10-01 NOTE — PROGRESS NOTES
Referring Physician: No ref. provider found    Chief Complaint:   No chief complaint on file.       SUBJECTIVE:    Interval History 10/1/2018:  The patient returns for follow up and lead pull.  She is s/p SCS trial with 100% pain relief.  She would like to move forward with implant.  She denies any fever or malaise during the trial period.  Her pain today is 0/10.     Interval History 8/10/2018:  The patient presents today to discuss procedure for left sided back and leg pain.  Since her last OV, she underwent left L5 and S1 TF CIRILO in January.  Initially she thought that it did not help.  However, about one month later, she was having about 75% relief.  This lasted for about 2 months.  Prior to this, she had left L4 and L5 TF CRIILO in December.  At this point, she feels as though both injections have worn off.  Her pain is affecting her daily activities.  She states that she previously discussed SCS trial with Dr. Lopez and would like to proceed with this.  She has seen Dr. Rashid and was not deemed a candidate for surgery.  Her pain today is 8/10.  The patient denies any bowel or bladder incontinence or signs of saddle paresthesia.      Interval history 01/15/2018  The patient returns to the clinic for a follow up visit, she is reporting pain of 10 /10 today.  She is s/p Left L4 and L5 TFESI performed on 12/20/17.  She estimates 100% pain relief for ~ 2 weeks.  Was able to be physically active during that time period, with significantly improved functional mobility.  Within past 2 weeks she has had recurrence of pain in same location, quality, intensity.   Continued pain in lumbar area radiating to the lateral/posterolateral thigh and to the posterior calf. Leg pain is worse than back pain. Additionally has developed pain at lateral hip area - greater trochanteric bursa. Now walking ~ 1 block.    Denies infection, new weakness, bowel/bladder dysfunction/incontinence, saddle anesthesia.     Interval history 12/01/2017    The patient returns to the clinic for a follow up visit, she is reporting pain of 5 /10 today.  She has been doing better since cervical surgery but has been having occasional radicular symptoms.  Currently she comes in for lower extremities radiculopathy predominantly in the left lower extremity in the L4-5 distribution.  She had an MRI of the lumbar spine which shows  cyst at the encroachment on the left L5 nerve root.  Additionally the patient has significant facet arthropathy throughout the lumbar spine    Interval history 04/02/2015:  Since previous encounter patient reports low back pain radiating down both lower extremities. Patient stated that she had Synvisc injection on 03/20/15 and this helped with her knee pain. Patient stated that she still has neck pain that radiates to both upper extremities. She stated that she needs a refill on her Gabapentin and Flexeril these medications have been helping with the pain. Patient also reported that she sees a chiropractor for her back pain. Patient reports no other health changes since her last visit. Patient reports her pain 6/10 today.    Interval history 03/02/2015:  Since previous encounter the patient reported having had knee arthroscopy and was told to try euflexxa injections versus total knee replacement by her orthopedist.  She was originally scheduled to have her orthopedist inject her knee with corticosteroids this morning but due to scheduling conflict the patient was placed on my schedule for an injection.  Unfortunately the patient has also been actively treated for upper respiratory infection and is currently on antibiotics.  She has been continuing home exercises with regularity stating good days and bad days with regards to her knee pain.  Additionally she continues to have upper neck and bilateral shoulder pain upper extremity radiculopathy and lower back pain.  Since her arthroscopic surgery the patient has been having intermittent hives and has  been placed by an allergist on hydroxyzine and famotidine but has not had complete relief of these symptoms.      Interval history 10/21/2014:  Since previous encounter patient was scheduled for an CIRILO but cancelled due to her having a cold. Patient reports she still has a cold. Patient reports neck radiating down her right arm with numbness and tingling. Patient stated on the way over her arm went numb. She states that driving is becoming harder as she feels she has a decreased hand  with opening of jars. Patient reports that she's been taking Mucinex and Thera Flu for her cold symptoms. Patient reports she been having hives which has been followed by an allergist with testing pending.  Additionally she has been seen by rheumatologist who diagnosed her with fibromyalgia.She states that she takes 6 caps of Gabapentin a day however she was titrating down down she states that when she gets to 4 caps a day she breaks out in hives and then quickly re escalates back to 6 tablets per day. Patient states that she's starting to have pain at the bottom of her feet when walking on her rugs in her home she states that just started yesterday. Patient reports her pain 8/10.  She says that she has been increasing her protein intake and has been continuing to do exercises improving her range of motion in her shoulder and neck and states that overall she feels as if she is doing better.  Patient reports no other health changes since her last visit.     Interval history 8/25/2014:  Patient continues to have radicular symptoms in the neck radiating down to the right upper extremity.  Additionally since previous encounter she had a dental abscess which is being treated with accommodation of antibiotics and having had her tooth pulled.  She is still antibiotics and has a followup visit in mid-September with her dentist to evaluate efficacy of treatment.    Interval history 7/25/2014:  Since previous encounter the patient is on  "gabapentin 1800 mg per day and this is helping with her shoulder pain although she does state that she is having restless legs at night which is preventing her from being able to sleep.  Additionally she is having anterior thigh radicular pain.  She has had no other health changes she continues to do physical therapy exercises at home.  She reports her pain level is a 6/10 today.    Interval history 7/2/2014:  Patient is status post ACDF in 4/2014.  She is healing very well from surgery, but continues to have right shoulder pain which she had prior to her surgery despite having a labral repair.  Additionally she continues to have neuropathic symptoms in bilateral arms and in the axilla bilaterally.  She has been undergoing physical therapy and states that it does help her including heat ultrasound to the muscles of the neck.  She states that this is only temporary relief.  She continues to use the topical compounded pain cream which does help her.  After her surgery she discontinued taking gabapentin which was previously helping her and which she was tolerating without side effects.  She reports her pain as a 3/10 today.  She had been taking hydrocodone/acetaminophen 10/325 3 times a day when necessary as prescribed by her surgeon, but this is being discontinued.    Interval history 3/11/2014:  Since previous encounter patient reports that she had an episode where she felt as if her neck "locked up" in that now when she is waking up in the morning she has bilateral upper extremity numbness which improves throughout the day.  She does not report any weakness.  She states that she has been having persistent daily headaches from the occiput over the vertex of the supraorbital ridge bilaterally, and continues to have pain in the right side of her neck and shoulder.  She reports that she is still taking the gabapentin 1800 mg per day, and hydrocodone/acetaminophen when necessary which helps a little.    Interval history " 2/24/2014:  Patient is status post cervical interlaminar epidural steroid injection by 2 on 1/24/2014 and 2/5/2014.  Patient has persisting radicular symptoms into her right upper extremity and shoulder the anterior aspect of the neck and base of the jaw.  Patient reported approximately 10% improvement in her pain symptoms after the first injection similar improvement in her pain symptoms after the second injection no sustained relief she states that the pain relief lasted her approximately one week and will go away.  She is taking gabapentin 300 mg 3 times a day, hydrocodone/acetaminophen 7.5/325 one tablet approximately 2-3 times per day.  She states that the Vicodin helps dull the pain but the does not eliminate it entirely, and she's not having any adverse problems in the gabapentin.  Patient continues in physical therapy for her right shoulder status post arthroscopic surgery, and has good range of motion in the shoulder.  Since previous exam the patient has a sinus infection with congestion.    Interval history 1/21/2014:  Since reducing her patient was only able to have one of the cervical intralaminar epidural steroid injections which approximately help her 80% in her pain symptoms in the back of her neck, but she was also having symptoms into the right shoulder and had a rotator cuff tear which was repaired arthroscopically which prevented us from being able to perform the second cervical intralaminar epidural steroid injection on 12/23/2013.  Patient reports that she continues to do home exercises for her shoulder surgery but was unable to perform PT secondary to pain.  She states that her pain was actually better up until 1/12/2014 where she developed severe pain on the right side of her neck and posterior side of her neck going down the back of the scapula and also across the trapezius muscles of the shoulder.  Her MRI which was done in outside facility reports that she has severe C5-6 neural foraminal  stenosis.  For her pain the patient has been taking hydrocodone/acetaminophen 7.5/325 as needed she reports there are occasional days where she takes it every 4 hours and some days where she doesn't take it at all.  Resting her head on the pillow helps alleviate the pain.  Sitting and working at computers when her pain is the worst.  She denies any problems with movement of her hands or weakness in her arms.  She has had no other health changes since previous encounter.    Initial history of present illness 10/2013:    Kaleigh Solo presents to the clinic for the evaluation of neck pain with radiation in right upper shoulder to the elbow, and down the right side of the chest wall. The pain started insidiously in the back of the neck in july  and symptoms have been worsening.  The pain has begun going into the shoulder, and there was swelling in the arm that began after starting Robaxin for muscle aches, and then took predisone taper and bactrim for the possibility of infection which did not help.  Patient stopped her medications and the swelling improved.  US of the upper extremity was negative for DVT.   Patient takes vicodin for pain which she states helps only a little bit, and doesn't take the pain go away.  MRI of cervical spine revealed severe neuroforaminal stenosis on the right at C5-6 with central to right neural foraminal disc protrusion with generalized bulging discs and uncovertebral joint osteophytic change.  No abnormal signal in the cord.      Pain Description:    The pain is located in the neck and radiates to the right shoulder .  The pain is described as aching and tight band      Symptoms interfere with daily activity and sleeping.     Exacerbating factors: Sitting, Bending, Touching, Coughing/Sneezing, Eating, Night Time, Morning, Flexing and Lifting.      Mitigating factors heat, laying down and medications.     She reports 2 hours of uninterrupted sleep per night.    Patient denies night  fever/night sweats, urinary incontinence, bowel incontinence, significant weight loss, significant motor weakness and loss of sensations.  Patient denies any suicidal or homicidal ideations    Pain Medications:  Current:  cymbalta recently started 60 mg daily  Robaxin 500 mg  Naproxen  norco 5-325 as needed  Gabapentin 2400 mg daily    Tried in Past:  Gabapentin  Hydrocodone  ibuprofen    Physical Therapy/Home Exercise: yes       report:  Not applicable    Pain Procedures:   12/20/17 Left L4 and L5 TF CIRILO- 75% relief  1/24/18 Left L5 and S1 TF CIRILO- 75% relief  9/25/18 Lumbar SCS trial- 100% relief    Chiropractor -No treatments yet.  Right shoulder arthroscopy 12/10/2013  Right knee arthroscopy 12/19/14    Imaging:     10/7/2013  MRI of cervical spine revealed severe neuroforaminal stenosis on the right at C5-6 with central to right neural foraminal disc protrusion with generalized bulging discs and uncovertebral joint osteophytic change.  No abnormal signal in the cord.  Minor disc bulging at C3-4, C4-5 without central canal stenosis, spinal cord impingement or neural foraminal stenosis.    CT chest: 10/02/2013  No definite acute process or obvious etiology of the right-sided chest pain, axillary pain, neck and supraclavicular pain.  (full report scanned into record)    Right upper extremity venous ultrasound 10/02/2013  No evidence of DVT  lumbar spine 11/28/2017  Impression     MRI LUMBAR SPINE    TECHNIQUE: MRI lumbar spine was performed without contrast. The following sequences were obtained: Localizer; sagittal T1, T2, STIR; axial T1 and T2.    COMPARISON: None.    FINDINGS:    There are 5 lumbar vertebrae.  Vertebral body heights and alignment are maintained.  Bone marrow signal is preserved.  There is multilevel disc desiccation with preservation of disc heights. Conus terminates at L1 and appears unremarkable. Limited evaluation of posterior abdominal structures is unremarkable.  Paraspinal musculature  is within normal limits.  Evaluation of sacroiliac joints is unremarkable.    L1-L2: No spinal canal stenosis or neuroforaminal narrowing.    L2-L3: Mild bilateral facet arthropathy noted.  No spinal canal stenosis or neuroforaminal narrowing.    L3-L4: Mild bilateral facet arthropathy and circumferential disc bulge noted.  No spinal canal stenosis or neuroforaminal narrowing.    L4-L5: Severe bilateral facet arthropathy and circumferential disc bulge result in moderate spinal canal stenosis.    L5-S1: Circumferential disc bulge and severe bilateral facet arthropathy result in severe spinal canal stenosis.  Note made of synovial cyst in the left foramen, contacting the exiting L5 nerve root.     Lab Results   Component Value Date    WBC 8.04 09/24/2018    HGB 14.7 09/24/2018    HCT 45.3 09/24/2018    MCV 98 09/24/2018     09/24/2018       CMP  Sodium   Date Value Ref Range Status   09/24/2018 142 136 - 145 mmol/L Final     Potassium   Date Value Ref Range Status   09/24/2018 4.1 3.5 - 5.1 mmol/L Final     Chloride   Date Value Ref Range Status   09/24/2018 103 95 - 110 mmol/L Final     CO2   Date Value Ref Range Status   09/24/2018 29 23 - 29 mmol/L Final     Glucose   Date Value Ref Range Status   09/24/2018 97 70 - 110 mg/dL Final     BUN, Bld   Date Value Ref Range Status   09/24/2018 11 8 - 23 mg/dL Final     Creatinine   Date Value Ref Range Status   09/24/2018 0.7 0.5 - 1.4 mg/dL Final     Calcium   Date Value Ref Range Status   09/24/2018 10.0 8.7 - 10.5 mg/dL Final     Total Protein   Date Value Ref Range Status   09/24/2018 7.2 6.0 - 8.4 g/dL Final     Albumin   Date Value Ref Range Status   09/24/2018 3.7 3.5 - 5.2 g/dL Final     Total Bilirubin   Date Value Ref Range Status   09/24/2018 0.4 0.1 - 1.0 mg/dL Final     Comment:     For infants and newborns, interpretation of results should be based  on gestational age, weight and in agreement with clinical  observations.  Premature Infant recommended  reference ranges:  Up to 24 hours.............<8.0 mg/dL  Up to 48 hours............<12.0 mg/dL  3-5 days..................<15.0 mg/dL  6-29 days.................<15.0 mg/dL       Alkaline Phosphatase   Date Value Ref Range Status   09/24/2018 130 55 - 135 U/L Final     AST   Date Value Ref Range Status   09/24/2018 40 10 - 40 U/L Final     ALT   Date Value Ref Range Status   09/24/2018 114 (H) 10 - 44 U/L Final     Anion Gap   Date Value Ref Range Status   09/24/2018 10 8 - 16 mmol/L Final     eGFR if    Date Value Ref Range Status   09/24/2018 >60.0 >60 mL/min/1.73 m^2 Final     eGFR if non    Date Value Ref Range Status   09/24/2018 >60.0 >60 mL/min/1.73 m^2 Final     Comment:     Calculation used to obtain the estimated glomerular filtration  rate (eGFR) is the CKD-EPI equation.            Past Medical History:   Diagnosis Date    Arthritis     Asthma     Cervicalgia     2013 tx by chiropractor    Closed dislocation of acromioclavicular joint 2013    Degenerative disc disease     Fibromyalgia     HPV (human papilloma virus) anogenital infection     conization in past-remote history-1990    HTN (hypertension)     Sciatica     Tobacco use     Ulcer 2007    stomach ulcer     Past Surgical History:   Procedure Laterality Date    acdf  4/2014    C5-6    ARTHROSCOPY, SHOULDER Right 12/10/2013    [facility]    ARTHROSCOPY, SHOULDER, WITH SUBACROMIAL SPACE DECOMPRESSION Right 12/10/2013    [facility] at [facility]    ARTHROSCOPY-KNEE Right 12/19/2014    [facility] at [facility]    CERVICAL CONIZATION   W/ LASER      CHONDROPLASTY-KNEE Right 12/19/2014    Per[facility] at [facility] OR    DISCECTOMY, SPINE, CERVICAL, ANTERIOR APPROACH, WITH FUSION N/A 4/7/2014    Per[facility] Turner [facility]id [facility] at [facility] OR 2ND FLR    INJECTION, STEROID, SPINE, CERVICAL, EPIDURAL N/A 2/5/2014     [facility] PAIN [facility]    INJECTION, STEROID, SPINE, CERVICAL, EPIDURAL N/A 1/24/2014    Per[facility] at [facility]    INJECTION, STEROID, SPINE, CERVICAL, EPIDURAL N/A 11/27/2013    Per[facility] at [facility]    INJECTION-STEROID-EPIDURAL-TRANSFORAMINAL Left 1/24/2018    Performed [facility] at [facility]    INJECTION-STEROID-EPIDURAL-TRANSFORAMINAL Left 12/20/2017    [facility] at [facility]    KNEE ARTHROSCOPY      REPAIR, LABRUM, HIP Right 12/10/2013    [facility] OR    right shoulder surger      arthroscopic surgery Dec 2013    TRIAL OF SPINAL CORD NERVE STIMULATOR N/A 9/25/2018    Procedure: TRIAL, NEUROSTIMULATOR, SPINAL CORD;  Surgeon: Candice Lopez MD;  Location: [facility];  Service: Pain Management;  Laterality: N/A;  SCS Trial  Pj Hankins notified of date and time    TRIAL, NEUROSTIMULATOR, SPINAL CORD N/A 9/25/2018    Per[facility] by [facility] Lopez [facility] at [facility]     Social History     Socioeconomic History    Marital status:      Spouse name: Not on file    Number of children: Not on file    Years of education: Not on file    Highest education level: Not on file   Social Needs    Financial resource strain: Not on file    Food insecurity - worry: Not on file    Food insecurity - inability: Not on file    Transportation needs - medical: Not on file    Transportation needs - non-medical: Not on file   Occupational History    Not on file   Tobacco Use    Smoking status: Current Every Day Smoker     Packs/day: 1.00     Years: 48.00     Pack years: 48.00     Types: Cigarettes    Smokeless tobacco: Never Used   Substance and Sexual Activity    Alcohol use: Yes     Comment: occasional    Drug use: No    Sexual activity: Not on file   Other Topics Concern    Not on file   Social History Narrative    ,  not working, 3 children (1 passed at 9 mo old), tobacco daily, ETOH socially, GYN  dtrs of cesar     Family History   Problem Relation Age of Onset    Cancer Mother         lung-was tobacco user  of lung cancer at 78    Aneurysm Father         abdominal burst-  of stroke at 71 years    Heart disease Father         had HTN       Allergies   Allergen Reactions    Ambien [Zolpidem] Other (See Comments)     Pt hyperactive    Lunesta [Eszopiclone] Other (See Comments)     Severely depressed    Shellfish Containing Products Swelling and Other (See Comments)     Metallic taste in mouth  Swells all over,including the tongue and throat  Feels likes insides are swollen  Allergic to crawfish,Shrimp    Ancef [Cefazolin]      Facial swelling  Swelling abdomen    Flexeril [Cyclobenzaprine] Swelling    Gabapentin Hives    Sulfa (Sulfonamide Antibiotics) Nausea Only       Current Outpatient Medications   Medication Sig    albuterol (PROAIR HFA) 90 mcg/actuation inhaler Inhale 2 puffs into the lungs every 6 (six) hours as needed for Wheezing.    ALPRAZolam (XANAX) 0.25 MG tablet Take 1 tablet (0.25 mg total) by mouth 2 (two) times daily as needed for Anxiety.    atorvastatin (LIPITOR) 10 MG tablet Take 1 tablet (10 mg total) by mouth once daily.    carvedilol (COREG) 25 MG tablet Take 1 tablet (25 mg total) by mouth 2 (two) times daily with meals.    cetirizine (ZYRTEC) 10 MG tablet Take 10 mg by mouth once daily.    ciprofloxacin-dexamethasone 0.3-0.1% (CIPRODEX) 0.3-0.1 % DrpS Place 4 drops into both ears 2 (two) times daily.    clotrimazole-betamethasone 1-0.05% (LOTRISONE) cream Apply topically 2 (two) times daily.    diphenhydrAMINE (BENADRYL) 50 MG tablet Take 50 mg by mouth nightly as needed for Itching.    famotidine (PEPCID) 20 MG tablet Take 20 mg by mouth 2 (two) times daily.    fexofenadine (ALLEGRA) 60 MG tablet Take 60 mg by mouth once daily.    fluocinonide 0.05% (LIDEX) 0.05 % cream  "    FLUoxetine 20 MG tablet     gabapentin (NEURONTIN) 600 MG tablet Take 1 tablet (600 mg total) by mouth 4 (four) times daily with meals and nightly.    hydrocortisone 1 % cream Apply topically 2 (two) times daily.    hydrOXYzine HCl (ATARAX) 25 MG tablet Take 1 tablet (25 mg total) by mouth 3 (three) times daily as needed for Itching.    methylphenidate HCl (RITALIN) 20 MG tablet     nicotine (NICODERM CQ) 21 mg/24 hr Place 1 patch onto the skin once daily.    OLANZapine (ZYPREXA) 5 MG tablet     valsartan-hydrochlorothiazide (DIOVAN-HCT) 320-25 mg per tablet Take 1 tablet by mouth once daily.     Current Facility-Administered Medications   Medication    ketorolac injection 30 mg       REVIEW OF SYSTEMS:    GENERAL:  No weight loss, malaise or fevers.  NECK:  Negative for lumps, no difficulty with swallowing.   RESPIRATORY:  No recent URI  CARDIOVASCULAR:  Negative for chest pain, leg swelling or palpitations.  GI:  Negative for abdominal discomfort, blood in stools or black stools or change in bowel habits.  Patient had a history of stomach ulcer, never bleeding.    MUSCULOSKELETAL:  See HPI.  Patient reports having restless legs at night preventing her from being able to sleep.  SKIN: no new lesions  HEMATOLOGY/LYMPHOLOGY:  Negative for prolonged bleeding, bruising easily or swollen nodes.  Patient is not currently taking any anti-coagulants  NEURO:   No history of syncope, paralysis, seizures or tremors.  All other reviewed and negative other than HPI.    OBJECTIVE:    BP (!) 146/89   Pulse 92   Temp 98.2 °F (36.8 °C) (Oral)   Resp 18   Ht 5' 2" (1.575 m)   Wt 105 kg (231 lb 8 oz)   BMI 42.34 kg/m²     PHYSICAL EXAMINATION:    GENERAL: Well appearing, in no acute distress, alert and oriented x3.  PSYCH:  Mood and affect appropriate.  SKIN: Skin color, texture, turgor normal, no rashes or lesions.  SCS site without drainage or signs of infection.  Some bruising surrounding lead on the right.  No " induration or swelling.  Leads removed without difficulty with tips intact.  Area cleaned with alcohol and Bacitracin applied.  HEAD/FACE:  Normocephalic, atraumatic. Cranial nerves grossly intact.  GAIT: Antalgic, ambulates without assistance    ASSESSMENT: 63 y.o. year old female with neck and lower back pain, consistent with the following diagnoses:    1. Chronic pain syndrome     2. Lumbar radiculopathy          PLAN:     - Previous imaging was reviewed and discussed with the patient today.    - Will schedule for SCS implant with Dr. Lopez.    - Can continue current medications from Dr. Richardson.    - F/U after implant.        The above plan and management options were discussed at length with patient. Patient is in agreement with the above and verbalized understanding.     Sadie Cowan  10/01/2018

## 2018-10-01 NOTE — H&P (VIEW-ONLY)
Referring Physician: No ref. provider found    Chief Complaint:   No chief complaint on file.       SUBJECTIVE:    Interval History 10/1/2018:  The patient returns for follow up and lead pull.  She is s/p SCS trial with 100% pain relief.  She would like to move forward with implant.  She denies any fever or malaise during the trial period.  Her pain today is 0/10.     Interval History 8/10/2018:  The patient presents today to discuss procedure for left sided back and leg pain.  Since her last OV, she underwent left L5 and S1 TF CIRILO in January.  Initially she thought that it did not help.  However, about one month later, she was having about 75% relief.  This lasted for about 2 months.  Prior to this, she had left L4 and L5 TF CIRILO in December.  At this point, she feels as though both injections have worn off.  Her pain is affecting her daily activities.  She states that she previously discussed SCS trial with Dr. Lopez and would like to proceed with this.  She has seen Dr. Rashid and was not deemed a candidate for surgery.  Her pain today is 8/10.  The patient denies any bowel or bladder incontinence or signs of saddle paresthesia.      Interval history 01/15/2018  The patient returns to the clinic for a follow up visit, she is reporting pain of 10 /10 today.  She is s/p Left L4 and L5 TFESI performed on 12/20/17.  She estimates 100% pain relief for ~ 2 weeks.  Was able to be physically active during that time period, with significantly improved functional mobility.  Within past 2 weeks she has had recurrence of pain in same location, quality, intensity.   Continued pain in lumbar area radiating to the lateral/posterolateral thigh and to the posterior calf. Leg pain is worse than back pain. Additionally has developed pain at lateral hip area - greater trochanteric bursa. Now walking ~ 1 block.    Denies infection, new weakness, bowel/bladder dysfunction/incontinence, saddle anesthesia.     Interval history 12/01/2017    The patient returns to the clinic for a follow up visit, she is reporting pain of 5 /10 today.  She has been doing better since cervical surgery but has been having occasional radicular symptoms.  Currently she comes in for lower extremities radiculopathy predominantly in the left lower extremity in the L4-5 distribution.  She had an MRI of the lumbar spine which shows  cyst at the encroachment on the left L5 nerve root.  Additionally the patient has significant facet arthropathy throughout the lumbar spine    Interval history 04/02/2015:  Since previous encounter patient reports low back pain radiating down both lower extremities. Patient stated that she had Synvisc injection on 03/20/15 and this helped with her knee pain. Patient stated that she still has neck pain that radiates to both upper extremities. She stated that she needs a refill on her Gabapentin and Flexeril these medications have been helping with the pain. Patient also reported that she sees a chiropractor for her back pain. Patient reports no other health changes since her last visit. Patient reports her pain 6/10 today.    Interval history 03/02/2015:  Since previous encounter the patient reported having had knee arthroscopy and was told to try euflexxa injections versus total knee replacement by her orthopedist.  She was originally scheduled to have her orthopedist inject her knee with corticosteroids this morning but due to scheduling conflict the patient was placed on my schedule for an injection.  Unfortunately the patient has also been actively treated for upper respiratory infection and is currently on antibiotics.  She has been continuing home exercises with regularity stating good days and bad days with regards to her knee pain.  Additionally she continues to have upper neck and bilateral shoulder pain upper extremity radiculopathy and lower back pain.  Since her arthroscopic surgery the patient has been having intermittent hives and has  been placed by an allergist on hydroxyzine and famotidine but has not had complete relief of these symptoms.      Interval history 10/21/2014:  Since previous encounter patient was scheduled for an CIRILO but cancelled due to her having a cold. Patient reports she still has a cold. Patient reports neck radiating down her right arm with numbness and tingling. Patient stated on the way over her arm went numb. She states that driving is becoming harder as she feels she has a decreased hand  with opening of jars. Patient reports that she's been taking Mucinex and Thera Flu for her cold symptoms. Patient reports she been having hives which has been followed by an allergist with testing pending.  Additionally she has been seen by rheumatologist who diagnosed her with fibromyalgia.She states that she takes 6 caps of Gabapentin a day however she was titrating down down she states that when she gets to 4 caps a day she breaks out in hives and then quickly re escalates back to 6 tablets per day. Patient states that she's starting to have pain at the bottom of her feet when walking on her rugs in her home she states that just started yesterday. Patient reports her pain 8/10.  She says that she has been increasing her protein intake and has been continuing to do exercises improving her range of motion in her shoulder and neck and states that overall she feels as if she is doing better.  Patient reports no other health changes since her last visit.     Interval history 8/25/2014:  Patient continues to have radicular symptoms in the neck radiating down to the right upper extremity.  Additionally since previous encounter she had a dental abscess which is being treated with accommodation of antibiotics and having had her tooth pulled.  She is still antibiotics and has a followup visit in mid-September with her dentist to evaluate efficacy of treatment.    Interval history 7/25/2014:  Since previous encounter the patient is on  "gabapentin 1800 mg per day and this is helping with her shoulder pain although she does state that she is having restless legs at night which is preventing her from being able to sleep.  Additionally she is having anterior thigh radicular pain.  She has had no other health changes she continues to do physical therapy exercises at home.  She reports her pain level is a 6/10 today.    Interval history 7/2/2014:  Patient is status post ACDF in 4/2014.  She is healing very well from surgery, but continues to have right shoulder pain which she had prior to her surgery despite having a labral repair.  Additionally she continues to have neuropathic symptoms in bilateral arms and in the axilla bilaterally.  She has been undergoing physical therapy and states that it does help her including heat ultrasound to the muscles of the neck.  She states that this is only temporary relief.  She continues to use the topical compounded pain cream which does help her.  After her surgery she discontinued taking gabapentin which was previously helping her and which she was tolerating without side effects.  She reports her pain as a 3/10 today.  She had been taking hydrocodone/acetaminophen 10/325 3 times a day when necessary as prescribed by her surgeon, but this is being discontinued.    Interval history 3/11/2014:  Since previous encounter patient reports that she had an episode where she felt as if her neck "locked up" in that now when she is waking up in the morning she has bilateral upper extremity numbness which improves throughout the day.  She does not report any weakness.  She states that she has been having persistent daily headaches from the occiput over the vertex of the supraorbital ridge bilaterally, and continues to have pain in the right side of her neck and shoulder.  She reports that she is still taking the gabapentin 1800 mg per day, and hydrocodone/acetaminophen when necessary which helps a little.    Interval history " 2/24/2014:  Patient is status post cervical interlaminar epidural steroid injection by 2 on 1/24/2014 and 2/5/2014.  Patient has persisting radicular symptoms into her right upper extremity and shoulder the anterior aspect of the neck and base of the jaw.  Patient reported approximately 10% improvement in her pain symptoms after the first injection similar improvement in her pain symptoms after the second injection no sustained relief she states that the pain relief lasted her approximately one week and will go away.  She is taking gabapentin 300 mg 3 times a day, hydrocodone/acetaminophen 7.5/325 one tablet approximately 2-3 times per day.  She states that the Vicodin helps dull the pain but the does not eliminate it entirely, and she's not having any adverse problems in the gabapentin.  Patient continues in physical therapy for her right shoulder status post arthroscopic surgery, and has good range of motion in the shoulder.  Since previous exam the patient has a sinus infection with congestion.    Interval history 1/21/2014:  Since reducing her patient was only able to have one of the cervical intralaminar epidural steroid injections which approximately help her 80% in her pain symptoms in the back of her neck, but she was also having symptoms into the right shoulder and had a rotator cuff tear which was repaired arthroscopically which prevented us from being able to perform the second cervical intralaminar epidural steroid injection on 12/23/2013.  Patient reports that she continues to do home exercises for her shoulder surgery but was unable to perform PT secondary to pain.  She states that her pain was actually better up until 1/12/2014 where she developed severe pain on the right side of her neck and posterior side of her neck going down the back of the scapula and also across the trapezius muscles of the shoulder.  Her MRI which was done in outside facility reports that she has severe C5-6 neural foraminal  stenosis.  For her pain the patient has been taking hydrocodone/acetaminophen 7.5/325 as needed she reports there are occasional days where she takes it every 4 hours and some days where she doesn't take it at all.  Resting her head on the pillow helps alleviate the pain.  Sitting and working at computers when her pain is the worst.  She denies any problems with movement of her hands or weakness in her arms.  She has had no other health changes since previous encounter.    Initial history of present illness 10/2013:    Kaleigh Solo presents to the clinic for the evaluation of neck pain with radiation in right upper shoulder to the elbow, and down the right side of the chest wall. The pain started insidiously in the back of the neck in july  and symptoms have been worsening.  The pain has begun going into the shoulder, and there was swelling in the arm that began after starting Robaxin for muscle aches, and then took predisone taper and bactrim for the possibility of infection which did not help.  Patient stopped her medications and the swelling improved.  US of the upper extremity was negative for DVT.   Patient takes vicodin for pain which she states helps only a little bit, and doesn't take the pain go away.  MRI of cervical spine revealed severe neuroforaminal stenosis on the right at C5-6 with central to right neural foraminal disc protrusion with generalized bulging discs and uncovertebral joint osteophytic change.  No abnormal signal in the cord.      Pain Description:    The pain is located in the neck and radiates to the right shoulder .  The pain is described as aching and tight band      Symptoms interfere with daily activity and sleeping.     Exacerbating factors: Sitting, Bending, Touching, Coughing/Sneezing, Eating, Night Time, Morning, Flexing and Lifting.      Mitigating factors heat, laying down and medications.     She reports 2 hours of uninterrupted sleep per night.    Patient denies night  fever/night sweats, urinary incontinence, bowel incontinence, significant weight loss, significant motor weakness and loss of sensations.  Patient denies any suicidal or homicidal ideations    Pain Medications:  Current:  cymbalta recently started 60 mg daily  Robaxin 500 mg  Naproxen  norco 5-325 as needed  Gabapentin 2400 mg daily    Tried in Past:  Gabapentin  Hydrocodone  ibuprofen    Physical Therapy/Home Exercise: yes       report:  Not applicable    Pain Procedures:   12/20/17 Left L4 and L5 TF CIRILO- 75% relief  1/24/18 Left L5 and S1 TF CIRILO- 75% relief  9/25/18 Lumbar SCS trial- 100% relief    Chiropractor -No treatments yet.  Right shoulder arthroscopy 12/10/2013  Right knee arthroscopy 12/19/14    Imaging:     10/7/2013  MRI of cervical spine revealed severe neuroforaminal stenosis on the right at C5-6 with central to right neural foraminal disc protrusion with generalized bulging discs and uncovertebral joint osteophytic change.  No abnormal signal in the cord.  Minor disc bulging at C3-4, C4-5 without central canal stenosis, spinal cord impingement or neural foraminal stenosis.    CT chest: 10/02/2013  No definite acute process or obvious etiology of the right-sided chest pain, axillary pain, neck and supraclavicular pain.  (full report scanned into record)    Right upper extremity venous ultrasound 10/02/2013  No evidence of DVT  lumbar spine 11/28/2017  Impression     MRI LUMBAR SPINE    TECHNIQUE: MRI lumbar spine was performed without contrast. The following sequences were obtained: Localizer; sagittal T1, T2, STIR; axial T1 and T2.    COMPARISON: None.    FINDINGS:    There are 5 lumbar vertebrae.  Vertebral body heights and alignment are maintained.  Bone marrow signal is preserved.  There is multilevel disc desiccation with preservation of disc heights. Conus terminates at L1 and appears unremarkable. Limited evaluation of posterior abdominal structures is unremarkable.  Paraspinal musculature  is within normal limits.  Evaluation of sacroiliac joints is unremarkable.    L1-L2: No spinal canal stenosis or neuroforaminal narrowing.    L2-L3: Mild bilateral facet arthropathy noted.  No spinal canal stenosis or neuroforaminal narrowing.    L3-L4: Mild bilateral facet arthropathy and circumferential disc bulge noted.  No spinal canal stenosis or neuroforaminal narrowing.    L4-L5: Severe bilateral facet arthropathy and circumferential disc bulge result in moderate spinal canal stenosis.    L5-S1: Circumferential disc bulge and severe bilateral facet arthropathy result in severe spinal canal stenosis.  Note made of synovial cyst in the left foramen, contacting the exiting L5 nerve root.     Lab Results   Component Value Date    WBC 8.04 09/24/2018    HGB 14.7 09/24/2018    HCT 45.3 09/24/2018    MCV 98 09/24/2018     09/24/2018       CMP  Sodium   Date Value Ref Range Status   09/24/2018 142 136 - 145 mmol/L Final     Potassium   Date Value Ref Range Status   09/24/2018 4.1 3.5 - 5.1 mmol/L Final     Chloride   Date Value Ref Range Status   09/24/2018 103 95 - 110 mmol/L Final     CO2   Date Value Ref Range Status   09/24/2018 29 23 - 29 mmol/L Final     Glucose   Date Value Ref Range Status   09/24/2018 97 70 - 110 mg/dL Final     BUN, Bld   Date Value Ref Range Status   09/24/2018 11 8 - 23 mg/dL Final     Creatinine   Date Value Ref Range Status   09/24/2018 0.7 0.5 - 1.4 mg/dL Final     Calcium   Date Value Ref Range Status   09/24/2018 10.0 8.7 - 10.5 mg/dL Final     Total Protein   Date Value Ref Range Status   09/24/2018 7.2 6.0 - 8.4 g/dL Final     Albumin   Date Value Ref Range Status   09/24/2018 3.7 3.5 - 5.2 g/dL Final     Total Bilirubin   Date Value Ref Range Status   09/24/2018 0.4 0.1 - 1.0 mg/dL Final     Comment:     For infants and newborns, interpretation of results should be based  on gestational age, weight and in agreement with clinical  observations.  Premature Infant recommended  reference ranges:  Up to 24 hours.............<8.0 mg/dL  Up to 48 hours............<12.0 mg/dL  3-5 days..................<15.0 mg/dL  6-29 days.................<15.0 mg/dL       Alkaline Phosphatase   Date Value Ref Range Status   09/24/2018 130 55 - 135 U/L Final     AST   Date Value Ref Range Status   09/24/2018 40 10 - 40 U/L Final     ALT   Date Value Ref Range Status   09/24/2018 114 (H) 10 - 44 U/L Final     Anion Gap   Date Value Ref Range Status   09/24/2018 10 8 - 16 mmol/L Final     eGFR if    Date Value Ref Range Status   09/24/2018 >60.0 >60 mL/min/1.73 m^2 Final     eGFR if non    Date Value Ref Range Status   09/24/2018 >60.0 >60 mL/min/1.73 m^2 Final     Comment:     Calculation used to obtain the estimated glomerular filtration  rate (eGFR) is the CKD-EPI equation.            Past Medical History:   Diagnosis Date    Arthritis     Asthma     Cervicalgia     2013 tx by chiropractor    Closed dislocation of acromioclavicular joint 2013    Degenerative disc disease     Fibromyalgia     HPV (human papilloma virus) anogenital infection     conization in past-remote history-1990    HTN (hypertension)     Sciatica     Tobacco use     Ulcer 2007    stomach ulcer     Past Surgical History:   Procedure Laterality Date    acdf  4/2014    C5-6    ARTHROSCOPY, SHOULDER Right 12/10/2013    [facility]    ARTHROSCOPY, SHOULDER, WITH SUBACROMIAL SPACE DECOMPRESSION Right 12/10/2013    [facility]    ARTHROSCOPY-KNEE Right 12/19/2014    [facility] at [facility]    CERVICAL CONIZATION   W/ LASER      CHONDROPLASTY-KNEE Right 12/19/2014    [facility] OR    DISCECTOMY, SPINE, CERVICAL, ANTERIOR APPROACH, WITH FUSION N/A 4/7/2014    [facility] OR 2ND FLR    INJECTION, STEROID, SPINE, CERVICAL, EPIDURAL N/A 2/5/2014     Per[facility] by [facility] at [facility]    INJECTION, STEROID, SPINE, CERVICAL, EPIDURAL N/A 1/24/2014    Per[facility] at [facility]    INJECTION, STEROID, SPINE, CERVICAL, EPIDURAL N/A 11/27/2013    Performed [facility] at [facility]    INJECTION-STEROID-EPIDURAL-TRANSFORAMINAL Left 1/24/2018    Per[facility]    INJECTION-STEROID-EPIDURAL-TRANSFORAMINAL Left 12/20/2017    [facility] at [facility]    KNEE ARTHROSCOPY      REPAIR, LABRUM, HIP Right 12/10/2013    [facility] at [facility] OR    right shoulder surger      arthroscopic surgery Dec 2013    TRIAL OF SPINAL CORD NERVE STIMULATOR N/A 9/25/2018    Procedure: TRIAL, NEUROSTIMULATOR, SPINAL CORD;  Surgeon: Candice Lopez MD;  Location: [facility];  Service: Pain Management;  Laterality: N/A;  SCS Trial  Pj Hankins notified of date and time    TRIAL, NEUROSTIMULATOR, SPINAL CORD N/A 9/25/2018    Performed by [facility]ice Lopez [facility] at [facility]     Social History     Socioeconomic History    Marital status:      Spouse name: Not on file    Number of children: Not on file    Years of education: Not on file    Highest education level: Not on file   Social Needs    Financial resource strain: Not on file    Food insecurity - worry: Not on file    Food insecurity - inability: Not on file    Transportation needs - medical: Not on file    Transportation needs - non-medical: Not on file   Occupational History    Not on file   Tobacco Use    Smoking status: Current Every Day Smoker     Packs/day: 1.00     Years: 48.00     Pack years: 48.00     Types: Cigarettes    Smokeless tobacco: Never Used   Substance and Sexual Activity    Alcohol use: Yes     Comment: occasional    Drug use: No    Sexual activity: Not on file   Other Topics Concern    Not on file   Social History Narrative    ,  not working, 3 children (1 passed at 9 mo old), tobacco daily, ETOH socially, GYN  dtrs of cesar     Family History   Problem Relation Age of Onset    Cancer Mother         lung-was tobacco user  of lung cancer at 78    Aneurysm Father         abdominal burst-  of stroke at 71 years    Heart disease Father         had HTN       Allergies   Allergen Reactions    Ambien [Zolpidem] Other (See Comments)     Pt hyperactive    Lunesta [Eszopiclone] Other (See Comments)     Severely depressed    Shellfish Containing Products Swelling and Other (See Comments)     Metallic taste in mouth  Swells all over,including the tongue and throat  Feels likes insides are swollen  Allergic to crawfish,Shrimp    Ancef [Cefazolin]      Facial swelling  Swelling abdomen    Flexeril [Cyclobenzaprine] Swelling    Gabapentin Hives    Sulfa (Sulfonamide Antibiotics) Nausea Only       Current Outpatient Medications   Medication Sig    albuterol (PROAIR HFA) 90 mcg/actuation inhaler Inhale 2 puffs into the lungs every 6 (six) hours as needed for Wheezing.    ALPRAZolam (XANAX) 0.25 MG tablet Take 1 tablet (0.25 mg total) by mouth 2 (two) times daily as needed for Anxiety.    atorvastatin (LIPITOR) 10 MG tablet Take 1 tablet (10 mg total) by mouth once daily.    carvedilol (COREG) 25 MG tablet Take 1 tablet (25 mg total) by mouth 2 (two) times daily with meals.    cetirizine (ZYRTEC) 10 MG tablet Take 10 mg by mouth once daily.    ciprofloxacin-dexamethasone 0.3-0.1% (CIPRODEX) 0.3-0.1 % DrpS Place 4 drops into both ears 2 (two) times daily.    clotrimazole-betamethasone 1-0.05% (LOTRISONE) cream Apply topically 2 (two) times daily.    diphenhydrAMINE (BENADRYL) 50 MG tablet Take 50 mg by mouth nightly as needed for Itching.    famotidine (PEPCID) 20 MG tablet Take 20 mg by mouth 2 (two) times daily.    fexofenadine (ALLEGRA) 60 MG tablet Take 60 mg by mouth once daily.    fluocinonide 0.05% (LIDEX) 0.05 % cream  "    FLUoxetine 20 MG tablet     gabapentin (NEURONTIN) 600 MG tablet Take 1 tablet (600 mg total) by mouth 4 (four) times daily with meals and nightly.    hydrocortisone 1 % cream Apply topically 2 (two) times daily.    hydrOXYzine HCl (ATARAX) 25 MG tablet Take 1 tablet (25 mg total) by mouth 3 (three) times daily as needed for Itching.    methylphenidate HCl (RITALIN) 20 MG tablet     nicotine (NICODERM CQ) 21 mg/24 hr Place 1 patch onto the skin once daily.    OLANZapine (ZYPREXA) 5 MG tablet     valsartan-hydrochlorothiazide (DIOVAN-HCT) 320-25 mg per tablet Take 1 tablet by mouth once daily.     Current Facility-Administered Medications   Medication    ketorolac injection 30 mg       REVIEW OF SYSTEMS:    GENERAL:  No weight loss, malaise or fevers.  NECK:  Negative for lumps, no difficulty with swallowing.   RESPIRATORY:  No recent URI  CARDIOVASCULAR:  Negative for chest pain, leg swelling or palpitations.  GI:  Negative for abdominal discomfort, blood in stools or black stools or change in bowel habits.  Patient had a history of stomach ulcer, never bleeding.    MUSCULOSKELETAL:  See HPI.  Patient reports having restless legs at night preventing her from being able to sleep.  SKIN: no new lesions  HEMATOLOGY/LYMPHOLOGY:  Negative for prolonged bleeding, bruising easily or swollen nodes.  Patient is not currently taking any anti-coagulants  NEURO:   No history of syncope, paralysis, seizures or tremors.  All other reviewed and negative other than HPI.    OBJECTIVE:    BP (!) 146/89   Pulse 92   Temp 98.2 °F (36.8 °C) (Oral)   Resp 18   Ht 5' 2" (1.575 m)   Wt 105 kg (231 lb 8 oz)   BMI 42.34 kg/m²     PHYSICAL EXAMINATION:    GENERAL: Well appearing, in no acute distress, alert and oriented x3.  PSYCH:  Mood and affect appropriate.  SKIN: Skin color, texture, turgor normal, no rashes or lesions.  SCS site without drainage or signs of infection.  Some bruising surrounding lead on the right.  No " induration or swelling.  Leads removed without difficulty with tips intact.  Area cleaned with alcohol and Bacitracin applied.  HEAD/FACE:  Normocephalic, atraumatic. Cranial nerves grossly intact.  GAIT: Antalgic, ambulates without assistance    ASSESSMENT: 63 y.o. year old female with neck and lower back pain, consistent with the following diagnoses:    1. Chronic pain syndrome     2. Lumbar radiculopathy          PLAN:     - Previous imaging was reviewed and discussed with the patient today.    - Will schedule for SCS implant with Dr. Lopez.    - Can continue current medications from Dr. Richardson.    - F/U after implant.        The above plan and management options were discussed at length with patient. Patient is in agreement with the above and verbalized understanding.     Sadie Cowan  10/01/2018

## 2018-10-02 ENCOUNTER — TELEPHONE (OUTPATIENT)
Dept: INTERNAL MEDICINE | Facility: CLINIC | Age: 63
End: 2018-10-02

## 2018-10-04 ENCOUNTER — TELEPHONE (OUTPATIENT)
Dept: PAIN MEDICINE | Facility: CLINIC | Age: 63
End: 2018-10-04

## 2018-10-04 DIAGNOSIS — G89.4 CHRONIC PAIN SYNDROME: Primary | ICD-10-CM

## 2018-10-04 DIAGNOSIS — M54.16 LUMBAR RADICULOPATHY: ICD-10-CM

## 2018-10-04 NOTE — TELEPHONE ENCOUNTER
Contacted patient regarding scheduling SCS Implant.     SCS Implant, pre-admit and post-op appointments were scheduled. All dates/times were given and all questions answered. Appointment letters were mailed.    Patient acknowledged information given and expressed understanding.

## 2018-10-11 ENCOUNTER — TELEPHONE (OUTPATIENT)
Dept: PAIN MEDICINE | Facility: CLINIC | Age: 63
End: 2018-10-11

## 2018-10-11 NOTE — TELEPHONE ENCOUNTER
Contacted patient to inform her there was a cancellation for 10/15/18 for a SCS Implant and was calling to see if she would like to move her surgery to 10/15/18.    Patient agreed. SCS Implant rescheduled to 10/15/18 at 3:30pm with an arrival time of 1:30pm, pre-admit appointment rescheduled for 10/12/18 at 10:00am and Post-op appointment rescheduled for 10/22/18 at 9:20am with Sadie Cowan NP.    Patient acknowledged information given and expressed understanding.       Staff also sent message to Marli Kuhn with research and Dr. Lopez of date and time change.     Appointment letter mailed.

## 2018-10-12 ENCOUNTER — INITIAL CONSULT (OUTPATIENT)
Dept: DERMATOLOGY | Facility: CLINIC | Age: 63
End: 2018-10-12
Payer: COMMERCIAL

## 2018-10-12 ENCOUNTER — HOSPITAL ENCOUNTER (OUTPATIENT)
Dept: PREADMISSION TESTING | Facility: OTHER | Age: 63
Discharge: HOME | End: 2018-10-12
Attending: ANESTHESIOLOGY
Payer: COMMERCIAL

## 2018-10-12 ENCOUNTER — ANESTHESIA EVENT (OUTPATIENT)
Dept: SURGERY | Facility: OTHER | Age: 63
End: 2018-10-12
Payer: COMMERCIAL

## 2018-10-12 VITALS
RESPIRATION RATE: 16 BRPM | HEART RATE: 90 BPM | BODY MASS INDEX: 42.33 KG/M2 | DIASTOLIC BLOOD PRESSURE: 85 MMHG | WEIGHT: 230 LBS | SYSTOLIC BLOOD PRESSURE: 170 MMHG | HEIGHT: 62 IN | OXYGEN SATURATION: 95 % | TEMPERATURE: 99 F

## 2018-10-12 VITALS — WEIGHT: 230 LBS | BODY MASS INDEX: 42.07 KG/M2

## 2018-10-12 DIAGNOSIS — L82.1 SEBORRHEIC KERATOSES: Primary | ICD-10-CM

## 2018-10-12 DIAGNOSIS — D22.9 NEVUS OF MULTIPLE SITES: ICD-10-CM

## 2018-10-12 DIAGNOSIS — L81.4 LENTIGINES: ICD-10-CM

## 2018-10-12 PROCEDURE — 3078F DIAST BP <80 MM HG: CPT | Mod: CPTII,S$GLB,, | Performed by: DERMATOLOGY

## 2018-10-12 PROCEDURE — 99202 OFFICE O/P NEW SF 15 MIN: CPT | Mod: S$GLB,,, | Performed by: DERMATOLOGY

## 2018-10-12 PROCEDURE — 99999 PR PBB SHADOW E&M-EST. PATIENT-LVL III: CPT | Mod: PBBFAC,,, | Performed by: DERMATOLOGY

## 2018-10-12 PROCEDURE — 3077F SYST BP >= 140 MM HG: CPT | Mod: CPTII,S$GLB,, | Performed by: DERMATOLOGY

## 2018-10-12 PROCEDURE — 3008F BODY MASS INDEX DOCD: CPT | Mod: CPTII,S$GLB,, | Performed by: DERMATOLOGY

## 2018-10-12 RX ORDER — MIDAZOLAM HYDROCHLORIDE 5 MG/ML
5 INJECTION INTRAMUSCULAR; INTRAVENOUS ONCE AS NEEDED
Status: CANCELLED | OUTPATIENT
Start: 2018-10-12 | End: 2018-10-12

## 2018-10-12 RX ORDER — FAMOTIDINE 20 MG/1
20 TABLET, FILM COATED ORAL
Status: CANCELLED | OUTPATIENT
Start: 2018-10-12 | End: 2018-10-12

## 2018-10-12 RX ORDER — LIDOCAINE HYDROCHLORIDE 10 MG/ML
0.5 INJECTION, SOLUTION EPIDURAL; INFILTRATION; INTRACAUDAL; PERINEURAL ONCE
Status: CANCELLED | OUTPATIENT
Start: 2018-10-12 | End: 2018-10-12

## 2018-10-12 RX ORDER — SODIUM CHLORIDE, SODIUM LACTATE, POTASSIUM CHLORIDE, CALCIUM CHLORIDE 600; 310; 30; 20 MG/100ML; MG/100ML; MG/100ML; MG/100ML
INJECTION, SOLUTION INTRAVENOUS CONTINUOUS
Status: CANCELLED | OUTPATIENT
Start: 2018-10-12

## 2018-10-12 RX ORDER — OXYCODONE HYDROCHLORIDE 5 MG/1
5 TABLET ORAL ONCE AS NEEDED
Status: CANCELLED | OUTPATIENT
Start: 2018-10-12 | End: 2018-10-12

## 2018-10-12 NOTE — PROGRESS NOTES
Subjective:       Patient ID:  Kaleigh Solo is a 63 y.o. female who presents for   Chief Complaint   Patient presents with    Mole     irght side of eye , neck      History of Present Illness: The patient presents with chief complaint of spot.  Location: right arm  Duration: months  Signs/Symptoms: none    Prior treatments: none          Review of Systems   Constitutional: Negative for fever.   Skin: Negative for itching and rash.   Hematologic/Lymphatic: Does not bruise/bleed easily.        Objective:    Physical Exam   Constitutional: She appears well-developed and well-nourished. No distress.   Neurological: She is alert and oriented to person, place, and time. She is not disoriented.   Psychiatric: She has a normal mood and affect.   Skin:   Areas Examined (abnormalities noted in diagram):   Head / Face Inspection Performed  Neck Inspection Performed  Chest / Axilla Inspection Performed  Back Inspection Performed  RUE Inspected  LUE Inspection Performed                   Diagram Legend     Erythematous scaling macule/papule c/w actinic keratosis       Vascular papule c/w angioma      Pigmented verrucoid papule/plaque c/w seborrheic keratosis      Yellow umbilicated papule c/w sebaceous hyperplasia      Irregularly shaped tan macule c/w lentigo     1-2 mm smooth white papules consistent with Milia      Movable subcutaneous cyst with punctum c/w epidermal inclusion cyst      Subcutaneous movable cyst c/w pilar cyst      Firm pink to brown papule c/w dermatofibroma      Pedunculated fleshy papule(s) c/w skin tag(s)      Evenly pigmented macule c/w junctional nevus     Mildly variegated pigmented, slightly irregular-bordered macule c/w mildly atypical nevus      Flesh colored to evenly pigmented papule c/w intradermal nevus       Pink pearly papule/plaque c/w basal cell carcinoma      Erythematous hyperkeratotic cursted plaque c/w SCC      Surgical scar with no sign of skin cancer recurrence      Open and  "closed comedones      Inflammatory papules and pustules      Verrucoid papule consistent consistent with wart     Erythematous eczematous patches and plaques     Dystrophic onycholytic nail with subungual debris c/w onychomycosis     Umbilicated papule    Erythematous-base heme-crusted tan verrucoid plaque consistent with inflamed seborrheic keratosis     Erythematous Silvery Scaling Plaque c/w Psoriasis     See annotation      Assessment / Plan:        Seborrheic keratoses  reassurance  Brochure provided      Lentigines  The "ABCD" rules to observe pigmented lesions were reviewed.      Nevus of multiple sites  The "ABCD" rules to observe pigmented lesions were reviewed.  Brochure provided               No Follow-up on file.  "

## 2018-10-12 NOTE — ANESTHESIA PREPROCEDURE EVALUATION
10/12/2018  Kaleigh Solo is a 63 y.o., female.    Anesthesia Evaluation    I have reviewed the Patient Summary Reports.    I have reviewed the Nursing Notes.   I have reviewed the Medications.     Review of Systems  Anesthesia Hx:  Hx of Anesthetic complications Possible ancef allergy.  Whole face turned red after shoulder surgery here Denies Family Hx of Anesthesia complications.   Denies Personal Hx of Anesthesia complications.   Social:  Smoker, Social Alcohol Use 1/2 ppd x 50 yr   Hematology/Oncology:  Hematology Normal   Oncology Normal     EENT/Dental:EENT/Dental Normal   Cardiovascular:   Exercise tolerance: good Hypertension, well controlled CAD asymptomatic     Pulmonary:   Asthma asymptomatic Seasonal asthma uses inhaler rare only during seasons. Now flaring   Renal/:  Renal/ Normal     Hepatic/GI:   PUD, GERD, well controlled Liver Disease, Remote ulcer   Musculoskeletal:   Arthritis  S/p recent ACF in April with residual neck and pain both arms Spine Disorders: lumbar Chronic Pain    Neurological:  Neurology Normal    Endocrine:  Endocrine Normal    Dermatological:  Skin Normal        Physical Exam  General:  Morbid Obesity    Airway/Jaw/Neck:  Airway Findings: Mouth Opening: Normal Tongue: Normal  General Airway Assessment: Adult, Average  Mallampati: II         Dental:  Dental Findings: molar capsUpper permanent partial             Anesthesia Plan  Type of Anesthesia, risks & benefits discussed:  Anesthesia Type:  general  Patient's Preference:   Intra-op Monitoring Plan: standard ASA monitors  Intra-op Monitoring Plan Comments:   Post Op Pain Control Plan: per primary service following discharge from PACU  Post Op Pain Control Plan Comments:   Induction:   IV  Beta Blocker:         Informed Consent: Patient understands risks and agrees with Anesthesia plan.  Questions answered.  Anesthesia consent signed with patient.  ASA Score: 3     Day of Surgery Review of History & Physical:    H&P update referred to the surgeon.     Anesthesia Plan Notes: Recent labs are okay.        Ready For Surgery From Anesthesia Perspective.     Had red streak under eyes last GA possibly from tape?? Consider paper tape

## 2018-10-15 ENCOUNTER — HOSPITAL ENCOUNTER (OUTPATIENT)
Facility: OTHER | Age: 63
Discharge: HOME | End: 2018-10-15
Attending: ANESTHESIOLOGY | Admitting: ANESTHESIOLOGY
Payer: COMMERCIAL

## 2018-10-15 ENCOUNTER — ANESTHESIA (OUTPATIENT)
Dept: SURGERY | Facility: OTHER | Age: 63
End: 2018-10-15
Payer: COMMERCIAL

## 2018-10-15 VITALS
OXYGEN SATURATION: 96 % | HEART RATE: 80 BPM | WEIGHT: 230 LBS | DIASTOLIC BLOOD PRESSURE: 86 MMHG | TEMPERATURE: 98 F | RESPIRATION RATE: 20 BRPM | SYSTOLIC BLOOD PRESSURE: 150 MMHG | BODY MASS INDEX: 42.33 KG/M2 | HEIGHT: 62 IN

## 2018-10-15 DIAGNOSIS — G89.0 CENTRAL PAIN SYNDROME: Primary | ICD-10-CM

## 2018-10-15 DIAGNOSIS — G89.4 CHRONIC PAIN SYNDROME: ICD-10-CM

## 2018-10-15 PROCEDURE — 63600175 PHARM REV CODE 636 W HCPCS: Performed by: NURSE ANESTHETIST, CERTIFIED REGISTERED

## 2018-10-15 PROCEDURE — 25000003 PHARM REV CODE 250: Performed by: SPECIALIST

## 2018-10-15 PROCEDURE — C1778 LEAD, NEUROSTIMULATOR: HCPCS | Performed by: ANESTHESIOLOGY

## 2018-10-15 PROCEDURE — 36000706: Performed by: ANESTHESIOLOGY

## 2018-10-15 PROCEDURE — C1787 PATIENT PROGR, NEUROSTIM: HCPCS | Performed by: ANESTHESIOLOGY

## 2018-10-15 PROCEDURE — 63600175 PHARM REV CODE 636 W HCPCS: Performed by: ANESTHESIOLOGY

## 2018-10-15 PROCEDURE — 36000707: Performed by: ANESTHESIOLOGY

## 2018-10-15 PROCEDURE — 63685 INS/RPLC SPI NPG/RCVR POCKET: CPT | Mod: ,,, | Performed by: ANESTHESIOLOGY

## 2018-10-15 PROCEDURE — 71000015 HC POSTOP RECOV 1ST HR: Performed by: ANESTHESIOLOGY

## 2018-10-15 PROCEDURE — 37000008 HC ANESTHESIA 1ST 15 MINUTES: Performed by: ANESTHESIOLOGY

## 2018-10-15 PROCEDURE — C1713 ANCHOR/SCREW BN/BN,TIS/BN: HCPCS | Performed by: ANESTHESIOLOGY

## 2018-10-15 PROCEDURE — 63650 IMPLANT NEUROELECTRODES: CPT | Mod: ,,, | Performed by: ANESTHESIOLOGY

## 2018-10-15 PROCEDURE — 27201423 OPTIME MED/SURG SUP & DEVICES STERILE SUPPLY: Performed by: ANESTHESIOLOGY

## 2018-10-15 PROCEDURE — C1820 GENERATOR NEURO RECHG BAT SY: HCPCS | Performed by: ANESTHESIOLOGY

## 2018-10-15 PROCEDURE — 37000009 HC ANESTHESIA EA ADD 15 MINS: Performed by: ANESTHESIOLOGY

## 2018-10-15 PROCEDURE — 95971 ALYS SMPL SP/PN NPGT W/PRGRM: CPT | Mod: ,,, | Performed by: ANESTHESIOLOGY

## 2018-10-15 PROCEDURE — 25000003 PHARM REV CODE 250: Performed by: ANESTHESIOLOGY

## 2018-10-15 DEVICE — KIT SPECTRA WAVEWRITER IPG: Type: IMPLANTABLE DEVICE | Site: BACK | Status: FUNCTIONAL

## 2018-10-15 DEVICE — IMPLANTABLE DEVICE: Type: IMPLANTABLE DEVICE | Site: BACK | Status: FUNCTIONAL

## 2018-10-15 DEVICE — LEAD KIT 8 CONTACT LINEAR 50CM: Type: IMPLANTABLE DEVICE | Site: BACK | Status: FUNCTIONAL

## 2018-10-15 RX ORDER — MIDAZOLAM HYDROCHLORIDE 5 MG/ML
5 INJECTION INTRAMUSCULAR; INTRAVENOUS ONCE AS NEEDED
Status: DISCONTINUED | OUTPATIENT
Start: 2018-10-15 | End: 2018-10-15 | Stop reason: HOSPADM

## 2018-10-15 RX ORDER — MEPERIDINE HYDROCHLORIDE 50 MG/ML
12.5 INJECTION INTRAMUSCULAR; INTRAVENOUS; SUBCUTANEOUS ONCE AS NEEDED
Status: DISCONTINUED | OUTPATIENT
Start: 2018-10-15 | End: 2018-10-15 | Stop reason: HOSPADM

## 2018-10-15 RX ORDER — BUPIVACAINE HYDROCHLORIDE AND EPINEPHRINE 5; 5 MG/ML; UG/ML
INJECTION, SOLUTION EPIDURAL; INTRACAUDAL; PERINEURAL
Status: DISCONTINUED | OUTPATIENT
Start: 2018-10-15 | End: 2018-10-15 | Stop reason: HOSPADM

## 2018-10-15 RX ORDER — HYDROCODONE BITARTRATE AND ACETAMINOPHEN 5; 325 MG/1; MG/1
1 TABLET ORAL EVERY 4 HOURS PRN
Qty: 30 TABLET | Refills: 0 | Status: SHIPPED | OUTPATIENT
Start: 2018-10-15 | End: 2018-12-13

## 2018-10-15 RX ORDER — ONDANSETRON 2 MG/ML
4 INJECTION INTRAMUSCULAR; INTRAVENOUS DAILY PRN
Status: DISCONTINUED | OUTPATIENT
Start: 2018-10-15 | End: 2018-10-15

## 2018-10-15 RX ORDER — SODIUM CHLORIDE 0.9 % (FLUSH) 0.9 %
3 SYRINGE (ML) INJECTION
Status: DISCONTINUED | OUTPATIENT
Start: 2018-10-15 | End: 2018-10-15 | Stop reason: HOSPADM

## 2018-10-15 RX ORDER — OXYCODONE HYDROCHLORIDE 5 MG/1
5 TABLET ORAL ONCE AS NEEDED
Status: DISCONTINUED | OUTPATIENT
Start: 2018-10-15 | End: 2018-10-15 | Stop reason: HOSPADM

## 2018-10-15 RX ORDER — MEPERIDINE HYDROCHLORIDE 50 MG/ML
12.5 INJECTION INTRAMUSCULAR; INTRAVENOUS; SUBCUTANEOUS ONCE AS NEEDED
Status: DISCONTINUED | OUTPATIENT
Start: 2018-10-15 | End: 2018-10-15

## 2018-10-15 RX ORDER — OXYCODONE HYDROCHLORIDE 5 MG/1
5 TABLET ORAL
Status: DISCONTINUED | OUTPATIENT
Start: 2018-10-15 | End: 2018-10-15 | Stop reason: HOSPADM

## 2018-10-15 RX ORDER — BACITRACIN 50000 [IU]/1
INJECTION, POWDER, FOR SOLUTION INTRAMUSCULAR
Status: DISCONTINUED | OUTPATIENT
Start: 2018-10-15 | End: 2018-10-15 | Stop reason: HOSPADM

## 2018-10-15 RX ORDER — FENTANYL CITRATE 50 UG/ML
25 INJECTION, SOLUTION INTRAMUSCULAR; INTRAVENOUS EVERY 5 MIN PRN
Status: DISCONTINUED | OUTPATIENT
Start: 2018-10-15 | End: 2018-10-15 | Stop reason: HOSPADM

## 2018-10-15 RX ORDER — SODIUM CHLORIDE, SODIUM LACTATE, POTASSIUM CHLORIDE, CALCIUM CHLORIDE 600; 310; 30; 20 MG/100ML; MG/100ML; MG/100ML; MG/100ML
INJECTION, SOLUTION INTRAVENOUS CONTINUOUS
Status: DISCONTINUED | OUTPATIENT
Start: 2018-10-15 | End: 2018-10-15 | Stop reason: HOSPADM

## 2018-10-15 RX ORDER — OXYCODONE HYDROCHLORIDE 5 MG/1
5 TABLET ORAL
Status: DISCONTINUED | OUTPATIENT
Start: 2018-10-15 | End: 2018-10-15

## 2018-10-15 RX ORDER — LIDOCAINE HYDROCHLORIDE 20 MG/ML
INJECTION, SOLUTION INFILTRATION; PERINEURAL
Status: DISCONTINUED | OUTPATIENT
Start: 2018-10-15 | End: 2018-10-15 | Stop reason: HOSPADM

## 2018-10-15 RX ORDER — DIPHENHYDRAMINE HYDROCHLORIDE 50 MG/ML
INJECTION INTRAMUSCULAR; INTRAVENOUS
Status: DISCONTINUED | OUTPATIENT
Start: 2018-10-15 | End: 2018-10-15

## 2018-10-15 RX ORDER — MIDAZOLAM HYDROCHLORIDE 1 MG/ML
INJECTION INTRAMUSCULAR; INTRAVENOUS
Status: DISCONTINUED | OUTPATIENT
Start: 2018-10-15 | End: 2018-10-15

## 2018-10-15 RX ORDER — LIDOCAINE HYDROCHLORIDE 10 MG/ML
0.5 INJECTION, SOLUTION EPIDURAL; INFILTRATION; INTRACAUDAL; PERINEURAL ONCE
Status: DISCONTINUED | OUTPATIENT
Start: 2018-10-15 | End: 2018-10-15 | Stop reason: HOSPADM

## 2018-10-15 RX ORDER — FAMOTIDINE 20 MG/1
20 TABLET, FILM COATED ORAL
Status: COMPLETED | OUTPATIENT
Start: 2018-10-15 | End: 2018-10-15

## 2018-10-15 RX ORDER — FENTANYL CITRATE 50 UG/ML
25 INJECTION, SOLUTION INTRAMUSCULAR; INTRAVENOUS EVERY 5 MIN PRN
Status: DISCONTINUED | OUTPATIENT
Start: 2018-10-15 | End: 2018-10-15

## 2018-10-15 RX ORDER — FENTANYL CITRATE 50 UG/ML
INJECTION, SOLUTION INTRAMUSCULAR; INTRAVENOUS
Status: DISCONTINUED | OUTPATIENT
Start: 2018-10-15 | End: 2018-10-15

## 2018-10-15 RX ORDER — DOXYCYCLINE 100 MG/1
100 CAPSULE ORAL 2 TIMES DAILY
Qty: 14 CAPSULE | Refills: 0 | Status: SHIPPED | OUTPATIENT
Start: 2018-10-15 | End: 2018-11-02 | Stop reason: SDUPTHER

## 2018-10-15 RX ORDER — HYDROMORPHONE HYDROCHLORIDE 2 MG/ML
0.4 INJECTION, SOLUTION INTRAMUSCULAR; INTRAVENOUS; SUBCUTANEOUS EVERY 5 MIN PRN
Status: DISCONTINUED | OUTPATIENT
Start: 2018-10-15 | End: 2018-10-15 | Stop reason: HOSPADM

## 2018-10-15 RX ORDER — ONDANSETRON 2 MG/ML
4 INJECTION INTRAMUSCULAR; INTRAVENOUS DAILY PRN
Status: DISCONTINUED | OUTPATIENT
Start: 2018-10-15 | End: 2018-10-15 | Stop reason: HOSPADM

## 2018-10-15 RX ORDER — HYDROMORPHONE HYDROCHLORIDE 2 MG/ML
0.4 INJECTION, SOLUTION INTRAMUSCULAR; INTRAVENOUS; SUBCUTANEOUS EVERY 5 MIN PRN
Status: DISCONTINUED | OUTPATIENT
Start: 2018-10-15 | End: 2018-10-15

## 2018-10-15 RX ORDER — VANCOMYCIN HCL IN 5 % DEXTROSE 1G/250ML
1000 PLASTIC BAG, INJECTION (ML) INTRAVENOUS
Status: COMPLETED | OUTPATIENT
Start: 2018-10-15 | End: 2018-10-15

## 2018-10-15 RX ORDER — VANCOMYCIN HYDROCHLORIDE 1 G/20ML
INJECTION, POWDER, LYOPHILIZED, FOR SOLUTION INTRAVENOUS
Status: DISCONTINUED | OUTPATIENT
Start: 2018-10-15 | End: 2018-10-15 | Stop reason: HOSPADM

## 2018-10-15 RX ADMIN — MIDAZOLAM HYDROCHLORIDE 1 MG: 1 INJECTION, SOLUTION INTRAMUSCULAR; INTRAVENOUS at 04:10

## 2018-10-15 RX ADMIN — MIDAZOLAM HYDROCHLORIDE 1 MG: 1 INJECTION, SOLUTION INTRAMUSCULAR; INTRAVENOUS at 05:10

## 2018-10-15 RX ADMIN — SODIUM CHLORIDE, SODIUM LACTATE, POTASSIUM CHLORIDE, AND CALCIUM CHLORIDE: 600; 310; 30; 20 INJECTION, SOLUTION INTRAVENOUS at 01:10

## 2018-10-15 RX ADMIN — FAMOTIDINE 20 MG: 20 TABLET ORAL at 01:10

## 2018-10-15 RX ADMIN — FENTANYL CITRATE 50 MCG: 50 INJECTION, SOLUTION INTRAMUSCULAR; INTRAVENOUS at 04:10

## 2018-10-15 RX ADMIN — DIPHENHYDRAMINE HYDROCHLORIDE 12.5 MG: 50 INJECTION, SOLUTION INTRAMUSCULAR; INTRAVENOUS at 04:10

## 2018-10-15 RX ADMIN — FENTANYL CITRATE 25 MCG: 50 INJECTION, SOLUTION INTRAMUSCULAR; INTRAVENOUS at 04:10

## 2018-10-15 RX ADMIN — FENTANYL CITRATE 50 MCG: 50 INJECTION, SOLUTION INTRAMUSCULAR; INTRAVENOUS at 05:10

## 2018-10-15 RX ADMIN — DIPHENHYDRAMINE HYDROCHLORIDE 12.5 MG: 50 INJECTION, SOLUTION INTRAMUSCULAR; INTRAVENOUS at 05:10

## 2018-10-15 RX ADMIN — FENTANYL CITRATE 25 MCG: 50 INJECTION, SOLUTION INTRAMUSCULAR; INTRAVENOUS at 05:10

## 2018-10-15 RX ADMIN — VANCOMYCIN HYDROCHLORIDE 1000 MG: 1 INJECTION, POWDER, LYOPHILIZED, FOR SOLUTION INTRAVENOUS at 01:10

## 2018-10-15 RX ADMIN — MIDAZOLAM HYDROCHLORIDE 2 MG: 1 INJECTION, SOLUTION INTRAMUSCULAR; INTRAVENOUS at 04:10

## 2018-10-15 NOTE — DISCHARGE SUMMARY
Discharge Note  Short Stay      SUMMARY     Admit Date: 10/15/2018    Attending Physician: Candice Lopez      Discharge Physician: Candice Lopez      Discharge Date: 10/15/2018 5:50 PM    Procedure(s) (LRB):  INSERTION, NEUROSTIMULATOR, SPINAL CORD STIMULATOR IMPLANT- WIRES AND BATTERY INTERNAL (N/A)    Final Diagnosis: Lumbar radiculopathy [M54.16]  Chronic pain syndrome [G89.4]    Disposition: Home or self care    Patient Instructions:   Current Discharge Medication List      CONTINUE these medications which have NOT CHANGED    Details   albuterol (PROAIR HFA) 90 mcg/actuation inhaler Inhale 2 puffs into the lungs every 6 (six) hours as needed for Wheezing.  Qty: 18 g, Refills: 11      atorvastatin (LIPITOR) 10 MG tablet Take 1 tablet (10 mg total) by mouth once daily.  Qty: 90 tablet, Refills: 0    Associated Diagnoses: Hypercholesterolemia      cetirizine (ZYRTEC) 10 MG tablet Take 10 mg by mouth once daily.      clotrimazole-betamethasone 1-0.05% (LOTRISONE) cream Apply topically 2 (two) times daily.  Qty: 45 g, Refills: 1    Associated Diagnoses: Dependent edema      diphenhydrAMINE (BENADRYL) 50 MG tablet Take 50 mg by mouth nightly as needed for Itching.      famotidine (PEPCID) 20 MG tablet Take 20 mg by mouth 2 (two) times daily.      fexofenadine (ALLEGRA) 60 MG tablet Take 60 mg by mouth once daily.      fluocinonide 0.05% (LIDEX) 0.05 % cream       FLUoxetine 20 MG tablet       HYDROcodone-acetaminophen (NORCO) 5-325 mg per tablet Take 1 tablet by mouth every 8 (eight) hours as needed for Pain.  Qty: 90 tablet, Refills: 0    Associated Diagnoses: Chronic bilateral low back pain with left-sided sciatica; Spinal stenosis of lumbar region with neurogenic claudication      hydrOXYzine HCl (ATARAX) 25 MG tablet Take 1 tablet (25 mg total) by mouth 3 (three) times daily as needed for Itching.  Qty: 60 tablet, Refills: 2      methylphenidate HCl (RITALIN) 20 MG tablet       nicotine (NICODERM CQ) 21  mg/24 hr Place 1 patch onto the skin once daily.  Qty: 28 patch, Refills: 3    Associated Diagnoses: Cigarette nicotine dependence without complication      OLANZapine (ZYPREXA) 5 MG tablet       valsartan-hydrochlorothiazide (DIOVAN-HCT) 320-25 mg per tablet Take 1 tablet by mouth once daily.  Qty: 90 tablet, Refills: 0      hydrocortisone 1 % cream Apply topically 2 (two) times daily.  Qty: 30 g, Refills: 2                 Discharge Diagnosis: Lumbar radiculopathy [M54.16]  Chronic pain syndrome [G89.4]  Condition on Discharge: Stable with no complications to procedure   Diet on Discharge: Same as before.  Activity: as per instruction sheet.  Discharge to: Home with a responsible adult.  Follow up: 2-4 weeks

## 2018-10-15 NOTE — PLAN OF CARE
Kaleigh Solo has met all discharge criteria from Phase II. Vital Signs are stable, ambulating  without difficulty.Pain is now under control and tolerable for the pt. Pain score 5 at this time.  Discharge instructions given, patient verbalized understanding. Discharged from facility via wheelchair in stable condition.

## 2018-10-15 NOTE — OP NOTE
Spinal cord stimulator implant     Preoperative diagnosis: Lumbar radiculopathy [M54.16]  Chronic pain syndrome [G89.4]     Postoperative diagnosis: Lumbar radiculopathy [M54.16]  Chronic pain syndrome [G89.4]     Procedure: 1) placement of Octad electrode x2   2) placement of pulse generator 3) intraoperative and post operative programming simple     Surgeon: Candice Lopez     Assistants:   Flor Gates MD, PGY-5, Pain Fellow  I was present and supervising all critical portions of the procedure      EBL: Minimal     Complications: None     Specimens: None     Anesthesia: MAC     Description of procedure:     After written consent was obtained the patient was brought into the OR and placed in the prone position with all pressure points padded appropriately. The area overlying the skin of the back was prepped and draped in usual sterile fashion using chlorhexidine with an Ioban dressing over the skin. A time out was performed and there was confirmation of preoperative antibiotics.     Fluoroscopy was used to identify the L1/2 intralaminar space this area was marked and an approximately 6 cm incision was planned and this area. The tract was anesthetized at the level of the skin and deeper tissues leading to the prevertebral fascia with 20 mL of a equal mixture of 0.5% bupivacaine with 1:200,000 epinephrine +1% lidocaine through a 25-gauge needle. This was followed with the skin incision with a 10 blade scalpel, followed by sharp and blunt dissection to the prevertebral fascia using cautery for hemostasis.  A modified Touhy needle was then advanced under fluoroscopy and walked off of the lamina at L1/2 on each side followed by loss of resistance to air to enter the epidural space. Once the epidural space was entered the octad electrode was advanced under live fluoroscopy in the AP and lateral view to the T78 level  in the posterior aspect of the epidural space. The electrodes were then connected with the   and intraoperative programming was performed to confirm that the electrodes were placed appropriately. After there was confirmation of the appropriate placement the modified Touhy needles were withdrawn and the stylette were removed from the electrodes. The electrodes were then anchored into place using an anchor provided by the stimulator company and the anchor was secured to the prevertebral fascia using fixate suture. A pulse generator pocket was created on the left/right side superior to the iliac crest. An approximately 6 cm incision was planned the area overlying the skin was anesthetized with an additional 10 mL of local anesthetic to a 25 gauge needle. This was followed by incising the skin with a 15 blade scalpel and sharp and blunt dissection to create a pocket approximately the size of the pulse generator for the spinal cord stimulator system approximately 1 inch deep to the skin. Electrocautery was used for hemostasis. The tunneler provided by the company was used to create a tunnel between the midline incision and a pulse generator pocket the electrodes were then threaded through with a stress relief loop being maintained in the midline incision. The electrodes were connected to the battery and the battery was tested so that the entire system was confirmed to be working appropriately. Both sites were irrigated with bacitracin solution and there was care to confirm hemostasis. Both incisions were then closed with interrupted 2-0 Vicryl followed by staples at the skin.  This was followed by bacitracin ointment being placed over the staples after wet and dry cleaning. Dressing was placed over the skin and an abdominal binder was placed around the patient.     The patient was taken to recovery in stable condition and the stimulator was programmed postoperatively to capture all the usual painful areas.     The patient was discharged from the hospital in stable condition.

## 2018-10-15 NOTE — DISCHARGE INSTRUCTIONS
Anesthesia: Monitored Anesthesia Care (MAC)    Anesthesia Safety  · Have an adult family member or friend drive you home after the procedure.  · For the first 24 hours after your surgery:  ¨ Do not drive or use heavy equipment.  ¨ Do not make important decisions or sign documents.  ¨ Avoid alcohol.  ¨ Have someone stay with you, if possible. They can watch for problems and help keep you safe.    No heavy lifting over 10 lbs for 6 weeks  No bending or twisting at the waist for 6 weeks  Wear abdominal binder at all times  Leave dressing in place until clinic visit

## 2018-10-15 NOTE — ANESTHESIA POSTPROCEDURE EVALUATION
"Anesthesia Post Evaluation    Patient: Kaleigh Solo    Procedure(s) Performed: Procedure(s) (LRB):  INSERTION, NEUROSTIMULATOR, SPINAL CORD STIMULATOR IMPLANT- WIRES AND BATTERY INTERNAL (N/A)    Final Anesthesia Type: MAC  Patient location during evaluation: [facility]  Patient participation: Yes- Able to Participate  Level of consciousness: awake and alert, awake and oriented  Post-procedure vital signs: reviewed and stable  Pain management: adequate  Airway patency: patent  PONV status at discharge: No PONV  Anesthetic complications: no      Cardiovascular status: blood pressure returned to baseline  Respiratory status: unassisted, spontaneous ventilation and room air  Hydration status: euvolemic  Follow-up not needed.        Visit Vitals  /85 (BP Location: Right arm, Patient Position: Lying)   Pulse 87   Temp 37 °C (98.6 °F) (Oral)   Resp 18   Ht 5' 2" (1.575 m)   Wt 104.3 kg (229 lb 16 oz)   SpO2 (!) 94%   Breastfeeding? No   BMI 42.07 kg/m²       Pain/Magdaleno Score: Pain Assessment Performed: Yes (10/15/2018  1:36 PM)  Presence of Pain: complains of pain/discomfort (10/15/2018  1:36 PM)        "

## 2018-10-22 ENCOUNTER — CLINICAL SUPPORT (OUTPATIENT)
Dept: OPHTHALMOLOGY | Facility: CLINIC | Age: 63
End: 2018-10-22
Payer: COMMERCIAL

## 2018-10-22 ENCOUNTER — OFFICE VISIT (OUTPATIENT)
Dept: PAIN MEDICINE | Facility: CLINIC | Age: 63
End: 2018-10-22
Payer: COMMERCIAL

## 2018-10-22 VITALS
DIASTOLIC BLOOD PRESSURE: 82 MMHG | TEMPERATURE: 98 F | SYSTOLIC BLOOD PRESSURE: 133 MMHG | HEART RATE: 98 BPM | BODY MASS INDEX: 42.6 KG/M2 | WEIGHT: 231.5 LBS | RESPIRATION RATE: 18 BRPM | HEIGHT: 62 IN

## 2018-10-22 DIAGNOSIS — M54.16 LUMBAR RADICULOPATHY: ICD-10-CM

## 2018-10-22 DIAGNOSIS — G89.4 CHRONIC PAIN SYNDROME: Primary | ICD-10-CM

## 2018-10-22 DIAGNOSIS — M51.16 LUMBAR DISC HERNIATION WITH RADICULOPATHY: ICD-10-CM

## 2018-10-22 PROCEDURE — 99999 PR PBB SHADOW E&M-EST. PATIENT-LVL III: CPT | Mod: PBBFAC,,, | Performed by: NURSE PRACTITIONER

## 2018-10-22 PROCEDURE — 90471 IMMUNIZATION ADMIN: CPT | Mod: ,,, | Performed by: INTERNAL MEDICINE

## 2018-10-22 PROCEDURE — 90715 TDAP VACCINE 7 YRS/> IM: CPT | Mod: ,,, | Performed by: INTERNAL MEDICINE

## 2018-10-22 PROCEDURE — 99024 POSTOP FOLLOW-UP VISIT: CPT | Mod: S$GLB,,, | Performed by: NURSE PRACTITIONER

## 2018-10-22 NOTE — PROGRESS NOTES
Tdap given; LEFT Deltoid; VIS given; No adverse reaction noted; Patient asked to remain in clinic for 15 minutes post injection.

## 2018-10-22 NOTE — PROGRESS NOTES
Referring Physician: No ref. provider found    Chief Complaint:   Chief Complaint   Patient presents with    Post-op Evaluation     staple removal        SUBJECTIVE:    Interval History 10/22/2018:  The patient presents for wound check.  She is s/p lumbar SCS implant on 10/15/18.  She states that the surgery was painful for her but she has started feeling better.  She denies any fever or malaise.  She denies any drainage.  She is finishing antibiotics today.  Her pain today is 2/10.    Interval History 10/1/2018:  The patient returns for follow up and lead pull.  She is s/p SCS trial with 100% pain relief.  She would like to move forward with implant.  She denies any fever or malaise during the trial period.  Her pain today is 0/10.     Interval History 8/10/2018:  The patient presents today to discuss procedure for left sided back and leg pain.  Since her last OV, she underwent left L5 and S1 TF CIRILO in January.  Initially she thought that it did not help.  However, about one month later, she was having about 75% relief.  This lasted for about 2 months.  Prior to this, she had left L4 and L5 TF CIRILO in December.  At this point, she feels as though both injections have worn off.  Her pain is affecting her daily activities.  She states that she previously discussed SCS trial with Dr. Lopez and would like to proceed with this.  She has seen Dr. Rashid and was not deemed a candidate for surgery.  Her pain today is 8/10.  The patient denies any bowel or bladder incontinence or signs of saddle paresthesia.      Interval history 01/15/2018  The patient returns to the clinic for a follow up visit, she is reporting pain of 10 /10 today.  She is s/p Left L4 and L5 TFESI performed on 12/20/17.  She estimates 100% pain relief for ~ 2 weeks.  Was able to be physically active during that time period, with significantly improved functional mobility.  Within past 2 weeks she has had recurrence of pain in same location, quality,  intensity.   Continued pain in lumbar area radiating to the lateral/posterolateral thigh and to the posterior calf. Leg pain is worse than back pain. Additionally has developed pain at lateral hip area - greater trochanteric bursa. Now walking ~ 1 block.    Denies infection, new weakness, bowel/bladder dysfunction/incontinence, saddle anesthesia.     Interval history 12/01/2017   The patient returns to the clinic for a follow up visit, she is reporting pain of 5 /10 today.  She has been doing better since cervical surgery but has been having occasional radicular symptoms.  Currently she comes in for lower extremities radiculopathy predominantly in the left lower extremity in the L4-5 distribution.  She had an MRI of the lumbar spine which shows  cyst at the encroachment on the left L5 nerve root.  Additionally the patient has significant facet arthropathy throughout the lumbar spine    Interval history 04/02/2015:  Since previous encounter patient reports low back pain radiating down both lower extremities. Patient stated that she had Synvisc injection on 03/20/15 and this helped with her knee pain. Patient stated that she still has neck pain that radiates to both upper extremities. She stated that she needs a refill on her Gabapentin and Flexeril these medications have been helping with the pain. Patient also reported that she sees a chiropractor for her back pain. Patient reports no other health changes since her last visit. Patient reports her pain 6/10 today.    Interval history 03/02/2015:  Since previous encounter the patient reported having had knee arthroscopy and was told to try euflexxa injections versus total knee replacement by her orthopedist.  She was originally scheduled to have her orthopedist inject her knee with corticosteroids this morning but due to scheduling conflict the patient was placed on my schedule for an injection.  Unfortunately the patient has also been actively treated for upper  respiratory infection and is currently on antibiotics.  She has been continuing home exercises with regularity stating good days and bad days with regards to her knee pain.  Additionally she continues to have upper neck and bilateral shoulder pain upper extremity radiculopathy and lower back pain.  Since her arthroscopic surgery the patient has been having intermittent hives and has been placed by an allergist on hydroxyzine and famotidine but has not had complete relief of these symptoms.      Interval history 10/21/2014:  Since previous encounter patient was scheduled for an CIRILO but cancelled due to her having a cold. Patient reports she still has a cold. Patient reports neck radiating down her right arm with numbness and tingling. Patient stated on the way over her arm went numb. She states that driving is becoming harder as she feels she has a decreased hand  with opening of jars. Patient reports that she's been taking Mucinex and Thera Flu for her cold symptoms. Patient reports she been having hives which has been followed by an allergist with testing pending.  Additionally she has been seen by rheumatologist who diagnosed her with fibromyalgia.She states that she takes 6 caps of Gabapentin a day however she was titrating down down she states that when she gets to 4 caps a day she breaks out in hives and then quickly re escalates back to 6 tablets per day. Patient states that she's starting to have pain at the bottom of her feet when walking on her rugs in her home she states that just started yesterday. Patient reports her pain 8/10.  She says that she has been increasing her protein intake and has been continuing to do exercises improving her range of motion in her shoulder and neck and states that overall she feels as if she is doing better.  Patient reports no other health changes since her last visit.     Interval history 8/25/2014:  Patient continues to have radicular symptoms in the neck radiating  "down to the right upper extremity.  Additionally since previous encounter she had a dental abscess which is being treated with accommodation of antibiotics and having had her tooth pulled.  She is still antibiotics and has a followup visit in mid-September with her dentist to evaluate efficacy of treatment.    Interval history 7/25/2014:  Since previous encounter the patient is on gabapentin 1800 mg per day and this is helping with her shoulder pain although she does state that she is having restless legs at night which is preventing her from being able to sleep.  Additionally she is having anterior thigh radicular pain.  She has had no other health changes she continues to do physical therapy exercises at home.  She reports her pain level is a 6/10 today.    Interval history 7/2/2014:  Patient is status post ACDF in 4/2014.  She is healing very well from surgery, but continues to have right shoulder pain which she had prior to her surgery despite having a labral repair.  Additionally she continues to have neuropathic symptoms in bilateral arms and in the axilla bilaterally.  She has been undergoing physical therapy and states that it does help her including heat ultrasound to the muscles of the neck.  She states that this is only temporary relief.  She continues to use the topical compounded pain cream which does help her.  After her surgery she discontinued taking gabapentin which was previously helping her and which she was tolerating without side effects.  She reports her pain as a 3/10 today.  She had been taking hydrocodone/acetaminophen 10/325 3 times a day when necessary as prescribed by her surgeon, but this is being discontinued.    Interval history 3/11/2014:  Since previous encounter patient reports that she had an episode where she felt as if her neck "locked up" in that now when she is waking up in the morning she has bilateral upper extremity numbness which improves throughout the day.  She does not " report any weakness.  She states that she has been having persistent daily headaches from the occiput over the vertex of the supraorbital ridge bilaterally, and continues to have pain in the right side of her neck and shoulder.  She reports that she is still taking the gabapentin 1800 mg per day, and hydrocodone/acetaminophen when necessary which helps a little.    Interval history 2/24/2014:  Patient is status post cervical interlaminar epidural steroid injection by 2 on 1/24/2014 and 2/5/2014.  Patient has persisting radicular symptoms into her right upper extremity and shoulder the anterior aspect of the neck and base of the jaw.  Patient reported approximately 10% improvement in her pain symptoms after the first injection similar improvement in her pain symptoms after the second injection no sustained relief she states that the pain relief lasted her approximately one week and will go away.  She is taking gabapentin 300 mg 3 times a day, hydrocodone/acetaminophen 7.5/325 one tablet approximately 2-3 times per day.  She states that the Vicodin helps dull the pain but the does not eliminate it entirely, and she's not having any adverse problems in the gabapentin.  Patient continues in physical therapy for her right shoulder status post arthroscopic surgery, and has good range of motion in the shoulder.  Since previous exam the patient has a sinus infection with congestion.    Interval history 1/21/2014:  Since reducing her patient was only able to have one of the cervical intralaminar epidural steroid injections which approximately help her 80% in her pain symptoms in the back of her neck, but she was also having symptoms into the right shoulder and had a rotator cuff tear which was repaired arthroscopically which prevented us from being able to perform the second cervical intralaminar epidural steroid injection on 12/23/2013.  Patient reports that she continues to do home exercises for her shoulder surgery but  was unable to perform PT secondary to pain.  She states that her pain was actually better up until 1/12/2014 where she developed severe pain on the right side of her neck and posterior side of her neck going down the back of the scapula and also across the trapezius muscles of the shoulder.  Her MRI which was done in outside facility reports that she has severe C5-6 neural foraminal stenosis.  For her pain the patient has been taking hydrocodone/acetaminophen 7.5/325 as needed she reports there are occasional days where she takes it every 4 hours and some days where she doesn't take it at all.  Resting her head on the pillow helps alleviate the pain.  Sitting and working at computers when her pain is the worst.  She denies any problems with movement of her hands or weakness in her arms.  She has had no other health changes since previous encounter.    Initial history of present illness 10/2013:    Kaleigh Solo presents to the clinic for the evaluation of neck pain with radiation in right upper shoulder to the elbow, and down the right side of the chest wall. The pain started insidiously in the back of the neck in july  and symptoms have been worsening.  The pain has begun going into the shoulder, and there was swelling in the arm that began after starting Robaxin for muscle aches, and then took predisone taper and bactrim for the possibility of infection which did not help.  Patient stopped her medications and the swelling improved.  US of the upper extremity was negative for DVT.   Patient takes vicodin for pain which she states helps only a little bit, and doesn't take the pain go away.  MRI of cervical spine revealed severe neuroforaminal stenosis on the right at C5-6 with central to right neural foraminal disc protrusion with generalized bulging discs and uncovertebral joint osteophytic change.  No abnormal signal in the cord.      Pain Description:    The pain is located in the neck and radiates to the  right shoulder .  The pain is described as aching and tight band      Symptoms interfere with daily activity and sleeping.     Exacerbating factors: Sitting, Bending, Touching, Coughing/Sneezing, Eating, Night Time, Morning, Flexing and Lifting.      Mitigating factors heat, laying down and medications.     She reports 2 hours of uninterrupted sleep per night.    Patient denies night fever/night sweats, urinary incontinence, bowel incontinence, significant weight loss, significant motor weakness and loss of sensations.  Patient denies any suicidal or homicidal ideations    Pain Medications:  Current:  cymbalta recently started 60 mg daily  Robaxin 500 mg  Naproxen  norco 5-325 as needed  Gabapentin 2400 mg daily    Tried in Past:  Gabapentin  Hydrocodone  ibuprofen    Physical Therapy/Home Exercise: yes       report:  Not applicable    Pain Procedures:   12/20/17 Left L4 and L5 TF CIRILO- 75% relief  1/24/18 Left L5 and S1 TF CIRILO- 75% relief  9/25/18 Lumbar SCS trial- 100% relief  10/15/18 Lumbar SCS implant    Chiropractor -No treatments yet.  Right shoulder arthroscopy 12/10/2013  Right knee arthroscopy 12/19/14    Imaging:     10/7/2013  MRI of cervical spine revealed severe neuroforaminal stenosis on the right at C5-6 with central to right neural foraminal disc protrusion with generalized bulging discs and uncovertebral joint osteophytic change.  No abnormal signal in the cord.  Minor disc bulging at C3-4, C4-5 without central canal stenosis, spinal cord impingement or neural foraminal stenosis.    CT chest: 10/02/2013  No definite acute process or obvious etiology of the right-sided chest pain, axillary pain, neck and supraclavicular pain.  (full report scanned into record)    Right upper extremity venous ultrasound 10/02/2013  No evidence of DVT  lumbar spine 11/28/2017  Impression     MRI LUMBAR SPINE    TECHNIQUE: MRI lumbar spine was performed without contrast. The following sequences were obtained:  Localizer; sagittal T1, T2, STIR; axial T1 and T2.    COMPARISON: None.    FINDINGS:    There are 5 lumbar vertebrae.  Vertebral body heights and alignment are maintained.  Bone marrow signal is preserved.  There is multilevel disc desiccation with preservation of disc heights. Conus terminates at L1 and appears unremarkable. Limited evaluation of posterior abdominal structures is unremarkable.  Paraspinal musculature is within normal limits.  Evaluation of sacroiliac joints is unremarkable.    L1-L2: No spinal canal stenosis or neuroforaminal narrowing.    L2-L3: Mild bilateral facet arthropathy noted.  No spinal canal stenosis or neuroforaminal narrowing.    L3-L4: Mild bilateral facet arthropathy and circumferential disc bulge noted.  No spinal canal stenosis or neuroforaminal narrowing.    L4-L5: Severe bilateral facet arthropathy and circumferential disc bulge result in moderate spinal canal stenosis.    L5-S1: Circumferential disc bulge and severe bilateral facet arthropathy result in severe spinal canal stenosis.  Note made of synovial cyst in the left foramen, contacting the exiting L5 nerve root.     Lab Results   Component Value Date    WBC 8.04 09/24/2018    HGB 14.7 09/24/2018    HCT 45.3 09/24/2018    MCV 98 09/24/2018     09/24/2018       CMP  Sodium   Date Value Ref Range Status   09/24/2018 142 136 - 145 mmol/L Final     Potassium   Date Value Ref Range Status   09/24/2018 4.1 3.5 - 5.1 mmol/L Final     Chloride   Date Value Ref Range Status   09/24/2018 103 95 - 110 mmol/L Final     CO2   Date Value Ref Range Status   09/24/2018 29 23 - 29 mmol/L Final     Glucose   Date Value Ref Range Status   09/24/2018 97 70 - 110 mg/dL Final     BUN, Bld   Date Value Ref Range Status   09/24/2018 11 8 - 23 mg/dL Final     Creatinine   Date Value Ref Range Status   09/24/2018 0.7 0.5 - 1.4 mg/dL Final     Calcium   Date Value Ref Range Status   09/24/2018 10.0 8.7 - 10.5 mg/dL Final     Total Protein    Date Value Ref Range Status   09/24/2018 7.2 6.0 - 8.4 g/dL Final     Albumin   Date Value Ref Range Status   09/24/2018 3.7 3.5 - 5.2 g/dL Final     Total Bilirubin   Date Value Ref Range Status   09/24/2018 0.4 0.1 - 1.0 mg/dL Final     Comment:     For infants and newborns, interpretation of results should be based  on gestational age, weight and in agreement with clinical  observations.  Premature Infant recommended reference ranges:  Up to 24 hours.............<8.0 mg/dL  Up to 48 hours............<12.0 mg/dL  3-5 days..................<15.0 mg/dL  6-29 days.................<15.0 mg/dL       Alkaline Phosphatase   Date Value Ref Range Status   09/24/2018 130 55 - 135 U/L Final     AST   Date Value Ref Range Status   09/24/2018 40 10 - 40 U/L Final     ALT   Date Value Ref Range Status   09/24/2018 114 (H) 10 - 44 U/L Final     Anion Gap   Date Value Ref Range Status   09/24/2018 10 8 - 16 mmol/L Final     eGFR if    Date Value Ref Range Status   09/24/2018 >60.0 >60 mL/min/1.73 m^2 Final     eGFR if non    Date Value Ref Range Status   09/24/2018 >60.0 >60 mL/min/1.73 m^2 Final     Comment:     Calculation used to obtain the estimated glomerular filtration  rate (eGFR) is the CKD-EPI equation.            Past Medical History:   Diagnosis Date    Arthritis     Asthma     Cervicalgia     2013 tx by chiropractor    Closed dislocation of acromioclavicular joint 2013    Degenerative disc disease     Fatty liver     Fibromyalgia     GERD (gastroesophageal reflux disease)     HPV (human papilloma virus) anogenital infection     conization in past-remote history-1990    HTN (hypertension)     Sciatica     Tobacco use     Ulcer 2007    stomach ulcer     Past Surgical History:   Procedure Laterality Date    acdf  4/2014    C5-6    ARTHROSCOPY, SHOULDER Right 12/10/2013    [facility]    ARTHROSCOPY, SHOULDER, WITH SUBACROMIAL SPACE  DECOMPRESSION Right 12/10/2013    [facility]    ARTHROSCOPY-KNEE Right 12/19/2014    [facility] OR    BACK SURGERY      CERVICAL    CERVICAL CONIZATION   W/ LASER      CHONDROPLASTY-KNEE Right 12/19/2014    [facility] OR    DISCECTOMY, SPINE, CERVICAL, ANTERIOR APPROACH, WITH FUSION N/A 4/7/2014    Performed [facility] at [facility] OR 2ND FLR    INJECTION, STEROID, SPINE, CERVICAL, EPIDURAL N/A 2/5/2014    Per[facility] Cand[facility] Lopez [facility] at [facility] PAIN MGT    INJECTION, STEROID, SPINE, CERVICAL, EPIDURAL N/A 1/24/2014    Per[facility] at [facility] PAIN MGT    INJECTION, STEROID, SPINE, CERVICAL, EPIDURAL N/A 11/27/2013    [facility] PAIN MGT    INJECTION-STEROID-EPIDURAL-TRANSFORAMINAL Left 1/24/2018    Per[facility] PAIN MGT    INJECTION-STEROID-EPIDURAL-TRANSFORAMINAL Left 12/20/2017    [facility] PAIN MGT    INSERTION, NEUROSTIMULATOR, SPINAL CORD STIMULATOR IMPLANT- WIRES AND BATTERY INTERNAL N/A 10/15/2018    [facility] at [facility] OR    KNEE ARTHROSCOPY      REPAIR, LABRUM, HIP Right 12/10/2013    [facility] OR    right shoulder surger      arthroscopic surgery Dec 2013    TRIAL OF SPINAL CORD NERVE STIMULATOR N/A 9/25/2018    Procedure: TRIAL, NEUROSTIMULATOR, SPINAL CORD;  Surgeon: Candice Lopez MD;  [facility];  Service: Pain Management;  Laterality: N/A;  SCS Trial  Pj Hankins notified of date and time    TRIAL, NEUROSTIMULATOR, SPINAL CORD N/A 9/25/2018    Per[facility] by [facility] at [facility] PAIN MGT     Social History     Socioeconomic History    Marital status:      Spouse name: Not on file    Number of children: Not on file    Years of education: Not on file    Highest education  level: Not on file   Social Needs    Financial resource strain: Not on file    Food insecurity - worry: Not on file    Food insecurity - inability: Not on file    Transportation needs - medical: Not on file    Transportation needs - non-medical: Not on file   Occupational History    Not on file   Tobacco Use    Smoking status: Current Every Day Smoker     Packs/day: 1.00     Years: 48.00     Pack years: 48.00     Types: Cigarettes    Smokeless tobacco: Never Used   Substance and Sexual Activity    Alcohol use: Yes     Comment: occasional    Drug use: No    Sexual activity: Not on file   Other Topics Concern    Are you pregnant or think you may be? Not Asked    Breast-feeding Not Asked   Social History Narrative    , not working, 3 children (1 passed at 9 mo old), tobacco daily, ETOH socially, GYN  dtrs of cesar     Family History   Problem Relation Age of Onset    Cancer Mother         lung-was tobacco user  of lung cancer at 78    Aneurysm Father         abdominal burst-  of stroke at 71 years    Heart disease Father         had HTN       Allergies   Allergen Reactions    Ambien [Zolpidem] Other (See Comments)     Pt hyperactive    Lunesta [Eszopiclone] Other (See Comments)     Severely depressed    Shellfish Containing Products Swelling and Other (See Comments)     Metallic taste in mouth  Swells all over,including the tongue and throat  Feels likes insides are swollen  Allergic to crawfish,Shrimp    Ancef [Cefazolin]      Facial swelling  Swelling abdomen    Flexeril [Cyclobenzaprine] Swelling    Gabapentin Hives    Sulfa (Sulfonamide Antibiotics) Nausea Only       Current Outpatient Medications   Medication Sig    albuterol (PROAIR HFA) 90 mcg/actuation inhaler Inhale 2 puffs into the lungs every 6 (six) hours as needed for Wheezing.    atorvastatin (LIPITOR) 10 MG tablet Take 1 tablet (10 mg total) by mouth once daily.    cetirizine (ZYRTEC) 10 MG tablet Take 10 mg  by mouth once daily.    clotrimazole-betamethasone 1-0.05% (LOTRISONE) cream Apply topically 2 (two) times daily.    diphenhydrAMINE (BENADRYL) 50 MG tablet Take 50 mg by mouth nightly as needed for Itching.    doxycycline (MONODOX) 100 MG capsule Take 1 capsule (100 mg total) by mouth 2 (two) times daily.    famotidine (PEPCID) 20 MG tablet Take 20 mg by mouth 2 (two) times daily.    fexofenadine (ALLEGRA) 60 MG tablet Take 60 mg by mouth once daily.    fluocinonide 0.05% (LIDEX) 0.05 % cream     HYDROcodone-acetaminophen (NORCO) 5-325 mg per tablet Take 1 tablet by mouth every 8 (eight) hours as needed for Pain.    HYDROcodone-acetaminophen (NORCO) 5-325 mg per tablet Take 1 tablet by mouth every 4 (four) hours as needed for Pain.    hydrocortisone 1 % cream Apply topically 2 (two) times daily.    hydrOXYzine HCl (ATARAX) 25 MG tablet Take 1 tablet (25 mg total) by mouth 3 (three) times daily as needed for Itching.    methylphenidate HCl (RITALIN) 20 MG tablet     nicotine (NICODERM CQ) 21 mg/24 hr Place 1 patch onto the skin once daily.    OLANZapine (ZYPREXA) 5 MG tablet     valsartan-hydrochlorothiazide (DIOVAN-HCT) 320-25 mg per tablet Take 1 tablet by mouth once daily.    FLUoxetine 20 MG tablet      No current facility-administered medications for this visit.        REVIEW OF SYSTEMS:    GENERAL:  No weight loss, malaise or fevers.  NECK:  Negative for lumps, no difficulty with swallowing.   RESPIRATORY:  No recent URI  CARDIOVASCULAR:  Negative for chest pain, leg swelling or palpitations.  GI:  Negative for abdominal discomfort, blood in stools or black stools or change in bowel habits.  Patient had a history of stomach ulcer, never bleeding.    MUSCULOSKELETAL:  See HPI.  Patient reports having restless legs at night preventing her from being able to sleep.  SKIN: no new lesions  HEMATOLOGY/LYMPHOLOGY:  Negative for prolonged bleeding, bruising easily or swollen nodes.  Patient is not  "currently taking any anti-coagulants  NEURO:   No history of syncope, paralysis, seizures or tremors.  All other reviewed and negative other than HPI.    OBJECTIVE:    /82 Comment: left upper armm  Pulse 98   Temp 97.7 °F (36.5 °C) (Oral)   Resp 18   Ht 5' 2" (1.575 m)   Wt 105 kg (231 lb 8 oz)   BMI 42.34 kg/m²     PHYSICAL EXAMINATION:    GENERAL: Well appearing, in no acute distress, alert and oriented x3.  PSYCH:  Mood and affect appropriate.  SKIN: Skin color, texture, turgor normal, no rashes or lesions.  SCS sites without drainage or signs of infection.  Staples secure.  No evidence of wound dehiscence, no evidence of infection or seroma.    HEAD/FACE:  Normocephalic, atraumatic. Cranial nerves grossly intact.  GAIT: Antalgic, ambulates without assistance.            ASSESSMENT: 63 y.o. year old female with neck and lower back pain, consistent with the following diagnoses:    1. Chronic pain syndrome     2. Lumbar radiculopathy     3. Lumbar disc herniation with radiculopathy          PLAN:     - She is s/p successful SCS implant with Dr. Lopez.    - Staples secure.  No signs of infection.    - Discontinue abdominal binder.  Continue with activity restrictions.    - RTC in 3 days for staple removal.      The above plan and management options were discussed at length with patient. Patient is in agreement with the above and verbalized understanding.     Sadie Cowan  10/22/2018                                                    "

## 2018-10-23 ENCOUNTER — IMMUNIZATION (OUTPATIENT)
Dept: PHARMACY | Facility: CLINIC | Age: 63
End: 2018-10-23
Payer: COMMERCIAL

## 2018-10-25 ENCOUNTER — OFFICE VISIT (OUTPATIENT)
Dept: SPINE | Facility: CLINIC | Age: 63
End: 2018-10-25
Payer: COMMERCIAL

## 2018-10-25 VITALS
DIASTOLIC BLOOD PRESSURE: 98 MMHG | BODY MASS INDEX: 42.51 KG/M2 | HEART RATE: 79 BPM | TEMPERATURE: 98 F | SYSTOLIC BLOOD PRESSURE: 137 MMHG | WEIGHT: 231 LBS | HEIGHT: 62 IN

## 2018-10-25 DIAGNOSIS — M53.3 SACROILIAC JOINT PAIN: Chronic | ICD-10-CM

## 2018-10-25 DIAGNOSIS — G89.4 CHRONIC PAIN SYNDROME: Primary | ICD-10-CM

## 2018-10-25 DIAGNOSIS — M54.42 CHRONIC BILATERAL LOW BACK PAIN WITH LEFT-SIDED SCIATICA: Chronic | ICD-10-CM

## 2018-10-25 DIAGNOSIS — G89.29 CHRONIC BILATERAL LOW BACK PAIN WITH LEFT-SIDED SCIATICA: Chronic | ICD-10-CM

## 2018-10-25 PROCEDURE — 99024 POSTOP FOLLOW-UP VISIT: CPT | Mod: S$GLB,,, | Performed by: NURSE PRACTITIONER

## 2018-10-25 PROCEDURE — 99999 PR PBB SHADOW E&M-EST. PATIENT-LVL III: CPT | Mod: PBBFAC,,, | Performed by: NURSE PRACTITIONER

## 2018-10-25 NOTE — PROGRESS NOTES
Referring Physician: No ref. provider found    Chief Complaint:   No chief complaint on file.       SUBJECTIVE:    Interval History 10/25/2018:  The patient returns for follow up and staple removal.  She reports feeling well- denies fever or malaise.  She completed oral antibiotics.  She is having benefit from SCS.  Wendy is here to meet with her today.  She has decreased her pain medications which she is happy about.  Her pain today is 4/10.    Interval History 10/22/2018:  The patient presents for wound check.  She is s/p lumbar SCS implant on 10/15/18.  She states that the surgery was painful for her but she has started feeling better.  She denies any fever or malaise.  She denies any drainage.  She is finishing antibiotics today.  Her pain today is 2/10.    Interval History 10/1/2018:  The patient returns for follow up and lead pull.  She is s/p SCS trial with 100% pain relief.  She would like to move forward with implant.  She denies any fever or malaise during the trial period.  Her pain today is 0/10.     Interval History 8/10/2018:  The patient presents today to discuss procedure for left sided back and leg pain.  Since her last OV, she underwent left L5 and S1 TF CIRILO in January.  Initially she thought that it did not help.  However, about one month later, she was having about 75% relief.  This lasted for about 2 months.  Prior to this, she had left L4 and L5 TF CIRILO in December.  At this point, she feels as though both injections have worn off.  Her pain is affecting her daily activities.  She states that she previously discussed SCS trial with Dr. Lopez and would like to proceed with this.  She has seen Dr. Rashid and was not deemed a candidate for surgery.  Her pain today is 8/10.  The patient denies any bowel or bladder incontinence or signs of saddle paresthesia.      Interval history 01/15/2018  The patient returns to the clinic for a follow up visit, she is reporting pain of 10 /10 today.  She is  s/p Left L4 and L5 TFESI performed on 12/20/17.  She estimates 100% pain relief for ~ 2 weeks.  Was able to be physically active during that time period, with significantly improved functional mobility.  Within past 2 weeks she has had recurrence of pain in same location, quality, intensity.   Continued pain in lumbar area radiating to the lateral/posterolateral thigh and to the posterior calf. Leg pain is worse than back pain. Additionally has developed pain at lateral hip area - greater trochanteric bursa. Now walking ~ 1 block.    Denies infection, new weakness, bowel/bladder dysfunction/incontinence, saddle anesthesia.     Interval history 12/01/2017   The patient returns to the clinic for a follow up visit, she is reporting pain of 5 /10 today.  She has been doing better since cervical surgery but has been having occasional radicular symptoms.  Currently she comes in for lower extremities radiculopathy predominantly in the left lower extremity in the L4-5 distribution.  She had an MRI of the lumbar spine which shows  cyst at the encroachment on the left L5 nerve root.  Additionally the patient has significant facet arthropathy throughout the lumbar spine    Interval history 04/02/2015:  Since previous encounter patient reports low back pain radiating down both lower extremities. Patient stated that she had Synvisc injection on 03/20/15 and this helped with her knee pain. Patient stated that she still has neck pain that radiates to both upper extremities. She stated that she needs a refill on her Gabapentin and Flexeril these medications have been helping with the pain. Patient also reported that she sees a chiropractor for her back pain. Patient reports no other health changes since her last visit. Patient reports her pain 6/10 today.    Interval history 03/02/2015:  Since previous encounter the patient reported having had knee arthroscopy and was told to try euflexxa injections versus total knee replacement by  her orthopedist.  She was originally scheduled to have her orthopedist inject her knee with corticosteroids this morning but due to scheduling conflict the patient was placed on my schedule for an injection.  Unfortunately the patient has also been actively treated for upper respiratory infection and is currently on antibiotics.  She has been continuing home exercises with regularity stating good days and bad days with regards to her knee pain.  Additionally she continues to have upper neck and bilateral shoulder pain upper extremity radiculopathy and lower back pain.  Since her arthroscopic surgery the patient has been having intermittent hives and has been placed by an allergist on hydroxyzine and famotidine but has not had complete relief of these symptoms.      Interval history 10/21/2014:  Since previous encounter patient was scheduled for an CIRILO but cancelled due to her having a cold. Patient reports she still has a cold. Patient reports neck radiating down her right arm with numbness and tingling. Patient stated on the way over her arm went numb. She states that driving is becoming harder as she feels she has a decreased hand  with opening of jars. Patient reports that she's been taking Mucinex and Thera Flu for her cold symptoms. Patient reports she been having hives which has been followed by an allergist with testing pending.  Additionally she has been seen by rheumatologist who diagnosed her with fibromyalgia.She states that she takes 6 caps of Gabapentin a day however she was titrating down down she states that when she gets to 4 caps a day she breaks out in hives and then quickly re escalates back to 6 tablets per day. Patient states that she's starting to have pain at the bottom of her feet when walking on her rugs in her home she states that just started yesterday. Patient reports her pain 8/10.  She says that she has been increasing her protein intake and has been continuing to do exercises  improving her range of motion in her shoulder and neck and states that overall she feels as if she is doing better.  Patient reports no other health changes since her last visit.     Interval history 8/25/2014:  Patient continues to have radicular symptoms in the neck radiating down to the right upper extremity.  Additionally since previous encounter she had a dental abscess which is being treated with accommodation of antibiotics and having had her tooth pulled.  She is still antibiotics and has a followup visit in mid-September with her dentist to evaluate efficacy of treatment.    Interval history 7/25/2014:  Since previous encounter the patient is on gabapentin 1800 mg per day and this is helping with her shoulder pain although she does state that she is having restless legs at night which is preventing her from being able to sleep.  Additionally she is having anterior thigh radicular pain.  She has had no other health changes she continues to do physical therapy exercises at home.  She reports her pain level is a 6/10 today.    Interval history 7/2/2014:  Patient is status post ACDF in 4/2014.  She is healing very well from surgery, but continues to have right shoulder pain which she had prior to her surgery despite having a labral repair.  Additionally she continues to have neuropathic symptoms in bilateral arms and in the axilla bilaterally.  She has been undergoing physical therapy and states that it does help her including heat ultrasound to the muscles of the neck.  She states that this is only temporary relief.  She continues to use the topical compounded pain cream which does help her.  After her surgery she discontinued taking gabapentin which was previously helping her and which she was tolerating without side effects.  She reports her pain as a 3/10 today.  She had been taking hydrocodone/acetaminophen 10/325 3 times a day when necessary as prescribed by her surgeon, but this is being  "discontinued.    Interval history 3/11/2014:  Since previous encounter patient reports that she had an episode where she felt as if her neck "locked up" in that now when she is waking up in the morning she has bilateral upper extremity numbness which improves throughout the day.  She does not report any weakness.  She states that she has been having persistent daily headaches from the occiput over the vertex of the supraorbital ridge bilaterally, and continues to have pain in the right side of her neck and shoulder.  She reports that she is still taking the gabapentin 1800 mg per day, and hydrocodone/acetaminophen when necessary which helps a little.    Interval history 2/24/2014:  Patient is status post cervical interlaminar epidural steroid injection by 2 on 1/24/2014 and 2/5/2014.  Patient has persisting radicular symptoms into her right upper extremity and shoulder the anterior aspect of the neck and base of the jaw.  Patient reported approximately 10% improvement in her pain symptoms after the first injection similar improvement in her pain symptoms after the second injection no sustained relief she states that the pain relief lasted her approximately one week and will go away.  She is taking gabapentin 300 mg 3 times a day, hydrocodone/acetaminophen 7.5/325 one tablet approximately 2-3 times per day.  She states that the Vicodin helps dull the pain but the does not eliminate it entirely, and she's not having any adverse problems in the gabapentin.  Patient continues in physical therapy for her right shoulder status post arthroscopic surgery, and has good range of motion in the shoulder.  Since previous exam the patient has a sinus infection with congestion.    Interval history 1/21/2014:  Since reducing her patient was only able to have one of the cervical intralaminar epidural steroid injections which approximately help her 80% in her pain symptoms in the back of her neck, but she was also having symptoms " into the right shoulder and had a rotator cuff tear which was repaired arthroscopically which prevented us from being able to perform the second cervical intralaminar epidural steroid injection on 12/23/2013.  Patient reports that she continues to do home exercises for her shoulder surgery but was unable to perform PT secondary to pain.  She states that her pain was actually better up until 1/12/2014 where she developed severe pain on the right side of her neck and posterior side of her neck going down the back of the scapula and also across the trapezius muscles of the shoulder.  Her MRI which was done in outside facility reports that she has severe C5-6 neural foraminal stenosis.  For her pain the patient has been taking hydrocodone/acetaminophen 7.5/325 as needed she reports there are occasional days where she takes it every 4 hours and some days where she doesn't take it at all.  Resting her head on the pillow helps alleviate the pain.  Sitting and working at computers when her pain is the worst.  She denies any problems with movement of her hands or weakness in her arms.  She has had no other health changes since previous encounter.    Initial history of present illness 10/2013:    Kaleigh Solo presents to the clinic for the evaluation of neck pain with radiation in right upper shoulder to the elbow, and down the right side of the chest wall. The pain started insidiously in the back of the neck in july  and symptoms have been worsening.  The pain has begun going into the shoulder, and there was swelling in the arm that began after starting Robaxin for muscle aches, and then took predisone taper and bactrim for the possibility of infection which did not help.  Patient stopped her medications and the swelling improved.  US of the upper extremity was negative for DVT.   Patient takes vicodin for pain which she states helps only a little bit, and doesn't take the pain go away.  MRI of cervical spine revealed  severe neuroforaminal stenosis on the right at C5-6 with central to right neural foraminal disc protrusion with generalized bulging discs and uncovertebral joint osteophytic change.  No abnormal signal in the cord.      Pain Description:    The pain is located in the neck and radiates to the right shoulder .  The pain is described as aching and tight band      Symptoms interfere with daily activity and sleeping.     Exacerbating factors: Sitting, Bending, Touching, Coughing/Sneezing, Eating, Night Time, Morning, Flexing and Lifting.      Mitigating factors heat, laying down and medications.     She reports 2 hours of uninterrupted sleep per night.    Patient denies night fever/night sweats, urinary incontinence, bowel incontinence, significant weight loss, significant motor weakness and loss of sensations.  Patient denies any suicidal or homicidal ideations    Pain Medications:  Current:  cymbalta recently started 60 mg daily  Robaxin 500 mg  Naproxen  norco 5-325 as needed  Gabapentin 2400 mg daily    Tried in Past:  Gabapentin  Hydrocodone  ibuprofen    Physical Therapy/Home Exercise: yes       report:  Not applicable    Pain Procedures:   12/20/17 Left L4 and L5 TF CIRILO- 75% relief  1/24/18 Left L5 and S1 TF CIRILO- 75% relief  9/25/18 Lumbar SCS trial- 100% relief  10/15/18 Lumbar SCS implant    Chiropractor -No treatments yet.  Right shoulder arthroscopy 12/10/2013  Right knee arthroscopy 12/19/14    Imaging:     10/7/2013  MRI of cervical spine revealed severe neuroforaminal stenosis on the right at C5-6 with central to right neural foraminal disc protrusion with generalized bulging discs and uncovertebral joint osteophytic change.  No abnormal signal in the cord.  Minor disc bulging at C3-4, C4-5 without central canal stenosis, spinal cord impingement or neural foraminal stenosis.    CT chest: 10/02/2013  No definite acute process or obvious etiology of the right-sided chest pain, axillary pain, neck and  supraclavicular pain.  (full report scanned into record)    Right upper extremity venous ultrasound 10/02/2013  No evidence of DVT  lumbar spine 11/28/2017  Impression     MRI LUMBAR SPINE    TECHNIQUE: MRI lumbar spine was performed without contrast. The following sequences were obtained: Localizer; sagittal T1, T2, STIR; axial T1 and T2.    COMPARISON: None.    FINDINGS:    There are 5 lumbar vertebrae.  Vertebral body heights and alignment are maintained.  Bone marrow signal is preserved.  There is multilevel disc desiccation with preservation of disc heights. Conus terminates at L1 and appears unremarkable. Limited evaluation of posterior abdominal structures is unremarkable.  Paraspinal musculature is within normal limits.  Evaluation of sacroiliac joints is unremarkable.    L1-L2: No spinal canal stenosis or neuroforaminal narrowing.    L2-L3: Mild bilateral facet arthropathy noted.  No spinal canal stenosis or neuroforaminal narrowing.    L3-L4: Mild bilateral facet arthropathy and circumferential disc bulge noted.  No spinal canal stenosis or neuroforaminal narrowing.    L4-L5: Severe bilateral facet arthropathy and circumferential disc bulge result in moderate spinal canal stenosis.    L5-S1: Circumferential disc bulge and severe bilateral facet arthropathy result in severe spinal canal stenosis.  Note made of synovial cyst in the left foramen, contacting the exiting L5 nerve root.     Lab Results   Component Value Date    WBC 8.04 09/24/2018    HGB 14.7 09/24/2018    HCT 45.3 09/24/2018    MCV 98 09/24/2018     09/24/2018       CMP  Sodium   Date Value Ref Range Status   09/24/2018 142 136 - 145 mmol/L Final     Potassium   Date Value Ref Range Status   09/24/2018 4.1 3.5 - 5.1 mmol/L Final     Chloride   Date Value Ref Range Status   09/24/2018 103 95 - 110 mmol/L Final     CO2   Date Value Ref Range Status   09/24/2018 29 23 - 29 mmol/L Final     Glucose   Date Value Ref Range Status   09/24/2018  97 70 - 110 mg/dL Final     BUN, Bld   Date Value Ref Range Status   09/24/2018 11 8 - 23 mg/dL Final     Creatinine   Date Value Ref Range Status   09/24/2018 0.7 0.5 - 1.4 mg/dL Final     Calcium   Date Value Ref Range Status   09/24/2018 10.0 8.7 - 10.5 mg/dL Final     Total Protein   Date Value Ref Range Status   09/24/2018 7.2 6.0 - 8.4 g/dL Final     Albumin   Date Value Ref Range Status   09/24/2018 3.7 3.5 - 5.2 g/dL Final     Total Bilirubin   Date Value Ref Range Status   09/24/2018 0.4 0.1 - 1.0 mg/dL Final     Comment:     For infants and newborns, interpretation of results should be based  on gestational age, weight and in agreement with clinical  observations.  Premature Infant recommended reference ranges:  Up to 24 hours.............<8.0 mg/dL  Up to 48 hours............<12.0 mg/dL  3-5 days..................<15.0 mg/dL  6-29 days.................<15.0 mg/dL       Alkaline Phosphatase   Date Value Ref Range Status   09/24/2018 130 55 - 135 U/L Final     AST   Date Value Ref Range Status   09/24/2018 40 10 - 40 U/L Final     ALT   Date Value Ref Range Status   09/24/2018 114 (H) 10 - 44 U/L Final     Anion Gap   Date Value Ref Range Status   09/24/2018 10 8 - 16 mmol/L Final     eGFR if    Date Value Ref Range Status   09/24/2018 >60.0 >60 mL/min/1.73 m^2 Final     eGFR if non    Date Value Ref Range Status   09/24/2018 >60.0 >60 mL/min/1.73 m^2 Final     Comment:     Calculation used to obtain the estimated glomerular filtration  rate (eGFR) is the CKD-EPI equation.            Past Medical History:   Diagnosis Date    Arthritis     Asthma     Cervicalgia     2013 tx by chiropractor    Closed dislocation of acromioclavicular joint 2013    Degenerative disc disease     Fatty liver     Fibromyalgia     GERD (gastroesophageal reflux disease)     HPV (human papilloma virus) anogenital infection     conization in past-remote history-1990    HTN (hypertension)      Sciatica     Tobacco use     Ulcer 2007    stomach ulcer     Past Surgical History:   Procedure Laterality Date    acdf  4/2014    C5-6    ARTHROSCOPY, SHOULDER Right 12/10/2013    [facility]    ARTHROSCOPY, SHOULDER, WITH SUBACROMIAL SPACE DECOMPRESSION Right 12/10/2013    [facility]    ARTHROSCOPY-KNEE Right 12/19/2014    [facility] OR    BACK SURGERY      CERVICAL    CERVICAL CONIZATION   W/ LASER      CHONDROPLASTY-KNEE Right 12/19/2014    [facility] OR    DISCECTOMY, SPINE, CERVICAL, ANTERIOR APPROACH, WITH FUSION N/A 4/7/2014    [facility] 2ND F[facility]    INJECTION, STEROID, SPINE, CERVICAL, EPIDURAL N/A 2/5/2014    Performed by Candice Lopez [facility] at [facility]    INJECTION, STEROID, SPINE, CERVICAL, EPIDURAL N/A 1/24/2014    Per[facility] by [facility]    INJECTION, STEROID, SPINE, CERVICAL, EPIDURAL N/A 11/27/2013    Per[facility]    INJECTION-STEROID-EPIDURAL-TRANSFORAMINAL Left 1/24/2018    Per[facility] by [facility]    INJECTION-STEROID-EPIDURAL-TRANSFORAMINAL Left 12/20/2017    Per[facility]    INSERTION, NEUROSTIMULATOR, SPINAL CORD STIMULATOR IMPLANT- WIRES AND BATTERY INTERNAL N/A 10/15/2018    Per[facility] OR    KNEE ARTHROSCOPY      REPAIR, LABRUM, HIP Right 12/10/2013    [facility] OR    right shoulder surger      arthroscopic surgery Dec 2013    TRIAL OF SPINAL CORD NERVE STIMULATOR N/A 9/25/2018    Procedure: TRIAL, NEUROSTIMULATOR, SPINAL CORD;  Surgeon: Candice Lopez MD;  Location: [facility];  Service: Pain Management;  Laterality: N/A;  SCS Trial  Pj Hankins notified of date and time    TRIAL, NEUROSTIMULATOR,  SPINAL CORD N/A 2018    [facility]     Social History     Socioeconomic History    Marital status:      Spouse name: Not on file    Number of children: Not on file    Years of education: Not on file    Highest education level: Not on file   Social Needs    Financial resource strain: Not on file    Food insecurity - worry: Not on file    Food insecurity - inability: Not on file    Transportation needs - medical: Not on file    Transportation needs - non-medical: Not on file   Occupational History    Not on file   Tobacco Use    Smoking status: Current Every Day Smoker     Packs/day: 1.00     Years: 48.00     Pack years: 48.00     Types: Cigarettes    Smokeless tobacco: Never Used   Substance and Sexual Activity    Alcohol use: Yes     Comment: occasional    Drug use: No    Sexual activity: Not on file   Other Topics Concern    Are you pregnant or think you may be? Not Asked    Breast-feeding Not Asked   Social History Narrative    , not working, 3 children (1 passed at 9 mo old), tobacco daily, ETOH socially, GYN  dtrs of cesar     Family History   Problem Relation Age of Onset    Cancer Mother         lung-was tobacco user  of lung cancer at 78    Aneurysm Father         abdominal burst-  of stroke at 71 years    Heart disease Father         had HTN       Allergies   Allergen Reactions    Ambien [Zolpidem] Other (See Comments)     Pt hyperactive    Lunesta [Eszopiclone] Other (See Comments)     Severely depressed    Shellfish Containing Products Swelling and Other (See Comments)     Metallic taste in mouth  Swells all over,including the tongue and throat  Feels likes insides are swollen  Allergic to crawfish,Shrimp    Ancef [Cefazolin]      Facial swelling  Swelling abdomen    Flexeril [Cyclobenzaprine] Swelling    Gabapentin Hives    Sulfa (Sulfonamide Antibiotics) Nausea Only       Current Outpatient Medications    Medication Sig    albuterol (PROAIR HFA) 90 mcg/actuation inhaler Inhale 2 puffs into the lungs every 6 (six) hours as needed for Wheezing.    atorvastatin (LIPITOR) 10 MG tablet Take 1 tablet (10 mg total) by mouth once daily.    cetirizine (ZYRTEC) 10 MG tablet Take 10 mg by mouth once daily.    clotrimazole-betamethasone 1-0.05% (LOTRISONE) cream Apply topically 2 (two) times daily.    diphenhydrAMINE (BENADRYL) 50 MG tablet Take 50 mg by mouth nightly as needed for Itching.    doxycycline (MONODOX) 100 MG capsule Take 1 capsule (100 mg total) by mouth 2 (two) times daily.    famotidine (PEPCID) 20 MG tablet Take 20 mg by mouth 2 (two) times daily.    fexofenadine (ALLEGRA) 60 MG tablet Take 60 mg by mouth once daily.    fluocinonide 0.05% (LIDEX) 0.05 % cream     FLUoxetine 20 MG tablet     HYDROcodone-acetaminophen (NORCO) 5-325 mg per tablet Take 1 tablet by mouth every 8 (eight) hours as needed for Pain.    HYDROcodone-acetaminophen (NORCO) 5-325 mg per tablet Take 1 tablet by mouth every 4 (four) hours as needed for Pain.    hydrocortisone 1 % cream Apply topically 2 (two) times daily.    hydrOXYzine HCl (ATARAX) 25 MG tablet Take 1 tablet (25 mg total) by mouth 3 (three) times daily as needed for Itching.    methylphenidate HCl (RITALIN) 20 MG tablet     nicotine (NICODERM CQ) 21 mg/24 hr Place 1 patch onto the skin once daily.    OLANZapine (ZYPREXA) 5 MG tablet     valsartan-hydrochlorothiazide (DIOVAN-HCT) 320-25 mg per tablet Take 1 tablet by mouth once daily.     No current facility-administered medications for this visit.        REVIEW OF SYSTEMS:    GENERAL:  No weight loss, malaise or fevers.  NECK:  Negative for lumps, no difficulty with swallowing.   RESPIRATORY:  No recent URI  CARDIOVASCULAR:  Negative for chest pain, leg swelling or palpitations.  GI:  Negative for abdominal discomfort, blood in stools or black stools or change in bowel habits.  Patient had a history of  stomach ulcer, never bleeding.    MUSCULOSKELETAL:  See HPI.  Patient reports having restless legs at night preventing her from being able to sleep.  SKIN: no new lesions  HEMATOLOGY/LYMPHOLOGY:  Negative for prolonged bleeding, bruising easily or swollen nodes.  Patient is not currently taking any anti-coagulants  NEURO:   No history of syncope, paralysis, seizures or tremors.  All other reviewed and negative other than HPI.    OBJECTIVE:    There were no vitals taken for this visit.    PHYSICAL EXAMINATION:    GENERAL: Well appearing, in no acute distress, alert and oriented x3.  PSYCH:  Mood and affect appropriate.  SKIN: Skin color, texture, turgor normal, no rashes or lesions.  Staples removed without difficulty.  No evidence of wound dehiscence, no evidence of seroma.  No  Swelling or induration.    HEAD/FACE:  Normocephalic, atraumatic. Cranial nerves grossly intact.  GAIT: Antalgic, ambulates without assistance.              ASSESSMENT: 63 y.o. year old female with neck and lower back pain, consistent with the following diagnoses:    1. Chronic pain syndrome     2. Sacroiliac joint pain     3. Chronic bilateral low back pain with left-sided sciatica          PLAN:     - She is s/p successful SCS implant with Dr. Lopez.    - Staples removed today.  No drainage or induration.  Continue Bacitracin BID.  She will call us with any swelling, drainage, fever.    - Continue with activity restrictions.    - RTC in 1 week.      The above plan and management options were discussed at length with patient. Patient is in agreement with the above and verbalized understanding.     Sadie Cowan  10/25/2018

## 2018-10-30 ENCOUNTER — PATIENT MESSAGE (OUTPATIENT)
Dept: ADMINISTRATIVE | Facility: HOSPITAL | Age: 63
End: 2018-10-30

## 2018-11-02 ENCOUNTER — OFFICE VISIT (OUTPATIENT)
Dept: PAIN MEDICINE | Facility: CLINIC | Age: 63
End: 2018-11-02
Payer: COMMERCIAL

## 2018-11-02 VITALS
BODY MASS INDEX: 43 KG/M2 | HEIGHT: 62 IN | RESPIRATION RATE: 18 BRPM | DIASTOLIC BLOOD PRESSURE: 92 MMHG | SYSTOLIC BLOOD PRESSURE: 140 MMHG | HEART RATE: 98 BPM | TEMPERATURE: 98 F | WEIGHT: 233.69 LBS

## 2018-11-02 DIAGNOSIS — M47.26 OSTEOARTHRITIS OF SPINE WITH RADICULOPATHY, LUMBAR REGION: ICD-10-CM

## 2018-11-02 DIAGNOSIS — G89.4 CHRONIC PAIN SYNDROME: Primary | ICD-10-CM

## 2018-11-02 DIAGNOSIS — M54.16 LUMBAR RADICULOPATHY: ICD-10-CM

## 2018-11-02 PROCEDURE — 99213 OFFICE O/P EST LOW 20 MIN: CPT | Mod: S$GLB,,, | Performed by: NURSE PRACTITIONER

## 2018-11-02 PROCEDURE — 3077F SYST BP >= 140 MM HG: CPT | Mod: CPTII,S$GLB,, | Performed by: NURSE PRACTITIONER

## 2018-11-02 PROCEDURE — 3080F DIAST BP >= 90 MM HG: CPT | Mod: CPTII,S$GLB,, | Performed by: NURSE PRACTITIONER

## 2018-11-02 PROCEDURE — 99999 PR PBB SHADOW E&M-EST. PATIENT-LVL III: CPT | Mod: PBBFAC,,, | Performed by: NURSE PRACTITIONER

## 2018-11-02 PROCEDURE — 3008F BODY MASS INDEX DOCD: CPT | Mod: CPTII,S$GLB,, | Performed by: NURSE PRACTITIONER

## 2018-11-02 RX ORDER — DOXYCYCLINE 100 MG/1
100 CAPSULE ORAL 2 TIMES DAILY
Qty: 14 CAPSULE | Refills: 0 | Status: SHIPPED | OUTPATIENT
Start: 2018-11-02 | End: 2018-11-09

## 2018-11-02 NOTE — PROGRESS NOTES
Referring Physician: No ref. provider found    Chief Complaint:   No chief complaint on file.       SUBJECTIVE:    Interval History 11/2/2018:  The patient presents for follow up.  She reports doing well with SCS device.  Her pain has been very mild.  She is not having any fever or malaise.  She has not noticed any drainage.  She completed PO antibiotics.  She has been applying Bacitracin BID.  Her pain today is 0/10.    Interval History 10/25/2018:  The patient returns for follow up and staple removal.  She reports feeling well- denies fever or malaise.  She completed oral antibiotics.  She is having benefit from SCS.  Wendy is here to meet with her today.  She has decreased her pain medications which she is happy about.  Her pain today is 4/10.    Interval History 10/22/2018:  The patient presents for wound check.  She is s/p lumbar SCS implant on 10/15/18.  She states that the surgery was painful for her but she has started feeling better.  She denies any fever or malaise.  She denies any drainage.  She is finishing antibiotics today.  Her pain today is 2/10.    Interval History 10/1/2018:  The patient returns for follow up and lead pull.  She is s/p SCS trial with 100% pain relief.  She would like to move forward with implant.  She denies any fever or malaise during the trial period.  Her pain today is 0/10.     Interval History 8/10/2018:  The patient presents today to discuss procedure for left sided back and leg pain.  Since her last OV, she underwent left L5 and S1 TF CIRILO in January.  Initially she thought that it did not help.  However, about one month later, she was having about 75% relief.  This lasted for about 2 months.  Prior to this, she had left L4 and L5 TF CIRILO in December.  At this point, she feels as though both injections have worn off.  Her pain is affecting her daily activities.  She states that she previously discussed SCS trial with Dr. Lopez and would like to proceed with this.  She  has seen Dr. Rashid and was not deemed a candidate for surgery.  Her pain today is 8/10.  The patient denies any bowel or bladder incontinence or signs of saddle paresthesia.      Interval history 01/15/2018  The patient returns to the clinic for a follow up visit, she is reporting pain of 10 /10 today.  She is s/p Left L4 and L5 TFESI performed on 12/20/17.  She estimates 100% pain relief for ~ 2 weeks.  Was able to be physically active during that time period, with significantly improved functional mobility.  Within past 2 weeks she has had recurrence of pain in same location, quality, intensity.   Continued pain in lumbar area radiating to the lateral/posterolateral thigh and to the posterior calf. Leg pain is worse than back pain. Additionally has developed pain at lateral hip area - greater trochanteric bursa. Now walking ~ 1 block.    Denies infection, new weakness, bowel/bladder dysfunction/incontinence, saddle anesthesia.     Interval history 12/01/2017   The patient returns to the clinic for a follow up visit, she is reporting pain of 5 /10 today.  She has been doing better since cervical surgery but has been having occasional radicular symptoms.  Currently she comes in for lower extremities radiculopathy predominantly in the left lower extremity in the L4-5 distribution.  She had an MRI of the lumbar spine which shows  cyst at the encroachment on the left L5 nerve root.  Additionally the patient has significant facet arthropathy throughout the lumbar spine    Interval history 04/02/2015:  Since previous encounter patient reports low back pain radiating down both lower extremities. Patient stated that she had Synvisc injection on 03/20/15 and this helped with her knee pain. Patient stated that she still has neck pain that radiates to both upper extremities. She stated that she needs a refill on her Gabapentin and Flexeril these medications have been helping with the pain. Patient also reported that she sees a  chiropractor for her back pain. Patient reports no other health changes since her last visit. Patient reports her pain 6/10 today.    Interval history 03/02/2015:  Since previous encounter the patient reported having had knee arthroscopy and was told to try euflexxa injections versus total knee replacement by her orthopedist.  She was originally scheduled to have her orthopedist inject her knee with corticosteroids this morning but due to scheduling conflict the patient was placed on my schedule for an injection.  Unfortunately the patient has also been actively treated for upper respiratory infection and is currently on antibiotics.  She has been continuing home exercises with regularity stating good days and bad days with regards to her knee pain.  Additionally she continues to have upper neck and bilateral shoulder pain upper extremity radiculopathy and lower back pain.  Since her arthroscopic surgery the patient has been having intermittent hives and has been placed by an allergist on hydroxyzine and famotidine but has not had complete relief of these symptoms.      Interval history 10/21/2014:  Since previous encounter patient was scheduled for an CIRILO but cancelled due to her having a cold. Patient reports she still has a cold. Patient reports neck radiating down her right arm with numbness and tingling. Patient stated on the way over her arm went numb. She states that driving is becoming harder as she feels she has a decreased hand  with opening of jars. Patient reports that she's been taking Mucinex and Thera Flu for her cold symptoms. Patient reports she been having hives which has been followed by an allergist with testing pending.  Additionally she has been seen by rheumatologist who diagnosed her with fibromyalgia.She states that she takes 6 caps of Gabapentin a day however she was titrating down down she states that when she gets to 4 caps a day she breaks out in hives and then quickly re escalates  back to 6 tablets per day. Patient states that she's starting to have pain at the bottom of her feet when walking on her rugs in her home she states that just started yesterday. Patient reports her pain 8/10.  She says that she has been increasing her protein intake and has been continuing to do exercises improving her range of motion in her shoulder and neck and states that overall she feels as if she is doing better.  Patient reports no other health changes since her last visit.     Interval history 8/25/2014:  Patient continues to have radicular symptoms in the neck radiating down to the right upper extremity.  Additionally since previous encounter she had a dental abscess which is being treated with accommodation of antibiotics and having had her tooth pulled.  She is still antibiotics and has a followup visit in mid-September with her dentist to evaluate efficacy of treatment.    Interval history 7/25/2014:  Since previous encounter the patient is on gabapentin 1800 mg per day and this is helping with her shoulder pain although she does state that she is having restless legs at night which is preventing her from being able to sleep.  Additionally she is having anterior thigh radicular pain.  She has had no other health changes she continues to do physical therapy exercises at home.  She reports her pain level is a 6/10 today.    Interval history 7/2/2014:  Patient is status post ACDF in 4/2014.  She is healing very well from surgery, but continues to have right shoulder pain which she had prior to her surgery despite having a labral repair.  Additionally she continues to have neuropathic symptoms in bilateral arms and in the axilla bilaterally.  She has been undergoing physical therapy and states that it does help her including heat ultrasound to the muscles of the neck.  She states that this is only temporary relief.  She continues to use the topical compounded pain cream which does help her.  After her  "surgery she discontinued taking gabapentin which was previously helping her and which she was tolerating without side effects.  She reports her pain as a 3/10 today.  She had been taking hydrocodone/acetaminophen 10/325 3 times a day when necessary as prescribed by her surgeon, but this is being discontinued.    Interval history 3/11/2014:  Since previous encounter patient reports that she had an episode where she felt as if her neck "locked up" in that now when she is waking up in the morning she has bilateral upper extremity numbness which improves throughout the day.  She does not report any weakness.  She states that she has been having persistent daily headaches from the occiput over the vertex of the supraorbital ridge bilaterally, and continues to have pain in the right side of her neck and shoulder.  She reports that she is still taking the gabapentin 1800 mg per day, and hydrocodone/acetaminophen when necessary which helps a little.    Interval history 2/24/2014:  Patient is status post cervical interlaminar epidural steroid injection by 2 on 1/24/2014 and 2/5/2014.  Patient has persisting radicular symptoms into her right upper extremity and shoulder the anterior aspect of the neck and base of the jaw.  Patient reported approximately 10% improvement in her pain symptoms after the first injection similar improvement in her pain symptoms after the second injection no sustained relief she states that the pain relief lasted her approximately one week and will go away.  She is taking gabapentin 300 mg 3 times a day, hydrocodone/acetaminophen 7.5/325 one tablet approximately 2-3 times per day.  She states that the Vicodin helps dull the pain but the does not eliminate it entirely, and she's not having any adverse problems in the gabapentin.  Patient continues in physical therapy for her right shoulder status post arthroscopic surgery, and has good range of motion in the shoulder.  Since previous exam the patient " has a sinus infection with congestion.    Interval history 1/21/2014:  Since reducing her patient was only able to have one of the cervical intralaminar epidural steroid injections which approximately help her 80% in her pain symptoms in the back of her neck, but she was also having symptoms into the right shoulder and had a rotator cuff tear which was repaired arthroscopically which prevented us from being able to perform the second cervical intralaminar epidural steroid injection on 12/23/2013.  Patient reports that she continues to do home exercises for her shoulder surgery but was unable to perform PT secondary to pain.  She states that her pain was actually better up until 1/12/2014 where she developed severe pain on the right side of her neck and posterior side of her neck going down the back of the scapula and also across the trapezius muscles of the shoulder.  Her MRI which was done in outside facility reports that she has severe C5-6 neural foraminal stenosis.  For her pain the patient has been taking hydrocodone/acetaminophen 7.5/325 as needed she reports there are occasional days where she takes it every 4 hours and some days where she doesn't take it at all.  Resting her head on the pillow helps alleviate the pain.  Sitting and working at computers when her pain is the worst.  She denies any problems with movement of her hands or weakness in her arms.  She has had no other health changes since previous encounter.    Initial history of present illness 10/2013:    Kaleigh Solo presents to the clinic for the evaluation of neck pain with radiation in right upper shoulder to the elbow, and down the right side of the chest wall. The pain started insidiously in the back of the neck in july  and symptoms have been worsening.  The pain has begun going into the shoulder, and there was swelling in the arm that began after starting Robaxin for muscle aches, and then took predisone taper and bactrim for the  possibility of infection which did not help.  Patient stopped her medications and the swelling improved.  US of the upper extremity was negative for DVT.   Patient takes vicodin for pain which she states helps only a little bit, and doesn't take the pain go away.  MRI of cervical spine revealed severe neuroforaminal stenosis on the right at C5-6 with central to right neural foraminal disc protrusion with generalized bulging discs and uncovertebral joint osteophytic change.  No abnormal signal in the cord.      Pain Description:    The pain is located in the neck and radiates to the right shoulder .  The pain is described as aching and tight band      Symptoms interfere with daily activity and sleeping.     Exacerbating factors: Sitting, Bending, Touching, Coughing/Sneezing, Eating, Night Time, Morning, Flexing and Lifting.      Mitigating factors heat, laying down and medications.     She reports 2 hours of uninterrupted sleep per night.    Patient denies night fever/night sweats, urinary incontinence, bowel incontinence, significant weight loss, significant motor weakness and loss of sensations.  Patient denies any suicidal or homicidal ideations    Pain Medications:  Current:  cymbalta recently started 60 mg daily  Robaxin 500 mg  Naproxen  norco 5-325 as needed  Gabapentin 2400 mg daily    Tried in Past:  Gabapentin  Hydrocodone  ibuprofen    Physical Therapy/Home Exercise: yes       report:  Not applicable    Pain Procedures:   12/20/17 Left L4 and L5 TF CIRILO- 75% relief  1/24/18 Left L5 and S1 TF CIRILO- 75% relief  9/25/18 Lumbar SCS trial- 100% relief  10/15/18 Lumbar SCS implant    Chiropractor -No treatments yet.  Right shoulder arthroscopy 12/10/2013  Right knee arthroscopy 12/19/14    Imaging:     10/7/2013  MRI of cervical spine revealed severe neuroforaminal stenosis on the right at C5-6 with central to right neural foraminal disc protrusion with generalized bulging discs and uncovertebral joint  osteophytic change.  No abnormal signal in the cord.  Minor disc bulging at C3-4, C4-5 without central canal stenosis, spinal cord impingement or neural foraminal stenosis.    CT chest: 10/02/2013  No definite acute process or obvious etiology of the right-sided chest pain, axillary pain, neck and supraclavicular pain.  (full report scanned into record)    Right upper extremity venous ultrasound 10/02/2013  No evidence of DVT  lumbar spine 11/28/2017  Impression     MRI LUMBAR SPINE    TECHNIQUE: MRI lumbar spine was performed without contrast. The following sequences were obtained: Localizer; sagittal T1, T2, STIR; axial T1 and T2.    COMPARISON: None.    FINDINGS:    There are 5 lumbar vertebrae.  Vertebral body heights and alignment are maintained.  Bone marrow signal is preserved.  There is multilevel disc desiccation with preservation of disc heights. Conus terminates at L1 and appears unremarkable. Limited evaluation of posterior abdominal structures is unremarkable.  Paraspinal musculature is within normal limits.  Evaluation of sacroiliac joints is unremarkable.    L1-L2: No spinal canal stenosis or neuroforaminal narrowing.    L2-L3: Mild bilateral facet arthropathy noted.  No spinal canal stenosis or neuroforaminal narrowing.    L3-L4: Mild bilateral facet arthropathy and circumferential disc bulge noted.  No spinal canal stenosis or neuroforaminal narrowing.    L4-L5: Severe bilateral facet arthropathy and circumferential disc bulge result in moderate spinal canal stenosis.    L5-S1: Circumferential disc bulge and severe bilateral facet arthropathy result in severe spinal canal stenosis.  Note made of synovial cyst in the left foramen, contacting the exiting L5 nerve root.     Lab Results   Component Value Date    WBC 8.04 09/24/2018    HGB 14.7 09/24/2018    HCT 45.3 09/24/2018    MCV 98 09/24/2018     09/24/2018       CMP  Sodium   Date Value Ref Range Status   09/24/2018 142 136 - 145 mmol/L  Final     Potassium   Date Value Ref Range Status   09/24/2018 4.1 3.5 - 5.1 mmol/L Final     Chloride   Date Value Ref Range Status   09/24/2018 103 95 - 110 mmol/L Final     CO2   Date Value Ref Range Status   09/24/2018 29 23 - 29 mmol/L Final     Glucose   Date Value Ref Range Status   09/24/2018 97 70 - 110 mg/dL Final     BUN, Bld   Date Value Ref Range Status   09/24/2018 11 8 - 23 mg/dL Final     Creatinine   Date Value Ref Range Status   09/24/2018 0.7 0.5 - 1.4 mg/dL Final     Calcium   Date Value Ref Range Status   09/24/2018 10.0 8.7 - 10.5 mg/dL Final     Total Protein   Date Value Ref Range Status   09/24/2018 7.2 6.0 - 8.4 g/dL Final     Albumin   Date Value Ref Range Status   09/24/2018 3.7 3.5 - 5.2 g/dL Final     Total Bilirubin   Date Value Ref Range Status   09/24/2018 0.4 0.1 - 1.0 mg/dL Final     Comment:     For infants and newborns, interpretation of results should be based  on gestational age, weight and in agreement with clinical  observations.  Premature Infant recommended reference ranges:  Up to 24 hours.............<8.0 mg/dL  Up to 48 hours............<12.0 mg/dL  3-5 days..................<15.0 mg/dL  6-29 days.................<15.0 mg/dL       Alkaline Phosphatase   Date Value Ref Range Status   09/24/2018 130 55 - 135 U/L Final     AST   Date Value Ref Range Status   09/24/2018 40 10 - 40 U/L Final     ALT   Date Value Ref Range Status   09/24/2018 114 (H) 10 - 44 U/L Final     Anion Gap   Date Value Ref Range Status   09/24/2018 10 8 - 16 mmol/L Final     eGFR if    Date Value Ref Range Status   09/24/2018 >60.0 >60 mL/min/1.73 m^2 Final     eGFR if non    Date Value Ref Range Status   09/24/2018 >60.0 >60 mL/min/1.73 m^2 Final     Comment:     Calculation used to obtain the estimated glomerular filtration  rate (eGFR) is the CKD-EPI equation.            Past Medical History:   Diagnosis Date    Arthritis     Asthma     Cervicalgia     2013 tx by  chiropractor    Closed dislocation of acromioclavicular joint 2013    Degenerative disc disease     Fatty liver     Fibromyalgia     GERD (gastroesophageal reflux disease)     HPV (human papilloma virus) anogenital infection     conization in past-remote history-1990    HTN (hypertension)     Sciatica     Tobacco use     Ulcer 2007    stomach ulcer     Past Surgical History:   Procedure Laterality Date    acdf  4/2014    C5-6    ARTHROSCOPY, SHOULDER Right 12/10/2013    Performed by Efrain Rankin MD at [facility], [place], operated by [facility] OR    ARTHROSCOPY, SHOULDER, WITH SUBACROMIAL SPACE DECOMPRESSION Right 12/10/2013    Performed by Efrain Rankin MD at [facility], [place], operated by [facility] OR    ARTHROSCOPY-KNEE Right 12/19/2014    Performed by Efrain Rankin MD at [facility], [place], operated by [facility] OR    BACK SURGERY      CERVICAL    CERVICAL CONIZATION   W/ LASER      CHONDROPLASTY-KNEE Right 12/19/2014    Performed by Efrain Rankin MD at [facility], [place], operated by [facility] OR    DISCECTOMY, SPINE, CERVICAL, ANTERIOR APPROACH, WITH FUSION N/A 4/7/2014    Performed by Turner [facility]id [facility] at [facility] OR 2ND FLR    INJECTION, STEROID, SPINE, CERVICAL, EPIDURAL N/A 2/5/2014    Performed by Candice Lopez MD at [facility], [place], operated by [facility] PAIN MGT    INJECTION, STEROID, SPINE, CERVICAL, EPIDURAL N/A 1/24/2014    Performed by Candice Lopez MD at [facility], [place], operated by [facility] PAIN MGT    INJECTION, STEROID, SPINE, CERVICAL, EPIDURAL N/A 11/27/2013    Performed by Candice Lopez MD at [facility], [place], operated by [facility] PAIN MGT    INJECTION-STEROID-EPIDURAL-TRANSFORAMINAL Left 1/24/2018    Performed by Candice Lopez MD at [facility], [place], operated by [facility] PAIN MGT    INJECTION-STEROID-EPIDURAL-TRANSFORAMINAL Left 12/20/2017    Performed by Candice Lopez MD at [facility], [place], operated by [facility] PAIN MGT    INSERTION, NEUROSTIMULATOR, SPINAL CORD STIMULATOR IMPLANT- WIRES AND BATTERY INTERNAL N/A 10/15/2018    Performed by Candice Lopez MD at [facility], [place], operated by [facility] OR    KNEE ARTHROSCOPY      REPAIR, LABRUM, HIP Right 12/10/2013    Performed by Efrain Rankin MD at [facility], [place], operated by [facility] OR    right shoulder surger      arthroscopic surgery  Dec 2013    TRIAL OF SPINAL CORD NERVE STIMULATOR N/A 2018    Procedure: TRIAL, NEUROSTIMULATOR, SPINAL CORD;  Surgeon: Candice Lopez MD;  Location: [facility];  Service: Pain Management;  Laterality: N/A;  SCS Trial  Pj Hankins notified of date and time    TRIAL, NEUROSTIMULATOR, SPINAL CORD N/A 2018    Performed by Candice Lopez MD at [facility]     Social History     Socioeconomic History    Marital status:      Spouse name: Not on file    Number of children: Not on file    Years of education: Not on file    Highest education level: Not on file   Social Needs    Financial resource strain: Not on file    Food insecurity - worry: Not on file    Food insecurity - inability: Not on file    Transportation needs - medical: Not on file    Transportation needs - non-medical: Not on file   Occupational History    Not on file   Tobacco Use    Smoking status: Current Every Day Smoker     Packs/day: 1.00     Years: 48.00     Pack years: 48.00     Types: Cigarettes    Smokeless tobacco: Never Used   Substance and Sexual Activity    Alcohol use: Yes     Comment: occasional    Drug use: No    Sexual activity: Not on file   Other Topics Concern    Are you pregnant or think you may be? Not Asked    Breast-feeding Not Asked   Social History Narrative    , not working, 3 children (1 passed at 9 mo old), tobacco daily, ETOH socially, GYN  dtrs of ecsar     Family History   Problem Relation Age of Onset    Cancer Mother         lung-was tobacco user  of lung cancer at 78    Aneurysm Father         abdominal burst-  of stroke at 71 years    Heart disease Father         had HTN       Allergies   Allergen Reactions    Ambien [Zolpidem] Other (See Comments)     Pt hyperactive    Lunesta [Eszopiclone] Other (See Comments)     Severely depressed    Shellfish Containing Products Swelling and Other (See Comments)     Metallic taste in mouth  Swells all  over,including the tongue and throat  Feels likes insides are swollen  Allergic to crawfish,Shrimp    Ancef [Cefazolin]      Facial swelling  Swelling abdomen    Flexeril [Cyclobenzaprine] Swelling    Gabapentin Hives    Sulfa (Sulfonamide Antibiotics) Nausea Only       Current Outpatient Medications   Medication Sig    albuterol (PROAIR HFA) 90 mcg/actuation inhaler Inhale 2 puffs into the lungs every 6 (six) hours as needed for Wheezing.    atorvastatin (LIPITOR) 10 MG tablet Take 1 tablet (10 mg total) by mouth once daily.    cetirizine (ZYRTEC) 10 MG tablet Take 10 mg by mouth once daily.    clotrimazole-betamethasone 1-0.05% (LOTRISONE) cream Apply topically 2 (two) times daily.    diphenhydrAMINE (BENADRYL) 50 MG tablet Take 50 mg by mouth nightly as needed for Itching.    famotidine (PEPCID) 20 MG tablet Take 20 mg by mouth 2 (two) times daily.    fexofenadine (ALLEGRA) 60 MG tablet Take 60 mg by mouth once daily.    fluocinonide 0.05% (LIDEX) 0.05 % cream     HYDROcodone-acetaminophen (NORCO) 5-325 mg per tablet Take 1 tablet by mouth every 8 (eight) hours as needed for Pain.    HYDROcodone-acetaminophen (NORCO) 5-325 mg per tablet Take 1 tablet by mouth every 4 (four) hours as needed for Pain.    hydrocortisone 1 % cream Apply topically 2 (two) times daily.    hydrOXYzine HCl (ATARAX) 25 MG tablet Take 1 tablet (25 mg total) by mouth 3 (three) times daily as needed for Itching.    methylphenidate HCl (RITALIN) 20 MG tablet     nicotine (NICODERM CQ) 21 mg/24 hr Place 1 patch onto the skin once daily.    OLANZapine (ZYPREXA) 5 MG tablet     valsartan-hydrochlorothiazide (DIOVAN-HCT) 320-25 mg per tablet Take 1 tablet by mouth once daily.    doxycycline (MONODOX) 100 MG capsule Take 1 capsule (100 mg total) by mouth 2 (two) times daily.    FLUoxetine 20 MG tablet      No current facility-administered medications for this visit.        REVIEW OF SYSTEMS:    GENERAL:  No weight loss,  "malaise or fevers.  NECK:  Negative for lumps, no difficulty with swallowing.   RESPIRATORY:  No recent URI  CARDIOVASCULAR:  Negative for chest pain, leg swelling or palpitations.  GI:  Negative for abdominal discomfort, blood in stools or black stools or change in bowel habits.  Patient had a history of stomach ulcer, never bleeding.    MUSCULOSKELETAL:  See HPI.   SKIN: no new lesions  HEMATOLOGY/LYMPHOLOGY:  Negative for prolonged bleeding, bruising easily or swollen nodes.  Patient is not currently taking any anti-coagulants  NEURO:   No history of syncope, paralysis, seizures or tremors.  All other reviewed and negative other than HPI.    OBJECTIVE:    BP (!) 140/92   Pulse 98   Temp 98.2 °F (36.8 °C) (Oral)   Resp 18   Ht 5' 2" (1.575 m)   Wt 106 kg (233 lb 11.2 oz)   BMI 42.74 kg/m²     PHYSICAL EXAMINATION:    GENERAL: Well appearing, in no acute distress, alert and oriented x3.  PSYCH:  Mood and affect appropriate.  SKIN: Skin color, texture, turgor normal, no rashes or lesions.  Midline incision not well approximated.  No drainage.  No induration- see below.  HEAD/FACE:  Normocephalic, atraumatic. Cranial nerves grossly intact.  GAIT: Antalgic, ambulates without assistance.                      ASSESSMENT: 63 y.o. year old female with neck and lower back pain, consistent with the following diagnoses:    1. Chronic pain syndrome     2. Lumbar radiculopathy     3. Osteoarthritis of spine with radiculopathy, lumbar region          PLAN:     - I cleaned the midline incision with alcohol.  I applied a Steristrip and Bacitracin.    - Restart Doxy 100 mg BID x7 days.    - She will contact our office or ED with any fever, malaise, or drainage.    - Continue with activity restrictions.    - RTC in 1 week.    - Dr. Lopez evaluated the patient and agrees with this plan.        The above plan and management options were discussed at length with patient. Patient is in agreement with the above and verbalized " understanding.     Sadie Cowan  11/02/2018

## 2018-11-08 ENCOUNTER — OFFICE VISIT (OUTPATIENT)
Dept: SPINE | Facility: CLINIC | Age: 63
End: 2018-11-08
Payer: COMMERCIAL

## 2018-11-08 VITALS
DIASTOLIC BLOOD PRESSURE: 85 MMHG | TEMPERATURE: 99 F | WEIGHT: 235 LBS | BODY MASS INDEX: 43.24 KG/M2 | HEIGHT: 62 IN | HEART RATE: 96 BPM | SYSTOLIC BLOOD PRESSURE: 129 MMHG

## 2018-11-08 DIAGNOSIS — T81.89XS DELAYED SURGICAL WOUND HEALING, SEQUELA: ICD-10-CM

## 2018-11-08 DIAGNOSIS — G89.4 CHRONIC PAIN SYNDROME: Primary | ICD-10-CM

## 2018-11-08 PROCEDURE — 99999 PR PBB SHADOW E&M-EST. PATIENT-LVL III: CPT | Mod: PBBFAC,,, | Performed by: NURSE PRACTITIONER

## 2018-11-08 PROCEDURE — 99024 POSTOP FOLLOW-UP VISIT: CPT | Mod: S$GLB,,, | Performed by: NURSE PRACTITIONER

## 2018-11-08 NOTE — PROGRESS NOTES
Referring Physician: No ref. provider found    Chief Complaint:   Chief Complaint   Patient presents with    Low-back Pain        SUBJECTIVE:    Interval History 11/8/2018:  Patient presents for wound check.  She did take PO antibiotics for 5 additional days but stopped due to significant nausea.  Otherwise, she has been feeling well.  She denies fever or malaise.  She has not noticed any drainage.  Her steristrips fell off 2 days ago and she applied Bacitracin and a bandage.  Her pain today is 0/10.    Interval History 11/2/2018:  The patient presents for follow up.  She reports doing well with SCS device.  Her pain has been very mild.  She is not having any fever or malaise.  She has not noticed any drainage.  She completed PO antibiotics.  She has been applying Bacitracin BID.  Her pain today is 0/10.    Interval History 10/25/2018:  The patient returns for follow up and staple removal.  She reports feeling well- denies fever or malaise.  She completed oral antibiotics.  She is having benefit from SCS.  Wendy is here to meet with her today.  She has decreased her pain medications which she is happy about.  Her pain today is 4/10.    Interval History 10/22/2018:  The patient presents for wound check.  She is s/p lumbar SCS implant on 10/15/18.  She states that the surgery was painful for her but she has started feeling better.  She denies any fever or malaise.  She denies any drainage.  She is finishing antibiotics today.  Her pain today is 2/10.    Interval History 10/1/2018:  The patient returns for follow up and lead pull.  She is s/p SCS trial with 100% pain relief.  She would like to move forward with implant.  She denies any fever or malaise during the trial period.  Her pain today is 0/10.     Interval History 8/10/2018:  The patient presents today to discuss procedure for left sided back and leg pain.  Since her last OV, she underwent left L5 and S1 TF CIRILO in January.  Initially she thought that it  did not help.  However, about one month later, she was having about 75% relief.  This lasted for about 2 months.  Prior to this, she had left L4 and L5 TF CIRILO in December.  At this point, she feels as though both injections have worn off.  Her pain is affecting her daily activities.  She states that she previously discussed SCS trial with Dr. Lopez and would like to proceed with this.  She has seen Dr. Rashid and was not deemed a candidate for surgery.  Her pain today is 8/10.  The patient denies any bowel or bladder incontinence or signs of saddle paresthesia.      Interval history 01/15/2018  The patient returns to the clinic for a follow up visit, she is reporting pain of 10 /10 today.  She is s/p Left L4 and L5 TFESI performed on 12/20/17.  She estimates 100% pain relief for ~ 2 weeks.  Was able to be physically active during that time period, with significantly improved functional mobility.  Within past 2 weeks she has had recurrence of pain in same location, quality, intensity.   Continued pain in lumbar area radiating to the lateral/posterolateral thigh and to the posterior calf. Leg pain is worse than back pain. Additionally has developed pain at lateral hip area - greater trochanteric bursa. Now walking ~ 1 block.    Denies infection, new weakness, bowel/bladder dysfunction/incontinence, saddle anesthesia.     Interval history 12/01/2017   The patient returns to the clinic for a follow up visit, she is reporting pain of 5 /10 today.  She has been doing better since cervical surgery but has been having occasional radicular symptoms.  Currently she comes in for lower extremities radiculopathy predominantly in the left lower extremity in the L4-5 distribution.  She had an MRI of the lumbar spine which shows  cyst at the encroachment on the left L5 nerve root.  Additionally the patient has significant facet arthropathy throughout the lumbar spine    Interval history 04/02/2015:  Since previous encounter patient  reports low back pain radiating down both lower extremities. Patient stated that she had Synvisc injection on 03/20/15 and this helped with her knee pain. Patient stated that she still has neck pain that radiates to both upper extremities. She stated that she needs a refill on her Gabapentin and Flexeril these medications have been helping with the pain. Patient also reported that she sees a chiropractor for her back pain. Patient reports no other health changes since her last visit. Patient reports her pain 6/10 today.    Interval history 03/02/2015:  Since previous encounter the patient reported having had knee arthroscopy and was told to try euflexxa injections versus total knee replacement by her orthopedist.  She was originally scheduled to have her orthopedist inject her knee with corticosteroids this morning but due to scheduling conflict the patient was placed on my schedule for an injection.  Unfortunately the patient has also been actively treated for upper respiratory infection and is currently on antibiotics.  She has been continuing home exercises with regularity stating good days and bad days with regards to her knee pain.  Additionally she continues to have upper neck and bilateral shoulder pain upper extremity radiculopathy and lower back pain.  Since her arthroscopic surgery the patient has been having intermittent hives and has been placed by an allergist on hydroxyzine and famotidine but has not had complete relief of these symptoms.      Interval history 10/21/2014:  Since previous encounter patient was scheduled for an CIRILO but cancelled due to her having a cold. Patient reports she still has a cold. Patient reports neck radiating down her right arm with numbness and tingling. Patient stated on the way over her arm went numb. She states that driving is becoming harder as she feels she has a decreased hand  with opening of jars. Patient reports that she's been taking Mucinex and Thera Flu for  her cold symptoms. Patient reports she been having hives which has been followed by an allergist with testing pending.  Additionally she has been seen by rheumatologist who diagnosed her with fibromyalgia.She states that she takes 6 caps of Gabapentin a day however she was titrating down down she states that when she gets to 4 caps a day she breaks out in hives and then quickly re escalates back to 6 tablets per day. Patient states that she's starting to have pain at the bottom of her feet when walking on her rugs in her home she states that just started yesterday. Patient reports her pain 8/10.  She says that she has been increasing her protein intake and has been continuing to do exercises improving her range of motion in her shoulder and neck and states that overall she feels as if she is doing better.  Patient reports no other health changes since her last visit.     Interval history 8/25/2014:  Patient continues to have radicular symptoms in the neck radiating down to the right upper extremity.  Additionally since previous encounter she had a dental abscess which is being treated with accommodation of antibiotics and having had her tooth pulled.  She is still antibiotics and has a followup visit in mid-September with her dentist to evaluate efficacy of treatment.    Interval history 7/25/2014:  Since previous encounter the patient is on gabapentin 1800 mg per day and this is helping with her shoulder pain although she does state that she is having restless legs at night which is preventing her from being able to sleep.  Additionally she is having anterior thigh radicular pain.  She has had no other health changes she continues to do physical therapy exercises at home.  She reports her pain level is a 6/10 today.    Interval history 7/2/2014:  Patient is status post ACDF in 4/2014.  She is healing very well from surgery, but continues to have right shoulder pain which she had prior to her surgery despite having  "a labral repair.  Additionally she continues to have neuropathic symptoms in bilateral arms and in the axilla bilaterally.  She has been undergoing physical therapy and states that it does help her including heat ultrasound to the muscles of the neck.  She states that this is only temporary relief.  She continues to use the topical compounded pain cream which does help her.  After her surgery she discontinued taking gabapentin which was previously helping her and which she was tolerating without side effects.  She reports her pain as a 3/10 today.  She had been taking hydrocodone/acetaminophen 10/325 3 times a day when necessary as prescribed by her surgeon, but this is being discontinued.    Interval history 3/11/2014:  Since previous encounter patient reports that she had an episode where she felt as if her neck "locked up" in that now when she is waking up in the morning she has bilateral upper extremity numbness which improves throughout the day.  She does not report any weakness.  She states that she has been having persistent daily headaches from the occiput over the vertex of the supraorbital ridge bilaterally, and continues to have pain in the right side of her neck and shoulder.  She reports that she is still taking the gabapentin 1800 mg per day, and hydrocodone/acetaminophen when necessary which helps a little.    Interval history 2/24/2014:  Patient is status post cervical interlaminar epidural steroid injection by 2 on 1/24/2014 and 2/5/2014.  Patient has persisting radicular symptoms into her right upper extremity and shoulder the anterior aspect of the neck and base of the jaw.  Patient reported approximately 10% improvement in her pain symptoms after the first injection similar improvement in her pain symptoms after the second injection no sustained relief she states that the pain relief lasted her approximately one week and will go away.  She is taking gabapentin 300 mg 3 times a day, " hydrocodone/acetaminophen 7.5/325 one tablet approximately 2-3 times per day.  She states that the Vicodin helps dull the pain but the does not eliminate it entirely, and she's not having any adverse problems in the gabapentin.  Patient continues in physical therapy for her right shoulder status post arthroscopic surgery, and has good range of motion in the shoulder.  Since previous exam the patient has a sinus infection with congestion.    Interval history 1/21/2014:  Since reducing her patient was only able to have one of the cervical intralaminar epidural steroid injections which approximately help her 80% in her pain symptoms in the back of her neck, but she was also having symptoms into the right shoulder and had a rotator cuff tear which was repaired arthroscopically which prevented us from being able to perform the second cervical intralaminar epidural steroid injection on 12/23/2013.  Patient reports that she continues to do home exercises for her shoulder surgery but was unable to perform PT secondary to pain.  She states that her pain was actually better up until 1/12/2014 where she developed severe pain on the right side of her neck and posterior side of her neck going down the back of the scapula and also across the trapezius muscles of the shoulder.  Her MRI which was done in outside facility reports that she has severe C5-6 neural foraminal stenosis.  For her pain the patient has been taking hydrocodone/acetaminophen 7.5/325 as needed she reports there are occasional days where she takes it every 4 hours and some days where she doesn't take it at all.  Resting her head on the pillow helps alleviate the pain.  Sitting and working at computers when her pain is the worst.  She denies any problems with movement of her hands or weakness in her arms.  She has had no other health changes since previous encounter.    Initial history of present illness 10/2013:    Kaleigh Solo presents to the clinic for the  evaluation of neck pain with radiation in right upper shoulder to the elbow, and down the right side of the chest wall. The pain started insidiously in the back of the neck in july  and symptoms have been worsening.  The pain has begun going into the shoulder, and there was swelling in the arm that began after starting Robaxin for muscle aches, and then took predisone taper and bactrim for the possibility of infection which did not help.  Patient stopped her medications and the swelling improved.  US of the upper extremity was negative for DVT.   Patient takes vicodin for pain which she states helps only a little bit, and doesn't take the pain go away.  MRI of cervical spine revealed severe neuroforaminal stenosis on the right at C5-6 with central to right neural foraminal disc protrusion with generalized bulging discs and uncovertebral joint osteophytic change.  No abnormal signal in the cord.      Pain Description:    The pain is located in the neck and radiates to the right shoulder .  The pain is described as aching and tight band      Symptoms interfere with daily activity and sleeping.     Exacerbating factors: Sitting, Bending, Touching, Coughing/Sneezing, Eating, Night Time, Morning, Flexing and Lifting.      Mitigating factors heat, laying down and medications.     She reports 2 hours of uninterrupted sleep per night.    Patient denies night fever/night sweats, urinary incontinence, bowel incontinence, significant weight loss, significant motor weakness and loss of sensations.  Patient denies any suicidal or homicidal ideations    Pain Medications:  Current:  cymbalta recently started 60 mg daily  Robaxin 500 mg  Naproxen  norco 5-325 as needed  Gabapentin 2400 mg daily    Tried in Past:  Gabapentin  Hydrocodone  ibuprofen    Physical Therapy/Home Exercise: yes       report:  Not applicable    Pain Procedures:   12/20/17 Left L4 and L5 TF CIRILO- 75% relief  1/24/18 Left L5 and S1 TF CIRILO- 75%  relief  9/25/18 Lumbar SCS trial- 100% relief  10/15/18 Lumbar SCS implant    Chiropractor -No treatments yet.  Right shoulder arthroscopy 12/10/2013  Right knee arthroscopy 12/19/14    Imaging:     10/7/2013  MRI of cervical spine revealed severe neuroforaminal stenosis on the right at C5-6 with central to right neural foraminal disc protrusion with generalized bulging discs and uncovertebral joint osteophytic change.  No abnormal signal in the cord.  Minor disc bulging at C3-4, C4-5 without central canal stenosis, spinal cord impingement or neural foraminal stenosis.    CT chest: 10/02/2013  No definite acute process or obvious etiology of the right-sided chest pain, axillary pain, neck and supraclavicular pain.  (full report scanned into record)    Right upper extremity venous ultrasound 10/02/2013  No evidence of DVT  lumbar spine 11/28/2017  Impression     MRI LUMBAR SPINE    TECHNIQUE: MRI lumbar spine was performed without contrast. The following sequences were obtained: Localizer; sagittal T1, T2, STIR; axial T1 and T2.    COMPARISON: None.    FINDINGS:    There are 5 lumbar vertebrae.  Vertebral body heights and alignment are maintained.  Bone marrow signal is preserved.  There is multilevel disc desiccation with preservation of disc heights. Conus terminates at L1 and appears unremarkable. Limited evaluation of posterior abdominal structures is unremarkable.  Paraspinal musculature is within normal limits.  Evaluation of sacroiliac joints is unremarkable.    L1-L2: No spinal canal stenosis or neuroforaminal narrowing.    L2-L3: Mild bilateral facet arthropathy noted.  No spinal canal stenosis or neuroforaminal narrowing.    L3-L4: Mild bilateral facet arthropathy and circumferential disc bulge noted.  No spinal canal stenosis or neuroforaminal narrowing.    L4-L5: Severe bilateral facet arthropathy and circumferential disc bulge result in moderate spinal canal stenosis.    L5-S1: Circumferential disc bulge  and severe bilateral facet arthropathy result in severe spinal canal stenosis.  Note made of synovial cyst in the left foramen, contacting the exiting L5 nerve root.     Lab Results   Component Value Date    WBC 8.04 09/24/2018    HGB 14.7 09/24/2018    HCT 45.3 09/24/2018    MCV 98 09/24/2018     09/24/2018       CMP  Sodium   Date Value Ref Range Status   09/24/2018 142 136 - 145 mmol/L Final     Potassium   Date Value Ref Range Status   09/24/2018 4.1 3.5 - 5.1 mmol/L Final     Chloride   Date Value Ref Range Status   09/24/2018 103 95 - 110 mmol/L Final     CO2   Date Value Ref Range Status   09/24/2018 29 23 - 29 mmol/L Final     Glucose   Date Value Ref Range Status   09/24/2018 97 70 - 110 mg/dL Final     BUN, Bld   Date Value Ref Range Status   09/24/2018 11 8 - 23 mg/dL Final     Creatinine   Date Value Ref Range Status   09/24/2018 0.7 0.5 - 1.4 mg/dL Final     Calcium   Date Value Ref Range Status   09/24/2018 10.0 8.7 - 10.5 mg/dL Final     Total Protein   Date Value Ref Range Status   09/24/2018 7.2 6.0 - 8.4 g/dL Final     Albumin   Date Value Ref Range Status   09/24/2018 3.7 3.5 - 5.2 g/dL Final     Total Bilirubin   Date Value Ref Range Status   09/24/2018 0.4 0.1 - 1.0 mg/dL Final     Comment:     For infants and newborns, interpretation of results should be based  on gestational age, weight and in agreement with clinical  observations.  Premature Infant recommended reference ranges:  Up to 24 hours.............<8.0 mg/dL  Up to 48 hours............<12.0 mg/dL  3-5 days..................<15.0 mg/dL  6-29 days.................<15.0 mg/dL       Alkaline Phosphatase   Date Value Ref Range Status   09/24/2018 130 55 - 135 U/L Final     AST   Date Value Ref Range Status   09/24/2018 40 10 - 40 U/L Final     ALT   Date Value Ref Range Status   09/24/2018 114 (H) 10 - 44 U/L Final     Anion Gap   Date Value Ref Range Status   09/24/2018 10 8 - 16 mmol/L Final     eGFR if    Date  Value Ref Range Status   09/24/2018 >60.0 >60 mL/min/1.73 m^2 Final     eGFR if non    Date Value Ref Range Status   09/24/2018 >60.0 >60 mL/min/1.73 m^2 Final     Comment:     Calculation used to obtain the estimated glomerular filtration  rate (eGFR) is the CKD-EPI equation.            Past Medical History:   Diagnosis Date    Arthritis     Asthma     Cervicalgia     2013 tx by chiropractor    Closed dislocation of acromioclavicular joint 2013    Degenerative disc disease     Fatty liver     Fibromyalgia     GERD (gastroesophageal reflux disease)     HPV (human papilloma virus) anogenital infection     conization in past-remote history-1990    HTN (hypertension)     Sciatica     Tobacco use     Ulcer 2007    stomach ulcer     Past Surgical History:   Procedure Laterality Date    acdf  4/2014    C5-6    ARTHROSCOPY, SHOULDER Right 12/10/2013    [facility] at [facility]    ARTHROSCOPY, SHOULDER, WITH SUBACROMIAL SPACE DECOMPRESSION Right 12/10/2013    [facility] at [facility] OR    ARTHROSCOPY-KNEE Right 12/19/2014    [facility] at [facility] OR    BACK SURGERY      CERVICAL    CERVICAL CONIZATION   W/ LASER      CHONDROPLASTY-KNEE Right 12/19/2014    Per[facility] at [facility] OR    DISCECTOMY, SPINE, CERVICAL, ANTERIOR APPROACH, WITH FUSION N/A 4/7/2014    [facility] OR 2ND FLR    INJECTION, STEROID, SPINE, CERVICAL, EPIDURAL N/A 2/5/2014    Performed by Candice Lopez [facility] at [facility]    INJECTION, STEROID, SPINE, CERVICAL, EPIDURAL N/A 1/24/2014    Per[facility] at [facility]    INJECTION, STEROID, SPINE, CERVICAL, EPIDURAL N/A 11/27/2013    Per[facility] by [facility] at [facility]    INJECTION-STEROID-EPIDURAL-TRANSFORAMINAL Left 1/24/2018    Performed [facility] at [facility]     INJECTION-STEROID-EPIDURAL-TRANSFORAMINAL Left 12/20/2017    Per[facility] at [facility]    INSERTION, NEUROSTIMULATOR, SPINAL CORD STIMULATOR IMPLANT- WIRES AND BATTERY INTERNAL N/A 10/15/2018    Performed by [facility] Lopez [facility] at [facility] OR    KNEE ARTHROSCOPY      REPAIR, LABRUM, HIP Right 12/10/2013    Per[facility] by [facility] at [facility] OR    right shoulder surger      arthroscopic surgery Dec 2013    TRIAL OF SPINAL CORD NERVE STIMULATOR N/A 9/25/2018    Procedure: TRIAL, NEUROSTIMULATOR, SPINAL CORD;  Surgeon: Candice Lopez MD;  Location: [facility] PAIN [facility];  Service: Pain Management;  Laterality: N/A;  SCS Trial  Pj Hankins notified of date and time    TRIAL, NEUROSTIMULATOR, SPINAL CORD N/A 9/25/2018    Performed by Candice Lopez [facility] at [facility]     Social History     Socioeconomic History    Marital status:      Spouse name: Not on file    Number of children: Not on file    Years of education: Not on file    Highest education level: Not on file   Social Needs    Financial resource strain: Not on file    Food insecurity - worry: Not on file    Food insecurity - inability: Not on file    Transportation needs - medical: Not on file    Transportation needs - non-medical: Not on file   Occupational History    Not on file   Tobacco Use    Smoking status: Current Every Day Smoker     Packs/day: 1.00     Years: 48.00     Pack years: 48.00     Types: Cigarettes    Smokeless tobacco: Never Used   Substance and Sexual Activity    Alcohol use: Yes     Comment: occasional    Drug use: No    Sexual activity: Not on file   Other Topics Concern    Are you pregnant or think you may be? Not Asked    Breast-feeding Not Asked   Social History Narrative    , not working, 3 children (1 passed at 9 mo old), tobacco daily, ETOH socially, GYN 2013 dtrs of [facility]     Family History   Problem Relation Age of Onset    Cancer Mother         lung-was  tobacco user  of lung cancer at 78    Aneurysm Father         abdominal burst-  of stroke at 71 years    Heart disease Father         had HTN       Allergies   Allergen Reactions    Ambien [Zolpidem] Other (See Comments)     Pt hyperactive    Lunesta [Eszopiclone] Other (See Comments)     Severely depressed    Shellfish Containing Products Swelling and Other (See Comments)     Metallic taste in mouth  Swells all over,including the tongue and throat  Feels likes insides are swollen  Allergic to crawfish,Shrimp    Ancef [Cefazolin]      Facial swelling  Swelling abdomen    Flexeril [Cyclobenzaprine] Swelling    Gabapentin Hives    Sulfa (Sulfonamide Antibiotics) Nausea Only       Current Outpatient Medications   Medication Sig    albuterol (PROAIR HFA) 90 mcg/actuation inhaler Inhale 2 puffs into the lungs every 6 (six) hours as needed for Wheezing.    atorvastatin (LIPITOR) 10 MG tablet Take 1 tablet (10 mg total) by mouth once daily.    cetirizine (ZYRTEC) 10 MG tablet Take 10 mg by mouth once daily.    clotrimazole-betamethasone 1-0.05% (LOTRISONE) cream Apply topically 2 (two) times daily.    diphenhydrAMINE (BENADRYL) 50 MG tablet Take 50 mg by mouth nightly as needed for Itching.    famotidine (PEPCID) 20 MG tablet Take 20 mg by mouth 2 (two) times daily.    fexofenadine (ALLEGRA) 60 MG tablet Take 60 mg by mouth once daily.    fluocinonide 0.05% (LIDEX) 0.05 % cream     FLUoxetine 20 MG tablet     HYDROcodone-acetaminophen (NORCO) 5-325 mg per tablet Take 1 tablet by mouth every 8 (eight) hours as needed for Pain.    HYDROcodone-acetaminophen (NORCO) 5-325 mg per tablet Take 1 tablet by mouth every 4 (four) hours as needed for Pain.    hydrocortisone 1 % cream Apply topically 2 (two) times daily.    hydrOXYzine HCl (ATARAX) 25 MG tablet Take 1 tablet (25 mg total) by mouth 3 (three) times daily as needed for Itching.    methylphenidate HCl (RITALIN) 20 MG tablet     nicotine  "(NICODERM CQ) 21 mg/24 hr Place 1 patch onto the skin once daily.    OLANZapine (ZYPREXA) 5 MG tablet     valsartan-hydrochlorothiazide (DIOVAN-HCT) 320-25 mg per tablet Take 1 tablet by mouth once daily.    doxycycline (MONODOX) 100 MG capsule Take 1 capsule (100 mg total) by mouth 2 (two) times daily. for 7 days     No current facility-administered medications for this visit.        REVIEW OF SYSTEMS:    GENERAL:  No weight loss, malaise or fevers.  NECK:  Negative for lumps, no difficulty with swallowing.   RESPIRATORY:  No recent URI  CARDIOVASCULAR:  Negative for chest pain, leg swelling or palpitations.  GI:  Negative for abdominal discomfort, blood in stools or black stools or change in bowel habits.  Patient had a history of stomach ulcer, never bleeding.    MUSCULOSKELETAL:  See HPI.   SKIN: no new lesions  HEMATOLOGY/LYMPHOLOGY:  Negative for prolonged bleeding, bruising easily or swollen nodes.  Patient is not currently taking any anti-coagulants  NEURO:   No history of syncope, paralysis, seizures or tremors.  All other reviewed and negative other than HPI.    OBJECTIVE:    /85   Pulse 96   Temp 98.5 °F (36.9 °C) (Oral)   Ht 5' 2" (1.575 m)   Wt 106.6 kg (235 lb)   BMI 42.98 kg/m²     PHYSICAL EXAMINATION:    GENERAL: Well appearing, in no acute distress, alert and oriented x3.  PSYCH:  Mood and affect appropriate.  SKIN: Skin color, texture, turgor normal, no rashes or lesions.  Midline incision not well approximated.  No drainage.  No induration.  Visible granulation tissues.  See picture below.  HEAD/FACE:  Normocephalic, atraumatic. Cranial nerves grossly intact.  GAIT: Antalgic, ambulates without assistance.                              ASSESSMENT: 63 y.o. year old female with neck and lower back pain, consistent with the following diagnoses:    1. Chronic pain syndrome     2. Delayed surgical wound healing, sequela          PLAN:     - I cleaned the midline incision with alcohol.  I " applied a Steristrip.  She appears to have granulation tissue.  I did send a picture to Dr. Lopez and he is aware.    - No additional oral antibiotics.      - She will contact our office or ED with any fever, malaise, or drainage.    - Continue with activity restrictions.    - RTC in 1 week.    - Dr. Lopez was consulted on the patient and agrees with this plan.        The above plan and management options were discussed at length with patient. Patient is in agreement with the above and verbalized understanding.     Sadie Cowan  11/08/2018

## 2018-11-14 ENCOUNTER — PATIENT MESSAGE (OUTPATIENT)
Dept: PAIN MEDICINE | Facility: CLINIC | Age: 63
End: 2018-11-14

## 2018-11-14 ENCOUNTER — OFFICE VISIT (OUTPATIENT)
Dept: PAIN MEDICINE | Facility: CLINIC | Age: 63
End: 2018-11-14
Payer: COMMERCIAL

## 2018-11-14 VITALS
DIASTOLIC BLOOD PRESSURE: 98 MMHG | WEIGHT: 125.88 LBS | SYSTOLIC BLOOD PRESSURE: 134 MMHG | HEIGHT: 61 IN | BODY MASS INDEX: 23.77 KG/M2 | HEART RATE: 121 BPM | RESPIRATION RATE: 18 BRPM | TEMPERATURE: 98 F

## 2018-11-14 DIAGNOSIS — M47.26 OSTEOARTHRITIS OF SPINE WITH RADICULOPATHY, LUMBAR REGION: ICD-10-CM

## 2018-11-14 DIAGNOSIS — G89.4 CHRONIC PAIN SYNDROME: Primary | ICD-10-CM

## 2018-11-14 DIAGNOSIS — T14.8XXD DELAYED WOUND HEALING: ICD-10-CM

## 2018-11-14 DIAGNOSIS — M54.16 LUMBAR RADICULOPATHY: ICD-10-CM

## 2018-11-14 PROCEDURE — 99024 POSTOP FOLLOW-UP VISIT: CPT | Mod: S$GLB,,, | Performed by: NURSE PRACTITIONER

## 2018-11-14 PROCEDURE — 99999 PR PBB SHADOW E&M-EST. PATIENT-LVL III: CPT | Mod: PBBFAC,,, | Performed by: NURSE PRACTITIONER

## 2018-11-14 NOTE — PROGRESS NOTES
Referring Physician: No ref. provider found    Chief Complaint:   Chief Complaint   Patient presents with    Low-back Pain        SUBJECTIVE:    Interval History 11/24/2018:  The patient returns for wound check follow up.  She reports feeling better overall.  She denies fever or drainage.  Her pain today is 0/10.    Interval History 11/8/2018:  Patient presents for wound check.  She did take PO antibiotics for 5 additional days but stopped due to significant nausea.  Otherwise, she has been feeling well.  She denies fever or malaise.  She has not noticed any drainage.  Her steristrips fell off 2 days ago and she applied Bacitracin and a bandage.  Her pain today is 0/10.    Interval History 11/2/2018:  The patient presents for follow up.  She reports doing well with SCS device.  Her pain has been very mild.  She is not having any fever or malaise.  She has not noticed any drainage.  She completed PO antibiotics.  She has been applying Bacitracin BID.  Her pain today is 0/10.    Interval History 10/25/2018:  The patient returns for follow up and staple removal.  She reports feeling well- denies fever or malaise.  She completed oral antibiotics.  She is having benefit from SCS.  Wendy is here to meet with her today.  She has decreased her pain medications which she is happy about.  Her pain today is 4/10.    Interval History 10/22/2018:  The patient presents for wound check.  She is s/p lumbar SCS implant on 10/15/18.  She states that the surgery was painful for her but she has started feeling better.  She denies any fever or malaise.  She denies any drainage.  She is finishing antibiotics today.  Her pain today is 2/10.    Interval History 10/1/2018:  The patient returns for follow up and lead pull.  She is s/p SCS trial with 100% pain relief.  She would like to move forward with implant.  She denies any fever or malaise during the trial period.  Her pain today is 0/10.     Interval History 8/10/2018:  The  patient presents today to discuss procedure for left sided back and leg pain.  Since her last OV, she underwent left L5 and S1 TF CIRILO in January.  Initially she thought that it did not help.  However, about one month later, she was having about 75% relief.  This lasted for about 2 months.  Prior to this, she had left L4 and L5 TF CIRILO in December.  At this point, she feels as though both injections have worn off.  Her pain is affecting her daily activities.  She states that she previously discussed SCS trial with Dr. Lopez and would like to proceed with this.  She has seen Dr. Rashid and was not deemed a candidate for surgery.  Her pain today is 8/10.  The patient denies any bowel or bladder incontinence or signs of saddle paresthesia.      Interval history 01/15/2018  The patient returns to the clinic for a follow up visit, she is reporting pain of 10 /10 today.  She is s/p Left L4 and L5 TFESI performed on 12/20/17.  She estimates 100% pain relief for ~ 2 weeks.  Was able to be physically active during that time period, with significantly improved functional mobility.  Within past 2 weeks she has had recurrence of pain in same location, quality, intensity.   Continued pain in lumbar area radiating to the lateral/posterolateral thigh and to the posterior calf. Leg pain is worse than back pain. Additionally has developed pain at lateral hip area - greater trochanteric bursa. Now walking ~ 1 block.    Denies infection, new weakness, bowel/bladder dysfunction/incontinence, saddle anesthesia.     Interval history 12/01/2017   The patient returns to the clinic for a follow up visit, she is reporting pain of 5 /10 today.  She has been doing better since cervical surgery but has been having occasional radicular symptoms.  Currently she comes in for lower extremities radiculopathy predominantly in the left lower extremity in the L4-5 distribution.  She had an MRI of the lumbar spine which shows  cyst at the encroachment on  the left L5 nerve root.  Additionally the patient has significant facet arthropathy throughout the lumbar spine    Interval history 04/02/2015:  Since previous encounter patient reports low back pain radiating down both lower extremities. Patient stated that she had Synvisc injection on 03/20/15 and this helped with her knee pain. Patient stated that she still has neck pain that radiates to both upper extremities. She stated that she needs a refill on her Gabapentin and Flexeril these medications have been helping with the pain. Patient also reported that she sees a chiropractor for her back pain. Patient reports no other health changes since her last visit. Patient reports her pain 6/10 today.    Interval history 03/02/2015:  Since previous encounter the patient reported having had knee arthroscopy and was told to try euflexxa injections versus total knee replacement by her orthopedist.  She was originally scheduled to have her orthopedist inject her knee with corticosteroids this morning but due to scheduling conflict the patient was placed on my schedule for an injection.  Unfortunately the patient has also been actively treated for upper respiratory infection and is currently on antibiotics.  She has been continuing home exercises with regularity stating good days and bad days with regards to her knee pain.  Additionally she continues to have upper neck and bilateral shoulder pain upper extremity radiculopathy and lower back pain.  Since her arthroscopic surgery the patient has been having intermittent hives and has been placed by an allergist on hydroxyzine and famotidine but has not had complete relief of these symptoms.      Interval history 10/21/2014:  Since previous encounter patient was scheduled for an CIRILO but cancelled due to her having a cold. Patient reports she still has a cold. Patient reports neck radiating down her right arm with numbness and tingling. Patient stated on the way over her arm went  numb. She states that driving is becoming harder as she feels she has a decreased hand  with opening of jars. Patient reports that she's been taking Mucinex and Thera Flu for her cold symptoms. Patient reports she been having hives which has been followed by an allergist with testing pending.  Additionally she has been seen by rheumatologist who diagnosed her with fibromyalgia.She states that she takes 6 caps of Gabapentin a day however she was titrating down down she states that when she gets to 4 caps a day she breaks out in hives and then quickly re escalates back to 6 tablets per day. Patient states that she's starting to have pain at the bottom of her feet when walking on her rugs in her home she states that just started yesterday. Patient reports her pain 8/10.  She says that she has been increasing her protein intake and has been continuing to do exercises improving her range of motion in her shoulder and neck and states that overall she feels as if she is doing better.  Patient reports no other health changes since her last visit.     Interval history 8/25/2014:  Patient continues to have radicular symptoms in the neck radiating down to the right upper extremity.  Additionally since previous encounter she had a dental abscess which is being treated with accommodation of antibiotics and having had her tooth pulled.  She is still antibiotics and has a followup visit in mid-September with her dentist to evaluate efficacy of treatment.    Interval history 7/25/2014:  Since previous encounter the patient is on gabapentin 1800 mg per day and this is helping with her shoulder pain although she does state that she is having restless legs at night which is preventing her from being able to sleep.  Additionally she is having anterior thigh radicular pain.  She has had no other health changes she continues to do physical therapy exercises at home.  She reports her pain level is a 6/10 today.    Interval history  "7/2/2014:  Patient is status post ACDF in 4/2014.  She is healing very well from surgery, but continues to have right shoulder pain which she had prior to her surgery despite having a labral repair.  Additionally she continues to have neuropathic symptoms in bilateral arms and in the axilla bilaterally.  She has been undergoing physical therapy and states that it does help her including heat ultrasound to the muscles of the neck.  She states that this is only temporary relief.  She continues to use the topical compounded pain cream which does help her.  After her surgery she discontinued taking gabapentin which was previously helping her and which she was tolerating without side effects.  She reports her pain as a 3/10 today.  She had been taking hydrocodone/acetaminophen 10/325 3 times a day when necessary as prescribed by her surgeon, but this is being discontinued.    Interval history 3/11/2014:  Since previous encounter patient reports that she had an episode where she felt as if her neck "locked up" in that now when she is waking up in the morning she has bilateral upper extremity numbness which improves throughout the day.  She does not report any weakness.  She states that she has been having persistent daily headaches from the occiput over the vertex of the supraorbital ridge bilaterally, and continues to have pain in the right side of her neck and shoulder.  She reports that she is still taking the gabapentin 1800 mg per day, and hydrocodone/acetaminophen when necessary which helps a little.    Interval history 2/24/2014:  Patient is status post cervical interlaminar epidural steroid injection by 2 on 1/24/2014 and 2/5/2014.  Patient has persisting radicular symptoms into her right upper extremity and shoulder the anterior aspect of the neck and base of the jaw.  Patient reported approximately 10% improvement in her pain symptoms after the first injection similar improvement in her pain symptoms after the " second injection no sustained relief she states that the pain relief lasted her approximately one week and will go away.  She is taking gabapentin 300 mg 3 times a day, hydrocodone/acetaminophen 7.5/325 one tablet approximately 2-3 times per day.  She states that the Vicodin helps dull the pain but the does not eliminate it entirely, and she's not having any adverse problems in the gabapentin.  Patient continues in physical therapy for her right shoulder status post arthroscopic surgery, and has good range of motion in the shoulder.  Since previous exam the patient has a sinus infection with congestion.    Interval history 1/21/2014:  Since reducing her patient was only able to have one of the cervical intralaminar epidural steroid injections which approximately help her 80% in her pain symptoms in the back of her neck, but she was also having symptoms into the right shoulder and had a rotator cuff tear which was repaired arthroscopically which prevented us from being able to perform the second cervical intralaminar epidural steroid injection on 12/23/2013.  Patient reports that she continues to do home exercises for her shoulder surgery but was unable to perform PT secondary to pain.  She states that her pain was actually better up until 1/12/2014 where she developed severe pain on the right side of her neck and posterior side of her neck going down the back of the scapula and also across the trapezius muscles of the shoulder.  Her MRI which was done in outside facility reports that she has severe C5-6 neural foraminal stenosis.  For her pain the patient has been taking hydrocodone/acetaminophen 7.5/325 as needed she reports there are occasional days where she takes it every 4 hours and some days where she doesn't take it at all.  Resting her head on the pillow helps alleviate the pain.  Sitting and working at computers when her pain is the worst.  She denies any problems with movement of her hands or weakness in  her arms.  She has had no other health changes since previous encounter.    Initial history of present illness 10/2013:    Kaleigh Solo presents to the clinic for the evaluation of neck pain with radiation in right upper shoulder to the elbow, and down the right side of the chest wall. The pain started insidiously in the back of the neck in july  and symptoms have been worsening.  The pain has begun going into the shoulder, and there was swelling in the arm that began after starting Robaxin for muscle aches, and then took predisone taper and bactrim for the possibility of infection which did not help.  Patient stopped her medications and the swelling improved.  US of the upper extremity was negative for DVT.   Patient takes vicodin for pain which she states helps only a little bit, and doesn't take the pain go away.  MRI of cervical spine revealed severe neuroforaminal stenosis on the right at C5-6 with central to right neural foraminal disc protrusion with generalized bulging discs and uncovertebral joint osteophytic change.  No abnormal signal in the cord.      Pain Description:    The pain is located in the neck and radiates to the right shoulder .  The pain is described as aching and tight band      Symptoms interfere with daily activity and sleeping.     Exacerbating factors: Sitting, Bending, Touching, Coughing/Sneezing, Eating, Night Time, Morning, Flexing and Lifting.      Mitigating factors heat, laying down and medications.     She reports 2 hours of uninterrupted sleep per night.    Patient denies night fever/night sweats, urinary incontinence, bowel incontinence, significant weight loss, significant motor weakness and loss of sensations.  Patient denies any suicidal or homicidal ideations    Pain Medications:  Current:  cymbalta 60 mg daily  Robaxin 500 mg  Naproxen PRN  norco 5-325 as needed  Gabapentin 2400 mg daily    Tried in Past:  Gabapentin  Hydrocodone  ibuprofen    Physical Therapy/Home  Exercise: yes       report:  Not applicable    Pain Procedures:   12/20/17 Left L4 and L5 TF CIRILO- 75% relief  1/24/18 Left L5 and S1 TF CIRILO- 75% relief  9/25/18 Lumbar SCS trial- 100% relief  10/15/18 Lumbar SCS implant- significant relief    Chiropractor -No treatments yet.  Right shoulder arthroscopy 12/10/2013  Right knee arthroscopy 12/19/14    Imaging:     10/7/2013  MRI of cervical spine revealed severe neuroforaminal stenosis on the right at C5-6 with central to right neural foraminal disc protrusion with generalized bulging discs and uncovertebral joint osteophytic change.  No abnormal signal in the cord.  Minor disc bulging at C3-4, C4-5 without central canal stenosis, spinal cord impingement or neural foraminal stenosis.    CT chest: 10/02/2013  No definite acute process or obvious etiology of the right-sided chest pain, axillary pain, neck and supraclavicular pain.  (full report scanned into record)    Right upper extremity venous ultrasound 10/02/2013  No evidence of DVT  lumbar spine 11/28/2017  Impression     MRI LUMBAR SPINE    TECHNIQUE: MRI lumbar spine was performed without contrast. The following sequences were obtained: Localizer; sagittal T1, T2, STIR; axial T1 and T2.    COMPARISON: None.    FINDINGS:    There are 5 lumbar vertebrae.  Vertebral body heights and alignment are maintained.  Bone marrow signal is preserved.  There is multilevel disc desiccation with preservation of disc heights. Conus terminates at L1 and appears unremarkable. Limited evaluation of posterior abdominal structures is unremarkable.  Paraspinal musculature is within normal limits.  Evaluation of sacroiliac joints is unremarkable.    L1-L2: No spinal canal stenosis or neuroforaminal narrowing.    L2-L3: Mild bilateral facet arthropathy noted.  No spinal canal stenosis or neuroforaminal narrowing.    L3-L4: Mild bilateral facet arthropathy and circumferential disc bulge noted.  No spinal canal stenosis or  neuroforaminal narrowing.    L4-L5: Severe bilateral facet arthropathy and circumferential disc bulge result in moderate spinal canal stenosis.    L5-S1: Circumferential disc bulge and severe bilateral facet arthropathy result in severe spinal canal stenosis.  Note made of synovial cyst in the left foramen, contacting the exiting L5 nerve root.     Lab Results   Component Value Date    WBC 8.04 09/24/2018    HGB 14.7 09/24/2018    HCT 45.3 09/24/2018    MCV 98 09/24/2018     09/24/2018       CMP  Sodium   Date Value Ref Range Status   09/24/2018 142 136 - 145 mmol/L Final     Potassium   Date Value Ref Range Status   09/24/2018 4.1 3.5 - 5.1 mmol/L Final     Chloride   Date Value Ref Range Status   09/24/2018 103 95 - 110 mmol/L Final     CO2   Date Value Ref Range Status   09/24/2018 29 23 - 29 mmol/L Final     Glucose   Date Value Ref Range Status   09/24/2018 97 70 - 110 mg/dL Final     BUN, Bld   Date Value Ref Range Status   09/24/2018 11 8 - 23 mg/dL Final     Creatinine   Date Value Ref Range Status   09/24/2018 0.7 0.5 - 1.4 mg/dL Final     Calcium   Date Value Ref Range Status   09/24/2018 10.0 8.7 - 10.5 mg/dL Final     Total Protein   Date Value Ref Range Status   09/24/2018 7.2 6.0 - 8.4 g/dL Final     Albumin   Date Value Ref Range Status   09/24/2018 3.7 3.5 - 5.2 g/dL Final     Total Bilirubin   Date Value Ref Range Status   09/24/2018 0.4 0.1 - 1.0 mg/dL Final     Comment:     For infants and newborns, interpretation of results should be based  on gestational age, weight and in agreement with clinical  observations.  Premature Infant recommended reference ranges:  Up to 24 hours.............<8.0 mg/dL  Up to 48 hours............<12.0 mg/dL  3-5 days..................<15.0 mg/dL  6-29 days.................<15.0 mg/dL       Alkaline Phosphatase   Date Value Ref Range Status   09/24/2018 130 55 - 135 U/L Final     AST   Date Value Ref Range Status   09/24/2018 40 10 - 40 U/L Final     ALT   Date  Value Ref Range Status   09/24/2018 114 (H) 10 - 44 U/L Final     Anion Gap   Date Value Ref Range Status   09/24/2018 10 8 - 16 mmol/L Final     eGFR if    Date Value Ref Range Status   09/24/2018 >60.0 >60 mL/min/1.73 m^2 Final     eGFR if non    Date Value Ref Range Status   09/24/2018 >60.0 >60 mL/min/1.73 m^2 Final     Comment:     Calculation used to obtain the estimated glomerular filtration  rate (eGFR) is the CKD-EPI equation.            Past Medical History:   Diagnosis Date    Arthritis     Asthma     Cervicalgia     2013 tx by chiropractor    Closed dislocation of acromioclavicular joint 2013    Degenerative disc disease     Fatty liver     Fibromyalgia     GERD (gastroesophageal reflux disease)     HPV (human papilloma virus) anogenital infection     conization in past-remote history-1990    HTN (hypertension)     Sciatica     Tobacco use     Ulcer 2007    stomach ulcer     Past Surgical History:   Procedure Laterality Date    acdf  4/2014    C5-6    ARTHROSCOPY, SHOULDER Right 12/10/2013    [facility]    ARTHROSCOPY, SHOULDER, WITH SUBACROMIAL SPACE DECOMPRESSION Right 12/10/2013    [facility] at [facility]    ARTHROSCOPY-KNEE Right 12/19/2014    [facility]    BACK SURGERY      CERVICAL    CERVICAL CONIZATION   W/ LASER      CHONDROPLASTY-KNEE Right 12/19/2014    [facility] at [facility]    DISCECTOMY, SPINE, CERVICAL, ANTERIOR APPROACH, WITH FUSION N/A 4/7/2014    Performed by Turner [facility]id MD at [facility] OR 2ND FLR    INJECTION, STEROID, SPINE, CERVICAL, EPIDURAL N/A 2/5/2014    Performed by Candice Lopez [facility] at [facility]    INJECTION, STEROID, SPINE, CERVICAL, EPIDURAL N/A 1/24/2014    Per[facility] at [facility]    INJECTION, STEROID, SPINE, CERVICAL, EPIDURAL N/A 11/27/2013    Performed by Candice PEARSON  MD [facility] at [facility]    INJECTION-STEROID-EPIDURAL-TRANSFORAMINAL Left 1/24/2018    Per[facility] at [facility]    INJECTION-STEROID-EPIDURAL-TRANSFORAMINAL Left 12/20/2017    Per[facility] at [facility]    INSERTION, NEUROSTIMULATOR, SPINAL CORD STIMULATOR IMPLANT- WIRES AND BATTERY INTERNAL N/A 10/15/2018    Performed by [facility] at [facility] OR    KNEE ARTHROSCOPY      REPAIR, LABRUM, HIP Right 12/10/2013    Per[facility] at [facility] OR    right shoulder surger      arthroscopic surgery Dec 2013    TRIAL OF SPINAL CORD NERVE STIMULATOR N/A 9/25/2018    Procedure: TRIAL, NEUROSTIMULATOR, SPINAL CORD;  Surgeon: Candice Lopez MD;  Location: [facility];  Service: Pain Management;  Laterality: N/A;  SCS Trial  Pj Hankins notified of date and time    TRIAL, NEUROSTIMULATOR, SPINAL CORD N/A 9/25/2018    Performed by Candice Lopez [facility] at [facility]     Social History     Socioeconomic History    Marital status:      Spouse name: Not on file    Number of children: Not on file    Years of education: Not on file    Highest education level: Not on file   Social Needs    Financial resource strain: Not on file    Food insecurity - worry: Not on file    Food insecurity - inability: Not on file    Transportation needs - medical: Not on file    Transportation needs - non-medical: Not on file   Occupational History    Not on file   Tobacco Use    Smoking status: Current Every Day Smoker     Packs/day: 1.00     Years: 48.00     Pack years: 48.00     Types: Cigarettes    Smokeless tobacco: Never Used   Substance and Sexual Activity    Alcohol use: Yes     Comment: occasional    Drug use: No    Sexual activity: Not on file   Other Topics Concern    Are you pregnant or think you may be? Not Asked    Breast-feeding Not Asked   Social History Narrative    , not working, 3 children (1 passed at 9 mo  old), tobacco daily, ETOH socially, GYN 2013 dtrs of cesar     Family History   Problem Relation Age of Onset    Cancer Mother         lung-was tobacco user  of lung cancer at 78    Aneurysm Father         abdominal burst-  of stroke at 71 years    Heart disease Father         had HTN       Allergies   Allergen Reactions    Ambien [Zolpidem] Other (See Comments)     Pt hyperactive    Lunesta [Eszopiclone] Other (See Comments)     Severely depressed    Shellfish Containing Products Swelling and Other (See Comments)     Metallic taste in mouth  Swells all over,including the tongue and throat  Feels likes insides are swollen  Allergic to crawfish,Shrimp    Ancef [Cefazolin]      Facial swelling  Swelling abdomen    Flexeril [Cyclobenzaprine] Swelling    Gabapentin Hives    Sulfa (Sulfonamide Antibiotics) Nausea Only       Current Outpatient Medications   Medication Sig    albuterol (PROAIR HFA) 90 mcg/actuation inhaler Inhale 2 puffs into the lungs every 6 (six) hours as needed for Wheezing.    atorvastatin (LIPITOR) 10 MG tablet Take 1 tablet (10 mg total) by mouth once daily.    cetirizine (ZYRTEC) 10 MG tablet Take 10 mg by mouth once daily.    clotrimazole-betamethasone 1-0.05% (LOTRISONE) cream Apply topically 2 (two) times daily.    diphenhydrAMINE (BENADRYL) 50 MG tablet Take 50 mg by mouth nightly as needed for Itching.    famotidine (PEPCID) 20 MG tablet Take 20 mg by mouth 2 (two) times daily.    fexofenadine (ALLEGRA) 60 MG tablet Take 60 mg by mouth once daily.    fluocinonide 0.05% (LIDEX) 0.05 % cream     FLUoxetine 20 MG tablet     HYDROcodone-acetaminophen (NORCO) 5-325 mg per tablet Take 1 tablet by mouth every 8 (eight) hours as needed for Pain.    HYDROcodone-acetaminophen (NORCO) 5-325 mg per tablet Take 1 tablet by mouth every 4 (four) hours as needed for Pain.    hydrocortisone 1 % cream Apply topically 2 (two) times daily.    hydrOXYzine HCl (ATARAX) 25 MG tablet  "Take 1 tablet (25 mg total) by mouth 3 (three) times daily as needed for Itching.    methylphenidate HCl (RITALIN) 20 MG tablet     nicotine (NICODERM CQ) 21 mg/24 hr Place 1 patch onto the skin once daily.    OLANZapine (ZYPREXA) 5 MG tablet     valsartan-hydrochlorothiazide (DIOVAN-HCT) 320-25 mg per tablet Take 1 tablet by mouth once daily.     No current facility-administered medications for this visit.        REVIEW OF SYSTEMS:    GENERAL:  No weight loss, malaise or fevers.  NECK:  Negative for lumps, no difficulty with swallowing.   RESPIRATORY:  No recent URI  CARDIOVASCULAR:  Negative for chest pain, leg swelling or palpitations.  GI:  Negative for abdominal discomfort, blood in stools or black stools or change in bowel habits.  Patient had a history of stomach ulcer, never bleeding.    MUSCULOSKELETAL:  See HPI.   SKIN: no new lesions  HEMATOLOGY/LYMPHOLOGY:  Negative for prolonged bleeding, bruising easily or swollen nodes.  Patient is not currently taking any anti-coagulants  NEURO:   No history of syncope, paralysis, seizures or tremors.  All other reviewed and negative other than HPI.    OBJECTIVE:    BP (!) 134/98   Pulse (!) 121   Temp 97.5 °F (36.4 °C) (Oral)   Resp 18   Ht 5' 1" (1.549 m)   Wt 57.1 kg (125 lb 14.4 oz)   BMI 23.79 kg/m²     PHYSICAL EXAMINATION:    GENERAL: Well appearing, in no acute distress, alert and oriented x3.  PSYCH:  Mood and affect appropriate.  SKIN: Skin color, texture, turgor normal, no rashes or lesions.  Midline incision not well approximated.  No drainage.  No induration.  Visible granulation tissues.  Improved healing from previous visit.  HEAD/FACE:  Normocephalic, atraumatic. Cranial nerves grossly intact.  GAIT: Antalgic, ambulates without assistance.    ASSESSMENT: 63 y.o. year old female with neck and lower back pain, consistent with the following diagnoses:    1. Chronic pain syndrome     2. Lumbar radiculopathy     3. Osteoarthritis of spine with " radiculopathy, lumbar region     4. Delayed wound healing          PLAN:     - I cleaned the midline incision with alcohol.  I applied a Steristrip securely.    - No signs of infection.    - She will contact our office or ED with any fever, malaise, or drainage.    - Continue with activity restrictions.  No tubs or pools.    - RTC in 1 week.      The above plan and management options were discussed at length with patient. Patient is in agreement with the above and verbalized understanding.     Sadie Cowan  11/14/2018

## 2018-11-19 ENCOUNTER — OFFICE VISIT (OUTPATIENT)
Dept: PAIN MEDICINE | Facility: CLINIC | Age: 63
End: 2018-11-19
Payer: COMMERCIAL

## 2018-11-19 VITALS
BODY MASS INDEX: 44.96 KG/M2 | HEIGHT: 61 IN | RESPIRATION RATE: 18 BRPM | DIASTOLIC BLOOD PRESSURE: 96 MMHG | WEIGHT: 238.13 LBS | HEART RATE: 117 BPM | TEMPERATURE: 98 F | SYSTOLIC BLOOD PRESSURE: 134 MMHG

## 2018-11-19 DIAGNOSIS — T14.8XXD DELAYED WOUND HEALING: Primary | ICD-10-CM

## 2018-11-19 PROCEDURE — 99024 POSTOP FOLLOW-UP VISIT: CPT | Mod: S$GLB,,, | Performed by: NURSE PRACTITIONER

## 2018-11-19 PROCEDURE — 99999 PR PBB SHADOW E&M-EST. PATIENT-LVL III: CPT | Mod: PBBFAC,,, | Performed by: NURSE PRACTITIONER

## 2018-11-19 NOTE — PROGRESS NOTES
Referring Physician: Theodore Petty    Chief Complaint:   Chief Complaint   Patient presents with    Low-back Pain        SUBJECTIVE:    Interval History 11/19/2018:  The patient returns for follow up and wound check.  She reports overall feeling well.  She says that she has not seen her wound and her bandage has stayed on.  Her pain today is 2/10.    Interval History 11/24/2018:  The patient returns for wound check follow up.  She reports feeling better overall.  She denies fever or drainage.  Her pain today is 0/10.    Interval History 11/8/2018:  Patient presents for wound check.  She did take PO antibiotics for 5 additional days but stopped due to significant nausea.  Otherwise, she has been feeling well.  She denies fever or malaise.  She has not noticed any drainage.  Her steristrips fell off 2 days ago and she applied Bacitracin and a bandage.  Her pain today is 0/10.    Interval History 11/2/2018:  The patient presents for follow up.  She reports doing well with SCS device.  Her pain has been very mild.  She is not having any fever or malaise.  She has not noticed any drainage.  She completed PO antibiotics.  She has been applying Bacitracin BID.  Her pain today is 0/10.    Interval History 10/25/2018:  The patient returns for follow up and staple removal.  She reports feeling well- denies fever or malaise.  She completed oral antibiotics.  She is having benefit from SCS.  Wendy is here to meet with her today.  She has decreased her pain medications which she is happy about.  Her pain today is 4/10.    Interval History 10/22/2018:  The patient presents for wound check.  She is s/p lumbar SCS implant on 10/15/18.  She states that the surgery was painful for her but she has started feeling better.  She denies any fever or malaise.  She denies any drainage.  She is finishing antibiotics today.  Her pain today is 2/10.    Interval History 10/1/2018:  The patient returns for follow up and lead pull.  She  is s/p SCS trial with 100% pain relief.  She would like to move forward with implant.  She denies any fever or malaise during the trial period.  Her pain today is 0/10.     Interval History 8/10/2018:  The patient presents today to discuss procedure for left sided back and leg pain.  Since her last OV, she underwent left L5 and S1 TF CIRILO in January.  Initially she thought that it did not help.  However, about one month later, she was having about 75% relief.  This lasted for about 2 months.  Prior to this, she had left L4 and L5 TF CIRILO in December.  At this point, she feels as though both injections have worn off.  Her pain is affecting her daily activities.  She states that she previously discussed SCS trial with Dr. Lopez and would like to proceed with this.  She has seen Dr. Rashid and was not deemed a candidate for surgery.  Her pain today is 8/10.  The patient denies any bowel or bladder incontinence or signs of saddle paresthesia.      Interval history 01/15/2018  The patient returns to the clinic for a follow up visit, she is reporting pain of 10 /10 today.  She is s/p Left L4 and L5 TFESI performed on 12/20/17.  She estimates 100% pain relief for ~ 2 weeks.  Was able to be physically active during that time period, with significantly improved functional mobility.  Within past 2 weeks she has had recurrence of pain in same location, quality, intensity.   Continued pain in lumbar area radiating to the lateral/posterolateral thigh and to the posterior calf. Leg pain is worse than back pain. Additionally has developed pain at lateral hip area - greater trochanteric bursa. Now walking ~ 1 block.    Denies infection, new weakness, bowel/bladder dysfunction/incontinence, saddle anesthesia.     Interval history 12/01/2017   The patient returns to the clinic for a follow up visit, she is reporting pain of 5 /10 today.  She has been doing better since cervical surgery but has been having occasional radicular symptoms.   Currently she comes in for lower extremities radiculopathy predominantly in the left lower extremity in the L4-5 distribution.  She had an MRI of the lumbar spine which shows  cyst at the encroachment on the left L5 nerve root.  Additionally the patient has significant facet arthropathy throughout the lumbar spine    Interval history 04/02/2015:  Since previous encounter patient reports low back pain radiating down both lower extremities. Patient stated that she had Synvisc injection on 03/20/15 and this helped with her knee pain. Patient stated that she still has neck pain that radiates to both upper extremities. She stated that she needs a refill on her Gabapentin and Flexeril these medications have been helping with the pain. Patient also reported that she sees a chiropractor for her back pain. Patient reports no other health changes since her last visit. Patient reports her pain 6/10 today.    Interval history 03/02/2015:  Since previous encounter the patient reported having had knee arthroscopy and was told to try euflexxa injections versus total knee replacement by her orthopedist.  She was originally scheduled to have her orthopedist inject her knee with corticosteroids this morning but due to scheduling conflict the patient was placed on my schedule for an injection.  Unfortunately the patient has also been actively treated for upper respiratory infection and is currently on antibiotics.  She has been continuing home exercises with regularity stating good days and bad days with regards to her knee pain.  Additionally she continues to have upper neck and bilateral shoulder pain upper extremity radiculopathy and lower back pain.  Since her arthroscopic surgery the patient has been having intermittent hives and has been placed by an allergist on hydroxyzine and famotidine but has not had complete relief of these symptoms.      Interval history 10/21/2014:  Since previous encounter patient was scheduled for an  CIRILO but cancelled due to her having a cold. Patient reports she still has a cold. Patient reports neck radiating down her right arm with numbness and tingling. Patient stated on the way over her arm went numb. She states that driving is becoming harder as she feels she has a decreased hand  with opening of jars. Patient reports that she's been taking Mucinex and Thera Flu for her cold symptoms. Patient reports she been having hives which has been followed by an allergist with testing pending.  Additionally she has been seen by rheumatologist who diagnosed her with fibromyalgia.She states that she takes 6 caps of Gabapentin a day however she was titrating down down she states that when she gets to 4 caps a day she breaks out in hives and then quickly re escalates back to 6 tablets per day. Patient states that she's starting to have pain at the bottom of her feet when walking on her rugs in her home she states that just started yesterday. Patient reports her pain 8/10.  She says that she has been increasing her protein intake and has been continuing to do exercises improving her range of motion in her shoulder and neck and states that overall she feels as if she is doing better.  Patient reports no other health changes since her last visit.     Interval history 8/25/2014:  Patient continues to have radicular symptoms in the neck radiating down to the right upper extremity.  Additionally since previous encounter she had a dental abscess which is being treated with accommodation of antibiotics and having had her tooth pulled.  She is still antibiotics and has a followup visit in mid-September with her dentist to evaluate efficacy of treatment.    Interval history 7/25/2014:  Since previous encounter the patient is on gabapentin 1800 mg per day and this is helping with her shoulder pain although she does state that she is having restless legs at night which is preventing her from being able to sleep.  Additionally  "she is having anterior thigh radicular pain.  She has had no other health changes she continues to do physical therapy exercises at home.  She reports her pain level is a 6/10 today.    Interval history 7/2/2014:  Patient is status post ACDF in 4/2014.  She is healing very well from surgery, but continues to have right shoulder pain which she had prior to her surgery despite having a labral repair.  Additionally she continues to have neuropathic symptoms in bilateral arms and in the axilla bilaterally.  She has been undergoing physical therapy and states that it does help her including heat ultrasound to the muscles of the neck.  She states that this is only temporary relief.  She continues to use the topical compounded pain cream which does help her.  After her surgery she discontinued taking gabapentin which was previously helping her and which she was tolerating without side effects.  She reports her pain as a 3/10 today.  She had been taking hydrocodone/acetaminophen 10/325 3 times a day when necessary as prescribed by her surgeon, but this is being discontinued.    Interval history 3/11/2014:  Since previous encounter patient reports that she had an episode where she felt as if her neck "locked up" in that now when she is waking up in the morning she has bilateral upper extremity numbness which improves throughout the day.  She does not report any weakness.  She states that she has been having persistent daily headaches from the occiput over the vertex of the supraorbital ridge bilaterally, and continues to have pain in the right side of her neck and shoulder.  She reports that she is still taking the gabapentin 1800 mg per day, and hydrocodone/acetaminophen when necessary which helps a little.    Interval history 2/24/2014:  Patient is status post cervical interlaminar epidural steroid injection by 2 on 1/24/2014 and 2/5/2014.  Patient has persisting radicular symptoms into her right upper extremity and " shoulder the anterior aspect of the neck and base of the jaw.  Patient reported approximately 10% improvement in her pain symptoms after the first injection similar improvement in her pain symptoms after the second injection no sustained relief she states that the pain relief lasted her approximately one week and will go away.  She is taking gabapentin 300 mg 3 times a day, hydrocodone/acetaminophen 7.5/325 one tablet approximately 2-3 times per day.  She states that the Vicodin helps dull the pain but the does not eliminate it entirely, and she's not having any adverse problems in the gabapentin.  Patient continues in physical therapy for her right shoulder status post arthroscopic surgery, and has good range of motion in the shoulder.  Since previous exam the patient has a sinus infection with congestion.    Interval history 1/21/2014:  Since reducing her patient was only able to have one of the cervical intralaminar epidural steroid injections which approximately help her 80% in her pain symptoms in the back of her neck, but she was also having symptoms into the right shoulder and had a rotator cuff tear which was repaired arthroscopically which prevented us from being able to perform the second cervical intralaminar epidural steroid injection on 12/23/2013.  Patient reports that she continues to do home exercises for her shoulder surgery but was unable to perform PT secondary to pain.  She states that her pain was actually better up until 1/12/2014 where she developed severe pain on the right side of her neck and posterior side of her neck going down the back of the scapula and also across the trapezius muscles of the shoulder.  Her MRI which was done in outside facility reports that she has severe C5-6 neural foraminal stenosis.  For her pain the patient has been taking hydrocodone/acetaminophen 7.5/325 as needed she reports there are occasional days where she takes it every 4 hours and some days where she  doesn't take it at all.  Resting her head on the pillow helps alleviate the pain.  Sitting and working at computers when her pain is the worst.  She denies any problems with movement of her hands or weakness in her arms.  She has had no other health changes since previous encounter.    Initial history of present illness 10/2013:    Kaleigh Solo presents to the clinic for the evaluation of neck pain with radiation in right upper shoulder to the elbow, and down the right side of the chest wall. The pain started insidiously in the back of the neck in july  and symptoms have been worsening.  The pain has begun going into the shoulder, and there was swelling in the arm that began after starting Robaxin for muscle aches, and then took predisone taper and bactrim for the possibility of infection which did not help.  Patient stopped her medications and the swelling improved.  US of the upper extremity was negative for DVT.   Patient takes vicodin for pain which she states helps only a little bit, and doesn't take the pain go away.  MRI of cervical spine revealed severe neuroforaminal stenosis on the right at C5-6 with central to right neural foraminal disc protrusion with generalized bulging discs and uncovertebral joint osteophytic change.  No abnormal signal in the cord.      Pain Description:    The pain is located in the neck and radiates to the right shoulder .  The pain is described as aching and tight band      Symptoms interfere with daily activity and sleeping.     Exacerbating factors: Sitting, Bending, Touching, Coughing/Sneezing, Eating, Night Time, Morning, Flexing and Lifting.      Mitigating factors heat, laying down and medications.     She reports 2 hours of uninterrupted sleep per night.    Patient denies night fever/night sweats, urinary incontinence, bowel incontinence, significant weight loss, significant motor weakness and loss of sensations.  Patient denies any suicidal or homicidal  ideations    Pain Medications:  Current:  cymbalta 60 mg daily  Robaxin 500 mg  Naproxen PRN  norco 5-325 as needed  Gabapentin 2400 mg daily    Tried in Past:  Gabapentin  Hydrocodone  ibuprofen    Physical Therapy/Home Exercise: yes       report:  Not applicable    Pain Procedures:   12/20/17 Left L4 and L5 TF CIRILO- 75% relief  1/24/18 Left L5 and S1 TF CIRILO- 75% relief  9/25/18 Lumbar SCS trial- 100% relief  10/15/18 Lumbar SCS implant- significant relief    Chiropractor -No treatments yet.  Right shoulder arthroscopy 12/10/2013  Right knee arthroscopy 12/19/14    Imaging:     10/7/2013  MRI of cervical spine revealed severe neuroforaminal stenosis on the right at C5-6 with central to right neural foraminal disc protrusion with generalized bulging discs and uncovertebral joint osteophytic change.  No abnormal signal in the cord.  Minor disc bulging at C3-4, C4-5 without central canal stenosis, spinal cord impingement or neural foraminal stenosis.    CT chest: 10/02/2013  No definite acute process or obvious etiology of the right-sided chest pain, axillary pain, neck and supraclavicular pain.  (full report scanned into record)    Right upper extremity venous ultrasound 10/02/2013  No evidence of DVT  lumbar spine 11/28/2017  Impression     MRI LUMBAR SPINE    TECHNIQUE: MRI lumbar spine was performed without contrast. The following sequences were obtained: Localizer; sagittal T1, T2, STIR; axial T1 and T2.    COMPARISON: None.    FINDINGS:    There are 5 lumbar vertebrae.  Vertebral body heights and alignment are maintained.  Bone marrow signal is preserved.  There is multilevel disc desiccation with preservation of disc heights. Conus terminates at L1 and appears unremarkable. Limited evaluation of posterior abdominal structures is unremarkable.  Paraspinal musculature is within normal limits.  Evaluation of sacroiliac joints is unremarkable.    L1-L2: No spinal canal stenosis or neuroforaminal  narrowing.    L2-L3: Mild bilateral facet arthropathy noted.  No spinal canal stenosis or neuroforaminal narrowing.    L3-L4: Mild bilateral facet arthropathy and circumferential disc bulge noted.  No spinal canal stenosis or neuroforaminal narrowing.    L4-L5: Severe bilateral facet arthropathy and circumferential disc bulge result in moderate spinal canal stenosis.    L5-S1: Circumferential disc bulge and severe bilateral facet arthropathy result in severe spinal canal stenosis.  Note made of synovial cyst in the left foramen, contacting the exiting L5 nerve root.     Lab Results   Component Value Date    WBC 8.04 09/24/2018    HGB 14.7 09/24/2018    HCT 45.3 09/24/2018    MCV 98 09/24/2018     09/24/2018       CMP  Sodium   Date Value Ref Range Status   09/24/2018 142 136 - 145 mmol/L Final     Potassium   Date Value Ref Range Status   09/24/2018 4.1 3.5 - 5.1 mmol/L Final     Chloride   Date Value Ref Range Status   09/24/2018 103 95 - 110 mmol/L Final     CO2   Date Value Ref Range Status   09/24/2018 29 23 - 29 mmol/L Final     Glucose   Date Value Ref Range Status   09/24/2018 97 70 - 110 mg/dL Final     BUN, Bld   Date Value Ref Range Status   09/24/2018 11 8 - 23 mg/dL Final     Creatinine   Date Value Ref Range Status   09/24/2018 0.7 0.5 - 1.4 mg/dL Final     Calcium   Date Value Ref Range Status   09/24/2018 10.0 8.7 - 10.5 mg/dL Final     Total Protein   Date Value Ref Range Status   09/24/2018 7.2 6.0 - 8.4 g/dL Final     Albumin   Date Value Ref Range Status   09/24/2018 3.7 3.5 - 5.2 g/dL Final     Total Bilirubin   Date Value Ref Range Status   09/24/2018 0.4 0.1 - 1.0 mg/dL Final     Comment:     For infants and newborns, interpretation of results should be based  on gestational age, weight and in agreement with clinical  observations.  Premature Infant recommended reference ranges:  Up to 24 hours.............<8.0 mg/dL  Up to 48 hours............<12.0 mg/dL  3-5 days..................<15.0  mg/dL  6-29 days.................<15.0 mg/dL       Alkaline Phosphatase   Date Value Ref Range Status   09/24/2018 130 55 - 135 U/L Final     AST   Date Value Ref Range Status   09/24/2018 40 10 - 40 U/L Final     ALT   Date Value Ref Range Status   09/24/2018 114 (H) 10 - 44 U/L Final     Anion Gap   Date Value Ref Range Status   09/24/2018 10 8 - 16 mmol/L Final     eGFR if    Date Value Ref Range Status   09/24/2018 >60.0 >60 mL/min/1.73 m^2 Final     eGFR if non    Date Value Ref Range Status   09/24/2018 >60.0 >60 mL/min/1.73 m^2 Final     Comment:     Calculation used to obtain the estimated glomerular filtration  rate (eGFR) is the CKD-EPI equation.            Past Medical History:   Diagnosis Date    Arthritis     Asthma     Cervicalgia     2013 tx by chiropractor    Closed dislocation of acromioclavicular joint 2013    Degenerative disc disease     Fatty liver     Fibromyalgia     GERD (gastroesophageal reflux disease)     HPV (human papilloma virus) anogenital infection     conization in past-remote history-1990    HTN (hypertension)     Sciatica     Tobacco use     Ulcer 2007    stomach ulcer     Past Surgical History:   Procedure Laterality Date    acdf  4/2014    C5-6    ARTHROSCOPY, SHOULDER Right 12/10/2013    [facility]    ARTHROSCOPY, SHOULDER, WITH SUBACROMIAL SPACE DECOMPRESSION Right 12/10/2013    [facility]    ARTHROSCOPY-KNEE Right 12/19/2014    [facility]    BACK SURGERY      CERVICAL    CERVICAL CONIZATION   W/ LASER      CHONDROPLASTY-KNEE Right 12/19/2014    [facility]    DISCECTOMY, SPINE, CERVICAL, ANTERIOR APPROACH, WITH FUSION N/A 4/7/2014    Per[facility] OR 2ND [facility]    INJECTION, STEROID, SPINE, CERVICAL, EPIDURAL N/A 2/5/2014    Performed by Candice Lopez [facility] at  [facility]    INJECTION, STEROID, SPINE, CERVICAL, EPIDURAL N/A 1/24/2014    Per[facility]    INJECTION, STEROID, SPINE, CERVICAL, EPIDURAL N/A 11/27/2013    Per[facility]    INJECTION-STEROID-EPIDURAL-TRANSFORAMINAL Left 1/24/2018    Per[facility]    INJECTION-STEROID-EPIDURAL-TRANSFORAMINAL Left 12/20/2017    Per[facility]    INSERTION, NEUROSTIMULATOR, SPINAL CORD STIMULATOR IMPLANT- WIRES AND BATTERY INTERNAL N/A 10/15/2018    Per[facility] by [facility] at [facility] OR    KNEE ARTHROSCOPY      REPAIR, LABRUM, HIP Right 12/10/2013    Per[facility] at [facility] OR    right shoulder surger      arthroscopic surgery Dec 2013    TRIAL OF SPINAL CORD NERVE STIMULATOR N/A 9/25/2018    Procedure: TRIAL, NEUROSTIMULATOR, SPINAL CORD;  Surgeon: Candice Lopez MD;  Location: [facility];  Service: Pain Management;  Laterality: N/A;  SCS Trial  Pj Hankins notified of date and time    TRIAL, NEUROSTIMULATOR, SPINAL CORD N/A 9/25/2018    Performed by [facility] at [facility]     Social History     Socioeconomic History    Marital status:      Spouse name: Not on file    Number of children: Not on file    Years of education: Not on file    Highest education level: Not on file   Social Needs    Financial resource strain: Not on file    Food insecurity - worry: Not on file    Food insecurity - inability: Not on file    Transportation needs - medical: Not on file    Transportation needs - non-medical: Not on file   Occupational History    Not on file   Tobacco Use    Smoking status: Current Every Day Smoker     Packs/day: 1.00     Years: 48.00     Pack years: 48.00     Types: Cigarettes    Smokeless tobacco: Never Used   Substance and Sexual Activity    Alcohol use: Yes     Comment: occasional    Drug  use: No    Sexual activity: Not on file   Other Topics Concern    Are you pregnant or think you may be? Not Asked    Breast-feeding Not Asked   Social History Narrative    , not working, 3 children (1 passed at 9 mo old), tobacco daily, ETOH socially, GYN 2013 dtrs of cesar     Family History   Problem Relation Age of Onset    Cancer Mother         lung-was tobacco user  of lung cancer at 78    Aneurysm Father         abdominal burst-  of stroke at 71 years    Heart disease Father         had HTN       Allergies   Allergen Reactions    Ambien [Zolpidem] Other (See Comments)     Pt hyperactive    Lunesta [Eszopiclone] Other (See Comments)     Severely depressed    Shellfish Containing Products Swelling and Other (See Comments)     Metallic taste in mouth  Swells all over,including the tongue and throat  Feels likes insides are swollen  Allergic to crawfish,Shrimp    Ancef [Cefazolin]      Facial swelling  Swelling abdomen    Flexeril [Cyclobenzaprine] Swelling    Gabapentin Hives    Sulfa (Sulfonamide Antibiotics) Nausea Only       Current Outpatient Medications   Medication Sig    albuterol (PROAIR HFA) 90 mcg/actuation inhaler Inhale 2 puffs into the lungs every 6 (six) hours as needed for Wheezing.    atorvastatin (LIPITOR) 10 MG tablet Take 1 tablet (10 mg total) by mouth once daily.    cetirizine (ZYRTEC) 10 MG tablet Take 10 mg by mouth once daily.    clotrimazole-betamethasone 1-0.05% (LOTRISONE) cream Apply topically 2 (two) times daily.    diphenhydrAMINE (BENADRYL) 50 MG tablet Take 50 mg by mouth nightly as needed for Itching.    famotidine (PEPCID) 20 MG tablet Take 20 mg by mouth 2 (two) times daily.    fexofenadine (ALLEGRA) 60 MG tablet Take 60 mg by mouth once daily.    fluocinonide 0.05% (LIDEX) 0.05 % cream     FLUoxetine 20 MG tablet     HYDROcodone-acetaminophen (NORCO) 5-325 mg per tablet Take 1 tablet by mouth every 8 (eight) hours as needed for Pain.     "HYDROcodone-acetaminophen (NORCO) 5-325 mg per tablet Take 1 tablet by mouth every 4 (four) hours as needed for Pain.    hydrocortisone 1 % cream Apply topically 2 (two) times daily.    hydrOXYzine HCl (ATARAX) 25 MG tablet Take 1 tablet (25 mg total) by mouth 3 (three) times daily as needed for Itching.    methylphenidate HCl (RITALIN) 20 MG tablet     nicotine (NICODERM CQ) 21 mg/24 hr Place 1 patch onto the skin once daily.    OLANZapine (ZYPREXA) 5 MG tablet     valsartan-hydrochlorothiazide (DIOVAN-HCT) 320-25 mg per tablet Take 1 tablet by mouth once daily.     No current facility-administered medications for this visit.        REVIEW OF SYSTEMS:    GENERAL:  No weight loss, malaise or fevers.  NECK:  Negative for lumps, no difficulty with swallowing.   RESPIRATORY:  No recent URI  CARDIOVASCULAR:  Negative for chest pain, leg swelling or palpitations.  GI:  Negative for abdominal discomfort, blood in stools or black stools or change in bowel habits.  Patient had a history of stomach ulcer, never bleeding.    MUSCULOSKELETAL:  See HPI.   SKIN: no new lesions  HEMATOLOGY/LYMPHOLOGY:  Negative for prolonged bleeding, bruising easily or swollen nodes.  Patient is not currently taking any anti-coagulants  NEURO:   No history of syncope, paralysis, seizures or tremors.  All other reviewed and negative other than HPI.    OBJECTIVE:    BP (!) 134/96   Pulse (!) 117   Temp 97.9 °F (36.6 °C) (Oral)   Resp 18   Ht 5' 1" (1.549 m)   Wt 108 kg (238 lb 1.6 oz)   BMI 44.99 kg/m²     PHYSICAL EXAMINATION:    GENERAL: Well appearing, in no acute distress, alert and oriented x3.  PSYCH:  Mood and affect appropriate.  SKIN: Skin color, texture, turgor normal, no rashes or lesions.  Midline incision not well approximated.  No drainage.  No induration.  Visible granulation tissues.  See below.  HEAD/FACE:  Normocephalic, atraumatic. Cranial nerves grossly intact.  GAIT: Antalgic, ambulates without " assistance.            ASSESSMENT: 63 y.o. year old female with neck and lower back pain, consistent with the following diagnoses:    1. Delayed wound healing  Ambulatory consult to Wound Clinic        PLAN:     - I cleaned the midline incision with alcohol.  I applied a Steristrip securely.    - No signs of infection.    - I discussed the case with Dr. Lopez.  Will consult wound care for delayed wound healing.    - She will contact our office or ED with any fever, malaise, or drainage.    - Continue with activity restrictions.     - RTC in 2 weeks.      The above plan and management options were discussed at length with patient. Patient is in agreement with the above and verbalized understanding.     Sadie Cowan  11/19/2018

## 2018-11-20 ENCOUNTER — PATIENT MESSAGE (OUTPATIENT)
Dept: PAIN MEDICINE | Facility: CLINIC | Age: 63
End: 2018-11-20

## 2018-11-23 DIAGNOSIS — E78.00 HYPERCHOLESTEROLEMIA: ICD-10-CM

## 2018-11-24 RX ORDER — ATORVASTATIN CALCIUM 10 MG/1
TABLET, FILM COATED ORAL
Qty: 90 TABLET | Refills: 1 | Status: SHIPPED | OUTPATIENT
Start: 2018-11-24 | End: 2019-01-21 | Stop reason: SDUPTHER

## 2018-11-24 RX ORDER — VALSARTAN AND HYDROCHLOROTHIAZIDE 320; 25 MG/1; MG/1
TABLET, FILM COATED ORAL
Qty: 90 TABLET | Refills: 1 | Status: SHIPPED | OUTPATIENT
Start: 2018-11-24 | End: 2019-01-21 | Stop reason: SDUPTHER

## 2018-11-28 ENCOUNTER — OFFICE VISIT (OUTPATIENT)
Dept: WOUND CARE | Facility: CLINIC | Age: 63
End: 2018-11-28
Payer: COMMERCIAL

## 2018-11-28 VITALS
TEMPERATURE: 97 F | HEIGHT: 61 IN | HEART RATE: 91 BPM | SYSTOLIC BLOOD PRESSURE: 134 MMHG | DIASTOLIC BLOOD PRESSURE: 94 MMHG | WEIGHT: 236.88 LBS | BODY MASS INDEX: 44.72 KG/M2

## 2018-11-28 DIAGNOSIS — T81.89XA DELAYED SURGICAL WOUND HEALING, INITIAL ENCOUNTER: ICD-10-CM

## 2018-11-28 PROCEDURE — 3075F SYST BP GE 130 - 139MM HG: CPT | Mod: CPTII,S$GLB,, | Performed by: NURSE PRACTITIONER

## 2018-11-28 PROCEDURE — 99203 OFFICE O/P NEW LOW 30 MIN: CPT | Mod: S$GLB,,, | Performed by: NURSE PRACTITIONER

## 2018-11-28 PROCEDURE — 3008F BODY MASS INDEX DOCD: CPT | Mod: CPTII,S$GLB,, | Performed by: NURSE PRACTITIONER

## 2018-11-28 PROCEDURE — 99999 PR PBB SHADOW E&M-EST. PATIENT-LVL V: CPT | Mod: PBBFAC,,, | Performed by: NURSE PRACTITIONER

## 2018-11-28 PROCEDURE — 3080F DIAST BP >= 90 MM HG: CPT | Mod: CPTII,S$GLB,, | Performed by: NURSE PRACTITIONER

## 2018-11-28 NOTE — PROGRESS NOTES
Subjective:       Patient ID: Kaleigh Solo is a 63 y.o. female.    Chief Complaint: Wound Check    HPI   This is a 63 year old female referred for evaluation and management of surgical wounds to the back and right upper buttock area. She is s/p insertion neurostimulator spinal cord stimulator implant, wires and battery on 10/15/18.  She has been using an antibiotic ointment on the wounds but they have failed to heal.  She is smoking one pack of cigarettes daily. She is afebrile. She denies increased redness, swelling or purulent drainage.  She does not complain of pain.  Review of Systems   Constitutional: Negative for chills, diaphoresis and fever.   HENT: Positive for postnasal drip and rhinorrhea. Negative for hearing loss, sinus pressure, sneezing, sore throat, tinnitus and trouble swallowing.    Eyes: Positive for visual disturbance.   Respiratory: Negative for apnea, cough, shortness of breath and wheezing.    Cardiovascular: Negative for chest pain, palpitations and leg swelling.   Gastrointestinal: Positive for constipation. Negative for diarrhea, nausea and vomiting.   Genitourinary: Negative for difficulty urinating, dysuria, frequency and hematuria.   Musculoskeletal: Positive for arthralgias. Negative for back pain and joint swelling.   Skin: Positive for wound.   Neurological: Positive for headaches. Negative for dizziness, weakness and light-headedness.   Hematological: Bruises/bleeds easily.   Psychiatric/Behavioral: Positive for sleep disturbance. Negative for confusion, decreased concentration and dysphoric mood. The patient is not nervous/anxious.        Objective:      Physical Exam   Constitutional: She is oriented to person, place, and time. She appears well-developed and well-nourished. No distress.   HENT:   Head: Normocephalic and atraumatic.   Mouth/Throat: Oropharynx is clear and moist.   Eyes: Conjunctivae and EOM are normal. Pupils are equal, round, and reactive to light. Right eye  exhibits no discharge. Left eye exhibits no discharge. No scleral icterus.   Neck: Normal range of motion. Neck supple. No JVD present. No tracheal deviation present. No thyromegaly present.   Cardiovascular: Normal rate, regular rhythm and normal heart sounds. Exam reveals no gallop and no friction rub.   No murmur heard.  Pulmonary/Chest: Effort normal and breath sounds normal. No respiratory distress. She has no wheezes. She has no rales.   Abdominal: Soft. Bowel sounds are normal. She exhibits no distension. There is no tenderness.   Musculoskeletal: Normal range of motion. She exhibits no edema or tenderness.        Legs:  Neurological: She is alert and oriented to person, place, and time.   Skin: Skin is warm and dry. No rash noted. She is not diaphoretic. No erythema.   Psychiatric: She has a normal mood and affect. Her behavior is normal. Judgment and thought content normal.   Nursing note and vitals reviewed.      Assessment:       1. Delayed surgical wound healing, initial encounter        Plan:           Apply medihoney gel daily to lower back and right upper buttock wounds, cover with gauze and secure with mepore island dressing.  Return to clinic in 2-3 weeks.

## 2018-11-28 NOTE — PATIENT INSTRUCTIONS
Wound Care  Taking proper care of your wound will help it heal. Your healthcare provider may show you how to clean and dress the wound. He or she will also explain how to tell if the wound is healing normally. Here are the basic steps:      A wound that's not healing normally may be dark in color or have white streaks.    Wash Your Hands  · Use liquid soap and lather for 2 minutes. Scrub between your fingers and under your nails.  · Rinse with warm water, keeping your fingers pointing down.  · Use a paper towel to dry your hands and to turn off the faucet.  Remove the Used Dressing  · Set up your supplies.  · Put on disposable gloves if youre dressing a wound for someone else or your wound is infected.  · Gently take off the old dressing. If you have a drain or tube in the wound, be careful not to pull on it.  · Loosen the tape by pulling gently toward the wound.  · Remove the dressing one layer at a time and put it in a plastic bag.  · Remove your gloves.  Inspect and Dress the Wound  · Each time you change the dressing, inspect the wound carefully to be sure its healing normally.  · Wash your hands again. Put on a new pair of gloves if youre dressing a wound for someone else or if your wound is infected.  · Clean and dress the wound as directed by your doctor or nurse. If you have a drain or tube, be careful not to pull on it.  · Put all supplies in a plastic bag; seal the bag and put it in the trash.  · Be sure to wash your hands again.  Call your healthcare provider if you see any of the following signs of a problem:   · Bleeding that soaks the dressing  · Pink fluid weeping from the wound  · Increased drainage or drainage that is yellow, yellow-green, or foul-smelling  · Increased swelling or pain, or redness or swelling in the skin around the wound  · A change in the color of the wound  · An increase in the size of the wound  · A fever over 101.0°F, increased fatigue, or a loss of appetite.       ©  8524-3537 [facility], [address] [place] [address], [place] [id number]. All rights reserved. This information is not intended as a substitute for professional medical care. Always follow your healthcare professional's instructions.      You may shower using a mild soap such as Dove.  Irrigate the wounds with lukewarm water for 5 minutes and dry thoroughly.  Apply medihoney gel to the wounds, cover with cotton gauze and secure with an island dressing or paper tape.  Change dressing daily.  Report any signs of infection.      You may purchase your supplies including the medihoney gel from [employer].  They have 3 locations:  [facility] [address] [address] [phone]  [address] [place] [address] [phone] [address] [place] [address] [phone]

## 2018-11-28 NOTE — LETTER
November 28, 2018      Sadie Cowan, MARCOP  [address] [facility] [address]           [facility]  [address]  [place] [address]  Phone: [phone]          Patient: Kaleigh Solo   MR Number: [id number]   YOB: 1955   Date of Visit: 11/28/2018       Dear Sadie Cowan:    Thank you for referring Kaleigh Solo to me for evaluation. Attached you will find relevant portions of my assessment and plan of care.    If you have questions, please do not hesitate to call me. I look forward to following Kaleigh Solo along with you.    Sincerely,    Mora Schaefer, NOREEN    Enclosure  CC:  No Recipients    If you would like to receive this communication electronically, please contact [email or web address] or [phone] to request more information on [employer] Link access.    For providers and/or their staff who would like to refer a patient to [facility], please contact us through our one-stop-shop provider referral line, Camilla Deshpande, at [phone].    If you feel you have received this communication in error or would no longer like to receive these types of communications, please e-mail [email or web address]

## 2018-12-07 ENCOUNTER — PATIENT MESSAGE (OUTPATIENT)
Dept: INTERNAL MEDICINE | Facility: CLINIC | Age: 63
End: 2018-12-07

## 2018-12-12 NOTE — PLAN OF CARE
Name:Kaleigh Solo  Physician:Marry Richardson MD  Date of eval:4/11/2018  Orders:  Physical Therapy evaluate and treat  Clinical#:[id number]  Diagnosis:  1. Weakness of both lower extremities         Visit 1    SUBJECTIVE: states R hip/groin pain prevents her from walking farther for exercise.    Chief complaint: Patient is a 63 yo F referred to OP PT for aquatic therapy secondary low back pain. Patient has a history of both cervical and low lumbar pain.    Radicular symptoms: none  Bowel and Bladder incontinence: none    Aggravating factors: twisting, vacuuming home, and prolonged standing.  Easing factors: ice, heat, sit in recliner    Sleep is not disturbed by pain. Sleeping position: side with pillows    Previous functional status includes: I with amb and ADL  Current functional status : I with amb and ADL    Patients structured exercise routine:  Walks a mile to mile and 1/2  Exercise routine prior to onset: none    Allergies:    Review of patient's allergies indicates:   Allergen Reactions    Ambien [zolpidem] Other (See Comments)     Pt hyperactive    Lunesta [eszopiclone] Other (See Comments)     Severely depressed    Shellfish containing products Swelling and Other (See Comments)     Metallic taste in mouth  Swells all over,including the tongue and throat  Feels likes insides are swollen  Allergic to crawfish,Shrimp    Ancef [cefazolin]      Facial swelling  Swelling abdomen    Flexeril [cyclobenzaprine] Swelling    Gabapentin Hives    Sulfa (sulfonamide antibiotics) Nausea Only       Medical history:   Past Medical History:   Diagnosis Date    Arthritis     Asthma     Cervicalgia     2013 tx by chiropractor    Closed dislocation of acromioclavicular joint 2013    Degenerative disc disease     Fibromyalgia     HPV (human papilloma virus) anogenital infection     conization in past-remote history-1990    HTN (hypertension)     Sciatica     Tobacco use     Ulcer 2007    stomach ulcer        Past Surgical History:   Procedure Laterality Date    acdf  4/2014    C5-6    CERVICAL CONIZATION   W/ LASER      KNEE ARTHROSCOPY      right shoulder surger      arthroscopic surgery Dec 2013       Medication:   Current Outpatient Prescriptions on File Prior to Visit   Medication Sig Dispense Refill    albuterol (PROAIR HFA) 90 mcg/actuation inhaler Inhale 2 puffs into the lungs every 6 (six) hours as needed for Wheezing. 18 g 11    ALPRAZolam (XANAX) 0.25 MG tablet Take 1 tablet (0.25 mg total) by mouth 2 (two) times daily as needed for Anxiety. 60 tablet 0    amitriptyline (ELAVIL) 10 MG tablet Take 2 tablets (20 mg total) by mouth nightly as needed for Insomnia or Pain. 60 tablet 2    amLODIPine (NORVASC) 10 MG tablet Take 1 tablet (10 mg total) by mouth once daily. 30 tablet 2    atorvastatin (LIPITOR) 10 MG tablet Take 1 tablet (10 mg total) by mouth once daily. 30 tablet 2    carvedilol (COREG) 25 MG tablet Take 1 tablet (25 mg total) by mouth 2 (two) times daily with meals. 60 tablet 2    cetirizine (ZYRTEC) 10 MG tablet Take 10 mg by mouth once daily.      diphenhydrAMINE (BENADRYL) 50 MG tablet Take 50 mg by mouth nightly as needed for Itching.      DULoxetine (CYMBALTA) 60 MG capsule       famotidine (PEPCID) 20 MG tablet Take 20 mg by mouth 2 (two) times daily.      fexofenadine (ALLEGRA) 60 MG tablet Take 60 mg by mouth once daily.      hydrocodone-acetaminophen 5-325mg (NORCO) 5-325 mg per tablet Take 1 tablet by mouth every 8 (eight) hours as needed for Pain. 90 tablet 0    hydrocortisone 1 % cream Apply topically 2 (two) times daily. 30 g 2    hydrOXYzine HCl (ATARAX) 25 MG tablet Take 1 tablet (25 mg total) by mouth 3 (three) times daily as needed for Itching. 60 tablet 2    methocarbamol (ROBAXIN) 500 MG Tab Take 1 tablet (500 mg total) by mouth 3 (three) times daily as needed. 60 tablet 0    MULTIVITAMIN W-MINERALS/LUTEIN (CENTRUM SILVER ORAL) Take 1 tablet by mouth once  daily. Centrum Silver 50 plus      nicotine (NICODERM CQ) 21 mg/24 hr Place 1 patch onto the skin once daily. 28 patch 3    pramoxine-hydrocortisone 1-1 % lotion Apply topically 3 (three) times daily.      pregabalin (LYRICA) 25 MG capsule Take 1 capsule (25 mg total) by mouth 3 (three) times daily. 90 capsule 2     No current facility-administered medications on file prior to visit.        Special tests:   MRI: 11/28/17  FINDINGS:    There are 5 lumbar vertebrae.  Vertebral body heights and alignment are maintained.  Bone marrow signal is preserved.  There is multilevel disc desiccation with preservation of disc heights. Conus terminates at L1 and appears unremarkable. Limited evaluation of posterior abdominal structures is unremarkable.  Paraspinal musculature is within normal limits.  Evaluation of sacroiliac joints is unremarkable.    L1-L2: No spinal canal stenosis or neuroforaminal narrowing.    L2-L3: Mild bilateral facet arthropathy noted.  No spinal canal stenosis or neuroforaminal narrowing.    L3-L4: Mild bilateral facet arthropathy and circumferential disc bulge noted.  No spinal canal stenosis or neuroforaminal narrowing.    L4-L5: Severe bilateral facet arthropathy and circumferential disc bulge result in moderate spinal canal stenosis.    L5-S1: Circumferential disc bulge and severe bilateral facet arthropathy result in severe spinal canal stenosis.  Note made of synovial cyst in the left foramen, contacting the exiting L5 nerve root.                       Past treatment includes:  Epidural injections, chiropractor    Work:  On disability                         Pts goals: to be able to move and clean her house, go shopping, no pain with travel  Pain level with 0 being the lowest and 10 being the highest presently: 2-3  Pain level with 0 being the lowest and 10 being the highest at worst: 10, prior to epidural injections    OBJECTIVE:   Postural examination in standing:  - normal lumbar  lordosis  - forward head  - forward shoulders    Postural examination in sitting:   - decreased lumbar lordosis  - forward head  - forward shoulders      Functional assessment:   - walking: I  - sit to stand: I  - sit to supine: I      - supine to sit: I  - supine to prone: I        Flexibility testing:  - hamstrings:        B: tight, R: -30 deg, L: -35 deg                 - piriformis:           R: tight, decreased 25%, L: WFL       - quadriceps:        B: tight, decreased 50%             - hip flexors:        B: tight, decreased 25%  - hip adductors:   B: WFL  - IT bands:           B: WFL     MMT R L   Hip flexion 5/5 5/5   Hip abduction 3+/5 3+/5   Hip extension 4/5 4/5   Hip ER 5/5 5/5   Hip IR /5 /5   Knee extension 5/5 5/5   Knee flexion 5/5 5/5   Ankle dorsiflexion 5/5 5/5   Ankle plantar flexion 5/5 5/5     Lumbar Special Tests    SLR: negative   Cross SLR: negative   LUDY positive       Lumbar active range of motion in standing is:  Flexion 70 deg   Extension 25 deg   Left side bending 30 deg   Right side bending 25 deg with pain   Left rotation Decreased 50%   Right rotation Decreased 50%     Sensation:  - Light Touch: intact  - Saddle: intact    Reflexes:   - Knee Jerk: 2+ B     Other:   Functional Limitations  Reports - G Codes    Category:  Mobility   Tool:  FOTO Outcomes Measurement System.   Score:  Patient scored out 37 of 100 on the FOTO Outcomes Measurement System.   Current:   CL at least 60% < 80% impaired, limited or restricted   Goal:   CK at least 40% < 60% impaired, limited or restricted     Patient History Examination Clinical Presentation Clinical Decision Making   Comorbidities:  Fibromyalgia  ACDF C5-C6    Personal Factors:         Activity and Participation Restriction:      Body Systems:  Musculoskeletal: strength, ROM    Body Regions: back, LEs Stable and uncomplicated     Low     FOTO: CL at least 60% < 80% impaired, limited or restricted         Endurance is  good.    TREATMENT: evaluation, low complexity  PT reviewed aquatic therapy information and precautions with patient. PT cleared pt for aquatic therapy.    Pt was educated, performed, and was provided with a written copy of  HEP to perform as tolerated,  Including:  TA bracing  TA bracing with knee fall outs  Posterior pelvic tilt    Exercises were reviewed and pt was able to demonstrate them prior to the end of the session.     No cultural, environmental, or spiritual barriers identified to treatment or learning.    Treatment today also included:    ASSESSMENT:  PT diagnosis: low back pain, weak core, decreased B LE strength and flexibility   Patient can benefit from outpatient physical therapy and a home program.  Treatment will be directed at improving the following impairments.  Prognosis is excellent/ good / fair / guarded.    Medical necessity is demonstrated by the following  IMPAIRMENTS:  - poor posture  - pain  - decreased flexibility  - decreased muscle strength  - poor cardiovascular conditioning  - impaired function  - decreased work ability    GOALS: 6-8 weeks (6/11/18). Pt agrees with goals set.  1. Independent with HEP.  2. Report decreased lumbar pain < or =  4/10 with adls such as twisting, vacuuming home, and prolonged standing.   3. Increased MMT for B LE by 1 muscle grade to promote proper pelvic stability to decrease lumbar pain < or =  4/10 with adls such as twisting, vacuuming home, and prolonged standing.   4. Increased flexibility in B hamstrings to -20 deg with 90/90 test to promote proper pelvic stability to decrease lumbar pain < or =  4/10 with adls such as twisting, vacuuming home, and prolonged standing.   5. Increased flexibility in R piriformis, B hip flexors, and B quads to promote proper pelvic stability to decrease lumbar pain < or =  4/10 with adls such as twisting, vacuuming home, and prolonged standing.   6. Patient to achieve CK (at least 40% < 60% impaired, limited or  restricted) level on the FOTO Outcomes Measurement System.    PLAN:  Outpatient physical therapy  2 times weekly to include: pt ed, hep, therapeutic exercises, neuromuscular re-education/ balance exercises, joint mobilizations, modalities prn, and aquatic therapy. Pt may be seen by PTA to carry out plan of care.      Cont PT for 6-8 weeks.     I certify the need for these services furnished under this plan of treatment and while under my care.    ____________________________________ Physician/Referring Practitioner                                                    Date of Signature

## 2018-12-13 ENCOUNTER — OFFICE VISIT (OUTPATIENT)
Dept: SPINE | Facility: CLINIC | Age: 63
End: 2018-12-13
Payer: COMMERCIAL

## 2018-12-13 VITALS
BODY MASS INDEX: 44.18 KG/M2 | HEART RATE: 101 BPM | SYSTOLIC BLOOD PRESSURE: 135 MMHG | HEIGHT: 61 IN | DIASTOLIC BLOOD PRESSURE: 77 MMHG | WEIGHT: 234 LBS

## 2018-12-13 DIAGNOSIS — G89.4 CHRONIC PAIN SYNDROME: Primary | ICD-10-CM

## 2018-12-13 DIAGNOSIS — G89.29 CHRONIC BILATERAL LOW BACK PAIN WITH LEFT-SIDED SCIATICA: ICD-10-CM

## 2018-12-13 DIAGNOSIS — M53.3 SACROILIAC JOINT PAIN: ICD-10-CM

## 2018-12-13 DIAGNOSIS — T81.89XS DELAYED SURGICAL WOUND HEALING, SEQUELA: ICD-10-CM

## 2018-12-13 DIAGNOSIS — M54.42 CHRONIC BILATERAL LOW BACK PAIN WITH LEFT-SIDED SCIATICA: ICD-10-CM

## 2018-12-13 PROCEDURE — 99999 PR PBB SHADOW E&M-EST. PATIENT-LVL III: CPT | Mod: PBBFAC,,, | Performed by: NURSE PRACTITIONER

## 2018-12-13 PROCEDURE — 3075F SYST BP GE 130 - 139MM HG: CPT | Mod: CPTII,S$GLB,, | Performed by: NURSE PRACTITIONER

## 2018-12-13 PROCEDURE — 3078F DIAST BP <80 MM HG: CPT | Mod: CPTII,S$GLB,, | Performed by: NURSE PRACTITIONER

## 2018-12-13 PROCEDURE — 99213 OFFICE O/P EST LOW 20 MIN: CPT | Mod: S$GLB,,, | Performed by: NURSE PRACTITIONER

## 2018-12-13 PROCEDURE — 3008F BODY MASS INDEX DOCD: CPT | Mod: CPTII,S$GLB,, | Performed by: NURSE PRACTITIONER

## 2018-12-13 RX ORDER — DESIPRAMINE HYDROCHLORIDE 100 MG/1
TABLET ORAL
COMMUNITY
Start: 2018-11-20 | End: 2018-12-21

## 2018-12-13 NOTE — LETTER
[facility]  [address], [address]  [place] [address]  Phone: [phone]  Fax: [phone] December 13, 2018    Kaleigh Solo  [address] [facility] [address]      To Whom It May Concern:    Kaleigh Solo is unable to participate in jury duty due to chronic back pain and neck pain.    If you have any questions or concerns, please feel free to call my office.    Sincerely,        GAGE Mayorga

## 2018-12-17 ENCOUNTER — LAB VISIT (OUTPATIENT)
Dept: LAB | Facility: HOSPITAL | Age: 63
End: 2018-12-17
Attending: INTERNAL MEDICINE
Payer: COMMERCIAL

## 2018-12-17 DIAGNOSIS — R74.01 ELEVATED ALT MEASUREMENT: ICD-10-CM

## 2018-12-17 LAB
ALBUMIN SERPL BCP-MCNC: 3.9 G/DL
ALP SERPL-CCNC: 114 U/L
ALT SERPL W/O P-5'-P-CCNC: 21 U/L
AST SERPL-CCNC: 16 U/L
BILIRUB DIRECT SERPL-MCNC: 0.3 MG/DL
BILIRUB SERPL-MCNC: 0.6 MG/DL
PROT SERPL-MCNC: 7.6 G/DL

## 2018-12-17 PROCEDURE — 36415 COLL VENOUS BLD VENIPUNCTURE: CPT | Mod: PO

## 2018-12-17 PROCEDURE — 80076 HEPATIC FUNCTION PANEL: CPT

## 2018-12-21 ENCOUNTER — OFFICE VISIT (OUTPATIENT)
Dept: INTERNAL MEDICINE | Facility: CLINIC | Age: 63
End: 2018-12-21
Payer: COMMERCIAL

## 2018-12-21 ENCOUNTER — LAB VISIT (OUTPATIENT)
Dept: LAB | Facility: HOSPITAL | Age: 63
End: 2018-12-21
Attending: INTERNAL MEDICINE
Payer: COMMERCIAL

## 2018-12-21 VITALS
DIASTOLIC BLOOD PRESSURE: 76 MMHG | WEIGHT: 235.25 LBS | SYSTOLIC BLOOD PRESSURE: 130 MMHG | RESPIRATION RATE: 18 BRPM | TEMPERATURE: 98 F | BODY MASS INDEX: 43.29 KG/M2 | HEART RATE: 96 BPM | HEIGHT: 62 IN

## 2018-12-21 DIAGNOSIS — I10 ESSENTIAL HYPERTENSION: Primary | ICD-10-CM

## 2018-12-21 DIAGNOSIS — Z12.11 SCREEN FOR COLON CANCER: ICD-10-CM

## 2018-12-21 DIAGNOSIS — F17.200 SMOKER: ICD-10-CM

## 2018-12-21 DIAGNOSIS — G89.4 CHRONIC PAIN SYNDROME: ICD-10-CM

## 2018-12-21 PROBLEM — T81.89XA DELAYED SURGICAL WOUND HEALING: Status: RESOLVED | Noted: 2018-11-28 | Resolved: 2018-12-21

## 2018-12-21 PROCEDURE — 99999 PR PBB SHADOW E&M-EST. PATIENT-LVL III: CPT | Mod: PBBFAC,,, | Performed by: INTERNAL MEDICINE

## 2018-12-21 PROCEDURE — 82274 ASSAY TEST FOR BLOOD FECAL: CPT

## 2018-12-21 PROCEDURE — 3075F SYST BP GE 130 - 139MM HG: CPT | Mod: CPTII,S$GLB,, | Performed by: INTERNAL MEDICINE

## 2018-12-21 PROCEDURE — 99214 OFFICE O/P EST MOD 30 MIN: CPT | Mod: S$GLB,,, | Performed by: INTERNAL MEDICINE

## 2018-12-21 PROCEDURE — 3078F DIAST BP <80 MM HG: CPT | Mod: CPTII,S$GLB,, | Performed by: INTERNAL MEDICINE

## 2018-12-21 PROCEDURE — 3008F BODY MASS INDEX DOCD: CPT | Mod: CPTII,S$GLB,, | Performed by: INTERNAL MEDICINE

## 2018-12-21 RX ORDER — ZOSTER VACCINE RECOMBINANT, ADJUVANTED 50 MCG/0.5
KIT INTRAMUSCULAR
COMMUNITY
Start: 2018-12-13 | End: 2019-05-09

## 2018-12-21 RX ORDER — HYDROCODONE BITARTRATE AND ACETAMINOPHEN 5; 325 MG/1; MG/1
1 TABLET ORAL EVERY 6 HOURS PRN
COMMUNITY
End: 2019-11-05 | Stop reason: SDUPTHER

## 2018-12-21 RX ORDER — VARENICLINE TARTRATE 0.5 (11)-1
KIT ORAL
Qty: 1 PACKAGE | Refills: 0 | Status: SHIPPED | OUTPATIENT
Start: 2018-12-21 | End: 2019-05-09

## 2018-12-21 NOTE — PROGRESS NOTES
Subjective:       Patient ID: Kaleigh Solo is a 63 y.o. female who presents for Results; Hypertension; Back Pain; and Nicotine Dependence      Hypertension   This is a chronic problem. The current episode started more than 1 year ago. The problem is unchanged. The problem is controlled. Pertinent negatives include no chest pain, headaches, palpitations, peripheral edema or shortness of breath. There are no associated agents to hypertension. Risk factors for coronary artery disease include family history, obesity and sedentary lifestyle. Past treatments include angiotensin blockers and diuretics. The current treatment provides moderate improvement. Compliance problems include exercise.  There is no history of kidney disease or heart failure. There is no history of chronic renal disease or a hypertension causing med.   Back Pain   This is a chronic problem. The current episode started more than 1 year ago. The problem occurs intermittently. The problem has been gradually improving since onset. The pain is present in the lumbar spine. The quality of the pain is described as aching. The pain is mild. Pertinent negatives include no abdominal pain, chest pain, fever, headaches, numbness, perianal numbness or weakness. She has tried heat and muscle relaxant (spine stimulator placement) for the symptoms. The treatment provided moderate relief.   Nicotine Dependence   Presents for initial visit. Symptoms are negative for decreased concentration. Preferred tobacco types include cigarettes. Her urge triggers include company of smokers and stress. She smokes 1 pack of cigarettes per day. Kaleigh is thinking about quitting. There is no history of alcohol abuse.      Plans to start walking since back pain has improved.      Review of Systems   Constitutional: Negative for chills, diaphoresis and fever.   HENT: Negative for congestion, rhinorrhea and sinus pressure.    Respiratory: Negative for cough, chest tightness and shortness  of breath.    Cardiovascular: Negative for chest pain, palpitations and leg swelling.   Gastrointestinal: Negative for abdominal pain, constipation, diarrhea, nausea and vomiting.   Genitourinary: Negative for decreased urine volume and hematuria.   Musculoskeletal: Positive for back pain. Negative for arthralgias and myalgias.   Skin: Negative for rash.   Neurological: Negative for dizziness, weakness, numbness and headaches.   Psychiatric/Behavioral: Negative for decreased concentration. The patient is not nervous/anxious.        Objective:      Physical Exam   Constitutional: She is oriented to person, place, and time. Vital signs are normal. She appears well-developed and well-nourished. No distress.   HENT:   Head: Normocephalic and atraumatic.   Right Ear: Hearing and external ear normal.   Left Ear: Hearing and external ear normal.   Nose: Nose normal.   Mouth/Throat: Uvula is midline.   Eyes: Lids are normal.   Cardiovascular: Normal rate, regular rhythm, normal heart sounds and intact distal pulses.   No murmur heard.  Pulmonary/Chest: Effort normal and breath sounds normal. She has no wheezes.   Abdominal: Soft. Bowel sounds are normal. She exhibits no distension. There is no tenderness.   Musculoskeletal: Normal range of motion. She exhibits no edema.   Neurological: She is alert and oriented to person, place, and time.   Skin: Skin is warm, dry and intact. No rash noted. She is not diaphoretic.   Psychiatric: She has a normal mood and affect.   Vitals reviewed.      Assessment/Plan:       1. Essential hypertension  - BP is controlled with Diovan HCT 320mg/25mg daily    2. Chronic pain syndrome  - s/p placement of spinal stimulator for low back pain  - significant improvement in pain, uses Norco as needed    3. Smoker  - trial of Chantix  - varenicline (CHANTIX STARTING MONTH BOX) 0.5 mg (11)- 1 mg (42) tablet; Take one 0.5mg tab by mouth once daily X3 days,then increase to one 0.5mg tab twice daily X4  days,then increase to one 1mg tab twice daily  Dispense: 1 Package; Refill: 0    RTC in 3 months or sooner if needed    Felicity Middleton MD

## 2018-12-24 LAB — HEMOCCULT STL QL IA: NEGATIVE

## 2019-01-03 ENCOUNTER — TELEPHONE (OUTPATIENT)
Dept: INTERNAL MEDICINE | Facility: CLINIC | Age: 64
End: 2019-01-03

## 2019-01-21 DIAGNOSIS — E78.00 HYPERCHOLESTEROLEMIA: ICD-10-CM

## 2019-01-21 RX ORDER — VALSARTAN AND HYDROCHLOROTHIAZIDE 320; 25 MG/1; MG/1
1 TABLET, FILM COATED ORAL DAILY
Qty: 90 TABLET | Refills: 1 | Status: SHIPPED | OUTPATIENT
Start: 2019-01-21 | End: 2019-05-09

## 2019-01-21 RX ORDER — ATORVASTATIN CALCIUM 10 MG/1
10 TABLET, FILM COATED ORAL DAILY
Qty: 90 TABLET | Refills: 1 | Status: SHIPPED | OUTPATIENT
Start: 2019-01-21 | End: 2019-10-21

## 2019-01-23 ENCOUNTER — TELEPHONE (OUTPATIENT)
Dept: INTERNAL MEDICINE | Facility: CLINIC | Age: 64
End: 2019-01-23

## 2019-02-13 ENCOUNTER — PATIENT MESSAGE (OUTPATIENT)
Dept: INTERNAL MEDICINE | Facility: CLINIC | Age: 64
End: 2019-02-13

## 2019-03-06 ENCOUNTER — TELEPHONE (OUTPATIENT)
Dept: INTERNAL MEDICINE | Facility: CLINIC | Age: 64
End: 2019-03-06

## 2019-05-09 ENCOUNTER — OFFICE VISIT (OUTPATIENT)
Dept: INTERNAL MEDICINE | Facility: CLINIC | Age: 64
End: 2019-05-09
Payer: MEDICARE

## 2019-05-09 VITALS
WEIGHT: 225.75 LBS | BODY MASS INDEX: 41.54 KG/M2 | TEMPERATURE: 97 F | DIASTOLIC BLOOD PRESSURE: 80 MMHG | HEART RATE: 72 BPM | SYSTOLIC BLOOD PRESSURE: 110 MMHG | HEIGHT: 62 IN

## 2019-05-09 DIAGNOSIS — I70.0 AORTIC CALCIFICATION: ICD-10-CM

## 2019-05-09 DIAGNOSIS — M54.42 CHRONIC BILATERAL LOW BACK PAIN WITH LEFT-SIDED SCIATICA: Chronic | ICD-10-CM

## 2019-05-09 DIAGNOSIS — E27.8 ADRENAL MASS, LEFT: ICD-10-CM

## 2019-05-09 DIAGNOSIS — I10 ESSENTIAL HYPERTENSION: Primary | ICD-10-CM

## 2019-05-09 DIAGNOSIS — G89.29 CHRONIC BILATERAL LOW BACK PAIN WITH LEFT-SIDED SCIATICA: Chronic | ICD-10-CM

## 2019-05-09 DIAGNOSIS — M47.812 FACET ARTHRITIS OF CERVICAL REGION: ICD-10-CM

## 2019-05-09 DIAGNOSIS — E78.5 HYPERLIPIDEMIA, UNSPECIFIED HYPERLIPIDEMIA TYPE: ICD-10-CM

## 2019-05-09 DIAGNOSIS — E66.01 MORBID OBESITY WITH BMI OF 40.0-44.9, ADULT: ICD-10-CM

## 2019-05-09 PROCEDURE — 3074F PR MOST RECENT SYSTOLIC BLOOD PRESSURE < 130 MM HG: ICD-10-PCS | Mod: HCNC,CPTII,S$GLB, | Performed by: INTERNAL MEDICINE

## 2019-05-09 PROCEDURE — 99214 PR OFFICE/OUTPT VISIT, EST, LEVL IV, 30-39 MIN: ICD-10-PCS | Mod: HCNC,S$GLB,, | Performed by: INTERNAL MEDICINE

## 2019-05-09 PROCEDURE — 3074F SYST BP LT 130 MM HG: CPT | Mod: HCNC,CPTII,S$GLB, | Performed by: INTERNAL MEDICINE

## 2019-05-09 PROCEDURE — 3008F PR BODY MASS INDEX (BMI) DOCUMENTED: ICD-10-PCS | Mod: HCNC,CPTII,S$GLB, | Performed by: INTERNAL MEDICINE

## 2019-05-09 PROCEDURE — 99999 PR PBB SHADOW E&M-EST. PATIENT-LVL III: CPT | Mod: PBBFAC,HCNC,, | Performed by: INTERNAL MEDICINE

## 2019-05-09 PROCEDURE — 99999 PR PBB SHADOW E&M-EST. PATIENT-LVL III: ICD-10-PCS | Mod: PBBFAC,HCNC,, | Performed by: INTERNAL MEDICINE

## 2019-05-09 PROCEDURE — 3079F PR MOST RECENT DIASTOLIC BLOOD PRESSURE 80-89 MM HG: ICD-10-PCS | Mod: HCNC,CPTII,S$GLB, | Performed by: INTERNAL MEDICINE

## 2019-05-09 PROCEDURE — 3008F BODY MASS INDEX DOCD: CPT | Mod: HCNC,CPTII,S$GLB, | Performed by: INTERNAL MEDICINE

## 2019-05-09 PROCEDURE — 3079F DIAST BP 80-89 MM HG: CPT | Mod: HCNC,CPTII,S$GLB, | Performed by: INTERNAL MEDICINE

## 2019-05-09 PROCEDURE — 99214 OFFICE O/P EST MOD 30 MIN: CPT | Mod: HCNC,S$GLB,, | Performed by: INTERNAL MEDICINE

## 2019-05-09 RX ORDER — PEDIATRIC MULTIVITAMIN NO.238
TABLET,CHEWABLE ORAL
COMMUNITY
Start: 2019-02-14 | End: 2019-08-05

## 2019-05-09 RX ORDER — CHOLECALCIFEROL (VITAMIN D3) 125 MCG
CAPSULE ORAL
COMMUNITY
Start: 2019-02-14 | End: 2019-08-05

## 2019-05-09 RX ORDER — FLUOCINONIDE 0.5 MG/G
CREAM TOPICAL 2 TIMES DAILY PRN
Qty: 60 G | Refills: 2 | Status: SHIPPED | OUTPATIENT
Start: 2019-05-09 | End: 2020-11-11

## 2019-05-09 NOTE — PROGRESS NOTES
Subjective:       Patient ID: Kaleigh Solo is a 63 y.o. female who presents for Hypertension; Back Pain; and Obesity      Hypertension   This is a recurrent problem. The current episode started more than 1 year ago. The problem has been gradually improving since onset. The problem is controlled. Associated symptoms include headaches, malaise/fatigue and neck pain. Pertinent negatives include no anxiety, blurred vision, chest pain, orthopnea, palpitations, peripheral edema, PND, shortness of breath or sweats. Agents associated with hypertension include NSAIDs. Risk factors for coronary artery disease include dyslipidemia, obesity and smoking/tobacco exposure. Past treatments include calcium channel blockers, ACE inhibitors, angiotensin blockers and diuretics. The current treatment provides significant improvement. Compliance problems include medication side effects.  There is no history of kidney disease. There is no history of chronic renal disease.   Back Pain   This is a chronic problem. The current episode started more than 1 year ago. The problem occurs intermittently. The problem has been waxing and waning since onset. The pain is present in the lumbar spine. The quality of the pain is described as aching. The pain does not radiate. The pain is mild. Associated symptoms include headaches. Pertinent negatives include no abdominal pain, chest pain, fever, numbness, paresthesias, pelvic pain, perianal numbness or weakness. She has tried muscle relaxant (Spinal stimulator was implanted) for the symptoms. The treatment provided significant relief.      Body mass index is 41.29 kg/m².    Since the spinal cord stimulator implantation, patient reports significant improvement and low back pain. Reports minimal use of any medications for pain. Patient has been much more active but is yet to get started with the gym.  She has changed her diet and added the juice and dark chocolate to her diet.  She reports this may  have some effect on her blood pressure which is good today.  Weight has decreased 10 lb since the last appointment in December.      Review of Systems   Constitutional: Positive for malaise/fatigue. Negative for chills, diaphoresis and fever.   HENT: Negative for congestion and sinus pressure.    Eyes: Negative for blurred vision, discharge and redness.   Respiratory: Negative for cough, chest tightness and shortness of breath.    Cardiovascular: Negative for chest pain, palpitations, orthopnea, leg swelling and PND.   Gastrointestinal: Negative for abdominal pain, constipation, diarrhea, nausea and vomiting.   Genitourinary: Negative for frequency and pelvic pain.   Musculoskeletal: Positive for back pain and neck pain. Negative for arthralgias and myalgias.   Skin: Positive for rash (Uses Lidex cream regularly). Negative for wound.   Neurological: Positive for headaches. Negative for dizziness, tremors, weakness, numbness and paresthesias.         Answers for HPI/ROS submitted by the patient on 5/3/2019   Hypertension  Chronicity: recurrent  Onset: more than 1 year ago  Progression since onset: gradually improving  Condition status: controlled  anxiety: No  blurred vision: No  chest pain: No  headaches: Yes  malaise/fatigue: Yes  neck pain: Yes  orthopnea: No  palpitations: No  peripheral edema: No  PND: No  shortness of breath: No  sweats: No  Agents associated with hypertension: NSAIDs  CAD risks: dyslipidemia, obesity, smoking/tobacco exposure  Compliance problems: medication side effects  Improvement on treatment: significant        Objective:      Physical Exam   Constitutional: She is oriented to person, place, and time. Vital signs are normal. She appears well-developed and well-nourished. No distress.   HENT:   Head: Normocephalic and atraumatic.   Right Ear: Hearing and external ear normal.   Left Ear: Hearing and external ear normal.   Nose: Nose normal.   Mouth/Throat: Uvula is midline and mucous  membranes are normal.   Eyes: Conjunctivae and lids are normal.   Neck: Full passive range of motion without pain.   Cardiovascular: Normal rate, regular rhythm, normal heart sounds and intact distal pulses.   No murmur heard.  Pulmonary/Chest: Effort normal and breath sounds normal. She has no wheezes.   Abdominal: Soft. Bowel sounds are normal. She exhibits no distension. There is no tenderness.   Musculoskeletal: Normal range of motion. She exhibits no edema.   Neurological: She is alert and oriented to person, place, and time.   Skin: Skin is warm, dry and intact. No rash noted. She is not diaphoretic.   Psychiatric: She has a normal mood and affect.   Vitals reviewed.      Assessment/Plan:       1. Essential hypertension  - HTN is controlled today  - patient stops her own medication, continue to monitor blood pressure, call or e-mail with readings regularly    2. Chronic bilateral low back pain with left-sided sciatica  - significant improvement after spinal cord stimulator placement  - sees pain management regularly    3. Hyperlipidemia, unspecified hyperlipidemia type  - stable, on statin, repeat lipid panel at next appointment    4. Morbid obesity with BMI of 40.0-44.9, adult  - plans to begin working out at the gym, will re-fax approval for exercise to  [facility] [place]    5. Aortic calcification  - atherosclerosis of the aorta and coronary arteries seen on screening chest CT in 2017  - control blood pressure, on Lipitor, continue to monitor    6. Facet arthritis of cervical region  - chronic, stable, fairly well controlled    7. 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

## 2019-05-15 ENCOUNTER — PATIENT MESSAGE (OUTPATIENT)
Dept: INTERNAL MEDICINE | Facility: CLINIC | Age: 64
End: 2019-05-15

## 2019-05-22 PROBLEM — G89.29 CHRONIC PAIN: Status: RESOLVED | Noted: 2017-12-20 | Resolved: 2019-05-22

## 2019-05-22 PROBLEM — Z86.79 PERSONAL HISTORY OF CORONARY ATHEROSCLEROSIS: Status: RESOLVED | Noted: 2017-11-29 | Resolved: 2019-05-22

## 2019-05-22 PROBLEM — E27.8 ADRENAL MASS, LEFT: Status: ACTIVE | Noted: 2019-05-22

## 2019-05-22 RX ORDER — LOSARTAN POTASSIUM 50 MG/1
TABLET ORAL
Qty: 90 TABLET | Refills: 0 | Status: SHIPPED | OUTPATIENT
Start: 2019-05-22 | End: 2019-08-05 | Stop reason: SDUPTHER

## 2019-05-22 RX ORDER — LOSARTAN POTASSIUM 50 MG/1
50 TABLET ORAL DAILY
Qty: 30 TABLET | Refills: 0 | Status: SHIPPED | OUTPATIENT
Start: 2019-05-22 | End: 2019-05-22 | Stop reason: SDUPTHER

## 2019-05-22 NOTE — TELEPHONE ENCOUNTER
Spoke with patient.     Will resume an antihypertensive.  Start losartan 50 mg daily.  Will sent to [facility] now.      Advised patient to check blood pressure is 1- 2 times daily for the next few days.  She will leave for a trip on May 29th and she was advised to call us with some BP readings before then.

## 2019-05-23 NOTE — TELEPHONE ENCOUNTER
Form was misplaced; called   [facility]- [phone]  Spoke with- Poncho Hoyos another clearance form

## 2019-05-28 ENCOUNTER — PATIENT MESSAGE (OUTPATIENT)
Dept: INTERNAL MEDICINE | Facility: CLINIC | Age: 64
End: 2019-05-28

## 2019-08-05 ENCOUNTER — TELEPHONE (OUTPATIENT)
Dept: CARDIOLOGY | Facility: CLINIC | Age: 64
End: 2019-08-05

## 2019-08-05 ENCOUNTER — OFFICE VISIT (OUTPATIENT)
Dept: CARDIOLOGY | Facility: CLINIC | Age: 64
End: 2019-08-05
Payer: MEDICARE

## 2019-08-05 VITALS
HEART RATE: 85 BPM | HEIGHT: 62 IN | BODY MASS INDEX: 41.2 KG/M2 | DIASTOLIC BLOOD PRESSURE: 73 MMHG | SYSTOLIC BLOOD PRESSURE: 133 MMHG | WEIGHT: 223.88 LBS

## 2019-08-05 DIAGNOSIS — I25.10 ATHEROSCLEROSIS OF NATIVE CORONARY ARTERY OF NATIVE HEART WITHOUT ANGINA PECTORIS: ICD-10-CM

## 2019-08-05 DIAGNOSIS — F17.200 CURRENT SMOKER: ICD-10-CM

## 2019-08-05 DIAGNOSIS — I70.0 AORTIC CALCIFICATION: ICD-10-CM

## 2019-08-05 DIAGNOSIS — I10 ESSENTIAL HYPERTENSION: ICD-10-CM

## 2019-08-05 DIAGNOSIS — E66.01 MORBID OBESITY WITH BMI OF 40.0-44.9, ADULT: Primary | ICD-10-CM

## 2019-08-05 DIAGNOSIS — R06.83 SNORING: ICD-10-CM

## 2019-08-05 DIAGNOSIS — E78.00 PURE HYPERCHOLESTEROLEMIA: ICD-10-CM

## 2019-08-05 DIAGNOSIS — I77.89 OTHER SPECIFIED DISORDERS OF ARTERIES AND ARTERIOLES: ICD-10-CM

## 2019-08-05 PROCEDURE — 3008F PR BODY MASS INDEX (BMI) DOCUMENTED: ICD-10-PCS | Mod: HCNC,CPTII,S$GLB, | Performed by: INTERNAL MEDICINE

## 2019-08-05 PROCEDURE — 99999 PR PBB SHADOW E&M-EST. PATIENT-LVL IV: CPT | Mod: PBBFAC,HCNC,, | Performed by: INTERNAL MEDICINE

## 2019-08-05 PROCEDURE — 3078F PR MOST RECENT DIASTOLIC BLOOD PRESSURE < 80 MM HG: ICD-10-PCS | Mod: HCNC,CPTII,S$GLB, | Performed by: INTERNAL MEDICINE

## 2019-08-05 PROCEDURE — 3075F SYST BP GE 130 - 139MM HG: CPT | Mod: HCNC,CPTII,S$GLB, | Performed by: INTERNAL MEDICINE

## 2019-08-05 PROCEDURE — 3008F BODY MASS INDEX DOCD: CPT | Mod: HCNC,CPTII,S$GLB, | Performed by: INTERNAL MEDICINE

## 2019-08-05 PROCEDURE — 3078F DIAST BP <80 MM HG: CPT | Mod: HCNC,CPTII,S$GLB, | Performed by: INTERNAL MEDICINE

## 2019-08-05 PROCEDURE — 99999 PR PBB SHADOW E&M-EST. PATIENT-LVL IV: ICD-10-PCS | Mod: PBBFAC,HCNC,, | Performed by: INTERNAL MEDICINE

## 2019-08-05 PROCEDURE — 99204 OFFICE O/P NEW MOD 45 MIN: CPT | Mod: HCNC,S$GLB,, | Performed by: INTERNAL MEDICINE

## 2019-08-05 PROCEDURE — 3075F PR MOST RECENT SYSTOLIC BLOOD PRESS GE 130-139MM HG: ICD-10-PCS | Mod: HCNC,CPTII,S$GLB, | Performed by: INTERNAL MEDICINE

## 2019-08-05 PROCEDURE — 99204 PR OFFICE/OUTPT VISIT, NEW, LEVL IV, 45-59 MIN: ICD-10-PCS | Mod: HCNC,S$GLB,, | Performed by: INTERNAL MEDICINE

## 2019-08-05 RX ORDER — AMLODIPINE BESYLATE 5 MG/1
5 TABLET ORAL DAILY
Qty: 90 TABLET | Refills: 3 | Status: SHIPPED | OUTPATIENT
Start: 2019-08-05 | End: 2020-10-30

## 2019-08-05 RX ORDER — LOSARTAN POTASSIUM 50 MG/1
50 TABLET ORAL DAILY
Qty: 90 TABLET | Refills: 3 | Status: SHIPPED | OUTPATIENT
Start: 2019-08-05 | End: 2020-09-21

## 2019-08-05 NOTE — PROGRESS NOTES
"Subjective:   Patient ID:  Kaleigh Solo is a 64 y.o. female is a new patient who presents for evaluation of Hypertension      HPI:   Patient has food allergies. Patient stopped taking HTN medicines.   Patient has cervical fusion and spinal cord stimulator.  1 pack a day smoker.  No f/h of heart attack. Father had aortic aneurysm.     The 10-year ASCVD risk score (Erma MARTÍNEZ Jr., et al., 2013) is: 12.2%    Values used to calculate the score:      Age: 64 years      Sex: Female      Is Non- : No      Diabetic: No      Tobacco smoker: Yes      Systolic Blood Pressure: 133 mmHg      Is BP treated: Yes      HDL Cholesterol: 61 mg/dL      Total Cholesterol: 238 mg/dL      Patient Active Problem List   Diagnosis    Asthma    DDD (degenerative disc disease), cervical    Facet arthritis of cervical region    Neural foraminal stenosis of cervical spine    Cervical spondylosis with radiculopathy    Morbid obesity with BMI of 40.0-44.9, adult    Cervical herniated disc    Sacroiliac joint pain    Tear of medial cartilage or meniscus of knee, current    Osteoarthritis of right knee    Thoracic or lumbosacral neuritis or radiculitis, unspecified    Essential hypertension    Hyperlipidemia    Urticaria    Smoker    Candidate for statin therapy due to risk of future cardiovascular event    Pulmonary nodule    Lumbar disc herniation with radiculopathy    Foraminal stenosis of lumbosacral region    Atherosclerosis of native coronary artery of native heart without angina pectoris    Chronic bilateral low back pain with left-sided sciatica    Spinal stenosis of lumbar region with neurogenic claudication    Weakness of both lower extremities    Aortic calcification    Elevated ALT measurement    Adrenal mass, left     /73   Pulse 85   Ht 5' 2" (1.575 m)   Wt 101.5 kg (223 lb 14 oz)   BMI 40.95 kg/m²   Body mass index is 40.95 kg/m².  CrCl cannot be calculated (Patient's most " recent lab result is older than the maximum 7 days allowed.).    Lab Results   Component Value Date     09/24/2018    K 4.1 09/24/2018     09/24/2018    CO2 29 09/24/2018    BUN 11 09/24/2018    CREATININE 0.7 09/24/2018    GLU 97 09/24/2018    HGBA1C 5.2 09/24/2018    MG 2.2 04/08/2014    AST 16 12/17/2018    ALT 21 12/17/2018    ALBUMIN 3.9 12/17/2018    PROT 7.6 12/17/2018    BILITOT 0.6 12/17/2018    WBC 8.04 09/24/2018    HGB 14.7 09/24/2018    HCT 45.3 09/24/2018    MCV 98 09/24/2018     09/24/2018    INR 0.9 03/26/2014    TSH 2.144 09/24/2018    CHOL 238 (H) 09/24/2018    HDL 61 09/24/2018    LDLCALC 155.6 09/24/2018    TRIG 107 09/24/2018       Current Outpatient Medications   Medication Sig    albuterol (PROAIR HFA) 90 mcg/actuation inhaler Inhale 2 puffs into the lungs every 6 (six) hours as needed for Wheezing.    atorvastatin (LIPITOR) 10 MG tablet Take 1 tablet (10 mg total) by mouth once daily.    cetirizine (ZYRTEC) 10 MG tablet Take 10 mg by mouth daily as needed.     clotrimazole-betamethasone 1-0.05% (LOTRISONE) cream Apply topically 2 (two) times daily. (Patient taking differently: Apply topically 2 (two) times daily as needed. )    diphenhydrAMINE (BENADRYL) 50 MG tablet Take 50 mg by mouth nightly as needed for Itching.    famotidine (PEPCID) 20 MG tablet Take 20 mg by mouth 2 (two) times daily as needed.     fexofenadine (ALLEGRA) 60 MG tablet Take 60 mg by mouth daily as needed.     fluocinonide 0.05% (LIDEX) 0.05 % cream Apply topically 2 (two) times daily as needed.    HYDROcodone-acetaminophen (NORCO) 5-325 mg per tablet Take 1 tablet by mouth every 6 (six) hours as needed for Pain.    hydrOXYzine HCl (ATARAX) 25 MG tablet Take 1 tablet (25 mg total) by mouth 3 (three) times daily as needed for Itching.    losartan (COZAAR) 50 MG tablet Take 1 tablet (50 mg total) by mouth once daily.    amLODIPine (NORVASC) 5 MG tablet Take 1 tablet (5 mg total) by mouth once  daily.     No current facility-administered medications for this visit.        ROS    Objective:   Physical Exam   Constitutional: She is oriented to person, place, and time. She appears well-developed and well-nourished. No distress.   HENT:   Head: Normocephalic and atraumatic.   Nose: Nose normal.   Mouth/Throat: Oropharynx is clear and moist. No oropharyngeal exudate.   Eyes: Pupils are equal, round, and reactive to light. Conjunctivae and EOM are normal. Right eye exhibits no discharge. Left eye exhibits no discharge. No scleral icterus.   Neck: Normal range of motion. Neck supple. No JVD present. No tracheal deviation present. No thyromegaly present.   Cardiovascular: Normal rate, regular rhythm and intact distal pulses. Exam reveals no gallop and no friction rub.   Murmur (systolic) heard.  Pulmonary/Chest: Effort normal and breath sounds normal. No stridor. No respiratory distress. She has no wheezes. She has no rales. She exhibits no tenderness.   Abdominal: Soft. Bowel sounds are normal. She exhibits no distension and no mass. There is no tenderness. There is no rebound and no guarding.   Musculoskeletal: Normal range of motion. She exhibits no edema or tenderness.   Lymphadenopathy:     She has no cervical adenopathy.   Neurological: She is alert and oriented to person, place, and time. She has normal reflexes. No cranial nerve deficit. She exhibits normal muscle tone. Coordination normal.   Skin: Skin is warm. No rash noted. She is not diaphoretic. No erythema. No pallor.   Psychiatric: She has a normal mood and affect. Her behavior is normal. Judgment and thought content normal.       Assessment:     1. Morbid obesity with BMI of 40.0-44.9, adult    2. Essential hypertension    3. Atherosclerosis of native coronary artery of native heart without angina pectoris    4. Aortic calcification    5. Pure hypercholesterolemia    6. Snoring    7. Current smoker    8. Other specified disorders of arteries and  arterioles         Plan:   Kaleigh was seen today for hypertension.    Diagnoses and all orders for this visit:    Morbid obesity with BMI of 40.0-44.9, adult    Essential hypertension    Atherosclerosis of native coronary artery of native heart without angina pectoris  -     Transthoracic echo (TTE) 2D with Color Flow; Future  -     Lipid panel; Future    Aortic calcification  -     Transthoracic echo (TTE) 2D with Color Flow; Future  -     Lipid panel; Future    Pure hypercholesterolemia    Snoring  -     Ambulatory referral to Sleep Disorders    Current smoker  -     Lipid panel; Future  -     CBC auto differential; Future  -     Comprehensive metabolic panel; Future  -     CV Ultrasound Bilateral Doppler Carotid; Future  -     CV Abdominal Aorta; Future    Other specified disorders of arteries and arterioles   -     CV Ultrasound Bilateral Doppler Carotid; Future    Other orders  -     amLODIPine (NORVASC) 5 MG tablet; Take 1 tablet (5 mg total) by mouth once daily.  -     losartan (COZAAR) 50 MG tablet; Take 1 tablet (50 mg total) by mouth once daily.      Start low dose norvasc and continue losartan. Current smoker needs screening.,echo for systolic murmur.   rtc 11 WKS.

## 2019-08-12 ENCOUNTER — CLINICAL SUPPORT (OUTPATIENT)
Dept: CARDIOLOGY | Facility: CLINIC | Age: 64
End: 2019-08-12
Attending: INTERNAL MEDICINE
Payer: MEDICARE

## 2019-08-12 VITALS
SYSTOLIC BLOOD PRESSURE: 138 MMHG | HEART RATE: 72 BPM | HEIGHT: 62 IN | BODY MASS INDEX: 41.04 KG/M2 | WEIGHT: 223 LBS | DIASTOLIC BLOOD PRESSURE: 86 MMHG

## 2019-08-12 DIAGNOSIS — I77.89 OTHER SPECIFIED DISORDERS OF ARTERIES AND ARTERIOLES: ICD-10-CM

## 2019-08-12 DIAGNOSIS — I25.10 ATHEROSCLEROSIS OF NATIVE CORONARY ARTERY OF NATIVE HEART WITHOUT ANGINA PECTORIS: ICD-10-CM

## 2019-08-12 DIAGNOSIS — F17.200 CURRENT SMOKER: ICD-10-CM

## 2019-08-12 DIAGNOSIS — I70.0 AORTIC CALCIFICATION: ICD-10-CM

## 2019-08-12 LAB
ASCENDING AORTA: 3.35 CM
AV INDEX (PROSTH): 0.85
AV MEAN GRADIENT: 5 MMHG
AV PEAK GRADIENT: 9 MMHG
AV VALVE AREA: 2.58 CM2
AV VELOCITY RATIO: 0.77
BSA FOR ECHO PROCEDURE: 2.1 M2
CV ECHO LV RWT: 0.26 CM
DOP CALC AO PEAK VEL: 1.52 M/S
DOP CALC AO VTI: 30.83 CM
DOP CALC LVOT AREA: 3 CM2
DOP CALC LVOT DIAMETER: 1.97 CM
DOP CALC LVOT PEAK VEL: 1.17 M/S
DOP CALC LVOT STROKE VOLUME: 79.51 CM3
DOP CALCLVOT PEAK VEL VTI: 26.1 CM
E WAVE DECELERATION TIME: 189.34 MSEC
E/A RATIO: 1.24
E/E' RATIO: 12.24 M/S
ECHO LV POSTERIOR WALL: 0.68 CM (ref 0.6–1.1)
FRACTIONAL SHORTENING: 32 % (ref 28–44)
INTERVENTRICULAR SEPTUM: 0.83 CM (ref 0.6–1.1)
IVRT: 0.09 MSEC
LA MAJOR: 4.74 CM
LA MINOR: 4.79 CM
LA WIDTH: 3.65 CM
LEFT ARM DIASTOLIC BLOOD PRESSURE: 60 MMHG
LEFT ARM SYSTOLIC BLOOD PRESSURE: 140 MMHG
LEFT ATRIUM SIZE: 3.33 CM
LEFT ATRIUM VOLUME INDEX: 24.6 ML/M2
LEFT ATRIUM VOLUME: 49.23 CM3
LEFT CBA DIAS: 17 CM/S
LEFT CBA SYS: 87 CM/S
LEFT CCA DIST DIAS: 24 CM/S
LEFT CCA DIST SYS: 81 CM/S
LEFT CCA MID DIAS: 26 CM/S
LEFT CCA MID SYS: 85 CM/S
LEFT CCA PROX DIAS: 24 CM/S
LEFT CCA PROX SYS: 80 CM/S
LEFT ECA DIAS: 31 CM/S
LEFT ECA SYS: 100 CM/S
LEFT ICA DIST DIAS: 28 CM/S
LEFT ICA DIST SYS: 82 CM/S
LEFT ICA MID DIAS: 27 CM/S
LEFT ICA MID SYS: 90 CM/S
LEFT ICA PROX DIAS: 28 CM/S
LEFT ICA PROX SYS: 81 CM/S
LEFT INTERNAL DIMENSION IN SYSTOLE: 3.52 CM (ref 2.1–4)
LEFT VENTRICLE DIASTOLIC VOLUME INDEX: 64.23 ML/M2
LEFT VENTRICLE DIASTOLIC VOLUME: 128.59 ML
LEFT VENTRICLE MASS INDEX: 67 G/M2
LEFT VENTRICLE SYSTOLIC VOLUME INDEX: 25.8 ML/M2
LEFT VENTRICLE SYSTOLIC VOLUME: 51.61 ML
LEFT VENTRICULAR INTERNAL DIMENSION IN DIASTOLE: 5.18 CM (ref 3.5–6)
LEFT VENTRICULAR MASS: 134.07 G
LEFT VERTEBRAL DIAS: 13 CM/S
LEFT VERTEBRAL SYS: 43 CM/S
LV LATERAL E/E' RATIO: 11.56 M/S
LV SEPTAL E/E' RATIO: 13 M/S
MV PEAK A VEL: 0.84 M/S
MV PEAK E VEL: 1.04 M/S
OHS CV CAROTID RIGHT ICA EDV HIGHEST: 37
OHS CV CAROTID ULTRASOUND LEFT ICA/CCA RATIO: 1.06
OHS CV CAROTID ULTRASOUND RIGHT ICA/CCA RATIO: 1
OHS CV PV CAROTID LEFT HIGHEST CCA: 85
OHS CV PV CAROTID LEFT HIGHEST ICA: 90
OHS CV PV CAROTID RIGHT HIGHEST CCA: 106
OHS CV PV CAROTID RIGHT HIGHEST ICA: 106
OHS CV US CAROTID LEFT HIGHEST EDV: 28
PISA TR MAX VEL: 2.02 M/S
PULM VEIN S/D RATIO: 1.09
PV PEAK D VEL: 0.47 M/S
PV PEAK S VEL: 0.51 M/S
RA MAJOR: 4.64 CM
RA PRESSURE: 3 MMHG
RA WIDTH: 3.62 CM
RIGHT ARM DIASTOLIC BLOOD PRESSURE: 60 MMHG
RIGHT ARM SYSTOLIC BLOOD PRESSURE: 115 MMHG
RIGHT CBA DIAS: 25 CM/S
RIGHT CBA SYS: 93 CM/S
RIGHT CCA DIST DIAS: 19 CM/S
RIGHT CCA DIST SYS: 84 CM/S
RIGHT CCA MID DIAS: 21 CM/S
RIGHT CCA MID SYS: 87 CM/S
RIGHT CCA PROX DIAS: 23 CM/S
RIGHT CCA PROX SYS: 106 CM/S
RIGHT ECA DIAS: 24 CM/S
RIGHT ECA SYS: 131 CM/S
RIGHT ICA DIST DIAS: 37 CM/S
RIGHT ICA DIST SYS: 93 CM/S
RIGHT ICA MID DIAS: 35 CM/S
RIGHT ICA MID SYS: 106 CM/S
RIGHT ICA PROX DIAS: 23 CM/S
RIGHT ICA PROX SYS: 80 CM/S
RIGHT VENTRICULAR END-DIASTOLIC DIMENSION: 2.73 CM
RIGHT VERTEBRAL DIAS: 17 CM/S
RIGHT VERTEBRAL SYS: 56 CM/S
SINUS: 2.96 CM
STJ: 2.52 CM
TDI LATERAL: 0.09 M/S
TDI SEPTAL: 0.08 M/S
TDI: 0.09 M/S
TR MAX PG: 16 MMHG
TRICUSPID ANNULAR PLANE SYSTOLIC EXCURSION: 2.13 CM
TV REST PULMONARY ARTERY PRESSURE: 19 MMHG

## 2019-08-12 PROCEDURE — 93880 CV US DOPPLER CAROTID (CUPID ONLY): ICD-10-PCS | Mod: HCNC,S$GLB,, | Performed by: INTERNAL MEDICINE

## 2019-08-12 PROCEDURE — 93306 TTE W/DOPPLER COMPLETE: CPT | Mod: HCNC,S$GLB,, | Performed by: INTERNAL MEDICINE

## 2019-08-12 PROCEDURE — 99999 PR PBB SHADOW E&M-EST. PATIENT-LVL II: CPT | Mod: PBBFAC,HCNC,,

## 2019-08-12 PROCEDURE — 93880 EXTRACRANIAL BILAT STUDY: CPT | Mod: HCNC,S$GLB,, | Performed by: INTERNAL MEDICINE

## 2019-08-12 PROCEDURE — 99999 PR PBB SHADOW E&M-EST. PATIENT-LVL II: ICD-10-PCS | Mod: PBBFAC,HCNC,,

## 2019-08-12 PROCEDURE — 93306 TRANSTHORACIC ECHO (TTE) COMPLETE (CUPID ONLY): ICD-10-PCS | Mod: HCNC,S$GLB,, | Performed by: INTERNAL MEDICINE

## 2019-08-14 ENCOUNTER — TELEPHONE (OUTPATIENT)
Dept: CARDIOLOGY | Facility: CLINIC | Age: 64
End: 2019-08-14

## 2019-08-14 NOTE — TELEPHONE ENCOUNTER
----- Message from Sally Wang MD sent at 8/14/2019  1:10 PM CDT -----  plz let pt. Know that echo (ultrasound of the heart) is normal.

## 2019-08-29 ENCOUNTER — TELEPHONE (OUTPATIENT)
Dept: CARDIOLOGY | Facility: CLINIC | Age: 64
End: 2019-08-29

## 2019-08-29 ENCOUNTER — CLINICAL SUPPORT (OUTPATIENT)
Dept: CARDIOLOGY | Facility: CLINIC | Age: 64
End: 2019-08-29
Attending: INTERNAL MEDICINE
Payer: MEDICARE

## 2019-08-29 DIAGNOSIS — F17.200 CURRENT SMOKER: ICD-10-CM

## 2019-08-29 LAB
ABDOMINAL IMA AP: 1.34 CM
ABDOMINAL IMA ED VEL: 15 CM/S
ABDOMINAL IMA PS VEL: 162 CM/S
ABDOMINAL IMA TRANS: 1.38 CM
ABDOMINAL INFRARENAL AORTA AP: 1.46 CM
ABDOMINAL INFRARENAL AORTA ED VEL: 10 CM/S
ABDOMINAL INFRARENAL AORTA PS VEL: 102 CM/S
ABDOMINAL INFRARENAL AORTA TRANS: 1.57 CM
ABDOMINAL JUXTARENAL AORTA AP: 2.09 CM
ABDOMINAL JUXTARENAL AORTA ED VEL: 10 CM/S
ABDOMINAL JUXTARENAL AORTA PS VEL: 77 CM/S
ABDOMINAL JUXTARENAL AORTA TRANS: 1.91 CM
ABDOMINAL LT COM ILIAC AP: 0.86 CM
ABDOMINAL LT COM ILIAC TRANS: 1.22 CM
ABDOMINAL LT COM ILIAC VEL: 144 CM/S
ABDOMINAL LT COM ILLIAC ED VEL: 14 CM/S
ABDOMINAL RT COM ILIAC AP: 0.79 CM
ABDOMINAL RT COM ILIAC TRANS: 0.98 CM
ABDOMINAL RT COM ILIAC VEL: 159 CM/S
ABDOMINAL RT COM ILLIAC ED VEL: 15 CM/S
ABDOMINAL SUPRARENAL AORTA AP: 2.26 CM
ABDOMINAL SUPRARENAL AORTA ED VEL: 10 CM/S
ABDOMINAL SUPRARENAL AORTA PS VEL: 77 CM/S
ABDOMINAL SUPRARENAL AORTA TRANS: 2.02 CM

## 2019-08-29 PROCEDURE — 93978 VASCULAR STUDY: CPT | Mod: HCNC,S$GLB,, | Performed by: INTERNAL MEDICINE

## 2019-08-29 PROCEDURE — 93978 CV US ABDOMINAL AORTA EVALUATION (CUPID ONLY): ICD-10-PCS | Mod: HCNC,S$GLB,, | Performed by: INTERNAL MEDICINE

## 2019-08-29 NOTE — TELEPHONE ENCOUNTER
----- Message from Sally Wang MD sent at 8/29/2019  3:03 PM CDT -----  plz let pt. Know that ultrasound of the abdomen shows normal aorta.

## 2019-10-15 ENCOUNTER — LAB VISIT (OUTPATIENT)
Dept: LAB | Facility: HOSPITAL | Age: 64
End: 2019-10-15
Attending: INTERNAL MEDICINE
Payer: MEDICARE

## 2019-10-15 DIAGNOSIS — F17.200 CURRENT SMOKER: ICD-10-CM

## 2019-10-15 DIAGNOSIS — I25.10 ATHEROSCLEROSIS OF NATIVE CORONARY ARTERY OF NATIVE HEART WITHOUT ANGINA PECTORIS: ICD-10-CM

## 2019-10-15 DIAGNOSIS — I70.0 AORTIC CALCIFICATION: ICD-10-CM

## 2019-10-15 LAB
ALBUMIN SERPL BCP-MCNC: 3.9 G/DL (ref 3.5–5.2)
ALP SERPL-CCNC: 100 U/L (ref 55–135)
ALT SERPL W/O P-5'-P-CCNC: 23 U/L (ref 10–44)
ANION GAP SERPL CALC-SCNC: 6 MMOL/L (ref 8–16)
AST SERPL-CCNC: 14 U/L (ref 10–40)
BASOPHILS # BLD AUTO: 0.04 K/UL (ref 0–0.2)
BASOPHILS NFR BLD: 0.6 % (ref 0–1.9)
BILIRUB SERPL-MCNC: 0.5 MG/DL (ref 0.1–1)
BUN SERPL-MCNC: 6 MG/DL (ref 8–23)
CALCIUM SERPL-MCNC: 9.6 MG/DL (ref 8.7–10.5)
CHLORIDE SERPL-SCNC: 107 MMOL/L (ref 95–110)
CHOLEST SERPL-MCNC: 167 MG/DL (ref 120–199)
CHOLEST/HDLC SERPL: 3.7 {RATIO} (ref 2–5)
CO2 SERPL-SCNC: 29 MMOL/L (ref 23–29)
CREAT SERPL-MCNC: 0.8 MG/DL (ref 0.5–1.4)
DIFFERENTIAL METHOD: ABNORMAL
EOSINOPHIL # BLD AUTO: 0.2 K/UL (ref 0–0.5)
EOSINOPHIL NFR BLD: 3 % (ref 0–8)
ERYTHROCYTE [DISTWIDTH] IN BLOOD BY AUTOMATED COUNT: 14.2 % (ref 11.5–14.5)
EST. GFR  (AFRICAN AMERICAN): >60 ML/MIN/1.73 M^2
EST. GFR  (NON AFRICAN AMERICAN): >60 ML/MIN/1.73 M^2
GLUCOSE SERPL-MCNC: 96 MG/DL (ref 70–110)
HCT VFR BLD AUTO: 44.7 % (ref 37–48.5)
HDLC SERPL-MCNC: 45 MG/DL (ref 40–75)
HDLC SERPL: 26.9 % (ref 20–50)
HGB BLD-MCNC: 14.2 G/DL (ref 12–16)
IMM GRANULOCYTES # BLD AUTO: 0.02 K/UL (ref 0–0.04)
IMM GRANULOCYTES NFR BLD AUTO: 0.3 % (ref 0–0.5)
LDLC SERPL CALC-MCNC: 98.2 MG/DL (ref 63–159)
LYMPHOCYTES # BLD AUTO: 1.5 K/UL (ref 1–4.8)
LYMPHOCYTES NFR BLD: 22.2 % (ref 18–48)
MCH RBC QN AUTO: 31.2 PG (ref 27–31)
MCHC RBC AUTO-ENTMCNC: 31.8 G/DL (ref 32–36)
MCV RBC AUTO: 98 FL (ref 82–98)
MONOCYTES # BLD AUTO: 0.4 K/UL (ref 0.3–1)
MONOCYTES NFR BLD: 5.2 % (ref 4–15)
NEUTROPHILS # BLD AUTO: 4.8 K/UL (ref 1.8–7.7)
NEUTROPHILS NFR BLD: 68.7 % (ref 38–73)
NONHDLC SERPL-MCNC: 122 MG/DL
NRBC BLD-RTO: 0 /100 WBC
PLATELET # BLD AUTO: 241 K/UL (ref 150–350)
PMV BLD AUTO: 9.8 FL (ref 9.2–12.9)
POTASSIUM SERPL-SCNC: 4.1 MMOL/L (ref 3.5–5.1)
PROT SERPL-MCNC: 7.1 G/DL (ref 6–8.4)
RBC # BLD AUTO: 4.55 M/UL (ref 4–5.4)
SODIUM SERPL-SCNC: 142 MMOL/L (ref 136–145)
TRIGL SERPL-MCNC: 119 MG/DL (ref 30–150)
WBC # BLD AUTO: 6.93 K/UL (ref 3.9–12.7)

## 2019-10-15 PROCEDURE — 85025 COMPLETE CBC W/AUTO DIFF WBC: CPT | Mod: HCNC

## 2019-10-15 PROCEDURE — 80061 LIPID PANEL: CPT | Mod: HCNC

## 2019-10-15 PROCEDURE — 36415 COLL VENOUS BLD VENIPUNCTURE: CPT | Mod: HCNC,PO

## 2019-10-15 PROCEDURE — 80053 COMPREHEN METABOLIC PANEL: CPT | Mod: HCNC

## 2019-10-18 ENCOUNTER — PATIENT OUTREACH (OUTPATIENT)
Dept: ADMINISTRATIVE | Facility: OTHER | Age: 64
End: 2019-10-18

## 2019-10-21 ENCOUNTER — OFFICE VISIT (OUTPATIENT)
Dept: CARDIOLOGY | Facility: CLINIC | Age: 64
End: 2019-10-21
Payer: MEDICARE

## 2019-10-21 VITALS
HEIGHT: 62 IN | HEART RATE: 89 BPM | BODY MASS INDEX: 41.46 KG/M2 | SYSTOLIC BLOOD PRESSURE: 142 MMHG | WEIGHT: 225.31 LBS | DIASTOLIC BLOOD PRESSURE: 69 MMHG | OXYGEN SATURATION: 97 %

## 2019-10-21 DIAGNOSIS — E78.00 HYPERCHOLESTEREMIA: Primary | ICD-10-CM

## 2019-10-21 DIAGNOSIS — I25.10 ATHEROSCLEROSIS OF NATIVE CORONARY ARTERY OF NATIVE HEART WITHOUT ANGINA PECTORIS: ICD-10-CM

## 2019-10-21 DIAGNOSIS — F17.200 CURRENT SMOKER: ICD-10-CM

## 2019-10-21 DIAGNOSIS — I70.0 AORTIC CALCIFICATION: ICD-10-CM

## 2019-10-21 DIAGNOSIS — E66.01 MORBID OBESITY WITH BMI OF 40.0-44.9, ADULT: ICD-10-CM

## 2019-10-21 DIAGNOSIS — I10 ESSENTIAL HYPERTENSION: ICD-10-CM

## 2019-10-21 DIAGNOSIS — Z82.49 FAMILY HISTORY OF AORTIC ANEURYSM: ICD-10-CM

## 2019-10-21 PROCEDURE — 99999 PR PBB SHADOW E&M-EST. PATIENT-LVL III: ICD-10-PCS | Mod: PBBFAC,HCNC,, | Performed by: INTERNAL MEDICINE

## 2019-10-21 PROCEDURE — 99999 PR PBB SHADOW E&M-EST. PATIENT-LVL III: CPT | Mod: PBBFAC,HCNC,, | Performed by: INTERNAL MEDICINE

## 2019-10-21 PROCEDURE — 99499 RISK ADDL DX/OHS AUDIT: ICD-10-PCS | Mod: S$GLB,,, | Performed by: INTERNAL MEDICINE

## 2019-10-21 PROCEDURE — 3078F PR MOST RECENT DIASTOLIC BLOOD PRESSURE < 80 MM HG: ICD-10-PCS | Mod: HCNC,CPTII,S$GLB, | Performed by: INTERNAL MEDICINE

## 2019-10-21 PROCEDURE — 3077F PR MOST RECENT SYSTOLIC BLOOD PRESSURE >= 140 MM HG: ICD-10-PCS | Mod: HCNC,CPTII,S$GLB, | Performed by: INTERNAL MEDICINE

## 2019-10-21 PROCEDURE — 99499 UNLISTED E&M SERVICE: CPT | Mod: S$GLB,,, | Performed by: INTERNAL MEDICINE

## 2019-10-21 PROCEDURE — 99214 PR OFFICE/OUTPT VISIT, EST, LEVL IV, 30-39 MIN: ICD-10-PCS | Mod: HCNC,S$GLB,, | Performed by: INTERNAL MEDICINE

## 2019-10-21 PROCEDURE — 3008F PR BODY MASS INDEX (BMI) DOCUMENTED: ICD-10-PCS | Mod: HCNC,CPTII,S$GLB, | Performed by: INTERNAL MEDICINE

## 2019-10-21 PROCEDURE — 99214 OFFICE O/P EST MOD 30 MIN: CPT | Mod: HCNC,S$GLB,, | Performed by: INTERNAL MEDICINE

## 2019-10-21 PROCEDURE — 3008F BODY MASS INDEX DOCD: CPT | Mod: HCNC,CPTII,S$GLB, | Performed by: INTERNAL MEDICINE

## 2019-10-21 PROCEDURE — 3078F DIAST BP <80 MM HG: CPT | Mod: HCNC,CPTII,S$GLB, | Performed by: INTERNAL MEDICINE

## 2019-10-21 PROCEDURE — 3077F SYST BP >= 140 MM HG: CPT | Mod: HCNC,CPTII,S$GLB, | Performed by: INTERNAL MEDICINE

## 2019-10-21 RX ORDER — ATORVASTATIN CALCIUM 40 MG/1
40 TABLET, FILM COATED ORAL DAILY
Qty: 90 TABLET | Refills: 3 | Status: SHIPPED | OUTPATIENT
Start: 2019-10-21 | End: 2019-12-05

## 2019-10-21 NOTE — PROGRESS NOTES
Subjective:   Patient ID:  Kaleigh Solo is a 64 y.o. female who presents for follow-up of Essential hypertension (11 weeks fu)   Kaleigh Solo is a 64 y.o. female is a new patient who presents for evaluation of Hypertension      HPI:   Patient has food allergies. Patient stopped taking HTN medicines.   Patient has cervical fusion and spinal cord stimulator.  1 pack a day smoker.  No f/h of heart attack. Father had aortic aneurysm.      The 10-year ASCVD risk score (Ermamihai MARTÍNEZ Jr., et al., 2013) is: 12.2%    Values used to calculate the score:      Age: 64 years      Sex: Female      Is Non- : No      Diabetic: No      Tobacco smoker: Yes      Systolic Blood Pressure: 133 mmHg      Is BP treated: Yes      HDL Cholesterol: 61 mg/dL      Total Cholesterol: 238 mg/dL      Echo:  · Normal left ventricular systolic function. The estimated ejection fraction is 55%  · Normal LV diastolic function.  · Normal right ventricular systolic function.  · Structurally normal valves.  · Normal central venous pressure (3 mm Hg).  · The estimated PA systolic pressure is 19 mm Hg    Carotid US:  · There is 20-39% right Internal Carotid Stenosis.  · There is 0-19% left Internal Carotid Stenosis.    HPI:   Follow up of HTN  Patient is very sedentary  She hs multiple aches and pains due to arthritis prior neck fusion, patient has spinal stimulator as well.   No chest pain, Orthopnea, PND of heart failure symptoms.   Denies palpitations or fluttering in the chest    Abdominal US  · No AAA.      Patient Active Problem List   Diagnosis    Asthma    DDD (degenerative disc disease), cervical    Facet arthritis of cervical region    Neural foraminal stenosis of cervical spine    Cervical spondylosis with radiculopathy    Morbid obesity with BMI of 40.0-44.9, adult    Cervical herniated disc    Sacroiliac joint pain    Tear of medial cartilage or meniscus of knee, current    Osteoarthritis of right knee     "Thoracic or lumbosacral neuritis or radiculitis, unspecified    Essential hypertension    Hyperlipidemia    Urticaria    Smoker    Candidate for statin therapy due to risk of future cardiovascular event    Pulmonary nodule    Lumbar disc herniation with radiculopathy    Foraminal stenosis of lumbosacral region    Atherosclerosis of native coronary artery of native heart without angina pectoris    Chronic bilateral low back pain with left-sided sciatica    Spinal stenosis of lumbar region with neurogenic claudication    Weakness of both lower extremities    Aortic calcification    Elevated ALT measurement    Adrenal mass, left    Family history of aortic aneurysm     BP (!) 142/69 (BP Location: Left arm, Patient Position: Sitting, BP Method: X-Large (Automatic))   Pulse 89   Ht 5' 2" (1.575 m)   Wt 102.2 kg (225 lb 5 oz)   SpO2 97%   BMI 41.21 kg/m²   Body mass index is 41.21 kg/m².  Estimated Creatinine Clearance: 81.6 mL/min (based on SCr of 0.8 mg/dL).    Lab Results   Component Value Date     10/15/2019    K 4.1 10/15/2019     10/15/2019    CO2 29 10/15/2019    BUN 6 (L) 10/15/2019    CREATININE 0.8 10/15/2019    GLU 96 10/15/2019    HGBA1C 5.2 09/24/2018    MG 2.2 04/08/2014    AST 14 10/15/2019    ALT 23 10/15/2019    ALBUMIN 3.9 10/15/2019    PROT 7.1 10/15/2019    BILITOT 0.5 10/15/2019    WBC 6.93 10/15/2019    HGB 14.2 10/15/2019    HCT 44.7 10/15/2019    MCV 98 10/15/2019     10/15/2019    INR 0.9 03/26/2014    TSH 2.144 09/24/2018    CHOL 167 10/15/2019    HDL 45 10/15/2019    LDLCALC 98.2 10/15/2019    TRIG 119 10/15/2019       Current Outpatient Medications   Medication Sig    albuterol (PROAIR HFA) 90 mcg/actuation inhaler Inhale 2 puffs into the lungs every 6 (six) hours as needed for Wheezing.    amLODIPine (NORVASC) 5 MG tablet Take 1 tablet (5 mg total) by mouth once daily.    cetirizine (ZYRTEC) 10 MG tablet Take 10 mg by mouth daily as needed.     " clotrimazole-betamethasone 1-0.05% (LOTRISONE) cream Apply topically 2 (two) times daily. (Patient taking differently: Apply topically 2 (two) times daily as needed. )    diphenhydrAMINE (BENADRYL) 50 MG tablet Take 50 mg by mouth nightly as needed for Itching.    famotidine (PEPCID) 20 MG tablet Take 20 mg by mouth 2 (two) times daily as needed.     fexofenadine (ALLEGRA) 60 MG tablet Take 60 mg by mouth daily as needed.     HYDROcodone-acetaminophen (NORCO) 5-325 mg per tablet Take 1 tablet by mouth every 6 (six) hours as needed for Pain.    hydrOXYzine HCl (ATARAX) 25 MG tablet Take 1 tablet (25 mg total) by mouth 3 (three) times daily as needed for Itching.    losartan (COZAAR) 50 MG tablet Take 1 tablet (50 mg total) by mouth once daily.    atorvastatin (LIPITOR) 40 MG tablet Take 1 tablet (40 mg total) by mouth once daily.    fluocinonide 0.05% (LIDEX) 0.05 % cream Apply topically 2 (two) times daily as needed. (Patient not taking: Reported on 10/21/2019)     No current facility-administered medications for this visit.        Review of Systems   Constitution: Negative for chills, decreased appetite, malaise/fatigue, night sweats, weight gain and weight loss.   Eyes: Negative for blurred vision, double vision, visual disturbance and visual halos.   Cardiovascular: Negative for chest pain, claudication, cyanosis, dyspnea on exertion, irregular heartbeat, leg swelling, near-syncope, orthopnea, palpitations, paroxysmal nocturnal dyspnea and syncope.   Respiratory: Negative for cough, hemoptysis, snoring, sputum production and wheezing.    Endocrine: Negative for cold intolerance, heat intolerance, polydipsia and polyphagia.   Hematologic/Lymphatic: Negative for adenopathy and bleeding problem. Does not bruise/bleed easily.   Skin: Negative for flushing, itching, poor wound healing and rash.   Musculoskeletal: Negative for arthritis, back pain, falls, gout, joint pain, joint swelling, muscle cramps, muscle  weakness, myalgias, neck pain and stiffness.   Gastrointestinal: Negative for bloating, abdominal pain, anorexia, diarrhea, dysphagia, excessive appetite, flatus, hematemesis, jaundice, melena and nausea.   Genitourinary: Negative for hesitancy and incomplete emptying.   Neurological: Negative for aphonia, brief paralysis, difficulty with concentration, disturbances in coordination, excessive daytime sleepiness, dizziness, focal weakness, light-headedness, loss of balance and weakness.   Psychiatric/Behavioral: Negative for altered mental status, depression, hallucinations, hypervigilance, memory loss, substance abuse and suicidal ideas. The patient does not have insomnia and is not nervous/anxious.        Objective:   Physical Exam   Constitutional: She is oriented to person, place, and time. She appears well-developed and well-nourished. No distress.   HENT:   Head: Normocephalic and atraumatic.   Nose: Nose normal.   Mouth/Throat: Oropharynx is clear and moist. No oropharyngeal exudate.   Eyes: Pupils are equal, round, and reactive to light. Conjunctivae and EOM are normal. Right eye exhibits no discharge. Left eye exhibits no discharge. No scleral icterus.   Neck: Normal range of motion. Neck supple. No JVD present. No tracheal deviation present. No thyromegaly present.   Cardiovascular: Normal rate, regular rhythm, normal heart sounds and intact distal pulses. Exam reveals no gallop and no friction rub.   No murmur heard.  Pulmonary/Chest: Effort normal and breath sounds normal. No stridor. No respiratory distress. She has no wheezes. She has no rales. She exhibits no tenderness.   Abdominal: Soft. Bowel sounds are normal. She exhibits no distension and no mass. There is no tenderness. There is no rebound and no guarding.   Musculoskeletal: Normal range of motion. She exhibits no edema or tenderness.   Lymphadenopathy:     She has no cervical adenopathy.   Neurological: She is alert and oriented to person,  place, and time. She has normal reflexes. No cranial nerve deficit. She exhibits normal muscle tone. Coordination normal.   Skin: Skin is warm. No rash noted. She is not diaphoretic. No erythema. No pallor.   Psychiatric: She has a normal mood and affect. Her behavior is normal. Judgment and thought content normal.       Assessment:     1. Hypercholesteremia    2. Current smoker    3. Atherosclerosis of native coronary artery of native heart without angina pectoris    4. Aortic calcification    5. Essential hypertension    6. Morbid obesity with BMI of 40.0-44.9, adult    7. Family history of aortic aneurysm        Plan:   Kaleigh was seen today for essential hypertension.    Diagnoses and all orders for this visit:    Hypercholesteremia  -     Hepatic function panel; Future  -     Lipid panel; Future    Current smoker    Atherosclerosis of native coronary artery of native heart without angina pectoris    Aortic calcification    Essential hypertension    Morbid obesity with BMI of 40.0-44.9, adult    Family history of aortic aneurysm    Other orders  -     atorvastatin (LIPITOR) 40 MG tablet; Take 1 tablet (40 mg total) by mouth once daily.      RTC PRN.   Counseled on importance of heart healthy diet low in saturated and trans fat and salt as well gradually starting a regular aerobic exercise regimen with goal of 30min 5x/week. Recommend BP diary. Call if systolic BP > 130 mmHg on checking repeatedly

## 2019-10-22 ENCOUNTER — IMMUNIZATION (OUTPATIENT)
Dept: PHARMACY | Facility: CLINIC | Age: 64
End: 2019-10-22
Payer: MEDICARE

## 2019-10-24 ENCOUNTER — PATIENT MESSAGE (OUTPATIENT)
Dept: CARDIOLOGY | Facility: CLINIC | Age: 64
End: 2019-10-24

## 2019-11-05 ENCOUNTER — LAB VISIT (OUTPATIENT)
Dept: LAB | Facility: HOSPITAL | Age: 64
End: 2019-11-05
Attending: INTERNAL MEDICINE
Payer: MEDICARE

## 2019-11-05 ENCOUNTER — OFFICE VISIT (OUTPATIENT)
Dept: INTERNAL MEDICINE | Facility: CLINIC | Age: 64
End: 2019-11-05
Payer: MEDICARE

## 2019-11-05 VITALS
TEMPERATURE: 98 F | SYSTOLIC BLOOD PRESSURE: 130 MMHG | RESPIRATION RATE: 20 BRPM | HEIGHT: 62 IN | OXYGEN SATURATION: 96 % | WEIGHT: 224.63 LBS | DIASTOLIC BLOOD PRESSURE: 78 MMHG | BODY MASS INDEX: 41.34 KG/M2 | HEART RATE: 97 BPM

## 2019-11-05 DIAGNOSIS — R79.89 ABNORMAL CBC: ICD-10-CM

## 2019-11-05 DIAGNOSIS — Z00.00 ANNUAL PHYSICAL EXAM: ICD-10-CM

## 2019-11-05 DIAGNOSIS — E66.01 MORBID OBESITY WITH BMI OF 40.0-44.9, ADULT: ICD-10-CM

## 2019-11-05 DIAGNOSIS — M17.0 BILATERAL PRIMARY OSTEOARTHRITIS OF KNEE: ICD-10-CM

## 2019-11-05 DIAGNOSIS — E78.5 HYPERLIPIDEMIA, UNSPECIFIED HYPERLIPIDEMIA TYPE: ICD-10-CM

## 2019-11-05 DIAGNOSIS — M62.838 MUSCLE SPASM: ICD-10-CM

## 2019-11-05 DIAGNOSIS — G47.00 INSOMNIA, UNSPECIFIED TYPE: ICD-10-CM

## 2019-11-05 DIAGNOSIS — R20.1 HYPOESTHESIA OF SKIN: ICD-10-CM

## 2019-11-05 DIAGNOSIS — G89.29 CHRONIC BILATERAL LOW BACK PAIN WITH LEFT-SIDED SCIATICA: Chronic | ICD-10-CM

## 2019-11-05 DIAGNOSIS — Z12.31 ENCOUNTER FOR SCREENING MAMMOGRAM FOR HIGH-RISK PATIENT: ICD-10-CM

## 2019-11-05 DIAGNOSIS — M54.42 CHRONIC BILATERAL LOW BACK PAIN WITH LEFT-SIDED SCIATICA: Chronic | ICD-10-CM

## 2019-11-05 DIAGNOSIS — I10 ESSENTIAL HYPERTENSION: Primary | ICD-10-CM

## 2019-11-05 DIAGNOSIS — M79.7 FIBROMYALGIA: ICD-10-CM

## 2019-11-05 DIAGNOSIS — Z12.11 SCREEN FOR COLON CANCER: ICD-10-CM

## 2019-11-05 LAB
25(OH)D3+25(OH)D2 SERPL-MCNC: 16 NG/ML (ref 30–96)
BASOPHILS # BLD AUTO: 0.07 K/UL (ref 0–0.2)
BASOPHILS NFR BLD: 0.7 % (ref 0–1.9)
CK SERPL-CCNC: 55 U/L (ref 20–180)
CRP SERPL-MCNC: 18.5 MG/L (ref 0–8.2)
DIFFERENTIAL METHOD: ABNORMAL
EOSINOPHIL # BLD AUTO: 0.2 K/UL (ref 0–0.5)
EOSINOPHIL NFR BLD: 2 % (ref 0–8)
ERYTHROCYTE [DISTWIDTH] IN BLOOD BY AUTOMATED COUNT: 13.7 % (ref 11.5–14.5)
ERYTHROCYTE [SEDIMENTATION RATE] IN BLOOD BY WESTERGREN METHOD: 21 MM/HR (ref 0–36)
FERRITIN SERPL-MCNC: 127 NG/ML (ref 20–300)
HCT VFR BLD AUTO: 46.1 % (ref 37–48.5)
HGB BLD-MCNC: 14.5 G/DL (ref 12–16)
IMM GRANULOCYTES # BLD AUTO: 0.02 K/UL (ref 0–0.04)
IMM GRANULOCYTES NFR BLD AUTO: 0.2 % (ref 0–0.5)
IRON SERPL-MCNC: 64 UG/DL (ref 30–160)
LYMPHOCYTES # BLD AUTO: 2.1 K/UL (ref 1–4.8)
LYMPHOCYTES NFR BLD: 21.8 % (ref 18–48)
MAGNESIUM SERPL-MCNC: 1.8 MG/DL (ref 1.6–2.6)
MCH RBC QN AUTO: 30.9 PG (ref 27–31)
MCHC RBC AUTO-ENTMCNC: 31.5 G/DL (ref 32–36)
MCV RBC AUTO: 98 FL (ref 82–98)
MONOCYTES # BLD AUTO: 0.6 K/UL (ref 0.3–1)
MONOCYTES NFR BLD: 6.1 % (ref 4–15)
NEUTROPHILS # BLD AUTO: 6.7 K/UL (ref 1.8–7.7)
NEUTROPHILS NFR BLD: 69.2 % (ref 38–73)
NRBC BLD-RTO: 0 /100 WBC
PHOSPHATE SERPL-MCNC: 3.7 MG/DL (ref 2.7–4.5)
PLATELET # BLD AUTO: 233 K/UL (ref 150–350)
PMV BLD AUTO: 9.6 FL (ref 9.2–12.9)
RBC # BLD AUTO: 4.69 M/UL (ref 4–5.4)
RETICS/RBC NFR AUTO: 2 % (ref 0.5–2.5)
SATURATED IRON: 19 % (ref 20–50)
TOTAL IRON BINDING CAPACITY: 343 UG/DL (ref 250–450)
TRANSFERRIN SERPL-MCNC: 232 MG/DL (ref 200–375)
TSH SERPL DL<=0.005 MIU/L-ACNC: 2.29 UIU/ML (ref 0.4–4)
VIT B12 SERPL-MCNC: 527 PG/ML (ref 210–950)
WBC # BLD AUTO: 9.66 K/UL (ref 3.9–12.7)

## 2019-11-05 PROCEDURE — 99214 OFFICE O/P EST MOD 30 MIN: CPT | Mod: HCNC,S$GLB,, | Performed by: INTERNAL MEDICINE

## 2019-11-05 PROCEDURE — 85025 COMPLETE CBC W/AUTO DIFF WBC: CPT | Mod: HCNC

## 2019-11-05 PROCEDURE — 99999 PR PBB SHADOW E&M-EST. PATIENT-LVL IV: ICD-10-PCS | Mod: PBBFAC,HCNC,, | Performed by: INTERNAL MEDICINE

## 2019-11-05 PROCEDURE — 36415 COLL VENOUS BLD VENIPUNCTURE: CPT | Mod: HCNC,PO

## 2019-11-05 PROCEDURE — 84443 ASSAY THYROID STIM HORMONE: CPT | Mod: HCNC

## 2019-11-05 PROCEDURE — 85045 AUTOMATED RETICULOCYTE COUNT: CPT | Mod: HCNC

## 2019-11-05 PROCEDURE — 3075F PR MOST RECENT SYSTOLIC BLOOD PRESS GE 130-139MM HG: ICD-10-PCS | Mod: HCNC,CPTII,S$GLB, | Performed by: INTERNAL MEDICINE

## 2019-11-05 PROCEDURE — 3008F PR BODY MASS INDEX (BMI) DOCUMENTED: ICD-10-PCS | Mod: HCNC,CPTII,S$GLB, | Performed by: INTERNAL MEDICINE

## 2019-11-05 PROCEDURE — 83540 ASSAY OF IRON: CPT | Mod: HCNC

## 2019-11-05 PROCEDURE — 84100 ASSAY OF PHOSPHORUS: CPT | Mod: HCNC

## 2019-11-05 PROCEDURE — 82550 ASSAY OF CK (CPK): CPT | Mod: HCNC

## 2019-11-05 PROCEDURE — 3078F PR MOST RECENT DIASTOLIC BLOOD PRESSURE < 80 MM HG: ICD-10-PCS | Mod: HCNC,CPTII,S$GLB, | Performed by: INTERNAL MEDICINE

## 2019-11-05 PROCEDURE — 85652 RBC SED RATE AUTOMATED: CPT | Mod: HCNC

## 2019-11-05 PROCEDURE — 3078F DIAST BP <80 MM HG: CPT | Mod: HCNC,CPTII,S$GLB, | Performed by: INTERNAL MEDICINE

## 2019-11-05 PROCEDURE — 82607 VITAMIN B-12: CPT | Mod: HCNC

## 2019-11-05 PROCEDURE — 82306 VITAMIN D 25 HYDROXY: CPT | Mod: HCNC

## 2019-11-05 PROCEDURE — 3008F BODY MASS INDEX DOCD: CPT | Mod: HCNC,CPTII,S$GLB, | Performed by: INTERNAL MEDICINE

## 2019-11-05 PROCEDURE — 82728 ASSAY OF FERRITIN: CPT | Mod: HCNC

## 2019-11-05 PROCEDURE — 86140 C-REACTIVE PROTEIN: CPT | Mod: HCNC

## 2019-11-05 PROCEDURE — 83735 ASSAY OF MAGNESIUM: CPT | Mod: HCNC

## 2019-11-05 PROCEDURE — 99999 PR PBB SHADOW E&M-EST. PATIENT-LVL IV: CPT | Mod: PBBFAC,HCNC,, | Performed by: INTERNAL MEDICINE

## 2019-11-05 PROCEDURE — 3075F SYST BP GE 130 - 139MM HG: CPT | Mod: HCNC,CPTII,S$GLB, | Performed by: INTERNAL MEDICINE

## 2019-11-05 PROCEDURE — 99499 UNLISTED E&M SERVICE: CPT | Mod: S$GLB,,, | Performed by: INTERNAL MEDICINE

## 2019-11-05 PROCEDURE — 99499 RISK ADDL DX/OHS AUDIT: ICD-10-PCS | Mod: S$GLB,,, | Performed by: INTERNAL MEDICINE

## 2019-11-05 PROCEDURE — 99214 PR OFFICE/OUTPT VISIT, EST, LEVL IV, 30-39 MIN: ICD-10-PCS | Mod: HCNC,S$GLB,, | Performed by: INTERNAL MEDICINE

## 2019-11-05 PROCEDURE — 82746 ASSAY OF FOLIC ACID SERUM: CPT | Mod: HCNC

## 2019-11-05 RX ORDER — FUROSEMIDE 20 MG/1
TABLET ORAL
Qty: 90 TABLET | Refills: 0 | Status: SHIPPED | OUTPATIENT
Start: 2019-11-05 | End: 2019-11-11

## 2019-11-05 RX ORDER — ALPRAZOLAM 0.25 MG/1
0.25 TABLET ORAL NIGHTLY PRN
Qty: 90 TABLET | Refills: 0 | Status: SHIPPED | OUTPATIENT
Start: 2019-11-05 | End: 2020-11-11

## 2019-11-05 RX ORDER — TIZANIDINE 4 MG/1
4 TABLET ORAL EVERY 8 HOURS PRN
Qty: 90 TABLET | Refills: 5 | Status: SHIPPED | OUTPATIENT
Start: 2019-11-05 | End: 2020-11-11

## 2019-11-05 RX ORDER — DULOXETIN HYDROCHLORIDE 30 MG/1
30 CAPSULE, DELAYED RELEASE ORAL DAILY
Qty: 30 CAPSULE | Refills: 11 | Status: SHIPPED | OUTPATIENT
Start: 2019-11-05 | End: 2019-12-27 | Stop reason: SDUPTHER

## 2019-11-05 RX ORDER — HYDROCODONE BITARTRATE AND ACETAMINOPHEN 5; 325 MG/1; MG/1
1 TABLET ORAL EVERY 6 HOURS PRN
Qty: 90 TABLET | Refills: 0 | Status: SHIPPED | OUTPATIENT
Start: 2019-11-05 | End: 2020-02-10 | Stop reason: SDUPTHER

## 2019-11-05 RX ORDER — FUROSEMIDE 20 MG/1
20 TABLET ORAL DAILY PRN
Qty: 30 TABLET | Refills: 0 | Status: SHIPPED | OUTPATIENT
Start: 2019-11-05 | End: 2019-11-05 | Stop reason: SDUPTHER

## 2019-11-05 RX ORDER — ALBUTEROL SULFATE 90 UG/1
2 AEROSOL, METERED RESPIRATORY (INHALATION) EVERY 6 HOURS PRN
Qty: 18 G | Refills: 2 | Status: SHIPPED | OUTPATIENT
Start: 2019-11-05 | End: 2020-11-11 | Stop reason: SDUPTHER

## 2019-11-05 NOTE — PROGRESS NOTES
FitKit was given to patient on 11/5/2019 4:18 PM         Answers for HPI/ROS submitted by the patient on 11/3/2019   activity change: No  unexpected weight change: No  neck pain: Yes  hearing loss: No  rhinorrhea: No  trouble swallowing: No  eye discharge: No  visual disturbance: No  chest tightness: No  wheezing: No  chest pain: No  palpitations: No  blood in stool: No  constipation: No  vomiting: No  diarrhea: No  polydipsia: No  polyuria: No  difficulty urinating: No  hematuria: No  menstrual problem: No  dysuria: No  joint swelling: No  arthralgias: Yes  headaches: No  weakness: No  confusion: No  dysphoric mood: No

## 2019-11-05 NOTE — PROGRESS NOTES
Subjective:      Kaleigh Solo is a 64 y.o. female who presents for annual exam.    HTN- well controlled, compliant with medications.. No chest pain, no palpitations, no shortness of breath, no headaches, mild leg swelling occasionally. BP @ home 111-130/76-87.    HLD- compliant, weight is stable, compliant with higher dose of Lipitor. Now has onset of severe muscle cramping pains affecting chest wall and left knee. Denies muscle weakness.     Chronic Pain- low back pain was evaluated by Orthopedics and Pain Management, significant improvement after placement of nerve stimulator but still has some low back pain and some pain related to muscle spasms.  She does have a history of fibromyalgia and was previously on Cymbalta, gabapentin, Lyrica but she stopped all of these after the stimulator was placed.  Reports chest wall pain and left knee pain. Denies any recent pain flare-ups.      Family History:  family history includes Aneurysm in her father; Cancer in her mother; Heart disease in her father.    Health Maintenance:  Health Maintenance       Date Due Completion Date    Aspirin/Antiplatelet Therapy 06/29/1975 ---    LDCT Lung Screen 11/28/2018 11/28/2017    Mammogram 11/28/2018 11/28/2017    Fecal Occult Blood Test (FOBT)/FitKit 12/21/2019 12/21/2018    Lipid Panel 10/15/2020 10/15/2019    High Dose Statin 10/21/2020 10/21/2019    Pap Smear with HPV Cotest 08/28/2021 8/28/2018 (Done)    Override on 8/28/2018: Done (Dr. Elke Cunha)    TETANUS VACCINE 10/22/2028 10/22/2018        Eye exam: Dr. Urban  MMG: due  FitKit due next month but patient will consider a colonoscopy  Orthopedist is Dr. Rankin  Psychiatrist is Dr. Kristel Pena, gave patient Ritalin    Influenza: 10/2019  Tetanus: 2018    Exercise: uses recumbent bike  Body mass index is 41.09 kg/m².    Meds:   Current Outpatient Medications:     albuterol (PROAIR HFA) 90 mcg/actuation inhaler, Inhale 2 puffs into the lungs every 6 (six) hours as needed  for Wheezing., Disp: 18 g, Rfl: 2    amLODIPine (NORVASC) 5 MG tablet, Take 1 tablet (5 mg total) by mouth once daily., Disp: 90 tablet, Rfl: 3    atorvastatin (LIPITOR) 40 MG tablet, Take 1 tablet (40 mg total) by mouth once daily., Disp: 90 tablet, Rfl: 3    famotidine (PEPCID) 20 MG tablet, Take 20 mg by mouth 2 (two) times daily as needed. , Disp: , Rfl:     fluocinonide 0.05% (LIDEX) 0.05 % cream, Apply topically 2 (two) times daily as needed., Disp: 60 g, Rfl: 2    HYDROcodone-acetaminophen (NORCO) 5-325 mg per tablet, Take 1 tablet by mouth every 6 (six) hours as needed for Pain., Disp: 90 tablet, Rfl: 0    losartan (COZAAR) 50 MG tablet, Take 1 tablet (50 mg total) by mouth once daily., Disp: 90 tablet, Rfl: 3    ALPRAZolam (XANAX) 0.25 MG tablet, Take 1 tablet (0.25 mg total) by mouth nightly as needed for Insomnia., Disp: 90 tablet, Rfl: 0    cetirizine (ZYRTEC) 10 MG tablet, Take 10 mg by mouth daily as needed. , Disp: , Rfl:     diphenhydrAMINE (BENADRYL) 50 MG tablet, Take 50 mg by mouth nightly as needed for Itching., Disp: , Rfl:     DULoxetine (CYMBALTA) 30 MG capsule, Take 1 capsule (30 mg total) by mouth once daily., Disp: 30 capsule, Rfl: 11    fexofenadine (ALLEGRA) 60 MG tablet, Take 60 mg by mouth daily as needed. , Disp: , Rfl:     furosemide (LASIX) 20 MG tablet, Take 1 tablet (20 mg total) by mouth daily as needed (swelling)., Disp: 30 tablet, Rfl: 0    tiZANidine (ZANAFLEX) 4 MG tablet, Take 1 tablet (4 mg total) by mouth every 8 (eight) hours as needed., Disp: 90 tablet, Rfl: 5    PMHx:   Past Medical History:   Diagnosis Date    Arthritis     Asthma     Cervical radiculopathy 10/14/2013    Cervicalgia     2013 tx by chiropractor    Closed dislocation of acromioclavicular joint 2013    Degenerative disc disease     Fatty liver     Fibromyalgia     GERD (gastroesophageal reflux disease)     HPV (human papilloma virus) anogenital infection     conization in past-remote  history-1990    HTN (hypertension)     Sciatica     Tobacco use     Ulcer 2007    stomach ulcer       PSHx:     Past Surgical History:   Procedure Laterality Date    acdf  4/2014    C5-6    BACK SURGERY      CERVICAL    CERVICAL CONIZATION   W/ LASER      KNEE ARTHROSCOPY      right shoulder surger      arthroscopic surgery Dec 2013    TRIAL OF SPINAL CORD NERVE STIMULATOR N/A 9/25/2018    Procedure: TRIAL, NEUROSTIMULATOR, SPINAL CORD;  Surgeon: Candice Lopez MD;  Location: [facility];  Service: Pain Management;  Laterality: N/A;  SCS Trial  [facility] notified of date and time       SocHx:   Social History     Socioeconomic History    Marital status:      Spouse name: Not on file    Number of children: Not on file    Years of education: Not on file    Highest education level: Not on file   Occupational History    Not on file   Social Needs    Financial resource strain: Not hard at all    Food insecurity:     Worry: Never true     Inability: Never true    Transportation needs:     Medical: No     Non-medical: No   Tobacco Use    Smoking status: Current Every Day Smoker     Packs/day: 1.00     Years: 48.00     Pack years: 48.00     Types: Cigarettes    Smokeless tobacco: Never Used   Substance and Sexual Activity    Alcohol use: Yes     Frequency: Monthly or less     Drinks per session: 1 or 2     Binge frequency: Never     Comment: occasional    Drug use: No    Sexual activity: Not on file   Lifestyle    Physical activity:     Days per week: 7 days     Minutes per session: 40 min    Stress: Not at all   Relationships    Social connections:     Talks on phone: More than three times a week     Gets together: More than three times a week     Attends Congregational service: Not on file     Active member of club or organization: No     Attends meetings of clubs or organizations: Never     Relationship status:    Other Topics Concern    Are you pregnant or think you may  be? Not Asked    Breast-feeding Not Asked   Social History Narrative    , not working, 3 children (1 passed at 9 mo old), tobacco daily, ETOH socially, GYN 2013 [facility]       Review of Systems   Constitutional: Negative for activity change, chills, diaphoresis, fever and unexpected weight change.   HENT: Positive for tinnitus (last few months, both ears, no hearing loss). Negative for congestion, ear discharge, ear pain, hearing loss, postnasal drip, rhinorrhea, sinus pressure, sore throat and trouble swallowing.    Eyes: Negative for discharge, redness and visual disturbance.   Respiratory: Negative for cough, chest tightness, shortness of breath and wheezing.    Cardiovascular: Negative for chest pain, palpitations and leg swelling.   Gastrointestinal: Negative for abdominal pain, blood in stool, constipation, diarrhea, nausea and vomiting.   Endocrine: Negative for cold intolerance, heat intolerance, polydipsia and polyuria.   Genitourinary: Negative for difficulty urinating, dysuria, frequency, hematuria and menstrual problem.   Musculoskeletal: Positive for arthralgias, back pain, myalgias and neck pain. Negative for joint swelling.   Skin: Negative for rash and wound.   Neurological: Positive for numbness (Reports abnormal sensation of left lower leg). Negative for dizziness, weakness and headaches.   Hematological: Negative for adenopathy.   Psychiatric/Behavioral: Negative for confusion, dysphoric mood and sleep disturbance. The patient is not nervous/anxious.            Answers for HPI/ROS submitted by the patient on 11/3/2019   activity change: No  unexpected weight change: No  neck pain: Yes  hearing loss: No  rhinorrhea: No  trouble swallowing: No  eye discharge: No  visual disturbance: No  chest tightness: No  wheezing: No  chest pain: No  palpitations: No  blood in stool: No  constipation: No  vomiting: No  diarrhea: No  polydipsia: No  polyuria: No  difficulty urinating: No  hematuria:  No  menstrual problem: No  dysuria: No  joint swelling: No  arthralgias: Yes  headaches: No  weakness: No  confusion: No  dysphoric mood: No      Objective:      Physical Exam   Constitutional: She is oriented to person, place, and time. Vital signs are normal. She appears well-developed and well-nourished. No distress.   HENT:   Head: Normocephalic and atraumatic.   Right Ear: Hearing, tympanic membrane, external ear and ear canal normal. Tympanic membrane is not erythematous and not bulging.   Left Ear: Hearing, tympanic membrane, external ear and ear canal normal. Tympanic membrane is not erythematous and not bulging.   Nose: Nose normal.   Mouth/Throat: Uvula is midline, oropharynx is clear and moist and mucous membranes are normal. No oropharyngeal exudate or posterior oropharyngeal erythema.   Eyes: Pupils are equal, round, and reactive to light. Conjunctivae, EOM and lids are normal. No scleral icterus.   Neck: Normal range of motion. Neck supple. No thyroid mass and no thyromegaly present.   Cardiovascular: Normal rate, regular rhythm, normal heart sounds and intact distal pulses.   No murmur heard.  Pulmonary/Chest: Effort normal and breath sounds normal. She has no wheezes.   Abdominal: Soft. Bowel sounds are normal. She exhibits no distension. There is no tenderness. There is no rigidity, no rebound and no guarding.   Musculoskeletal: Normal range of motion. She exhibits no edema.        Left knee: She exhibits normal range of motion and no erythema. Tenderness found. Medial joint line tenderness noted.   Lymphadenopathy:     She has no cervical adenopathy.        Right: No supraclavicular adenopathy present.        Left: No supraclavicular adenopathy present.   Neurological: She is alert and oriented to person, place, and time. She has normal reflexes. Coordination and gait normal.   Skin: Skin is warm, dry and intact. No rash noted. She is not diaphoretic.   Psychiatric: She has a normal mood and  affect.   Vitals reviewed.      Assessment:       1. Essential hypertension    2. Hyperlipidemia, unspecified hyperlipidemia type    3. Chronic bilateral low back pain with left-sided sciatica    4. Bilateral primary osteoarthritis of knee    5. Fibromyalgia    6. Muscle spasm    7. Insomnia, unspecified type    8. Hypoesthesia of skin     9. Abnormal CBC    10. Morbid obesity with BMI of 40.0-44.9, adult    11. Annual physical exam    12. Encounter for screening mammogram for high-risk patient    13. Screen for colon cancer        Plan:       1. Essential hypertension  - blood pressure is controlled, continue amlodipine and losartan    2. Hyperlipidemia, unspecified hyperlipidemia type  - continue Lipitor  - TSH; Future    3. Chronic bilateral low back pain with left-sided sciatica  - uses Norco as needed, has pain contract, checked LA     4.  Bilateral primary osteoarthritis of knee  - may continue Norco as needed  - return to Dr. Rankin with Orthopedics for further evaluation    5. Fibromyalgia  - will try Cymbalta 30 mg daily    6. Muscle spasm  - CK; Future  - PHOSPHORUS; Future  - Magnesium; Future  - Vitamin D; Future  - Sedimentation rate; Future  - C-reactive protein; Future    7.  Insomnia  - may use Xanax for at bedtime as needed  - advised patient to try over-the-counter sleep aid and alternate days with Xanax    8. Hypoesthesia of skin   - Folate; Future  - Vitamin B12; Future    9. Abnormal CBC  - CBC auto differential; Future  - Reticulocytes; Future  - Folate; Future  - Vitamin B12; Future  - Iron and TIBC; Future  - Ferritin; Future    10. Morbid obesity with BMI of 40.0-44.9, adult  - Vitamin D; Future    11. Annual physical exam  - reviewed recent labs with patient  - TSH; Future    12. Encounter for screening mammogram for high-risk patient  - Mammo Digital Screening Bilat; Future    13. Screen for colon cancer  - Fecal Immunochemical Test (iFOBT); Future    RTC in 3 months for follow-up or  sooner if needed    Felicity Middleton MD  Internal Medicine, [facility]

## 2019-11-06 ENCOUNTER — PATIENT MESSAGE (OUTPATIENT)
Dept: CARDIOLOGY | Facility: CLINIC | Age: 64
End: 2019-11-06

## 2019-11-06 PROBLEM — E55.9 VITAMIN D INSUFFICIENCY: Status: ACTIVE | Noted: 2019-11-06

## 2019-11-06 PROBLEM — R79.82 ELEVATED C-REACTIVE PROTEIN (CRP): Status: ACTIVE | Noted: 2019-11-06

## 2019-11-06 LAB — FOLATE SERPL-MCNC: 15.4 NG/ML (ref 4–24)

## 2019-11-11 ENCOUNTER — TELEPHONE (OUTPATIENT)
Dept: CARDIOLOGY | Facility: CLINIC | Age: 64
End: 2019-11-11

## 2019-11-11 RX ORDER — FUROSEMIDE 20 MG/1
20 TABLET ORAL
Qty: 30 TABLET | Refills: 3 | Status: SHIPPED | OUTPATIENT
Start: 2019-11-11 | End: 2021-07-13

## 2019-11-15 ENCOUNTER — PATIENT MESSAGE (OUTPATIENT)
Dept: INTERNAL MEDICINE | Facility: CLINIC | Age: 64
End: 2019-11-15

## 2019-11-22 ENCOUNTER — OFFICE VISIT (OUTPATIENT)
Dept: INTERNAL MEDICINE | Facility: CLINIC | Age: 64
End: 2019-11-22
Payer: MEDICARE

## 2019-11-22 ENCOUNTER — LAB VISIT (OUTPATIENT)
Dept: LAB | Facility: HOSPITAL | Age: 64
End: 2019-11-22
Attending: INTERNAL MEDICINE
Payer: MEDICARE

## 2019-11-22 ENCOUNTER — TELEPHONE (OUTPATIENT)
Dept: INTERNAL MEDICINE | Facility: CLINIC | Age: 64
End: 2019-11-22

## 2019-11-22 VITALS
HEART RATE: 78 BPM | DIASTOLIC BLOOD PRESSURE: 76 MMHG | BODY MASS INDEX: 40.85 KG/M2 | RESPIRATION RATE: 18 BRPM | TEMPERATURE: 99 F | HEIGHT: 62 IN | SYSTOLIC BLOOD PRESSURE: 116 MMHG | WEIGHT: 222 LBS

## 2019-11-22 DIAGNOSIS — R10.9 ACUTE RIGHT FLANK PAIN: ICD-10-CM

## 2019-11-22 DIAGNOSIS — I10 ESSENTIAL HYPERTENSION: ICD-10-CM

## 2019-11-22 DIAGNOSIS — R10.9 RIGHT LATERAL ABDOMINAL PAIN: ICD-10-CM

## 2019-11-22 DIAGNOSIS — E55.9 VITAMIN D INSUFFICIENCY: ICD-10-CM

## 2019-11-22 DIAGNOSIS — R10.9 RIGHT LATERAL ABDOMINAL PAIN: Primary | ICD-10-CM

## 2019-11-22 DIAGNOSIS — M54.9 MUSCULOSKELETAL BACK PAIN: ICD-10-CM

## 2019-11-22 LAB
ALBUMIN SERPL BCP-MCNC: 4 G/DL (ref 3.5–5.2)
ALP SERPL-CCNC: 109 U/L (ref 55–135)
ALT SERPL W/O P-5'-P-CCNC: 15 U/L (ref 10–44)
ANION GAP SERPL CALC-SCNC: 8 MMOL/L (ref 8–16)
AST SERPL-CCNC: 12 U/L (ref 10–40)
BILIRUB SERPL-MCNC: 0.3 MG/DL (ref 0.1–1)
BUN SERPL-MCNC: 11 MG/DL (ref 8–23)
CALCIUM SERPL-MCNC: 9.6 MG/DL (ref 8.7–10.5)
CHLORIDE SERPL-SCNC: 103 MMOL/L (ref 95–110)
CO2 SERPL-SCNC: 29 MMOL/L (ref 23–29)
CREAT SERPL-MCNC: 1 MG/DL (ref 0.5–1.4)
EST. GFR  (AFRICAN AMERICAN): >60 ML/MIN/1.73 M^2
EST. GFR  (NON AFRICAN AMERICAN): >60 ML/MIN/1.73 M^2
GLUCOSE SERPL-MCNC: 108 MG/DL (ref 70–110)
LIPASE SERPL-CCNC: 11 U/L (ref 4–60)
POTASSIUM SERPL-SCNC: 4.1 MMOL/L (ref 3.5–5.1)
PROT SERPL-MCNC: 7.2 G/DL (ref 6–8.4)
SODIUM SERPL-SCNC: 140 MMOL/L (ref 136–145)

## 2019-11-22 PROCEDURE — 3008F BODY MASS INDEX DOCD: CPT | Mod: HCNC,CPTII,S$GLB, | Performed by: INTERNAL MEDICINE

## 2019-11-22 PROCEDURE — 99999 PR PBB SHADOW E&M-EST. PATIENT-LVL III: CPT | Mod: PBBFAC,HCNC,, | Performed by: INTERNAL MEDICINE

## 2019-11-22 PROCEDURE — 3074F SYST BP LT 130 MM HG: CPT | Mod: HCNC,CPTII,S$GLB, | Performed by: INTERNAL MEDICINE

## 2019-11-22 PROCEDURE — 3078F DIAST BP <80 MM HG: CPT | Mod: HCNC,CPTII,S$GLB, | Performed by: INTERNAL MEDICINE

## 2019-11-22 PROCEDURE — 99214 PR OFFICE/OUTPT VISIT, EST, LEVL IV, 30-39 MIN: ICD-10-PCS | Mod: HCNC,S$GLB,, | Performed by: INTERNAL MEDICINE

## 2019-11-22 PROCEDURE — 99999 PR PBB SHADOW E&M-EST. PATIENT-LVL III: ICD-10-PCS | Mod: PBBFAC,HCNC,, | Performed by: INTERNAL MEDICINE

## 2019-11-22 PROCEDURE — 36415 COLL VENOUS BLD VENIPUNCTURE: CPT | Mod: HCNC,PO

## 2019-11-22 PROCEDURE — 3074F PR MOST RECENT SYSTOLIC BLOOD PRESSURE < 130 MM HG: ICD-10-PCS | Mod: HCNC,CPTII,S$GLB, | Performed by: INTERNAL MEDICINE

## 2019-11-22 PROCEDURE — 3008F PR BODY MASS INDEX (BMI) DOCUMENTED: ICD-10-PCS | Mod: HCNC,CPTII,S$GLB, | Performed by: INTERNAL MEDICINE

## 2019-11-22 PROCEDURE — 80053 COMPREHEN METABOLIC PANEL: CPT | Mod: HCNC

## 2019-11-22 PROCEDURE — 3078F PR MOST RECENT DIASTOLIC BLOOD PRESSURE < 80 MM HG: ICD-10-PCS | Mod: HCNC,CPTII,S$GLB, | Performed by: INTERNAL MEDICINE

## 2019-11-22 PROCEDURE — 99214 OFFICE O/P EST MOD 30 MIN: CPT | Mod: HCNC,S$GLB,, | Performed by: INTERNAL MEDICINE

## 2019-11-22 PROCEDURE — 83690 ASSAY OF LIPASE: CPT | Mod: HCNC

## 2019-11-22 RX ORDER — FOLIC ACID 0.8 MG
400 TABLET ORAL DAILY
COMMUNITY

## 2019-11-22 RX ORDER — ERGOCALCIFEROL 1.25 MG/1
50000 CAPSULE ORAL
Qty: 4 CAPSULE | Refills: 1 | Status: SHIPPED | OUTPATIENT
Start: 2019-11-22 | End: 2019-11-22 | Stop reason: SDUPTHER

## 2019-11-22 RX ORDER — ERGOCALCIFEROL 1.25 MG/1
CAPSULE ORAL
Qty: 12 CAPSULE | Refills: 0 | Status: SHIPPED | OUTPATIENT
Start: 2019-11-22 | End: 2019-12-16 | Stop reason: SDUPTHER

## 2019-11-22 NOTE — TELEPHONE ENCOUNTER
Spoke to pt and we were unable to get her in to see the doctor today.  She going to call in the am for urgent care clinic

## 2019-11-22 NOTE — TELEPHONE ENCOUNTER
----- Message from Dagoberto Sood sent at 11/22/2019  9:58 AM CST -----  Contact: JONNA YU [[id number]]  Name of Who is Calling: JONNA YU [[id number]]      What is the request in detail: Would like to speak with staff in regards to an appointment today for right side pain. Please advise.       Can the clinic reply by [facility]: no      What Number to Call Back if not in [facility]: [phone]

## 2019-11-23 ENCOUNTER — HOSPITAL ENCOUNTER (OUTPATIENT)
Dept: RADIOLOGY | Facility: HOSPITAL | Age: 64
Discharge: HOME | End: 2019-11-23
Attending: INTERNAL MEDICINE
Payer: MEDICARE

## 2019-11-23 DIAGNOSIS — R10.9 RIGHT LATERAL ABDOMINAL PAIN: ICD-10-CM

## 2019-11-23 DIAGNOSIS — Z12.31 ENCOUNTER FOR SCREENING MAMMOGRAM FOR HIGH-RISK PATIENT: ICD-10-CM

## 2019-11-23 PROCEDURE — 77063 MAMMO DIGITAL SCREENING BILAT WITH TOMOSYNTHESIS_CAD: ICD-10-PCS | Mod: 26,HCNC,, | Performed by: RADIOLOGY

## 2019-11-23 PROCEDURE — 77067 SCR MAMMO BI INCL CAD: CPT | Mod: TC,HCNC

## 2019-11-23 PROCEDURE — 77063 BREAST TOMOSYNTHESIS BI: CPT | Mod: 26,HCNC,, | Performed by: RADIOLOGY

## 2019-11-23 PROCEDURE — 77067 MAMMO DIGITAL SCREENING BILAT WITH TOMOSYNTHESIS_CAD: ICD-10-PCS | Mod: 26,HCNC,, | Performed by: RADIOLOGY

## 2019-11-23 PROCEDURE — 76705 ECHO EXAM OF ABDOMEN: CPT | Mod: 26,HCNC,, | Performed by: RADIOLOGY

## 2019-11-23 PROCEDURE — 76705 ECHO EXAM OF ABDOMEN: CPT | Mod: TC,HCNC

## 2019-11-23 PROCEDURE — 76705 US ABDOMEN LIMITED: ICD-10-PCS | Mod: 26,HCNC,, | Performed by: RADIOLOGY

## 2019-11-23 PROCEDURE — 77067 SCR MAMMO BI INCL CAD: CPT | Mod: 26,HCNC,, | Performed by: RADIOLOGY

## 2019-11-25 ENCOUNTER — PATIENT MESSAGE (OUTPATIENT)
Dept: INTERNAL MEDICINE | Facility: CLINIC | Age: 64
End: 2019-11-25

## 2019-11-25 RX ORDER — GABAPENTIN 300 MG/1
CAPSULE ORAL
Qty: 55 CAPSULE | Refills: 0 | Status: SHIPPED | OUTPATIENT
Start: 2019-11-25 | End: 2020-11-11

## 2019-11-26 ENCOUNTER — PATIENT MESSAGE (OUTPATIENT)
Dept: INTERNAL MEDICINE | Facility: CLINIC | Age: 64
End: 2019-11-26

## 2019-12-03 ENCOUNTER — LAB VISIT (OUTPATIENT)
Dept: LAB | Facility: HOSPITAL | Age: 64
End: 2019-12-03
Attending: INTERNAL MEDICINE
Payer: MEDICARE

## 2019-12-03 DIAGNOSIS — E78.00 HYPERCHOLESTEREMIA: ICD-10-CM

## 2019-12-03 LAB
ALBUMIN SERPL BCP-MCNC: 3.8 G/DL (ref 3.5–5.2)
ALP SERPL-CCNC: 103 U/L (ref 55–135)
ALT SERPL W/O P-5'-P-CCNC: 20 U/L (ref 10–44)
AST SERPL-CCNC: 15 U/L (ref 10–40)
BILIRUB DIRECT SERPL-MCNC: 0.2 MG/DL (ref 0.1–0.3)
BILIRUB SERPL-MCNC: 0.4 MG/DL (ref 0.1–1)
CHOLEST SERPL-MCNC: 154 MG/DL (ref 120–199)
CHOLEST/HDLC SERPL: 3.1 {RATIO} (ref 2–5)
HDLC SERPL-MCNC: 50 MG/DL (ref 40–75)
HDLC SERPL: 32.5 % (ref 20–50)
LDLC SERPL CALC-MCNC: 87.4 MG/DL (ref 63–159)
NONHDLC SERPL-MCNC: 104 MG/DL
PROT SERPL-MCNC: 6.9 G/DL (ref 6–8.4)
TRIGL SERPL-MCNC: 83 MG/DL (ref 30–150)

## 2019-12-03 PROCEDURE — 80076 HEPATIC FUNCTION PANEL: CPT | Mod: HCNC

## 2019-12-03 PROCEDURE — 36415 COLL VENOUS BLD VENIPUNCTURE: CPT | Mod: HCNC,PO

## 2019-12-03 PROCEDURE — 80061 LIPID PANEL: CPT | Mod: HCNC

## 2019-12-05 ENCOUNTER — TELEPHONE (OUTPATIENT)
Dept: CARDIOLOGY | Facility: CLINIC | Age: 64
End: 2019-12-05

## 2019-12-05 DIAGNOSIS — E78.00 HYPERCHOLESTEREMIA: Primary | ICD-10-CM

## 2019-12-05 RX ORDER — ATORVASTATIN CALCIUM 80 MG/1
80 TABLET, FILM COATED ORAL DAILY
Qty: 90 TABLET | Refills: 3 | Status: SHIPPED | OUTPATIENT
Start: 2019-12-05 | End: 2020-11-11

## 2019-12-08 NOTE — PROGRESS NOTES
Subjective:       Patient ID: Kaleigh Solo is a 64 y.o. female who presents for Flank Pain (right-constant 4) and Hypertension      Flank Pain   This is a new problem. The current episode started 1 to 4 weeks ago. The problem occurs constantly. The problem is unchanged. The pain is present in the costovertebral angle. The quality of the pain is described as aching. The pain is at a severity of 4/10. The pain is moderate. Associated symptoms include abdominal pain. Pertinent negatives include no chest pain, dysuria, fever, headaches, numbness, paresthesias, pelvic pain, tingling or weakness. She has tried NSAIDs for the symptoms. The treatment provided mild relief.   Hypertension   This is a chronic problem. The current episode started more than 1 year ago. The problem is controlled. Pertinent negatives include no chest pain, headaches, palpitations, peripheral edema or shortness of breath. There are no associated agents to hypertension. Risk factors for coronary artery disease include obesity and sedentary lifestyle. Past treatments include diuretics and angiotensin blockers. The current treatment provides moderate improvement. There are no compliance problems.  There is no history of kidney disease. There is no history of a hypertension causing med.        Review of Systems   Constitutional: Negative for chills, diaphoresis and fever.   HENT: Negative for congestion, ear pain, postnasal drip, sinus pressure and sore throat.    Eyes: Negative for discharge and redness.   Respiratory: Negative for cough, chest tightness and shortness of breath.    Cardiovascular: Negative for chest pain, palpitations and leg swelling.   Gastrointestinal: Positive for abdominal pain. Negative for constipation, diarrhea, nausea and vomiting.   Genitourinary: Positive for flank pain. Negative for decreased urine volume, dysuria, frequency, hematuria, pelvic pain and urgency.   Musculoskeletal: Positive for back pain. Negative for  arthralgias and myalgias.   Skin: Negative for rash and wound.   Neurological: Negative for dizziness, tingling, tremors, weakness, numbness, headaches and paresthesias.   Hematological: Negative for adenopathy.       Objective:      Physical Exam   Constitutional: She is oriented to person, place, and time. Vital signs are normal. She appears well-developed and well-nourished. No distress.   HENT:   Head: Normocephalic and atraumatic.   Right Ear: Hearing and external ear normal.   Left Ear: Hearing and external ear normal.   Nose: Nose normal.   Mouth/Throat: Uvula is midline and mucous membranes are normal.   Eyes: Lids are normal.   Cardiovascular: Normal rate, regular rhythm, normal heart sounds and intact distal pulses.   No murmur heard.  Pulmonary/Chest: Effort normal and breath sounds normal. She has no wheezes.   Abdominal: Soft. Bowel sounds are normal. She exhibits no distension. There is no tenderness. There is no rigidity, no rebound, no guarding and no CVA tenderness.   Musculoskeletal: Normal range of motion. She exhibits no edema.        Cervical back: She exhibits no bony tenderness and no pain.        Thoracic back: She exhibits no tenderness and no pain.        Lumbar back: She exhibits tenderness (left sided), pain and spasm.   Neurological: She is alert and oriented to person, place, and time.   Skin: Skin is warm, dry and intact. No rash noted. She is not diaphoretic.   Psychiatric: She has a normal mood and affect.   Vitals reviewed.      Assessment/Plan:       1. Right lateral abdominal pain  - Comprehensive metabolic panel; Future  - Lipase; Future  - Urinalysis; Future  - US Abdomen Limited; Future    2. Acute right flank pain  - Comprehensive metabolic panel; Future  - Lipase; Future  - Urinalysis; Future  - US Abdomen Limited; Future    3. Musculoskeletal back pain  - may use topical analgesics and heating pad or patches for now    4. Essential hypertension  - BP is controlled, continue  Norvasc, Lasix, Cozaar    5. Vitamin D insufficiency  - resume Vitamin D 50,000 IU weekly    Call if no improvement in symptoms    Felicity Middleton MD

## 2019-12-16 RX ORDER — ERGOCALCIFEROL 1.25 MG/1
CAPSULE ORAL
Qty: 12 CAPSULE | Refills: 0 | Status: SHIPPED | OUTPATIENT
Start: 2019-12-16 | End: 2020-07-07

## 2019-12-27 ENCOUNTER — OFFICE VISIT (OUTPATIENT)
Dept: INTERNAL MEDICINE | Facility: CLINIC | Age: 64
End: 2019-12-27
Payer: MEDICARE

## 2019-12-27 VITALS
HEART RATE: 74 BPM | RESPIRATION RATE: 18 BRPM | HEIGHT: 62 IN | TEMPERATURE: 98 F | DIASTOLIC BLOOD PRESSURE: 80 MMHG | SYSTOLIC BLOOD PRESSURE: 126 MMHG | BODY MASS INDEX: 41.02 KG/M2 | WEIGHT: 222.88 LBS

## 2019-12-27 DIAGNOSIS — G89.29 CHRONIC BILATERAL LOW BACK PAIN WITH LEFT-SIDED SCIATICA: Chronic | ICD-10-CM

## 2019-12-27 DIAGNOSIS — I10 ESSENTIAL HYPERTENSION: ICD-10-CM

## 2019-12-27 DIAGNOSIS — M54.42 CHRONIC BILATERAL LOW BACK PAIN WITH LEFT-SIDED SCIATICA: Chronic | ICD-10-CM

## 2019-12-27 DIAGNOSIS — M79.10 MUSCLE PAIN: Primary | ICD-10-CM

## 2019-12-27 DIAGNOSIS — M79.7 FIBROMYALGIA: ICD-10-CM

## 2019-12-27 PROCEDURE — 99214 PR OFFICE/OUTPT VISIT, EST, LEVL IV, 30-39 MIN: ICD-10-PCS | Mod: HCNC,S$GLB,, | Performed by: INTERNAL MEDICINE

## 2019-12-27 PROCEDURE — 3074F PR MOST RECENT SYSTOLIC BLOOD PRESSURE < 130 MM HG: ICD-10-PCS | Mod: HCNC,CPTII,S$GLB, | Performed by: INTERNAL MEDICINE

## 2019-12-27 PROCEDURE — 99999 PR PBB SHADOW E&M-EST. PATIENT-LVL IV: ICD-10-PCS | Mod: PBBFAC,HCNC,, | Performed by: INTERNAL MEDICINE

## 2019-12-27 PROCEDURE — 99999 PR PBB SHADOW E&M-EST. PATIENT-LVL IV: CPT | Mod: PBBFAC,HCNC,, | Performed by: INTERNAL MEDICINE

## 2019-12-27 PROCEDURE — 3008F BODY MASS INDEX DOCD: CPT | Mod: HCNC,CPTII,S$GLB, | Performed by: INTERNAL MEDICINE

## 2019-12-27 PROCEDURE — 3079F DIAST BP 80-89 MM HG: CPT | Mod: HCNC,CPTII,S$GLB, | Performed by: INTERNAL MEDICINE

## 2019-12-27 PROCEDURE — 99214 OFFICE O/P EST MOD 30 MIN: CPT | Mod: HCNC,S$GLB,, | Performed by: INTERNAL MEDICINE

## 2019-12-27 PROCEDURE — 3079F PR MOST RECENT DIASTOLIC BLOOD PRESSURE 80-89 MM HG: ICD-10-PCS | Mod: HCNC,CPTII,S$GLB, | Performed by: INTERNAL MEDICINE

## 2019-12-27 PROCEDURE — 3074F SYST BP LT 130 MM HG: CPT | Mod: HCNC,CPTII,S$GLB, | Performed by: INTERNAL MEDICINE

## 2019-12-27 PROCEDURE — 3008F PR BODY MASS INDEX (BMI) DOCUMENTED: ICD-10-PCS | Mod: HCNC,CPTII,S$GLB, | Performed by: INTERNAL MEDICINE

## 2019-12-27 RX ORDER — DULOXETIN HYDROCHLORIDE 60 MG/1
60 CAPSULE, DELAYED RELEASE ORAL DAILY
Qty: 90 CAPSULE | Refills: 0 | Status: SHIPPED | OUTPATIENT
Start: 2019-12-27 | End: 2020-11-11

## 2019-12-27 NOTE — PROGRESS NOTES
Subjective:       Patient ID: Kaleigh Solo is a 64 y.o. female who presents for Muscle Pain (left shin, right calf); Hypertension; and Hyperlipidemia      Muscle Pain   This is a new problem. The current episode started more than 1 month ago (initially has flank pain and spasms but now improving, new right hamstring pain and left shin pain). The problem occurs intermittently. The problem has been waxing and waning. Associated symptoms include arthralgias and myalgias. Pertinent negatives include no abdominal pain, chest pain, chills, congestion, coughing, diaphoresis, fever, headaches, nausea, numbness, rash, urinary symptoms, vomiting or weakness. Nothing aggravates the symptoms. She has tried rest for the symptoms. The treatment provided mild relief.   Hypertension   This is a chronic problem. The current episode started more than 1 year ago. The problem is unchanged. The problem is controlled. Pertinent negatives include no chest pain, headaches, malaise/fatigue, palpitations, peripheral edema or shortness of breath. There are no associated agents to hypertension. Risk factors for coronary artery disease include obesity and sedentary lifestyle. Past treatments include diuretics and angiotensin blockers. The current treatment provides moderate improvement. Compliance problems include exercise.  There is no history of heart failure. There is no history of a hypertension causing med.   Hyperlipidemia   This is a chronic problem. The current episode started more than 1 year ago. The problem is uncontrolled. Recent lipid tests were reviewed and are high. Exacerbating diseases include obesity. She has no history of hypothyroidism. Associated symptoms include leg pain and myalgias. Pertinent negatives include no chest pain or shortness of breath. Current antihyperlipidemic treatment includes statins. Compliance problems include adherence to exercise.  Risk factors for coronary artery disease include dyslipidemia,  hypertension, obesity and a sedentary lifestyle.      Patient took Lipitor 10mg daily until it was increased to 40mg daily on 10/2019 and then to 80mg on 12/2019. Patient did not notice a clear relationship between Lipitor dose changes and symptoms. She started Cymbalta and it can be rarely associated with musculoskeletal pain. She reports that there is mild improvement in pains.       Review of Systems   Constitutional: Negative for chills, diaphoresis, fever and malaise/fatigue.   HENT: Negative for congestion and sinus pressure.    Eyes: Negative for discharge and redness.   Respiratory: Negative for cough, chest tightness and shortness of breath.    Cardiovascular: Negative for chest pain and palpitations.   Gastrointestinal: Negative for abdominal pain, constipation, diarrhea, nausea and vomiting.   Genitourinary: Positive for flank pain (improving with time). Negative for hematuria and urgency.   Musculoskeletal: Positive for arthralgias and myalgias.   Skin: Negative for rash and wound.   Neurological: Negative for dizziness, tremors, weakness, numbness and headaches.       Objective:      Physical Exam   Constitutional: She is oriented to person, place, and time. Vital signs are normal. She appears well-developed and well-nourished. No distress.   HENT:   Head: Normocephalic and atraumatic.   Right Ear: Hearing and external ear normal.   Left Ear: Hearing and external ear normal.   Nose: Nose normal.   Mouth/Throat: Uvula is midline.   Eyes: Lids are normal.   Neck: Full passive range of motion without pain.   Cardiovascular: Normal rate, regular rhythm, normal heart sounds and intact distal pulses.   No murmur heard.  Pulmonary/Chest: Effort normal and breath sounds normal. She has no wheezes.   Abdominal: Soft. Bowel sounds are normal. She exhibits no distension. There is no tenderness.   Musculoskeletal: Normal range of motion. She exhibits no edema.        Right ankle: She exhibits normal range of  motion.        Right upper leg: She exhibits tenderness (along hamstring muscle, + tightness and spasm).   + tenderness along right shin, small cystic lesion in area, faint ecchymoses   Neurological: She is alert and oriented to person, place, and time.   Skin: Skin is warm and dry. Laceration (healing lesion right shin) noted. No rash noted. She is not diaphoretic.   Psychiatric: She has a normal mood and affect.   Vitals reviewed.      Assessment/Plan:       1. Muscle pain  - improving, unclear etiology, reviewed labs and electrolytes ok except for mildly low-normal magnesium  - CK normal, could be related to change in statin dose, consider change if muscle pain worsens, repeat CK with labs in January    2. Essential hypertension  - BP is controlled, continue amlodipine, losartan    3. Fibromyalgia  - increase Cymbalta to 60mg daily  - DULoxetine (CYMBALTA) 60 MG capsule; Take 1 capsule (60 mg total) by mouth once daily.  Dispense: 90 capsule; Refill: 0    4. Chronic bilateral low back pain with left-sided sciatica  - stable, fairly controlled, uses Norco as needed  - DULoxetine (CYMBALTA) 60 MG capsule; Take 1 capsule (60 mg total) by mouth once daily.  Dispense: 90 capsule; Refill: 0    RTC in 4 months or sooner if needed    Felicity Middleton MD

## 2020-01-10 ENCOUNTER — PATIENT OUTREACH (OUTPATIENT)
Dept: ADMINISTRATIVE | Facility: HOSPITAL | Age: 65
End: 2020-01-10

## 2020-01-23 ENCOUNTER — PATIENT OUTREACH (OUTPATIENT)
Dept: ADMINISTRATIVE | Facility: OTHER | Age: 65
End: 2020-01-23

## 2020-01-28 ENCOUNTER — OFFICE VISIT (OUTPATIENT)
Dept: PAIN MEDICINE | Facility: CLINIC | Age: 65
End: 2020-01-28
Payer: MEDICARE

## 2020-01-28 VITALS
WEIGHT: 224.88 LBS | HEART RATE: 93 BPM | RESPIRATION RATE: 18 BRPM | DIASTOLIC BLOOD PRESSURE: 85 MMHG | TEMPERATURE: 98 F | SYSTOLIC BLOOD PRESSURE: 138 MMHG | BODY MASS INDEX: 41.38 KG/M2 | HEIGHT: 62 IN

## 2020-01-28 DIAGNOSIS — G89.4 CHRONIC PAIN DISORDER: ICD-10-CM

## 2020-01-28 DIAGNOSIS — G89.4 CHRONIC PAIN SYNDROME: Primary | ICD-10-CM

## 2020-01-28 DIAGNOSIS — M54.16 LUMBAR RADICULOPATHY: ICD-10-CM

## 2020-01-28 DIAGNOSIS — M62.838 MUSCLE SPASMS OF BOTH LOWER EXTREMITIES: ICD-10-CM

## 2020-01-28 PROCEDURE — 3075F SYST BP GE 130 - 139MM HG: CPT | Mod: HCNC,CPTII,S$GLB, | Performed by: NURSE PRACTITIONER

## 2020-01-28 PROCEDURE — 3079F DIAST BP 80-89 MM HG: CPT | Mod: HCNC,CPTII,S$GLB, | Performed by: NURSE PRACTITIONER

## 2020-01-28 PROCEDURE — 99214 OFFICE O/P EST MOD 30 MIN: CPT | Mod: HCNC,S$GLB,, | Performed by: NURSE PRACTITIONER

## 2020-01-28 PROCEDURE — 3075F PR MOST RECENT SYSTOLIC BLOOD PRESS GE 130-139MM HG: ICD-10-PCS | Mod: HCNC,CPTII,S$GLB, | Performed by: NURSE PRACTITIONER

## 2020-01-28 PROCEDURE — 3008F PR BODY MASS INDEX (BMI) DOCUMENTED: ICD-10-PCS | Mod: HCNC,CPTII,S$GLB, | Performed by: NURSE PRACTITIONER

## 2020-01-28 PROCEDURE — 3008F BODY MASS INDEX DOCD: CPT | Mod: HCNC,CPTII,S$GLB, | Performed by: NURSE PRACTITIONER

## 2020-01-28 PROCEDURE — 99999 PR PBB SHADOW E&M-EST. PATIENT-LVL V: ICD-10-PCS | Mod: PBBFAC,HCNC,, | Performed by: NURSE PRACTITIONER

## 2020-01-28 PROCEDURE — 3079F PR MOST RECENT DIASTOLIC BLOOD PRESSURE 80-89 MM HG: ICD-10-PCS | Mod: HCNC,CPTII,S$GLB, | Performed by: NURSE PRACTITIONER

## 2020-01-28 PROCEDURE — 99999 PR PBB SHADOW E&M-EST. PATIENT-LVL V: CPT | Mod: PBBFAC,HCNC,, | Performed by: NURSE PRACTITIONER

## 2020-01-28 PROCEDURE — 99214 PR OFFICE/OUTPT VISIT, EST, LEVL IV, 30-39 MIN: ICD-10-PCS | Mod: HCNC,S$GLB,, | Performed by: NURSE PRACTITIONER

## 2020-01-28 RX ORDER — METHOCARBAMOL 500 MG/1
500 TABLET, FILM COATED ORAL 3 TIMES DAILY
Qty: 90 TABLET | Refills: 0 | Status: SHIPPED | OUTPATIENT
Start: 2020-01-28 | End: 2020-02-27

## 2020-01-28 NOTE — PROGRESS NOTES
Referring Physician: No ref. provider found    Chief Complaint:   Chief Complaint   Patient presents with    Leg Pain     left        SUBJECTIVE:    Interval History 1/29/2020:  The patient is here to discuss new symptoms.  We have not seen the patient in over one year.  She reports that she has been doing very well.  Her SCS incision healed well.  She has had minimal pain.  However, she reports that a few months ago she started experiences lower leg cramping and aching.  Her cardiologist stopped her statin about 2 months ago.  She says that since that time, her symptoms have improved.  However, she still has some aching to hamstring area and would like to know my opinion.  She has tried Zanaflex but does not like how it makes her feel.  Her pain today is 3/10.    Interval History 12/13/2018:  The patient presents for follow up.  She was evaluated by wound care on 11/28/18 and told to use medihoney gel to help with wound healing.  She has been doing this and noticed significant improvement.  She would like to recheck her skin today.  She is no longer taking pain medication.  Her pain today is 2/10.    Interval History 11/19/2018:  The patient returns for follow up and wound check.  She reports overall feeling well.  She says that she has not seen her wound and her bandage has stayed on.  Her pain today is 2/10.    Interval History 11/24/2018:  The patient returns for wound check follow up.  She reports feeling better overall.  She denies fever or drainage.  Her pain today is 0/10.    Interval History 11/8/2018:  Patient presents for wound check.  She did take PO antibiotics for 5 additional days but stopped due to significant nausea.  Otherwise, she has been feeling well.  She denies fever or malaise.  She has not noticed any drainage.  Her steristrips fell off 2 days ago and she applied Bacitracin and a bandage.  Her pain today is 0/10.    Interval History 11/2/2018:  The patient presents for follow up.  She  reports doing well with SCS device.  Her pain has been very mild.  She is not having any fever or malaise.  She has not noticed any drainage.  She completed PO antibiotics.  She has been applying Bacitracin BID.  Her pain today is 0/10.    Interval History 10/25/2018:  The patient returns for follow up and staple removal.  She reports feeling well- denies fever or malaise.  She completed oral antibiotics.  She is having benefit from SCS.  Wendy is here to meet with her today.  She has decreased her pain medications which she is happy about.  Her pain today is 4/10.    Interval History 10/22/2018:  The patient presents for wound check.  She is s/p lumbar SCS implant on 10/15/18.  She states that the surgery was painful for her but she has started feeling better.  She denies any fever or malaise.  She denies any drainage.  She is finishing antibiotics today.  Her pain today is 2/10.    Interval History 10/1/2018:  The patient returns for follow up and lead pull.  She is s/p SCS trial with 100% pain relief.  She would like to move forward with implant.  She denies any fever or malaise during the trial period.  Her pain today is 0/10.     Interval History 8/10/2018:  The patient presents today to discuss procedure for left sided back and leg pain.  Since her last OV, she underwent left L5 and S1 TF CIRILO in January.  Initially she thought that it did not help.  However, about one month later, she was having about 75% relief.  This lasted for about 2 months.  Prior to this, she had left L4 and L5 TF CIRILO in December.  At this point, she feels as though both injections have worn off.  Her pain is affecting her daily activities.  She states that she previously discussed SCS trial with Dr. Lopez and would like to proceed with this.  She has seen Dr. Rashid and was not deemed a candidate for surgery.  Her pain today is 8/10.  The patient denies any bowel or bladder incontinence or signs of saddle paresthesia.      Interval  history 01/15/2018  The patient returns to the clinic for a follow up visit, she is reporting pain of 10 /10 today.  She is s/p Left L4 and L5 TFESI performed on 12/20/17.  She estimates 100% pain relief for ~ 2 weeks.  Was able to be physically active during that time period, with significantly improved functional mobility.  Within past 2 weeks she has had recurrence of pain in same location, quality, intensity.   Continued pain in lumbar area radiating to the lateral/posterolateral thigh and to the posterior calf. Leg pain is worse than back pain. Additionally has developed pain at lateral hip area - greater trochanteric bursa. Now walking ~ 1 block.    Denies infection, new weakness, bowel/bladder dysfunction/incontinence, saddle anesthesia.     Interval history 12/01/2017   The patient returns to the clinic for a follow up visit, she is reporting pain of 5 /10 today.  She has been doing better since cervical surgery but has been having occasional radicular symptoms.  Currently she comes in for lower extremities radiculopathy predominantly in the left lower extremity in the L4-5 distribution.  She had an MRI of the lumbar spine which shows  cyst at the encroachment on the left L5 nerve root.  Additionally the patient has significant facet arthropathy throughout the lumbar spine    Interval history 04/02/2015:  Since previous encounter patient reports low back pain radiating down both lower extremities. Patient stated that she had Synvisc injection on 03/20/15 and this helped with her knee pain. Patient stated that she still has neck pain that radiates to both upper extremities. She stated that she needs a refill on her Gabapentin and Flexeril these medications have been helping with the pain. Patient also reported that she sees a chiropractor for her back pain. Patient reports no other health changes since her last visit. Patient reports her pain 6/10 today.    Interval history 03/02/2015:  Since previous  encounter the patient reported having had knee arthroscopy and was told to try euflexxa injections versus total knee replacement by her orthopedist.  She was originally scheduled to have her orthopedist inject her knee with corticosteroids this morning but due to scheduling conflict the patient was placed on my schedule for an injection.  Unfortunately the patient has also been actively treated for upper respiratory infection and is currently on antibiotics.  She has been continuing home exercises with regularity stating good days and bad days with regards to her knee pain.  Additionally she continues to have upper neck and bilateral shoulder pain upper extremity radiculopathy and lower back pain.  Since her arthroscopic surgery the patient has been having intermittent hives and has been placed by an allergist on hydroxyzine and famotidine but has not had complete relief of these symptoms.      Interval history 10/21/2014:  Since previous encounter patient was scheduled for an CIRILO but cancelled due to her having a cold. Patient reports she still has a cold. Patient reports neck radiating down her right arm with numbness and tingling. Patient stated on the way over her arm went numb. She states that driving is becoming harder as she feels she has a decreased hand  with opening of jars. Patient reports that she's been taking Mucinex and Thera Flu for her cold symptoms. Patient reports she been having hives which has been followed by an allergist with testing pending.  Additionally she has been seen by rheumatologist who diagnosed her with fibromyalgia.She states that she takes 6 caps of Gabapentin a day however she was titrating down down she states that when she gets to 4 caps a day she breaks out in hives and then quickly re escalates back to 6 tablets per day. Patient states that she's starting to have pain at the bottom of her feet when walking on her rugs in her home she states that just started yesterday.  Patient reports her pain 8/10.  She says that she has been increasing her protein intake and has been continuing to do exercises improving her range of motion in her shoulder and neck and states that overall she feels as if she is doing better.  Patient reports no other health changes since her last visit.     Interval history 8/25/2014:  Patient continues to have radicular symptoms in the neck radiating down to the right upper extremity.  Additionally since previous encounter she had a dental abscess which is being treated with accommodation of antibiotics and having had her tooth pulled.  She is still antibiotics and has a followup visit in mid-September with her dentist to evaluate efficacy of treatment.    Interval history 7/25/2014:  Since previous encounter the patient is on gabapentin 1800 mg per day and this is helping with her shoulder pain although she does state that she is having restless legs at night which is preventing her from being able to sleep.  Additionally she is having anterior thigh radicular pain.  She has had no other health changes she continues to do physical therapy exercises at home.  She reports her pain level is a 6/10 today.    Interval history 7/2/2014:  Patient is status post ACDF in 4/2014.  She is healing very well from surgery, but continues to have right shoulder pain which she had prior to her surgery despite having a labral repair.  Additionally she continues to have neuropathic symptoms in bilateral arms and in the axilla bilaterally.  She has been undergoing physical therapy and states that it does help her including heat ultrasound to the muscles of the neck.  She states that this is only temporary relief.  She continues to use the topical compounded pain cream which does help her.  After her surgery she discontinued taking gabapentin which was previously helping her and which she was tolerating without side effects.  She reports her pain as a 3/10 today.  She had been  "taking hydrocodone/acetaminophen 10/325 3 times a day when necessary as prescribed by her surgeon, but this is being discontinued.    Interval history 3/11/2014:  Since previous encounter patient reports that she had an episode where she felt as if her neck "locked up" in that now when she is waking up in the morning she has bilateral upper extremity numbness which improves throughout the day.  She does not report any weakness.  She states that she has been having persistent daily headaches from the occiput over the vertex of the supraorbital ridge bilaterally, and continues to have pain in the right side of her neck and shoulder.  She reports that she is still taking the gabapentin 1800 mg per day, and hydrocodone/acetaminophen when necessary which helps a little.    Interval history 2/24/2014:  Patient is status post cervical interlaminar epidural steroid injection by 2 on 1/24/2014 and 2/5/2014.  Patient has persisting radicular symptoms into her right upper extremity and shoulder the anterior aspect of the neck and base of the jaw.  Patient reported approximately 10% improvement in her pain symptoms after the first injection similar improvement in her pain symptoms after the second injection no sustained relief she states that the pain relief lasted her approximately one week and will go away.  She is taking gabapentin 300 mg 3 times a day, hydrocodone/acetaminophen 7.5/325 one tablet approximately 2-3 times per day.  She states that the Vicodin helps dull the pain but the does not eliminate it entirely, and she's not having any adverse problems in the gabapentin.  Patient continues in physical therapy for her right shoulder status post arthroscopic surgery, and has good range of motion in the shoulder.  Since previous exam the patient has a sinus infection with congestion.    Interval history 1/21/2014:  Since reducing her patient was only able to have one of the cervical intralaminar epidural steroid " injections which approximately help her 80% in her pain symptoms in the back of her neck, but she was also having symptoms into the right shoulder and had a rotator cuff tear which was repaired arthroscopically which prevented us from being able to perform the second cervical intralaminar epidural steroid injection on 12/23/2013.  Patient reports that she continues to do home exercises for her shoulder surgery but was unable to perform PT secondary to pain.  She states that her pain was actually better up until 1/12/2014 where she developed severe pain on the right side of her neck and posterior side of her neck going down the back of the scapula and also across the trapezius muscles of the shoulder.  Her MRI which was done in outside facility reports that she has severe C5-6 neural foraminal stenosis.  For her pain the patient has been taking hydrocodone/acetaminophen 7.5/325 as needed she reports there are occasional days where she takes it every 4 hours and some days where she doesn't take it at all.  Resting her head on the pillow helps alleviate the pain.  Sitting and working at computers when her pain is the worst.  She denies any problems with movement of her hands or weakness in her arms.  She has had no other health changes since previous encounter.    Initial history of present illness 10/2013:    Kaleigh Solo presents to the clinic for the evaluation of neck pain with radiation in right upper shoulder to the elbow, and down the right side of the chest wall. The pain started insidiously in the back of the neck in july  and symptoms have been worsening.  The pain has begun going into the shoulder, and there was swelling in the arm that began after starting Robaxin for muscle aches, and then took predisone taper and bactrim for the possibility of infection which did not help.  Patient stopped her medications and the swelling improved.  US of the upper extremity was negative for DVT.   Patient takes  vicodin for pain which she states helps only a little bit, and doesn't take the pain go away.  MRI of cervical spine revealed severe neuroforaminal stenosis on the right at C5-6 with central to right neural foraminal disc protrusion with generalized bulging discs and uncovertebral joint osteophytic change.  No abnormal signal in the cord.      Pain Description:    The pain is located in the neck and radiates to the right shoulder .  The pain is described as aching and tight band      Symptoms interfere with daily activity and sleeping.     Exacerbating factors: Sitting, Bending, Touching, Coughing/Sneezing, Eating, Night Time, Morning, Flexing and Lifting.      Mitigating factors heat, laying down and medications.     She reports 2 hours of uninterrupted sleep per night.    Patient denies night fever/night sweats, urinary incontinence, bowel incontinence, significant weight loss, significant motor weakness and loss of sensations.  Patient denies any suicidal or homicidal ideations    Pain Medications:  Current:  Gabapentin 2400 mg daily  Cymbalta 60 mg daily    Tried in Past:  Gabapentin  Hydrocodone  ibuprofen    Physical Therapy/Home Exercise: yes       report:  Not applicable    Pain Procedures:   12/20/17 Left L4 and L5 TF CIRILO- 75% relief  1/24/18 Left L5 and S1 TF CIRILO- 75% relief  9/25/18 Lumbar SCS trial- 100% relief  10/15/18 Lumbar SCS implant- significant relief    Chiropractor -No treatments yet.  Right shoulder arthroscopy 12/10/2013  Right knee arthroscopy 12/19/14    Imaging:     10/7/2013  MRI of cervical spine revealed severe neuroforaminal stenosis on the right at C5-6 with central to right neural foraminal disc protrusion with generalized bulging discs and uncovertebral joint osteophytic change.  No abnormal signal in the cord.  Minor disc bulging at C3-4, C4-5 without central canal stenosis, spinal cord impingement or neural foraminal stenosis.    CT chest: 10/02/2013  No definite acute process  or obvious etiology of the right-sided chest pain, axillary pain, neck and supraclavicular pain.  (full report scanned into record)    Right upper extremity venous ultrasound 10/02/2013  No evidence of DVT  lumbar spine 11/28/2017  Impression     MRI LUMBAR SPINE    TECHNIQUE: MRI lumbar spine was performed without contrast. The following sequences were obtained: Localizer; sagittal T1, T2, STIR; axial T1 and T2.    COMPARISON: None.    FINDINGS:    There are 5 lumbar vertebrae.  Vertebral body heights and alignment are maintained.  Bone marrow signal is preserved.  There is multilevel disc desiccation with preservation of disc heights. Conus terminates at L1 and appears unremarkable. Limited evaluation of posterior abdominal structures is unremarkable.  Paraspinal musculature is within normal limits.  Evaluation of sacroiliac joints is unremarkable.    L1-L2: No spinal canal stenosis or neuroforaminal narrowing.    L2-L3: Mild bilateral facet arthropathy noted.  No spinal canal stenosis or neuroforaminal narrowing.    L3-L4: Mild bilateral facet arthropathy and circumferential disc bulge noted.  No spinal canal stenosis or neuroforaminal narrowing.    L4-L5: Severe bilateral facet arthropathy and circumferential disc bulge result in moderate spinal canal stenosis.    L5-S1: Circumferential disc bulge and severe bilateral facet arthropathy result in severe spinal canal stenosis.  Note made of synovial cyst in the left foramen, contacting the exiting L5 nerve root.     Lab Results   Component Value Date    WBC 9.66 11/05/2019    HGB 14.5 11/05/2019    HCT 46.1 11/05/2019    MCV 98 11/05/2019     11/05/2019       CMP  Sodium   Date Value Ref Range Status   11/22/2019 140 136 - 145 mmol/L Final     Potassium   Date Value Ref Range Status   11/22/2019 4.1 3.5 - 5.1 mmol/L Final     Chloride   Date Value Ref Range Status   11/22/2019 103 95 - 110 mmol/L Final     CO2   Date Value Ref Range Status   11/22/2019 29  23 - 29 mmol/L Final     Glucose   Date Value Ref Range Status   11/22/2019 108 70 - 110 mg/dL Final     BUN, Bld   Date Value Ref Range Status   11/22/2019 11 8 - 23 mg/dL Final     Creatinine   Date Value Ref Range Status   11/22/2019 1.0 0.5 - 1.4 mg/dL Final     Calcium   Date Value Ref Range Status   11/22/2019 9.6 8.7 - 10.5 mg/dL Final     Total Protein   Date Value Ref Range Status   12/03/2019 6.9 6.0 - 8.4 g/dL Final     Albumin   Date Value Ref Range Status   12/03/2019 3.8 3.5 - 5.2 g/dL Final     Total Bilirubin   Date Value Ref Range Status   12/03/2019 0.4 0.1 - 1.0 mg/dL Final     Comment:     For infants and newborns, interpretation of results should be based  on gestational age, weight and in agreement with clinical  observations.  Premature Infant recommended reference ranges:  Up to 24 hours.............<8.0 mg/dL  Up to 48 hours............<12.0 mg/dL  3-5 days..................<15.0 mg/dL  6-29 days.................<15.0 mg/dL       Alkaline Phosphatase   Date Value Ref Range Status   12/03/2019 103 55 - 135 U/L Final     AST   Date Value Ref Range Status   12/03/2019 15 10 - 40 U/L Final     ALT   Date Value Ref Range Status   12/03/2019 20 10 - 44 U/L Final     Anion Gap   Date Value Ref Range Status   11/22/2019 8 8 - 16 mmol/L Final     eGFR if    Date Value Ref Range Status   11/22/2019 >60.0 >60 mL/min/1.73 m^2 Final     eGFR if non    Date Value Ref Range Status   11/22/2019 >60.0 >60 mL/min/1.73 m^2 Final     Comment:     Calculation used to obtain the estimated glomerular filtration  rate (eGFR) is the CKD-EPI equation.            Past Medical History:   Diagnosis Date    Arthritis     Asthma     Cervical radiculopathy 10/14/2013    Cervicalgia     2013 tx by chiropractor    Closed dislocation of acromioclavicular joint 2013    Degenerative disc disease     Fatty liver     Fibromyalgia     GERD (gastroesophageal reflux disease)     HPV (human  papilloma virus) anogenital infection     conization in past-remote history-1990    HTN (hypertension)     Sciatica     Tobacco use     Ulcer 2007    stomach ulcer     Past Surgical History:   Procedure Laterality Date    acdf  4/2014    C5-6    BACK SURGERY      CERVICAL    CERVICAL CONIZATION   W/ LASER      KNEE ARTHROSCOPY      right shoulder surger      arthroscopic surgery Dec 2013    TRIAL OF SPINAL CORD NERVE STIMULATOR N/A 9/25/2018    Procedure: TRIAL, NEUROSTIMULATOR, SPINAL CORD;  Surgeon: Candice Lopez MD;  Location: [facility];  Service: Pain Management;  Laterality: N/A;  SCS Trial  Pj Hankins notified of date and time     Social History     Socioeconomic History    Marital status:      Spouse name: Not on file    Number of children: Not on file    Years of education: Not on file    Highest education level: Not on file   Occupational History    Not on file   Social Needs    Financial resource strain: Not hard at all    Food insecurity:     Worry: Never true     Inability: Never true    Transportation needs:     Medical: No     Non-medical: No   Tobacco Use    Smoking status: Current Every Day Smoker     Packs/day: 1.00     Years: 48.00     Pack years: 48.00     Types: Cigarettes    Smokeless tobacco: Never Used   Substance and Sexual Activity    Alcohol use: Yes     Frequency: Monthly or less     Drinks per session: 1 or 2     Binge frequency: Never     Comment: occasional    Drug use: No    Sexual activity: Not on file   Lifestyle    Physical activity:     Days per week: 7 days     Minutes per session: 40 min    Stress: Not at all   Relationships    Social connections:     Talks on phone: More than three times a week     Gets together: More than three times a week     Attends [employer] service: Not on file     Active member of club or organization: No     Attends meetings of clubs or organizations: Never     Relationship status:    Other Topics  Concern    Are you pregnant or think you may be? Not Asked    Breast-feeding Not Asked   Social History Narrative    , not working, 3 children (1 passed at 9 mo old), tobacco daily, ETOH socially, GYN  dtrs of cesar     Family History   Problem Relation Age of Onset    Cancer Mother         lung-was tobacco user  of lung cancer at 78    Aneurysm Father         abdominal burst-  of stroke at 71 years    Heart disease Father         had HTN       Allergies   Allergen Reactions    Ambien [Zolpidem] Other (See Comments)     Pt hyperactive    Lunesta [Eszopiclone] Other (See Comments)     Severely depressed    Shellfish Containing Products Swelling and Other (See Comments)     Metallic taste in mouth  Swells all over,including the tongue and throat  Feels likes insides are swollen  Allergic to crawfish,Shrimp    Ancef [Cefazolin]      Facial swelling  Swelling abdomen    Flexeril [Cyclobenzaprine] Swelling    Gabapentin Hives    Sulfa (Sulfonamide Antibiotics) Nausea Only       Current Outpatient Medications   Medication Sig    albuterol (PROAIR HFA) 90 mcg/actuation inhaler Inhale 2 puffs into the lungs every 6 (six) hours as needed for Wheezing.    ALPRAZolam (XANAX) 0.25 MG tablet Take 1 tablet (0.25 mg total) by mouth nightly as needed for Insomnia.    amLODIPine (NORVASC) 5 MG tablet Take 1 tablet (5 mg total) by mouth once daily.    atorvastatin (LIPITOR) 80 MG tablet Take 1 tablet (80 mg total) by mouth once daily.    CALCIUM-MAG OXIDE-VITAMIN D3 ORAL Take by mouth.    cetirizine (ZYRTEC) 10 MG tablet Take 10 mg by mouth daily as needed.     diphenhydrAMINE (BENADRYL) 50 MG tablet Take 50 mg by mouth nightly as needed for Itching.    DULoxetine (CYMBALTA) 60 MG capsule Take 1 capsule (60 mg total) by mouth once daily.    ergocalciferol (ERGOCALCIFEROL) 50,000 unit Cap TAKE 1 CAPSULE BY MOUTH EVERY 7 DAYS    famotidine (PEPCID) 20 MG tablet Take 20 mg by mouth 2 (two)  "times daily as needed.     fexofenadine (ALLEGRA) 60 MG tablet Take 60 mg by mouth daily as needed.     fluocinonide 0.05% (LIDEX) 0.05 % cream Apply topically 2 (two) times daily as needed.    folic acid (FOLVITE) 800 MCG Tab Take 400 mcg by mouth once daily.    furosemide (LASIX) 20 MG tablet Take 1 tablet (20 mg total) by mouth as needed (leg swelling).    HYDROcodone-acetaminophen (NORCO) 5-325 mg per tablet Take 1 tablet by mouth every 6 (six) hours as needed for Pain.    losartan (COZAAR) 50 MG tablet Take 1 tablet (50 mg total) by mouth once daily.    tiZANidine (ZANAFLEX) 4 MG tablet Take 1 tablet (4 mg total) by mouth every 8 (eight) hours as needed.    gabapentin (NEURONTIN) 300 MG capsule Take 1 capsule (300 mg total) by mouth every evening for 5 days, THEN 1 capsule (300 mg total) 2 (two) times daily for 25 days.    simethicone (PHAZYME) 250 mg Cap Take 500 mg by mouth 2 (two) times daily.     No current facility-administered medications for this visit.        REVIEW OF SYSTEMS:    GENERAL:  No weight loss, malaise or fevers.  NECK:  Negative for lumps, no difficulty with swallowing.   RESPIRATORY:  No recent URI  CARDIOVASCULAR:  Negative for chest pain, leg swelling or palpitations.  GI:  Negative for abdominal discomfort, blood in stools or black stools or change in bowel habits.  Patient had a history of stomach ulcer, never bleeding.    MUSCULOSKELETAL:  See HPI.   SKIN: no new lesions  HEMATOLOGY/LYMPHOLOGY:  Negative for prolonged bleeding, bruising easily or swollen nodes.  Patient is not currently taking any anti-coagulants  NEURO:   No history of syncope, paralysis, seizures or tremors.  All other reviewed and negative other than HPI.    OBJECTIVE:    /85   Pulse 93   Temp 98 °F (36.7 °C) (Oral)   Resp 18   Ht 5' 2" (1.575 m)   Wt 102 kg (224 lb 14.4 oz)   BMI 41.13 kg/m²     PHYSICAL EXAMINATION:    GENERAL: Well appearing, in no acute distress, alert and oriented " x3.  PSYCH:  Mood and affect appropriate.  SKIN: Skin color, texture, turgor normal, no rashes or lesions.  HEAD/FACE:  Normocephalic, atraumatic. Cranial nerves grossly intact.  CV: RRR with palpation of the radial artery.  PULM: No evidence of respiratory difficulty, symmetric chest rise.  BACK: Straight leg raising in the sitting and supine positions is negative to radicular pain.  There is tightening discomfort to hamstring muscles with forward flexion.  There is decreased range of motion with extension to 15 degrees, and facet loading maneuvers cause reproducible pain.    EXTREMITIES: Peripheral joint ROM is full and pain free without obvious instability or laxity in all four extremities. No deformities, edema, or skin discoloration. Good capillary refill.  MUSCULOSKELETAL: Hip, and knee provocative maneuvers are negative.   5/5 strength in right ankle with plantar and dorsiflexion, 5/5 strength in left ankle with plantar and 5/5 dorsiflexion, 5/5 strength with right knee flexion extension, 5/5 strength with knee flexion extension on the left .  No atrophy or tone abnormalities are noted.  NEURO: Bilateral lower extremity coordination and muscle stretch reflexes are physiologic and symmetric.  Plantar response are downgoing. No clonus.  No loss of sensation is noted.  GAIT: Antalgic, ambulates without assistance    ASSESSMENT: 64 y.o. year old female with neck and lower back pain, consistent with the following diagnoses:    1. Chronic pain syndrome     2. Muscle spasms of both lower extremities     3. Chronic pain disorder     4. Lumbar radiculopathy          PLAN:     - Recent labs and previous imaging reviewed with patient.    - Her LE muscle pain and spasms improved when she stopped her statin.  She still has some tightness to hamstring muscles.  She will start stretching throughout the day.  Start Robaxin 500 mg TID PRN.    - If no improvement with above, she will notify me.    - The patient will continue a  home exercise routine to help with pain and strengthening.      - Continue current medications otherwise.    - RTC PRN.      The above plan and management options were discussed at length with patient. Patient is in agreement with the above and verbalized understanding.     Sadie Cowan  01/28/2020

## 2020-01-29 ENCOUNTER — TELEPHONE (OUTPATIENT)
Dept: CARDIOLOGY | Facility: CLINIC | Age: 65
End: 2020-01-29

## 2020-01-29 ENCOUNTER — PATIENT MESSAGE (OUTPATIENT)
Dept: CARDIOLOGY | Facility: CLINIC | Age: 65
End: 2020-01-29

## 2020-01-29 ENCOUNTER — PATIENT MESSAGE (OUTPATIENT)
Dept: ADMINISTRATIVE | Facility: HOSPITAL | Age: 65
End: 2020-01-29

## 2020-01-29 DIAGNOSIS — M62.81 MUSCLE WEAKNESS: Primary | ICD-10-CM

## 2020-01-29 DIAGNOSIS — M79.10 MYALGIA: ICD-10-CM

## 2020-01-29 NOTE — PROGRESS NOTES
Patient, Kaleigh Solo (MRN #[id number]), presented with a recorded BMI of 41.13 kg/m^2 consistent with the definition of morbid obesity (ICD-10 E66.01). The patient's morbid obesity was monitored, evaluated, addressed and/or treated. This addendum to the medical record is made on 01/29/2020.

## 2020-02-03 ENCOUNTER — LAB VISIT (OUTPATIENT)
Dept: LAB | Facility: HOSPITAL | Age: 65
End: 2020-02-03
Attending: INTERNAL MEDICINE
Payer: MEDICARE

## 2020-02-03 ENCOUNTER — PATIENT MESSAGE (OUTPATIENT)
Dept: INTERNAL MEDICINE | Facility: CLINIC | Age: 65
End: 2020-02-03

## 2020-02-03 DIAGNOSIS — M62.81 MUSCLE WEAKNESS: ICD-10-CM

## 2020-02-03 DIAGNOSIS — M17.0 BILATERAL PRIMARY OSTEOARTHRITIS OF KNEE: ICD-10-CM

## 2020-02-03 DIAGNOSIS — M79.10 MYALGIA: ICD-10-CM

## 2020-02-03 DIAGNOSIS — G89.29 CHRONIC BILATERAL LOW BACK PAIN WITH LEFT-SIDED SCIATICA: Chronic | ICD-10-CM

## 2020-02-03 DIAGNOSIS — M54.42 CHRONIC BILATERAL LOW BACK PAIN WITH LEFT-SIDED SCIATICA: Chronic | ICD-10-CM

## 2020-02-03 LAB
ALBUMIN SERPL BCP-MCNC: 4 G/DL (ref 3.5–5.2)
ALBUMIN SERPL BCP-MCNC: 4 G/DL (ref 3.5–5.2)
ALP SERPL-CCNC: 111 U/L (ref 55–135)
ALP SERPL-CCNC: 111 U/L (ref 55–135)
ALT SERPL W/O P-5'-P-CCNC: 24 U/L (ref 10–44)
ALT SERPL W/O P-5'-P-CCNC: 24 U/L (ref 10–44)
ANION GAP SERPL CALC-SCNC: 9 MMOL/L (ref 8–16)
AST SERPL-CCNC: 16 U/L (ref 10–40)
AST SERPL-CCNC: 16 U/L (ref 10–40)
BILIRUB DIRECT SERPL-MCNC: 0.2 MG/DL (ref 0.1–0.3)
BILIRUB SERPL-MCNC: 0.4 MG/DL (ref 0.1–1)
BILIRUB SERPL-MCNC: 0.4 MG/DL (ref 0.1–1)
BUN SERPL-MCNC: 9 MG/DL (ref 8–23)
CALCIUM SERPL-MCNC: 9.7 MG/DL (ref 8.7–10.5)
CHLORIDE SERPL-SCNC: 102 MMOL/L (ref 95–110)
CK SERPL-CCNC: 57 U/L (ref 20–180)
CO2 SERPL-SCNC: 30 MMOL/L (ref 23–29)
CREAT SERPL-MCNC: 0.8 MG/DL (ref 0.5–1.4)
EST. GFR  (AFRICAN AMERICAN): >60 ML/MIN/1.73 M^2
EST. GFR  (NON AFRICAN AMERICAN): >60 ML/MIN/1.73 M^2
GLUCOSE SERPL-MCNC: 78 MG/DL (ref 70–110)
POTASSIUM SERPL-SCNC: 4.6 MMOL/L (ref 3.5–5.1)
PROT SERPL-MCNC: 7.3 G/DL (ref 6–8.4)
PROT SERPL-MCNC: 7.3 G/DL (ref 6–8.4)
SODIUM SERPL-SCNC: 141 MMOL/L (ref 136–145)

## 2020-02-03 PROCEDURE — 36415 COLL VENOUS BLD VENIPUNCTURE: CPT | Mod: HCNC,PO

## 2020-02-03 PROCEDURE — 82550 ASSAY OF CK (CPK): CPT | Mod: HCNC

## 2020-02-03 PROCEDURE — 80053 COMPREHEN METABOLIC PANEL: CPT | Mod: HCNC

## 2020-02-03 RX ORDER — HYDROXYZINE HYDROCHLORIDE 25 MG/1
25 TABLET, FILM COATED ORAL 3 TIMES DAILY
Qty: 60 TABLET | Refills: 2 | Status: SHIPPED | OUTPATIENT
Start: 2020-02-03 | End: 2020-02-10 | Stop reason: SDUPTHER

## 2020-02-03 RX ORDER — HYDROXYZINE HYDROCHLORIDE 25 MG/1
25 TABLET, FILM COATED ORAL 3 TIMES DAILY
COMMUNITY
End: 2020-02-03 | Stop reason: SDUPTHER

## 2020-02-03 NOTE — TELEPHONE ENCOUNTER
Pt requesting refill of NORCO and HYDROXYZINE.    Last office visit:  12/27/19  Recalls set for April 4mo f/u, Nov annual    [facility] set for escript.

## 2020-02-06 ENCOUNTER — TELEPHONE (OUTPATIENT)
Dept: CARDIOLOGY | Facility: CLINIC | Age: 65
End: 2020-02-06

## 2020-02-06 NOTE — TELEPHONE ENCOUNTER
----- Message from Sally Wang MD sent at 2/6/2020  8:39 AM CST -----  Please let the patient know that her blod work is normal. Continue meds as prescribed.

## 2020-02-08 ENCOUNTER — PATIENT MESSAGE (OUTPATIENT)
Dept: INTERNAL MEDICINE | Facility: CLINIC | Age: 65
End: 2020-02-08

## 2020-02-10 RX ORDER — HYDROXYZINE HYDROCHLORIDE 25 MG/1
25 TABLET, FILM COATED ORAL 3 TIMES DAILY
Qty: 60 TABLET | Refills: 2 | Status: SHIPPED | OUTPATIENT
Start: 2020-02-10 | End: 2021-05-26

## 2020-02-10 RX ORDER — HYDROCODONE BITARTRATE AND ACETAMINOPHEN 5; 325 MG/1; MG/1
1 TABLET ORAL EVERY 6 HOURS PRN
Qty: 90 TABLET | Refills: 0 | Status: SHIPPED | OUTPATIENT
Start: 2020-02-10 | End: 2020-08-03 | Stop reason: SDUPTHER

## 2020-02-11 ENCOUNTER — PATIENT MESSAGE (OUTPATIENT)
Dept: INTERNAL MEDICINE | Facility: CLINIC | Age: 65
End: 2020-02-11

## 2020-02-24 ENCOUNTER — OFFICE VISIT (OUTPATIENT)
Dept: INTERNAL MEDICINE | Facility: CLINIC | Age: 65
End: 2020-02-24
Payer: MEDICARE

## 2020-02-24 VITALS
RESPIRATION RATE: 18 BRPM | DIASTOLIC BLOOD PRESSURE: 72 MMHG | SYSTOLIC BLOOD PRESSURE: 130 MMHG | WEIGHT: 225.06 LBS | HEART RATE: 95 BPM | BODY MASS INDEX: 41.42 KG/M2 | HEIGHT: 62 IN | TEMPERATURE: 98 F

## 2020-02-24 DIAGNOSIS — I70.0 AORTIC CALCIFICATION: ICD-10-CM

## 2020-02-24 DIAGNOSIS — M46.92 UNSPECIFIED INFLAMMATORY SPONDYLOPATHY, CERVICAL REGION: ICD-10-CM

## 2020-02-24 DIAGNOSIS — J45.901 EXACERBATION OF ASTHMA, UNSPECIFIED ASTHMA SEVERITY, UNSPECIFIED WHETHER PERSISTENT: ICD-10-CM

## 2020-02-24 DIAGNOSIS — E66.01 MORBID OBESITY WITH BMI OF 40.0-44.9, ADULT: ICD-10-CM

## 2020-02-24 DIAGNOSIS — J32.9 BACTERIAL SINUSITIS: Primary | ICD-10-CM

## 2020-02-24 DIAGNOSIS — B96.89 BACTERIAL SINUSITIS: Primary | ICD-10-CM

## 2020-02-24 DIAGNOSIS — E27.8 ADRENAL MASS, LEFT: ICD-10-CM

## 2020-02-24 PROCEDURE — 3075F PR MOST RECENT SYSTOLIC BLOOD PRESS GE 130-139MM HG: ICD-10-PCS | Mod: HCNC,CPTII,S$GLB, | Performed by: FAMILY MEDICINE

## 2020-02-24 PROCEDURE — 99999 PR PBB SHADOW E&M-EST. PATIENT-LVL III: ICD-10-PCS | Mod: PBBFAC,HCNC,, | Performed by: FAMILY MEDICINE

## 2020-02-24 PROCEDURE — 99999 PR PBB SHADOW E&M-EST. PATIENT-LVL III: CPT | Mod: PBBFAC,HCNC,, | Performed by: FAMILY MEDICINE

## 2020-02-24 PROCEDURE — 3075F SYST BP GE 130 - 139MM HG: CPT | Mod: HCNC,CPTII,S$GLB, | Performed by: FAMILY MEDICINE

## 2020-02-24 PROCEDURE — 3008F BODY MASS INDEX DOCD: CPT | Mod: HCNC,CPTII,S$GLB, | Performed by: FAMILY MEDICINE

## 2020-02-24 PROCEDURE — 3078F PR MOST RECENT DIASTOLIC BLOOD PRESSURE < 80 MM HG: ICD-10-PCS | Mod: HCNC,CPTII,S$GLB, | Performed by: FAMILY MEDICINE

## 2020-02-24 PROCEDURE — 3008F PR BODY MASS INDEX (BMI) DOCUMENTED: ICD-10-PCS | Mod: HCNC,CPTII,S$GLB, | Performed by: FAMILY MEDICINE

## 2020-02-24 PROCEDURE — 99499 UNLISTED E&M SERVICE: CPT | Mod: S$GLB,,, | Performed by: FAMILY MEDICINE

## 2020-02-24 PROCEDURE — 99214 PR OFFICE/OUTPT VISIT, EST, LEVL IV, 30-39 MIN: ICD-10-PCS | Mod: HCNC,S$GLB,, | Performed by: FAMILY MEDICINE

## 2020-02-24 PROCEDURE — 99214 OFFICE O/P EST MOD 30 MIN: CPT | Mod: HCNC,S$GLB,, | Performed by: FAMILY MEDICINE

## 2020-02-24 PROCEDURE — 99499 RISK ADDL DX/OHS AUDIT: ICD-10-PCS | Mod: S$GLB,,, | Performed by: FAMILY MEDICINE

## 2020-02-24 PROCEDURE — 3078F DIAST BP <80 MM HG: CPT | Mod: HCNC,CPTII,S$GLB, | Performed by: FAMILY MEDICINE

## 2020-02-24 RX ORDER — BENZONATATE 200 MG/1
200 CAPSULE ORAL 3 TIMES DAILY PRN
Qty: 30 CAPSULE | Refills: 0 | Status: SHIPPED | OUTPATIENT
Start: 2020-02-24 | End: 2020-03-05

## 2020-02-24 RX ORDER — AMOXICILLIN AND CLAVULANATE POTASSIUM 875; 125 MG/1; MG/1
1 TABLET, FILM COATED ORAL 2 TIMES DAILY
Qty: 20 TABLET | Refills: 0 | Status: SHIPPED | OUTPATIENT
Start: 2020-02-24 | End: 2020-03-05

## 2020-02-24 RX ORDER — PREDNISONE 20 MG/1
40 TABLET ORAL DAILY
Qty: 10 TABLET | Refills: 0 | Status: SHIPPED | OUTPATIENT
Start: 2020-02-24 | End: 2020-02-29

## 2020-02-24 RX ORDER — FLUTICASONE PROPIONATE 50 MCG
2 SPRAY, SUSPENSION (ML) NASAL DAILY
Qty: 16 G | Refills: 11 | Status: SHIPPED | OUTPATIENT
Start: 2020-02-24 | End: 2020-11-11

## 2020-02-24 NOTE — PROGRESS NOTES
Subjective:       Patient ID: Kaleigh Solo is a 64 y.o. female.    Chief Complaint: Cough; Chest Pain; and Headache    HPI 64-year-old white female with morbid obesity, left adrenal mass, aortic calcification, and inflammatory spondylopathy of the cervical region presents to clinic today secondary to a complaint of subjective fever and chills, fatigue, nasal congestion, postnasal drip, runny nose, scratchy throat, chest congestion, productive cough, and headaches worsening since yesterday.  She has had sick contact with her  who is also ill.  She has been using TheraFlu and this morning used her albuterol inhaler with mild relief.  Review of Systems   Constitutional: Positive for chills, fatigue and fever. Negative for appetite change.   HENT: Positive for congestion, postnasal drip, rhinorrhea and sore throat. Negative for ear pain, hearing loss, sinus pressure and tinnitus.    Eyes: Negative for redness, itching and visual disturbance.   Respiratory: Positive for cough and chest tightness. Negative for shortness of breath.    Cardiovascular: Negative for chest pain and palpitations.   Gastrointestinal: Negative for abdominal pain, constipation, diarrhea, nausea and vomiting.   Genitourinary: Negative for decreased urine volume, difficulty urinating, dysuria, frequency, hematuria and urgency.   Musculoskeletal: Negative for back pain, myalgias, neck pain and neck stiffness.   Skin: Negative for rash.   Neurological: Positive for headaches. Negative for dizziness and light-headedness.   Psychiatric/Behavioral: Negative.        Objective:      Physical Exam   Constitutional: She is oriented to person, place, and time. She appears well-developed and well-nourished. No distress.   HENT:   Head: Normocephalic and atraumatic.   Right Ear: External ear normal. A middle ear effusion is present.   Left Ear: External ear normal. A middle ear effusion is present.   Nose: Mucosal edema and rhinorrhea present. No  nose lacerations, sinus tenderness, nasal deformity, septal deviation or nasal septal hematoma. No epistaxis.  No foreign bodies. Right sinus exhibits maxillary sinus tenderness and frontal sinus tenderness. Left sinus exhibits maxillary sinus tenderness and frontal sinus tenderness.   Mouth/Throat: Oropharynx is clear and moist. No oropharyngeal exudate.   Eyes: Pupils are equal, round, and reactive to light. Conjunctivae and EOM are normal. Right eye exhibits no discharge. Left eye exhibits no discharge. No scleral icterus.   Neck: Normal range of motion. Neck supple. No JVD present. No tracheal deviation present. No thyromegaly present.   Cardiovascular: Normal rate, regular rhythm, normal heart sounds and intact distal pulses. Exam reveals no gallop and no friction rub.   No murmur heard.  Pulmonary/Chest: Effort normal. No stridor. No respiratory distress. She has wheezes. She has no rales.   Abdominal: Soft. Bowel sounds are normal. She exhibits no distension and no mass. There is no tenderness. There is no rebound and no guarding.   Musculoskeletal: Normal range of motion. She exhibits no edema or tenderness.   Lymphadenopathy:     She has no cervical adenopathy.   Neurological: She is alert and oriented to person, place, and time.   Skin: Skin is warm and dry. No rash noted. She is not diaphoretic. No erythema. No pallor.   Psychiatric: She has a normal mood and affect. Her behavior is normal. Judgment and thought content normal.   Nursing note and vitals reviewed.      Assessment:       1. Bacterial sinusitis    2. Exacerbation of asthma, unspecified asthma severity, unspecified whether persistent    3. Morbid obesity with BMI of 40.0-44.9, adult    4. Adrenal mass, left    5. Unspecified inflammatory spondylopathy, cervical region    6. Aortic calcification        Plan:       Bacterial sinusitis  -     amoxicillin-clavulanate 875-125mg (AUGMENTIN) 875-125 mg per tablet; Take 1 tablet by mouth 2 (two) times  daily. for 10 days  Dispense: 20 tablet; Refill: 0  -     benzonatate (TESSALON) 200 MG capsule; Take 1 capsule (200 mg total) by mouth 3 (three) times daily as needed for Cough.  Dispense: 30 capsule; Refill: 0  -     fluticasone propionate (FLONASE) 50 mcg/actuation nasal spray; 2 sprays (100 mcg total) by Each Nostril route once daily.  Dispense: 16 g; Refill: 11  -     predniSONE (DELTASONE) 20 MG tablet; Take 2 tablets (40 mg total) by mouth once daily. for 5 days  Dispense: 10 tablet; Refill: 0    Exacerbation of asthma, unspecified asthma severity, unspecified whether persistent  -     fluticasone propionate (FLONASE) 50 mcg/actuation nasal spray; 2 sprays (100 mcg total) by Each Nostril route once daily.  Dispense: 16 g; Refill: 11  -     predniSONE (DELTASONE) 20 MG tablet; Take 2 tablets (40 mg total) by mouth once daily. for 5 days  Dispense: 10 tablet; Refill: 0   Continue albuterol HFA as needed for shortness of breath or wheezing.    Morbid obesity with BMI of 40.0-44.9, adult   Encourage diet and exercise.    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    Unspecified inflammatory spondylopathy, cervical region   Stable and followed by pain management.    Aortic calcification   - atherosclerosis of the aorta and coronary arteries seen on screening chest CT in 2017   - control blood pressure, on Lipitor, continue to monitor      Return to clinic as needed if symptoms persist or worsen.

## 2020-03-03 ENCOUNTER — PATIENT MESSAGE (OUTPATIENT)
Dept: INTERNAL MEDICINE | Facility: CLINIC | Age: 65
End: 2020-03-03

## 2020-03-03 RX ORDER — FLUCONAZOLE 150 MG/1
150 TABLET ORAL ONCE
Qty: 1 TABLET | Refills: 0 | Status: SHIPPED | OUTPATIENT
Start: 2020-03-03 | End: 2020-03-03

## 2020-08-03 ENCOUNTER — TELEPHONE (OUTPATIENT)
Dept: INTERNAL MEDICINE | Facility: CLINIC | Age: 65
End: 2020-08-03

## 2020-08-03 DIAGNOSIS — G89.29 CHRONIC BILATERAL LOW BACK PAIN WITH LEFT-SIDED SCIATICA: Chronic | ICD-10-CM

## 2020-08-03 DIAGNOSIS — R73.9 ELEVATED BLOOD SUGAR LEVEL: ICD-10-CM

## 2020-08-03 DIAGNOSIS — M17.0 BILATERAL PRIMARY OSTEOARTHRITIS OF KNEE: ICD-10-CM

## 2020-08-03 DIAGNOSIS — Z00.00 ANNUAL PHYSICAL EXAM: Primary | ICD-10-CM

## 2020-08-03 DIAGNOSIS — E55.9 VITAMIN D INSUFFICIENCY: ICD-10-CM

## 2020-08-03 DIAGNOSIS — I10 ESSENTIAL HYPERTENSION: ICD-10-CM

## 2020-08-03 DIAGNOSIS — M54.42 CHRONIC BILATERAL LOW BACK PAIN WITH LEFT-SIDED SCIATICA: Chronic | ICD-10-CM

## 2020-08-04 RX ORDER — HYDROCODONE BITARTRATE AND ACETAMINOPHEN 5; 325 MG/1; MG/1
1 TABLET ORAL EVERY 6 HOURS PRN
Qty: 90 TABLET | Refills: 0 | Status: SHIPPED | OUTPATIENT
Start: 2020-08-04 | End: 2020-11-11 | Stop reason: SDUPTHER

## 2020-08-05 ENCOUNTER — LAB VISIT (OUTPATIENT)
Dept: LAB | Facility: HOSPITAL | Age: 65
End: 2020-08-05
Attending: INTERNAL MEDICINE
Payer: MEDICARE

## 2020-08-05 DIAGNOSIS — Z12.11 SCREEN FOR COLON CANCER: ICD-10-CM

## 2020-08-05 PROCEDURE — 82274 ASSAY TEST FOR BLOOD FECAL: CPT | Mod: HCNC

## 2020-08-06 LAB — HEMOCCULT STL QL IA: NEGATIVE

## 2020-09-29 ENCOUNTER — PATIENT MESSAGE (OUTPATIENT)
Dept: OTHER | Facility: OTHER | Age: 65
End: 2020-09-29

## 2020-11-06 ENCOUNTER — LAB VISIT (OUTPATIENT)
Dept: LAB | Facility: HOSPITAL | Age: 65
End: 2020-11-06
Attending: INTERNAL MEDICINE
Payer: MEDICARE

## 2020-11-06 DIAGNOSIS — R73.9 ELEVATED BLOOD SUGAR LEVEL: ICD-10-CM

## 2020-11-06 DIAGNOSIS — Z00.00 ANNUAL PHYSICAL EXAM: ICD-10-CM

## 2020-11-06 DIAGNOSIS — I10 ESSENTIAL HYPERTENSION: ICD-10-CM

## 2020-11-06 DIAGNOSIS — M79.10 MUSCLE PAIN: ICD-10-CM

## 2020-11-06 DIAGNOSIS — E78.00 HYPERCHOLESTEREMIA: ICD-10-CM

## 2020-11-06 DIAGNOSIS — E55.9 VITAMIN D INSUFFICIENCY: ICD-10-CM

## 2020-11-06 LAB
25(OH)D3+25(OH)D2 SERPL-MCNC: 17 NG/ML (ref 30–96)
ALBUMIN SERPL BCP-MCNC: 4.1 G/DL (ref 3.5–5.2)
ALBUMIN SERPL BCP-MCNC: 4.1 G/DL (ref 3.5–5.2)
ALP SERPL-CCNC: 102 U/L (ref 55–135)
ALP SERPL-CCNC: 102 U/L (ref 55–135)
ALT SERPL W/O P-5'-P-CCNC: 17 U/L (ref 10–44)
ALT SERPL W/O P-5'-P-CCNC: 17 U/L (ref 10–44)
ANION GAP SERPL CALC-SCNC: 11 MMOL/L (ref 8–16)
AST SERPL-CCNC: 13 U/L (ref 10–40)
AST SERPL-CCNC: 13 U/L (ref 10–40)
BASOPHILS # BLD AUTO: 0.06 K/UL (ref 0–0.2)
BASOPHILS NFR BLD: 1 % (ref 0–1.9)
BILIRUB DIRECT SERPL-MCNC: 0.2 MG/DL (ref 0.1–0.3)
BILIRUB SERPL-MCNC: 0.5 MG/DL (ref 0.1–1)
BILIRUB SERPL-MCNC: 0.5 MG/DL (ref 0.1–1)
BUN SERPL-MCNC: 9 MG/DL (ref 8–23)
CALCIUM SERPL-MCNC: 9.7 MG/DL (ref 8.7–10.5)
CHLORIDE SERPL-SCNC: 105 MMOL/L (ref 95–110)
CHOLEST SERPL-MCNC: 244 MG/DL (ref 120–199)
CHOLEST/HDLC SERPL: 4.5 {RATIO} (ref 2–5)
CK SERPL-CCNC: 54 U/L (ref 20–180)
CO2 SERPL-SCNC: 26 MMOL/L (ref 23–29)
CREAT SERPL-MCNC: 0.8 MG/DL (ref 0.5–1.4)
DIFFERENTIAL METHOD: ABNORMAL
EOSINOPHIL # BLD AUTO: 0.2 K/UL (ref 0–0.5)
EOSINOPHIL NFR BLD: 3.3 % (ref 0–8)
ERYTHROCYTE [DISTWIDTH] IN BLOOD BY AUTOMATED COUNT: 13.8 % (ref 11.5–14.5)
EST. GFR  (AFRICAN AMERICAN): >60 ML/MIN/1.73 M^2
EST. GFR  (NON AFRICAN AMERICAN): >60 ML/MIN/1.73 M^2
ESTIMATED AVG GLUCOSE: 100 MG/DL (ref 68–131)
GLUCOSE SERPL-MCNC: 105 MG/DL (ref 70–110)
HBA1C MFR BLD HPLC: 5.1 % (ref 4–5.6)
HCT VFR BLD AUTO: 47.8 % (ref 37–48.5)
HDLC SERPL-MCNC: 54 MG/DL (ref 40–75)
HDLC SERPL: 22.1 % (ref 20–50)
HGB BLD-MCNC: 14.9 G/DL (ref 12–16)
IMM GRANULOCYTES # BLD AUTO: 0.02 K/UL (ref 0–0.04)
IMM GRANULOCYTES NFR BLD AUTO: 0.3 % (ref 0–0.5)
LDLC SERPL CALC-MCNC: 162.2 MG/DL (ref 63–159)
LYMPHOCYTES # BLD AUTO: 1.7 K/UL (ref 1–4.8)
LYMPHOCYTES NFR BLD: 27.5 % (ref 18–48)
MCH RBC QN AUTO: 31 PG (ref 27–31)
MCHC RBC AUTO-ENTMCNC: 31.2 G/DL (ref 32–36)
MCV RBC AUTO: 99 FL (ref 82–98)
MONOCYTES # BLD AUTO: 0.4 K/UL (ref 0.3–1)
MONOCYTES NFR BLD: 6.8 % (ref 4–15)
NEUTROPHILS # BLD AUTO: 3.9 K/UL (ref 1.8–7.7)
NEUTROPHILS NFR BLD: 61.1 % (ref 38–73)
NONHDLC SERPL-MCNC: 190 MG/DL
NRBC BLD-RTO: 0 /100 WBC
PLATELET # BLD AUTO: 271 K/UL (ref 150–350)
PMV BLD AUTO: 9.6 FL (ref 9.2–12.9)
POTASSIUM SERPL-SCNC: 4.1 MMOL/L (ref 3.5–5.1)
PROT SERPL-MCNC: 7.4 G/DL (ref 6–8.4)
PROT SERPL-MCNC: 7.4 G/DL (ref 6–8.4)
RBC # BLD AUTO: 4.81 M/UL (ref 4–5.4)
SODIUM SERPL-SCNC: 142 MMOL/L (ref 136–145)
TRIGL SERPL-MCNC: 139 MG/DL (ref 30–150)
TSH SERPL DL<=0.005 MIU/L-ACNC: 2.36 UIU/ML (ref 0.4–4)
WBC # BLD AUTO: 6.3 K/UL (ref 3.9–12.7)

## 2020-11-06 PROCEDURE — 83036 HEMOGLOBIN GLYCOSYLATED A1C: CPT | Mod: HCNC

## 2020-11-06 PROCEDURE — 80053 COMPREHEN METABOLIC PANEL: CPT | Mod: HCNC

## 2020-11-06 PROCEDURE — 82550 ASSAY OF CK (CPK): CPT | Mod: HCNC

## 2020-11-06 PROCEDURE — 36415 COLL VENOUS BLD VENIPUNCTURE: CPT | Mod: HCNC,PO

## 2020-11-06 PROCEDURE — 80076 HEPATIC FUNCTION PANEL: CPT | Mod: HCNC

## 2020-11-06 PROCEDURE — 84443 ASSAY THYROID STIM HORMONE: CPT | Mod: HCNC

## 2020-11-06 PROCEDURE — 80061 LIPID PANEL: CPT | Mod: HCNC

## 2020-11-06 PROCEDURE — 85025 COMPLETE CBC W/AUTO DIFF WBC: CPT | Mod: HCNC

## 2020-11-06 PROCEDURE — 82306 VITAMIN D 25 HYDROXY: CPT | Mod: HCNC

## 2020-11-09 ENCOUNTER — TELEPHONE (OUTPATIENT)
Dept: CARDIOLOGY | Facility: CLINIC | Age: 65
End: 2020-11-09

## 2020-11-09 NOTE — TELEPHONE ENCOUNTER
----- Message from Sally Wang MD sent at 11/9/2020 10:10 AM CST -----  Please let patient know that her labs are normal. Your liver tests are normal.

## 2020-11-11 ENCOUNTER — OFFICE VISIT (OUTPATIENT)
Dept: INTERNAL MEDICINE | Facility: CLINIC | Age: 65
End: 2020-11-11
Payer: MEDICARE

## 2020-11-11 VITALS
RESPIRATION RATE: 22 BRPM | SYSTOLIC BLOOD PRESSURE: 144 MMHG | WEIGHT: 235.25 LBS | TEMPERATURE: 98 F | DIASTOLIC BLOOD PRESSURE: 86 MMHG | HEART RATE: 88 BPM | BODY MASS INDEX: 43.29 KG/M2 | OXYGEN SATURATION: 97 % | HEIGHT: 62 IN

## 2020-11-11 DIAGNOSIS — J32.0 CHRONIC MAXILLARY SINUSITIS: ICD-10-CM

## 2020-11-11 DIAGNOSIS — E55.9 VITAMIN D INSUFFICIENCY: ICD-10-CM

## 2020-11-11 DIAGNOSIS — G89.29 CHRONIC BILATERAL LOW BACK PAIN WITH LEFT-SIDED SCIATICA: ICD-10-CM

## 2020-11-11 DIAGNOSIS — Z00.00 ANNUAL PHYSICAL EXAM: ICD-10-CM

## 2020-11-11 DIAGNOSIS — I70.0 AORTIC CALCIFICATION: ICD-10-CM

## 2020-11-11 DIAGNOSIS — M17.0 BILATERAL PRIMARY OSTEOARTHRITIS OF KNEE: ICD-10-CM

## 2020-11-11 DIAGNOSIS — M79.7 FIBROMYALGIA: ICD-10-CM

## 2020-11-11 DIAGNOSIS — E78.2 MIXED HYPERLIPIDEMIA: ICD-10-CM

## 2020-11-11 DIAGNOSIS — J45.20 MILD INTERMITTENT ASTHMA WITHOUT COMPLICATION: ICD-10-CM

## 2020-11-11 DIAGNOSIS — I10 ESSENTIAL HYPERTENSION: Primary | ICD-10-CM

## 2020-11-11 DIAGNOSIS — M62.830 MUSCLE SPASM OF BACK: ICD-10-CM

## 2020-11-11 DIAGNOSIS — M54.42 CHRONIC BILATERAL LOW BACK PAIN WITH LEFT-SIDED SCIATICA: ICD-10-CM

## 2020-11-11 DIAGNOSIS — E66.01 MORBID OBESITY WITH BMI OF 40.0-44.9, ADULT: ICD-10-CM

## 2020-11-11 DIAGNOSIS — E27.8 ADRENAL MASS, LEFT: ICD-10-CM

## 2020-11-11 DIAGNOSIS — R91.1 PULMONARY NODULE: ICD-10-CM

## 2020-11-11 DIAGNOSIS — Z87.2 HISTORY OF CHRONIC URTICARIA: ICD-10-CM

## 2020-11-11 PROBLEM — R74.01 ELEVATED ALT MEASUREMENT: Status: RESOLVED | Noted: 2018-09-24 | Resolved: 2020-11-11

## 2020-11-11 PROBLEM — Z91.89 CANDIDATE FOR STATIN THERAPY DUE TO RISK OF FUTURE CARDIOVASCULAR EVENT: Status: RESOLVED | Noted: 2017-08-28 | Resolved: 2020-11-11

## 2020-11-11 PROBLEM — M54.9 DORSALGIA, UNSPECIFIED: Status: ACTIVE | Noted: 2018-03-20

## 2020-11-11 PROBLEM — Z82.49 FAMILY HISTORY OF AORTIC ANEURYSM: Status: RESOLVED | Noted: 2019-10-21 | Resolved: 2020-11-11

## 2020-11-11 PROCEDURE — G0008 ADMIN INFLUENZA VIRUS VAC: HCPCS | Mod: HCNC,S$GLB,, | Performed by: INTERNAL MEDICINE

## 2020-11-11 PROCEDURE — 99214 OFFICE O/P EST MOD 30 MIN: CPT | Mod: HCNC,25,S$GLB, | Performed by: INTERNAL MEDICINE

## 2020-11-11 PROCEDURE — 99999 PR PBB SHADOW E&M-EST. PATIENT-LVL IV: CPT | Mod: PBBFAC,HCNC,, | Performed by: INTERNAL MEDICINE

## 2020-11-11 PROCEDURE — 3079F DIAST BP 80-89 MM HG: CPT | Mod: HCNC,CPTII,S$GLB, | Performed by: INTERNAL MEDICINE

## 2020-11-11 PROCEDURE — 99214 PR OFFICE/OUTPT VISIT, EST, LEVL IV, 30-39 MIN: ICD-10-PCS | Mod: HCNC,25,S$GLB, | Performed by: INTERNAL MEDICINE

## 2020-11-11 PROCEDURE — G0008 FLU VACCINE (QUAD) GREATER THAN OR EQUAL TO 3YO PRESERVATIVE FREE IM: ICD-10-PCS | Mod: HCNC,S$GLB,, | Performed by: INTERNAL MEDICINE

## 2020-11-11 PROCEDURE — 90686 FLU VACCINE (QUAD) GREATER THAN OR EQUAL TO 3YO PRESERVATIVE FREE IM: ICD-10-PCS | Mod: HCNC,S$GLB,, | Performed by: INTERNAL MEDICINE

## 2020-11-11 PROCEDURE — 99999 PR PBB SHADOW E&M-EST. PATIENT-LVL IV: ICD-10-PCS | Mod: PBBFAC,HCNC,, | Performed by: INTERNAL MEDICINE

## 2020-11-11 PROCEDURE — 3008F BODY MASS INDEX DOCD: CPT | Mod: HCNC,CPTII,S$GLB, | Performed by: INTERNAL MEDICINE

## 2020-11-11 PROCEDURE — 3079F PR MOST RECENT DIASTOLIC BLOOD PRESSURE 80-89 MM HG: ICD-10-PCS | Mod: HCNC,CPTII,S$GLB, | Performed by: INTERNAL MEDICINE

## 2020-11-11 PROCEDURE — 3077F SYST BP >= 140 MM HG: CPT | Mod: HCNC,CPTII,S$GLB, | Performed by: INTERNAL MEDICINE

## 2020-11-11 PROCEDURE — 90686 IIV4 VACC NO PRSV 0.5 ML IM: CPT | Mod: HCNC,S$GLB,, | Performed by: INTERNAL MEDICINE

## 2020-11-11 PROCEDURE — 3077F PR MOST RECENT SYSTOLIC BLOOD PRESSURE >= 140 MM HG: ICD-10-PCS | Mod: HCNC,CPTII,S$GLB, | Performed by: INTERNAL MEDICINE

## 2020-11-11 PROCEDURE — 3008F PR BODY MASS INDEX (BMI) DOCUMENTED: ICD-10-PCS | Mod: HCNC,CPTII,S$GLB, | Performed by: INTERNAL MEDICINE

## 2020-11-11 RX ORDER — ROSUVASTATIN CALCIUM 10 MG/1
10 TABLET, COATED ORAL DAILY
Qty: 90 TABLET | Refills: 3 | Status: SHIPPED | OUTPATIENT
Start: 2020-11-11 | End: 2021-06-16

## 2020-11-11 RX ORDER — ALBUTEROL SULFATE 90 UG/1
2 AEROSOL, METERED RESPIRATORY (INHALATION) EVERY 6 HOURS PRN
Qty: 18 G | Refills: 11 | Status: SHIPPED | OUTPATIENT
Start: 2020-11-11 | End: 2023-08-29 | Stop reason: SDUPTHER

## 2020-11-11 RX ORDER — CLOTRIMAZOLE AND BETAMETHASONE DIPROPIONATE 10; .64 MG/G; MG/G
CREAM TOPICAL 2 TIMES DAILY
COMMUNITY
End: 2020-11-11 | Stop reason: SDUPTHER

## 2020-11-11 RX ORDER — METHOCARBAMOL 500 MG/1
500 TABLET, FILM COATED ORAL 3 TIMES DAILY PRN
COMMUNITY
End: 2021-12-10 | Stop reason: SDUPTHER

## 2020-11-11 RX ORDER — LOSARTAN POTASSIUM 100 MG/1
100 TABLET ORAL DAILY
Qty: 90 TABLET | Refills: 0 | Status: SHIPPED | OUTPATIENT
Start: 2020-11-11 | End: 2021-02-18 | Stop reason: SDUPTHER

## 2020-11-11 RX ORDER — CLOTRIMAZOLE AND BETAMETHASONE DIPROPIONATE 10; .64 MG/G; MG/G
CREAM TOPICAL 2 TIMES DAILY
Qty: 45 G | Refills: 2 | Status: SHIPPED | OUTPATIENT
Start: 2020-11-11 | End: 2022-05-18 | Stop reason: SDUPTHER

## 2020-11-11 RX ORDER — HYDROCODONE BITARTRATE AND ACETAMINOPHEN 5; 325 MG/1; MG/1
1 TABLET ORAL EVERY 6 HOURS PRN
Qty: 90 TABLET | Refills: 0 | Status: SHIPPED | OUTPATIENT
Start: 2020-11-11 | End: 2021-03-10 | Stop reason: SDUPTHER

## 2020-11-11 NOTE — PROGRESS NOTES
Subjective:      Kaleigh Solo is a 65 y.o. female who presents for annual exam.    HTN- BP is elevated today and she does not check her BG at home. Medications were recently changed and she takes losartan and amlodipine. She denies chest pain, no palpitations, no shortness of breath, no headaches, no leg swelling.    Asthma- she reports mild SOB today and it usually occurs with weather changes. She also has symptoms of sinusitis that are lingering. She denies cough, no sputum production, no fever and no chills.    Chronic low back pain- reports continued improvement after placement of spine stimulator in 2018. She uses muscle relaxants for episodes of muscle spasms of the back and Norco as needed for back pain.       Family History:  family history includes Aneurysm in her father; Cancer in her mother; Heart disease in her father.    Health Maintenance:  Health Maintenance       Date Due Completion Date    HIV Screening 1972 ---    Aspirin/Antiplatelet Therapy 1975 ---    LDCT Lung Screen 2018    Influenza Vaccine (1) 2020 10/21/2019    Mammogram 2020    Colorectal Cancer Screening 2021    Cervical Cancer Screening 2021 (Done)    Override on 2018: Done (Dr. Elke Cunha)    Lipid Panel 2021    TETANUS VACCINE 10/22/2028 10/22/2018        Eye exam: due  Dental Exam: every 6 months  MM, patient would like repeat every 2 years  OB/GYN: Dr. Cunha  Last Pap: 2018  FitKit 20  Influenza: due  Tetanus: 2018  Shingrix done    Body mass index is 43.02 kg/m².    Meds:   Current Outpatient Medications:     albuterol (PROAIR HFA) 90 mcg/actuation inhaler, Inhale 2 puffs into the lungs every 6 (six) hours as needed for Wheezing or Shortness of Breath., Disp: 18 g, Rfl: 11    amLODIPine (NORVASC) 5 MG tablet, TAKE 1 TABLET BY MOUTH ONCE DAILY, Disp: 90 tablet, Rfl: 3    CALCIUM-MAG OXIDE-VITAMIN D3 ORAL,  Take by mouth., Disp: , Rfl:     cetirizine (ZYRTEC) 10 MG tablet, Take 10 mg by mouth daily as needed. , Disp: , Rfl:     clotrimazole-betamethasone 1-0.05% (LOTRISONE) cream, Apply topically 2 (two) times daily., Disp: 45 g, Rfl: 2    diphenhydrAMINE (BENADRYL) 50 MG tablet, Take 50 mg by mouth nightly as needed for Itching., Disp: , Rfl:     ergocalciferol (ERGOCALCIFEROL) 50,000 unit Cap, TAKE 1 CAPSULE BY MOUTH EVERY 7 DAYS, Disp: 12 capsule, Rfl: 0    famotidine (PEPCID) 20 MG tablet, Take 20 mg by mouth 2 (two) times daily as needed. , Disp: , Rfl:     folic acid (FOLVITE) 800 MCG Tab, Take 400 mcg by mouth once daily., Disp: , Rfl:     furosemide (LASIX) 20 MG tablet, Take 1 tablet (20 mg total) by mouth as needed (leg swelling)., Disp: 30 tablet, Rfl: 3    HYDROcodone-acetaminophen (NORCO) 5-325 mg per tablet, Take 1 tablet by mouth every 6 (six) hours as needed for Pain., Disp: 90 tablet, Rfl: 0    hydrOXYzine HCl (ATARAX) 25 MG tablet, Take 1 tablet (25 mg total) by mouth 3 (three) times daily. As needed for itching, Disp: 60 tablet, Rfl: 2    losartan (COZAAR) 100 MG tablet, Take 1 tablet (100 mg total) by mouth once daily., Disp: 90 tablet, Rfl: 0    methocarbamoL (ROBAXIN) 500 MG Tab, Take 500 mg by mouth 3 (three) times daily as needed., Disp: , Rfl:     simethicone (PHAZYME) 250 mg Cap, Take 500 mg by mouth 2 (two) times daily., Disp: , Rfl:     rosuvastatin (CRESTOR) 10 MG tablet, Take 1 tablet (10 mg total) by mouth once daily., Disp: 90 tablet, Rfl: 3    PMHx:   Past Medical History:   Diagnosis Date    Arthritis     Asthma     Cervical radiculopathy 10/14/2013    Cervicalgia     2013 tx by chiropractor    Closed dislocation of acromioclavicular joint 2013    Degenerative disc disease     Fatty liver     Fibromyalgia     GERD (gastroesophageal reflux disease)     HPV (human papilloma virus) anogenital infection     conization in past-remote history-1990    HTN  (hypertension)     Sciatica     Tobacco use     Ulcer 2007    stomach ulcer       PSHx:     Past Surgical History:   Procedure Laterality Date    acdf  4/2014    C5-6    BACK SURGERY      CERVICAL    CERVICAL CONIZATION   W/ LASER      KNEE ARTHROSCOPY      right shoulder surger      arthroscopic surgery Dec 2013    TRIAL OF SPINAL CORD NERVE STIMULATOR N/A 9/25/2018    Procedure: TRIAL, NEUROSTIMULATOR, SPINAL CORD;  Surgeon: Candice Lopez MD;  [facility];  Service: Pain Management;  Laterality: N/A;  SCS Trial  P[employer] Hankins notified of date and time       SocHx:   Social History     Socioeconomic History    Marital status:      Spouse name: Not on file    Number of children: Not on file    Years of education: Not on file    Highest education level: Not on file   Occupational History    Not on file   Social Needs    Financial resource strain: Not hard at all    Food insecurity     Worry: Never true     Inability: Never true    Transportation needs     Medical: No     Non-medical: No   Tobacco Use    Smoking status: Current Every Day Smoker     Packs/day: 1.00     Years: 48.00     Pack years: 48.00     Types: Cigarettes    Smokeless tobacco: Never Used   Substance and Sexual Activity    Alcohol use: Yes     Frequency: 2-4 times a month     Drinks per session: 3 or 4     Binge frequency: Never     Comment: occasional    Drug use: No    Sexual activity: Not on file   Lifestyle    Physical activity     Days per week: 0 days     Minutes per session: 0 min    Stress: Only a little   Relationships    Social connections     Talks on phone: More than three times a week     Gets together: Never     Attends Religion service: Not on file     Active member of club or organization: No     Attends meetings of clubs or organizations: Never     Relationship status:    Other Topics Concern    Are you pregnant or think you may be? Not Asked    Breast-feeding Not Asked    Social History Narrative    , not working, 3 children (1 passed at 9 mo old), tobacco daily, ETOH socially, GYN 2013 [facility]       Review of Systems   Constitutional: Positive for activity change. Negative for chills, diaphoresis, fatigue, fever and unexpected weight change.   HENT: Positive for congestion, hearing loss, rhinorrhea and sinus pain (right maxillary pain). Negative for dental problem, ear discharge, ear pain, postnasal drip, sore throat and trouble swallowing.    Eyes: Negative for discharge, redness and visual disturbance.   Respiratory: Positive for shortness of breath. Negative for cough, chest tightness and wheezing.    Cardiovascular: Negative for chest pain, palpitations and leg swelling.   Gastrointestinal: Negative for abdominal pain, blood in stool, constipation, diarrhea, nausea and vomiting.   Endocrine: Negative for polydipsia and polyuria.   Genitourinary: Negative for difficulty urinating, dysuria, hematuria, menstrual problem and urgency.   Musculoskeletal: Positive for arthralgias, back pain, joint swelling, myalgias (worsened when Lipitor was increased from 40mg to 80mg and patient stopped taking it months ago) and neck pain.   Skin: Negative for color change and rash.   Allergic/Immunologic: Positive for environmental allergies and food allergies.   Neurological: Positive for numbness (episode of numbness of the right leg but it resolved) and headaches. Negative for dizziness and weakness.   Hematological: Negative for adenopathy.   Psychiatric/Behavioral: Negative for confusion and dysphoric mood. The patient is not nervous/anxious.        Objective:      Physical Exam  Vitals signs reviewed.   Constitutional:       General: She is not in acute distress.     Appearance: She is well-developed. She is not diaphoretic.   HENT:      Head: Normocephalic and atraumatic.      Right Ear: Hearing, tympanic membrane, ear canal and external ear normal. Tympanic membrane is not  erythematous or bulging.      Left Ear: Hearing, tympanic membrane, ear canal and external ear normal. Tympanic membrane is not erythematous or bulging.      Nose: Nose normal.      Mouth/Throat:      Pharynx: Uvula midline. No oropharyngeal exudate or posterior oropharyngeal erythema.   Eyes:      General: Lids are normal. No scleral icterus.     Conjunctiva/sclera: Conjunctivae normal.      Pupils: Pupils are equal, round, and reactive to light.   Neck:      Musculoskeletal: Normal range of motion and neck supple.      Thyroid: No thyroid mass or thyromegaly.   Cardiovascular:      Rate and Rhythm: Normal rate and regular rhythm.      Heart sounds: Normal heart sounds. No murmur.   Pulmonary:      Effort: Pulmonary effort is normal.      Breath sounds: Normal breath sounds. No wheezing.   Abdominal:      General: Bowel sounds are normal. There is no distension.      Palpations: Abdomen is soft. Abdomen is not rigid.      Tenderness: There is no abdominal tenderness. There is no guarding or rebound.   Musculoskeletal: Normal range of motion.   Lymphadenopathy:      Cervical: No cervical adenopathy.      Upper Body:      Right upper body: No supraclavicular adenopathy.      Left upper body: No supraclavicular adenopathy.   Skin:     General: Skin is warm and dry.      Findings: No rash.   Neurological:      Mental Status: She is alert and oriented to person, place, and time.      Coordination: Coordination normal.      Gait: Gait normal.      Deep Tendon Reflexes: Reflexes are normal and symmetric.         Assessment:       1. Essential hypertension    2. Fibromyalgia    3. Mixed hyperlipidemia    4. Chronic bilateral low back pain with left-sided sciatica    5. Bilateral primary osteoarthritis of knee    6. Muscle spasm of back    7. Mild intermittent asthma without complication    8. Chronic maxillary sinusitis    9. Aortic calcification    10. Vitamin D insufficiency    11. Adrenal mass, left    12. Pulmonary  nodule    13. Annual physical exam    14. Morbid obesity with BMI of 40.0-44.9, adult    15. History of chronic urticaria        Plan:         1. Essential hypertension  - BP is elevated, will increase losartan to 100mg daily  - losartan (COZAAR) 100 MG tablet; Take 1 tablet (100 mg total) by mouth once daily.  Dispense: 90 tablet; Refill: 0  - continue HCTZ and may use lasix as needed    2. Fibromyalgia  - stable, continue to monitor closely    3. Mixed hyperlipidemia  - patient stopped Lipitor due to muscle pain, will change to Crestor 5mg daily and increase as tolerated  - rosuvastatin (CRESTOR) 10 MG tablet; Take 1 tablet (10 mg total) by mouth once daily.  Dispense: 90 tablet; Refill: 3  - repeat lipid panel and CMP in 3 months (Feb 2021)    4. Chronic bilateral low back pain with left-sided sciatica  - stable, has spine stimulator in place, continue Norco as needed, reviewed [place]   - HYDROcodone-acetaminophen (NORCO) 5-325 mg per tablet; Take 1 tablet by mouth every 6 (six) hours as needed for Pain.  Dispense: 90 tablet; Refill: 0    5. Bilateral primary osteoarthritis of knee  - HYDROcodone-acetaminophen (NORCO) 5-325 mg per tablet; Take 1 tablet by mouth every 6 (six) hours as needed for Pain.  Dispense: 90 tablet; Refill: 0    6. Muscle spasm of back  - methocarbamoL (ROBAXIN) 500 MG Tab; Take 500 mg by mouth 3 (three) times daily as needed.    7. Mild intermittent asthma without complication  - albuterol (PROAIR HFA) 90 mcg/actuation inhaler; Inhale 2 puffs into the lungs every 6 (six) hours as needed for Wheezing or Shortness of Breath.  Dispense: 18 g; Refill: 11    8. Chronic maxillary sinusitis  - patient plans to see her ENT physician    9. Aortic calcification  - will control HTN and treat HLD  - rosuvastatin (CRESTOR) 10 MG tablet; Take 1 tablet (10 mg total) by mouth once daily.  Dispense: 90 tablet; Refill: 3    10. Vitamin D insufficiency  - patient tor resume Vitamin D 50,000 IU weekly as  ordered    11. Adrenal mass, left  - seen incidentally on imaging in 2017, monitor periodically    12. Pulmonary nodule  - she is overdue for screening CT, will arrange in a few months    13. Annual physical exam  - reviewed recent labs with patient  - flu shot now    14. Morbid obesity with BMI of 40.0-44.9, adult  - weight is stable, exercise ability is limited due to chronic pain but could improve with a little physical activity, possibly chair exercises  - will discuss further with patient the next appointment    15. History of chronic urticaria  - stable, controlled, continue Zyrtec with hydroxyzine    RTC in 1 month for follow-up or sooner if needed    Felicity Middleton MD  Internal Medicine, [facility]

## 2020-12-11 ENCOUNTER — PATIENT MESSAGE (OUTPATIENT)
Dept: OTHER | Facility: OTHER | Age: 65
End: 2020-12-11

## 2020-12-16 ENCOUNTER — OFFICE VISIT (OUTPATIENT)
Dept: INTERNAL MEDICINE | Facility: CLINIC | Age: 65
End: 2020-12-16
Payer: MEDICARE

## 2020-12-16 VITALS
TEMPERATURE: 98 F | HEART RATE: 72 BPM | SYSTOLIC BLOOD PRESSURE: 112 MMHG | OXYGEN SATURATION: 97 % | RESPIRATION RATE: 18 BRPM | DIASTOLIC BLOOD PRESSURE: 76 MMHG | HEIGHT: 62 IN | WEIGHT: 234.38 LBS | BODY MASS INDEX: 43.13 KG/M2

## 2020-12-16 DIAGNOSIS — E78.2 MIXED HYPERLIPIDEMIA: ICD-10-CM

## 2020-12-16 DIAGNOSIS — I10 ESSENTIAL HYPERTENSION: Primary | ICD-10-CM

## 2020-12-16 PROCEDURE — 1125F AMNT PAIN NOTED PAIN PRSNT: CPT | Mod: HCNC,S$GLB,, | Performed by: INTERNAL MEDICINE

## 2020-12-16 PROCEDURE — 3008F PR BODY MASS INDEX (BMI) DOCUMENTED: ICD-10-PCS | Mod: HCNC,CPTII,S$GLB, | Performed by: INTERNAL MEDICINE

## 2020-12-16 PROCEDURE — 99499 UNLISTED E&M SERVICE: CPT | Mod: S$GLB,,, | Performed by: INTERNAL MEDICINE

## 2020-12-16 PROCEDURE — 99213 PR OFFICE/OUTPT VISIT, EST, LEVL III, 20-29 MIN: ICD-10-PCS | Mod: HCNC,S$GLB,, | Performed by: INTERNAL MEDICINE

## 2020-12-16 PROCEDURE — 3074F PR MOST RECENT SYSTOLIC BLOOD PRESSURE < 130 MM HG: ICD-10-PCS | Mod: HCNC,CPTII,S$GLB, | Performed by: INTERNAL MEDICINE

## 2020-12-16 PROCEDURE — 99999 PR PBB SHADOW E&M-EST. PATIENT-LVL IV: CPT | Mod: PBBFAC,HCNC,, | Performed by: INTERNAL MEDICINE

## 2020-12-16 PROCEDURE — 3008F BODY MASS INDEX DOCD: CPT | Mod: HCNC,CPTII,S$GLB, | Performed by: INTERNAL MEDICINE

## 2020-12-16 PROCEDURE — 3078F DIAST BP <80 MM HG: CPT | Mod: HCNC,CPTII,S$GLB, | Performed by: INTERNAL MEDICINE

## 2020-12-16 PROCEDURE — 3074F SYST BP LT 130 MM HG: CPT | Mod: HCNC,CPTII,S$GLB, | Performed by: INTERNAL MEDICINE

## 2020-12-16 PROCEDURE — 99999 PR PBB SHADOW E&M-EST. PATIENT-LVL IV: ICD-10-PCS | Mod: PBBFAC,HCNC,, | Performed by: INTERNAL MEDICINE

## 2020-12-16 PROCEDURE — 99499 RISK ADDL DX/OHS AUDIT: ICD-10-PCS | Mod: S$GLB,,, | Performed by: INTERNAL MEDICINE

## 2020-12-16 PROCEDURE — 99213 OFFICE O/P EST LOW 20 MIN: CPT | Mod: HCNC,S$GLB,, | Performed by: INTERNAL MEDICINE

## 2020-12-16 PROCEDURE — 3078F PR MOST RECENT DIASTOLIC BLOOD PRESSURE < 80 MM HG: ICD-10-PCS | Mod: HCNC,CPTII,S$GLB, | Performed by: INTERNAL MEDICINE

## 2020-12-16 PROCEDURE — 1125F PR PAIN SEVERITY QUANTIFIED, PAIN PRESENT: ICD-10-PCS | Mod: HCNC,S$GLB,, | Performed by: INTERNAL MEDICINE

## 2020-12-16 NOTE — PROGRESS NOTES
Subjective:       Patient ID: Kaleigh Solo is a 65 y.o. female who presents for Hypertension (f/u) and Hyperlipidemia      Hypertension  This is a chronic problem. The current episode started more than 1 year ago. The problem has been gradually improving since onset. The problem is controlled. Pertinent negatives include no blurred vision, chest pain, headaches, palpitations or shortness of breath. Risk factors for coronary artery disease include diabetes mellitus, dyslipidemia, obesity, sedentary lifestyle and stress. Past treatments include diuretics, calcium channel blockers and angiotensin blockers. The current treatment provides moderate improvement. Compliance problems include exercise.  There is no history of heart failure. There is no history of a hypertension causing med.   Hyperlipidemia  This is a chronic problem. The current episode started more than 1 year ago. The problem is uncontrolled. Recent lipid tests were reviewed and are high. Exacerbating diseases include diabetes and hypothyroidism. Pertinent negatives include no chest pain, myalgias or shortness of breath. Current antihyperlipidemic treatment includes statins. Compliance problems include adherence to exercise.  Risk factors for coronary artery disease include diabetes mellitus, dyslipidemia, hypertension, obesity and a sedentary lifestyle.        Review of Systems   Constitutional: Negative for chills, diaphoresis and fever.   HENT: Negative for congestion and sinus pressure.    Eyes: Negative for blurred vision.   Respiratory: Negative for cough, chest tightness and shortness of breath.    Cardiovascular: Positive for leg swelling (uses Lasix as needed). Negative for chest pain and palpitations.   Gastrointestinal: Negative for abdominal pain, constipation, diarrhea, nausea and vomiting.   Musculoskeletal: Positive for back pain. Negative for arthralgias and myalgias.   Skin: Positive for rash. Negative for wound.    Allergic/Immunologic: Positive for environmental allergies and food allergies.   Neurological: Negative for dizziness, tremors and headaches.       Objective:      Physical Exam  Vitals signs reviewed.   Constitutional:       General: She is not in acute distress.     Appearance: She is well-developed. She is not diaphoretic.   HENT:      Head: Normocephalic and atraumatic.      Right Ear: Hearing and external ear normal.      Left Ear: Hearing and external ear normal.      Nose: Nose normal.   Eyes:      General: Lids are normal.      Conjunctiva/sclera: Conjunctivae normal.   Cardiovascular:      Rate and Rhythm: Normal rate and regular rhythm.      Heart sounds: Normal heart sounds. No murmur.   Pulmonary:      Effort: Pulmonary effort is normal.      Breath sounds: Normal breath sounds. No wheezing.   Abdominal:      General: Bowel sounds are normal. There is no distension.      Palpations: Abdomen is soft.      Tenderness: There is no abdominal tenderness.   Musculoskeletal: Normal range of motion.   Skin:     General: Skin is warm and dry.      Findings: No rash.   Neurological:      Mental Status: She is alert and oriented to person, place, and time.   Psychiatric:         Attention and Perception: Attention normal.         Mood and Affect: Mood normal.         Assessment/Plan:         1. Essential hypertension  - blood pressure is well controlled after recent increase in losartan dose  - continue amlodipine and losartan    2. Mixed hyperlipidemia  - patient is tolerating the higher dose of Crestor.  Continue  - repeat lipid panel in February 2021    RTC in 6 months for follow-up or sooner if needed    Felicity Middleton MD  Internal Medicine, [facility]

## 2020-12-18 ENCOUNTER — TELEPHONE (OUTPATIENT)
Dept: RESEARCH | Facility: OTHER | Age: 65
End: 2020-12-18

## 2020-12-18 NOTE — TELEPHONE ENCOUNTER
Kaleigh Solo was contacted in regard to a research survey questionnaire (IRB #2020.065) though she did not answer. This is the 2nd contact attempt in regard to this study. I have left a voice message with contact information and study information. Will plan to follow up on Wednesday, Dec 23, 2020.

## 2020-12-23 ENCOUNTER — TELEPHONE (OUTPATIENT)
Dept: RESEARCH | Facility: OTHER | Age: 65
End: 2020-12-23

## 2020-12-23 NOTE — TELEPHONE ENCOUNTER
Kaleigh Solo was contacted in regard to a research survey questionnaire (IRB #2020.065) though she did not answer. This is the 3rd contact attempt in regard to this study. I have left a voice message with contact information and study information. This is the 3rd and final call so she will be removed from the call list.

## 2021-01-20 ENCOUNTER — LAB VISIT (OUTPATIENT)
Dept: LAB | Facility: HOSPITAL | Age: 66
End: 2021-01-20
Attending: ALLERGY & IMMUNOLOGY
Payer: MEDICARE

## 2021-01-20 DIAGNOSIS — L50.1 IDIOPATHIC URTICARIA: Primary | ICD-10-CM

## 2021-01-20 DIAGNOSIS — T78.1XXA ADVERSE FOOD REACTION: ICD-10-CM

## 2021-01-20 LAB
ERYTHROCYTE [SEDIMENTATION RATE] IN BLOOD BY WESTERGREN METHOD: 13 MM/HR (ref 0–36)
THYROPEROXIDASE IGG SERPL-ACNC: <6 IU/ML
TSH SERPL DL<=0.005 MIU/L-ACNC: 2.13 UIU/ML (ref 0.4–4)

## 2021-01-20 PROCEDURE — 84443 ASSAY THYROID STIM HORMONE: CPT | Mod: GA

## 2021-01-20 PROCEDURE — 86003 ALLG SPEC IGE CRUDE XTRC EA: CPT | Mod: 59

## 2021-01-20 PROCEDURE — 86376 MICROSOMAL ANTIBODY EACH: CPT

## 2021-01-20 PROCEDURE — 85652 RBC SED RATE AUTOMATED: CPT

## 2021-01-20 PROCEDURE — 86038 ANTINUCLEAR ANTIBODIES: CPT

## 2021-01-21 LAB — ANA SER QL IF: NORMAL

## 2021-01-23 LAB
BLACK PEPPER IGE QN: <0.1 KU/L
CHILI PEPPER IGE QN: <0.1 KU/L
CRAB IGE QN: 0.19 KU/L
CRAWFISH IGE QN: 0.43 KU/L
DEPRECATED BLACK PEPPER IGE RAST QL: NORMAL
DEPRECATED CHILI PEPPER IGE RAST QL: NORMAL
DEPRECATED CRAB IGE RAST QL: ABNORMAL
DEPRECATED CRAWFISH IGE RAST QL: ABNORMAL
DEPRECATED EGG WHITE IGE RAST QL: NORMAL
DEPRECATED LOBSTER IGE RAST QL: NORMAL
DEPRECATED OYSTER IGE RAST QL: NORMAL
DEPRECATED PAPRIKA IGE RAST QL: NORMAL
DEPRECATED PORK IGE RAST QL: NORMAL
DEPRECATED SHRIMP IGE RAST QL: ABNORMAL
EGG WHITE IGE QN: <0.1 KU/L
LOBSTER IGE QN: <0.1 KU/L
OYSTER IGE QN: <0.1 KU/L
PAPRIKA IGE QN: <0.1 KU/L
PORK IGE QN: <0.1 KU/L
SHRIMP IGE QN: 0.32 KU/L

## 2021-01-26 LAB
ALLERGEN NAME: NORMAL
ALLERGEN RESULT: NORMAL

## 2021-02-18 DIAGNOSIS — I10 ESSENTIAL HYPERTENSION: ICD-10-CM

## 2021-02-18 RX ORDER — LOSARTAN POTASSIUM 100 MG/1
100 TABLET ORAL DAILY
Qty: 90 TABLET | Refills: 3 | Status: SHIPPED | OUTPATIENT
Start: 2021-02-18 | End: 2021-10-08 | Stop reason: SDUPTHER

## 2021-02-19 ENCOUNTER — TELEPHONE (OUTPATIENT)
Dept: INTERNAL MEDICINE | Facility: CLINIC | Age: 66
End: 2021-02-19

## 2021-02-26 ENCOUNTER — PES CALL (OUTPATIENT)
Dept: ADMINISTRATIVE | Facility: CLINIC | Age: 66
End: 2021-02-26

## 2021-03-03 ENCOUNTER — IMMUNIZATION (OUTPATIENT)
Dept: PHARMACY | Facility: CLINIC | Age: 66
End: 2021-03-03
Payer: MEDICARE

## 2021-03-03 DIAGNOSIS — Z23 NEED FOR VACCINATION: Primary | ICD-10-CM

## 2021-03-31 ENCOUNTER — IMMUNIZATION (OUTPATIENT)
Dept: PHARMACY | Facility: CLINIC | Age: 66
End: 2021-03-31
Payer: MEDICARE

## 2021-03-31 DIAGNOSIS — Z23 NEED FOR VACCINATION: Primary | ICD-10-CM

## 2021-04-05 ENCOUNTER — LAB VISIT (OUTPATIENT)
Dept: LAB | Facility: HOSPITAL | Age: 66
End: 2021-04-05
Attending: HOSPITALIST
Payer: MEDICARE

## 2021-04-05 ENCOUNTER — OFFICE VISIT (OUTPATIENT)
Dept: INTERNAL MEDICINE | Facility: CLINIC | Age: 66
End: 2021-04-05
Payer: MEDICARE

## 2021-04-05 VITALS
HEART RATE: 98 BPM | OXYGEN SATURATION: 97 % | WEIGHT: 234.56 LBS | TEMPERATURE: 98 F | BODY MASS INDEX: 43.16 KG/M2 | SYSTOLIC BLOOD PRESSURE: 120 MMHG | DIASTOLIC BLOOD PRESSURE: 82 MMHG | HEIGHT: 62 IN

## 2021-04-05 DIAGNOSIS — R10.10 PAIN OF UPPER ABDOMEN: ICD-10-CM

## 2021-04-05 DIAGNOSIS — R19.7 DIARRHEA, UNSPECIFIED TYPE: Primary | ICD-10-CM

## 2021-04-05 DIAGNOSIS — R19.7 DIARRHEA, UNSPECIFIED TYPE: ICD-10-CM

## 2021-04-05 LAB
BASOPHILS # BLD AUTO: 0.05 K/UL (ref 0–0.2)
BASOPHILS NFR BLD: 0.6 % (ref 0–1.9)
DIFFERENTIAL METHOD: ABNORMAL
EOSINOPHIL # BLD AUTO: 0.2 K/UL (ref 0–0.5)
EOSINOPHIL NFR BLD: 2.3 % (ref 0–8)
ERYTHROCYTE [DISTWIDTH] IN BLOOD BY AUTOMATED COUNT: 13.8 % (ref 11.5–14.5)
HCT VFR BLD AUTO: 45.6 % (ref 37–48.5)
HGB BLD-MCNC: 15 G/DL (ref 12–16)
IMM GRANULOCYTES # BLD AUTO: 0.02 K/UL (ref 0–0.04)
IMM GRANULOCYTES NFR BLD AUTO: 0.2 % (ref 0–0.5)
LYMPHOCYTES # BLD AUTO: 2.2 K/UL (ref 1–4.8)
LYMPHOCYTES NFR BLD: 25.8 % (ref 18–48)
MCH RBC QN AUTO: 31.3 PG (ref 27–31)
MCHC RBC AUTO-ENTMCNC: 32.9 G/DL (ref 32–36)
MCV RBC AUTO: 95 FL (ref 82–98)
MONOCYTES # BLD AUTO: 0.6 K/UL (ref 0.3–1)
MONOCYTES NFR BLD: 6.7 % (ref 4–15)
NEUTROPHILS # BLD AUTO: 5.6 K/UL (ref 1.8–7.7)
NEUTROPHILS NFR BLD: 64.4 % (ref 38–73)
NRBC BLD-RTO: 0 /100 WBC
PLATELET # BLD AUTO: 244 K/UL (ref 150–450)
PMV BLD AUTO: 9.2 FL (ref 9.2–12.9)
RBC # BLD AUTO: 4.79 M/UL (ref 4–5.4)
WBC # BLD AUTO: 8.69 K/UL (ref 3.9–12.7)

## 2021-04-05 PROCEDURE — 3008F PR BODY MASS INDEX (BMI) DOCUMENTED: ICD-10-PCS | Mod: CPTII,S$GLB,, | Performed by: HOSPITALIST

## 2021-04-05 PROCEDURE — 86677 HELICOBACTER PYLORI ANTIBODY: CPT | Performed by: HOSPITALIST

## 2021-04-05 PROCEDURE — 36415 COLL VENOUS BLD VENIPUNCTURE: CPT | Mod: PO | Performed by: HOSPITALIST

## 2021-04-05 PROCEDURE — 3079F DIAST BP 80-89 MM HG: CPT | Mod: CPTII,S$GLB,, | Performed by: HOSPITALIST

## 2021-04-05 PROCEDURE — 1125F AMNT PAIN NOTED PAIN PRSNT: CPT | Mod: S$GLB,,, | Performed by: HOSPITALIST

## 2021-04-05 PROCEDURE — 3074F PR MOST RECENT SYSTOLIC BLOOD PRESSURE < 130 MM HG: ICD-10-PCS | Mod: CPTII,S$GLB,, | Performed by: HOSPITALIST

## 2021-04-05 PROCEDURE — 3074F SYST BP LT 130 MM HG: CPT | Mod: CPTII,S$GLB,, | Performed by: HOSPITALIST

## 2021-04-05 PROCEDURE — 3008F BODY MASS INDEX DOCD: CPT | Mod: CPTII,S$GLB,, | Performed by: HOSPITALIST

## 2021-04-05 PROCEDURE — 1125F PR PAIN SEVERITY QUANTIFIED, PAIN PRESENT: ICD-10-PCS | Mod: S$GLB,,, | Performed by: HOSPITALIST

## 2021-04-05 PROCEDURE — 3079F PR MOST RECENT DIASTOLIC BLOOD PRESSURE 80-89 MM HG: ICD-10-PCS | Mod: CPTII,S$GLB,, | Performed by: HOSPITALIST

## 2021-04-05 PROCEDURE — 99214 OFFICE O/P EST MOD 30 MIN: CPT | Mod: S$GLB,,, | Performed by: HOSPITALIST

## 2021-04-05 PROCEDURE — 99999 PR PBB SHADOW E&M-EST. PATIENT-LVL V: CPT | Mod: PBBFAC,,, | Performed by: HOSPITALIST

## 2021-04-05 PROCEDURE — 99214 PR OFFICE/OUTPT VISIT, EST, LEVL IV, 30-39 MIN: ICD-10-PCS | Mod: S$GLB,,, | Performed by: HOSPITALIST

## 2021-04-05 PROCEDURE — 85025 COMPLETE CBC W/AUTO DIFF WBC: CPT | Performed by: HOSPITALIST

## 2021-04-05 PROCEDURE — 80053 COMPREHEN METABOLIC PANEL: CPT | Performed by: HOSPITALIST

## 2021-04-05 PROCEDURE — 99999 PR PBB SHADOW E&M-EST. PATIENT-LVL V: ICD-10-PCS | Mod: PBBFAC,,, | Performed by: HOSPITALIST

## 2021-04-05 PROCEDURE — 83690 ASSAY OF LIPASE: CPT | Performed by: HOSPITALIST

## 2021-04-05 RX ORDER — TRIAMCINOLONE ACETONIDE 5 MG/G
CREAM TOPICAL
COMMUNITY
Start: 2021-02-17 | End: 2023-04-14

## 2021-04-05 RX ORDER — OMALIZUMAB 150 MG/ML
INJECTION, SOLUTION SUBCUTANEOUS
COMMUNITY
Start: 2021-02-25 | End: 2021-11-15

## 2021-04-06 ENCOUNTER — LAB VISIT (OUTPATIENT)
Dept: LAB | Facility: HOSPITAL | Age: 66
End: 2021-04-06
Attending: HOSPITALIST
Payer: MEDICARE

## 2021-04-06 DIAGNOSIS — R19.7 DIARRHEA, UNSPECIFIED TYPE: ICD-10-CM

## 2021-04-06 DIAGNOSIS — R10.10 PAIN OF UPPER ABDOMEN: ICD-10-CM

## 2021-04-06 LAB
ALBUMIN SERPL BCP-MCNC: 3.9 G/DL (ref 3.5–5.2)
ALP SERPL-CCNC: 98 U/L (ref 55–135)
ALT SERPL W/O P-5'-P-CCNC: 31 U/L (ref 10–44)
ANION GAP SERPL CALC-SCNC: 12 MMOL/L (ref 8–16)
AST SERPL-CCNC: 22 U/L (ref 10–40)
BILIRUB SERPL-MCNC: 0.5 MG/DL (ref 0.1–1)
BUN SERPL-MCNC: 8 MG/DL (ref 8–23)
CALCIUM SERPL-MCNC: 9.3 MG/DL (ref 8.7–10.5)
CHLORIDE SERPL-SCNC: 103 MMOL/L (ref 95–110)
CO2 SERPL-SCNC: 23 MMOL/L (ref 23–29)
CREAT SERPL-MCNC: 0.9 MG/DL (ref 0.5–1.4)
EST. GFR  (AFRICAN AMERICAN): >60 ML/MIN/1.73 M^2
EST. GFR  (NON AFRICAN AMERICAN): >60 ML/MIN/1.73 M^2
GLUCOSE SERPL-MCNC: 100 MG/DL (ref 70–110)
LIPASE SERPL-CCNC: 11 U/L (ref 4–60)
POTASSIUM SERPL-SCNC: 4.1 MMOL/L (ref 3.5–5.1)
PROT SERPL-MCNC: 7.2 G/DL (ref 6–8.4)
SODIUM SERPL-SCNC: 138 MMOL/L (ref 136–145)

## 2021-04-06 PROCEDURE — 87338 HPYLORI STOOL AG IA: CPT | Performed by: HOSPITALIST

## 2021-04-07 LAB — H PYLORI IGG SERPL QL IA: NEGATIVE

## 2021-04-11 LAB — H PYLORI AG STL QL IA: NOT DETECTED

## 2021-04-29 ENCOUNTER — OFFICE VISIT (OUTPATIENT)
Dept: OTOLARYNGOLOGY | Facility: CLINIC | Age: 66
End: 2021-04-29
Payer: MEDICARE

## 2021-04-29 VITALS
TEMPERATURE: 94 F | HEART RATE: 94 BPM | DIASTOLIC BLOOD PRESSURE: 63 MMHG | WEIGHT: 238.44 LBS | SYSTOLIC BLOOD PRESSURE: 133 MMHG | BODY MASS INDEX: 43.61 KG/M2

## 2021-04-29 DIAGNOSIS — J30.9 ALLERGIC RHINITIS, UNSPECIFIED SEASONALITY, UNSPECIFIED TRIGGER: ICD-10-CM

## 2021-04-29 DIAGNOSIS — H93.13 TINNITUS OF BOTH EARS: ICD-10-CM

## 2021-04-29 DIAGNOSIS — H91.91 HEARING LOSS OF RIGHT EAR, UNSPECIFIED HEARING LOSS TYPE: Primary | ICD-10-CM

## 2021-04-29 DIAGNOSIS — J34.2 NASAL SEPTAL DEVIATION: ICD-10-CM

## 2021-04-29 PROCEDURE — 3008F PR BODY MASS INDEX (BMI) DOCUMENTED: ICD-10-PCS | Mod: CPTII,S$GLB,, | Performed by: SPECIALIST

## 2021-04-29 PROCEDURE — 99999 PR PBB SHADOW E&M-EST. PATIENT-LVL IV: ICD-10-PCS | Mod: PBBFAC,,, | Performed by: SPECIALIST

## 2021-04-29 PROCEDURE — 99999 PR PBB SHADOW E&M-EST. PATIENT-LVL IV: CPT | Mod: PBBFAC,,, | Performed by: SPECIALIST

## 2021-04-29 PROCEDURE — 3008F BODY MASS INDEX DOCD: CPT | Mod: CPTII,S$GLB,, | Performed by: SPECIALIST

## 2021-04-29 PROCEDURE — 99204 PR OFFICE/OUTPT VISIT, NEW, LEVL IV, 45-59 MIN: ICD-10-PCS | Mod: S$GLB,,, | Performed by: SPECIALIST

## 2021-04-29 PROCEDURE — 99204 OFFICE O/P NEW MOD 45 MIN: CPT | Mod: S$GLB,,, | Performed by: SPECIALIST

## 2021-05-13 ENCOUNTER — CLINICAL SUPPORT (OUTPATIENT)
Dept: AUDIOLOGY | Facility: CLINIC | Age: 66
End: 2021-05-13
Payer: MEDICARE

## 2021-05-13 ENCOUNTER — OFFICE VISIT (OUTPATIENT)
Dept: OTOLARYNGOLOGY | Facility: CLINIC | Age: 66
End: 2021-05-13
Payer: MEDICARE

## 2021-05-13 VITALS
BODY MASS INDEX: 43.93 KG/M2 | TEMPERATURE: 98 F | HEART RATE: 84 BPM | DIASTOLIC BLOOD PRESSURE: 66 MMHG | WEIGHT: 240.19 LBS | SYSTOLIC BLOOD PRESSURE: 139 MMHG

## 2021-05-13 DIAGNOSIS — J34.2 NASAL SEPTAL DEVIATION: ICD-10-CM

## 2021-05-13 DIAGNOSIS — H90.3 SENSORINEURAL HEARING LOSS (SNHL) OF BOTH EARS: Primary | ICD-10-CM

## 2021-05-13 DIAGNOSIS — H93.13 TINNITUS OF BOTH EARS: ICD-10-CM

## 2021-05-13 DIAGNOSIS — H91.91 HEARING LOSS OF RIGHT EAR, UNSPECIFIED HEARING LOSS TYPE: ICD-10-CM

## 2021-05-13 DIAGNOSIS — H90.3 SENSORINEURAL HEARING LOSS, BILATERAL: Primary | ICD-10-CM

## 2021-05-13 DIAGNOSIS — H69.93 DYSFUNCTION OF BOTH EUSTACHIAN TUBES: ICD-10-CM

## 2021-05-13 DIAGNOSIS — J30.9 ALLERGIC RHINITIS, UNSPECIFIED SEASONALITY, UNSPECIFIED TRIGGER: ICD-10-CM

## 2021-05-13 PROCEDURE — 92567 PR TYMPA2METRY: ICD-10-PCS | Mod: S$GLB,,, | Performed by: AUDIOLOGIST

## 2021-05-13 PROCEDURE — 99999 PR PBB SHADOW E&M-EST. PATIENT-LVL I: ICD-10-PCS | Mod: PBBFAC,,, | Performed by: AUDIOLOGIST

## 2021-05-13 PROCEDURE — 1126F PR PAIN SEVERITY QUANTIFIED, NO PAIN PRESENT: ICD-10-PCS | Mod: S$GLB,,, | Performed by: SPECIALIST

## 2021-05-13 PROCEDURE — 92557 COMPREHENSIVE HEARING TEST: CPT | Mod: S$GLB,,, | Performed by: AUDIOLOGIST

## 2021-05-13 PROCEDURE — 99999 PR PBB SHADOW E&M-EST. PATIENT-LVL III: CPT | Mod: PBBFAC,,, | Performed by: SPECIALIST

## 2021-05-13 PROCEDURE — 99999 PR PBB SHADOW E&M-EST. PATIENT-LVL III: ICD-10-PCS | Mod: PBBFAC,,, | Performed by: SPECIALIST

## 2021-05-13 PROCEDURE — 3008F BODY MASS INDEX DOCD: CPT | Mod: CPTII,S$GLB,, | Performed by: SPECIALIST

## 2021-05-13 PROCEDURE — 99214 OFFICE O/P EST MOD 30 MIN: CPT | Mod: S$GLB,,, | Performed by: SPECIALIST

## 2021-05-13 PROCEDURE — 99214 PR OFFICE/OUTPT VISIT, EST, LEVL IV, 30-39 MIN: ICD-10-PCS | Mod: S$GLB,,, | Performed by: SPECIALIST

## 2021-05-13 PROCEDURE — 3008F PR BODY MASS INDEX (BMI) DOCUMENTED: ICD-10-PCS | Mod: CPTII,S$GLB,, | Performed by: SPECIALIST

## 2021-05-13 PROCEDURE — 92567 TYMPANOMETRY: CPT | Mod: S$GLB,,, | Performed by: AUDIOLOGIST

## 2021-05-13 PROCEDURE — 1126F AMNT PAIN NOTED NONE PRSNT: CPT | Mod: S$GLB,,, | Performed by: SPECIALIST

## 2021-05-13 PROCEDURE — 92557 PR COMPREHENSIVE HEARING TEST: ICD-10-PCS | Mod: S$GLB,,, | Performed by: AUDIOLOGIST

## 2021-05-13 PROCEDURE — 99999 PR PBB SHADOW E&M-EST. PATIENT-LVL I: CPT | Mod: PBBFAC,,, | Performed by: AUDIOLOGIST

## 2021-05-26 ENCOUNTER — PATIENT MESSAGE (OUTPATIENT)
Dept: INTERNAL MEDICINE | Facility: CLINIC | Age: 66
End: 2021-05-26

## 2021-05-26 DIAGNOSIS — G89.29 CHRONIC BILATERAL LOW BACK PAIN WITH LEFT-SIDED SCIATICA: ICD-10-CM

## 2021-05-26 DIAGNOSIS — M54.42 CHRONIC BILATERAL LOW BACK PAIN WITH LEFT-SIDED SCIATICA: ICD-10-CM

## 2021-05-26 DIAGNOSIS — M17.0 BILATERAL PRIMARY OSTEOARTHRITIS OF KNEE: ICD-10-CM

## 2021-05-26 RX ORDER — HYDROCODONE BITARTRATE AND ACETAMINOPHEN 5; 325 MG/1; MG/1
1 TABLET ORAL EVERY 6 HOURS PRN
Qty: 90 TABLET | Refills: 0 | Status: SHIPPED | OUTPATIENT
Start: 2021-05-26 | End: 2021-09-20 | Stop reason: SDUPTHER

## 2021-06-16 ENCOUNTER — OFFICE VISIT (OUTPATIENT)
Dept: INTERNAL MEDICINE | Facility: CLINIC | Age: 66
End: 2021-06-16
Payer: MEDICARE

## 2021-06-16 VITALS
BODY MASS INDEX: 43.81 KG/M2 | WEIGHT: 238.06 LBS | HEIGHT: 62 IN | SYSTOLIC BLOOD PRESSURE: 122 MMHG | DIASTOLIC BLOOD PRESSURE: 84 MMHG | TEMPERATURE: 98 F | HEART RATE: 90 BPM | RESPIRATION RATE: 24 BRPM

## 2021-06-16 DIAGNOSIS — E27.8 ADRENAL MASS, LEFT: ICD-10-CM

## 2021-06-16 DIAGNOSIS — Z12.31 ENCOUNTER FOR SCREENING MAMMOGRAM FOR HIGH-RISK PATIENT: ICD-10-CM

## 2021-06-16 DIAGNOSIS — E78.2 MIXED HYPERLIPIDEMIA: ICD-10-CM

## 2021-06-16 DIAGNOSIS — Z87.891 PERSONAL HISTORY OF NICOTINE DEPENDENCE: ICD-10-CM

## 2021-06-16 DIAGNOSIS — I70.0 AORTIC CALCIFICATION: ICD-10-CM

## 2021-06-16 DIAGNOSIS — I10 ESSENTIAL HYPERTENSION: Primary | ICD-10-CM

## 2021-06-16 DIAGNOSIS — F32.A DEPRESSION, UNSPECIFIED DEPRESSION TYPE: ICD-10-CM

## 2021-06-16 DIAGNOSIS — M46.92 UNSPECIFIED INFLAMMATORY SPONDYLOPATHY, CERVICAL REGION: ICD-10-CM

## 2021-06-16 DIAGNOSIS — E66.01 MORBID OBESITY WITH BMI OF 40.0-44.9, ADULT: ICD-10-CM

## 2021-06-16 PROCEDURE — 3074F SYST BP LT 130 MM HG: CPT | Mod: CPTII,S$GLB,, | Performed by: INTERNAL MEDICINE

## 2021-06-16 PROCEDURE — 1125F AMNT PAIN NOTED PAIN PRSNT: CPT | Mod: S$GLB,,, | Performed by: INTERNAL MEDICINE

## 2021-06-16 PROCEDURE — 3074F PR MOST RECENT SYSTOLIC BLOOD PRESSURE < 130 MM HG: ICD-10-PCS | Mod: CPTII,S$GLB,, | Performed by: INTERNAL MEDICINE

## 2021-06-16 PROCEDURE — 3079F DIAST BP 80-89 MM HG: CPT | Mod: CPTII,S$GLB,, | Performed by: INTERNAL MEDICINE

## 2021-06-16 PROCEDURE — 99499 RISK ADDL DX/OHS AUDIT: ICD-10-PCS | Mod: HCNC,S$GLB,, | Performed by: INTERNAL MEDICINE

## 2021-06-16 PROCEDURE — 3008F PR BODY MASS INDEX (BMI) DOCUMENTED: ICD-10-PCS | Mod: CPTII,S$GLB,, | Performed by: INTERNAL MEDICINE

## 2021-06-16 PROCEDURE — 99499 UNLISTED E&M SERVICE: CPT | Mod: HCNC,S$GLB,, | Performed by: INTERNAL MEDICINE

## 2021-06-16 PROCEDURE — 99214 PR OFFICE/OUTPT VISIT, EST, LEVL IV, 30-39 MIN: ICD-10-PCS | Mod: S$GLB,,, | Performed by: INTERNAL MEDICINE

## 2021-06-16 PROCEDURE — 3079F PR MOST RECENT DIASTOLIC BLOOD PRESSURE 80-89 MM HG: ICD-10-PCS | Mod: CPTII,S$GLB,, | Performed by: INTERNAL MEDICINE

## 2021-06-16 PROCEDURE — 99999 PR PBB SHADOW E&M-EST. PATIENT-LVL V: CPT | Mod: PBBFAC,,, | Performed by: INTERNAL MEDICINE

## 2021-06-16 PROCEDURE — 1125F PR PAIN SEVERITY QUANTIFIED, PAIN PRESENT: ICD-10-PCS | Mod: S$GLB,,, | Performed by: INTERNAL MEDICINE

## 2021-06-16 PROCEDURE — 99999 PR PBB SHADOW E&M-EST. PATIENT-LVL V: ICD-10-PCS | Mod: PBBFAC,,, | Performed by: INTERNAL MEDICINE

## 2021-06-16 PROCEDURE — 99214 OFFICE O/P EST MOD 30 MIN: CPT | Mod: S$GLB,,, | Performed by: INTERNAL MEDICINE

## 2021-06-16 PROCEDURE — 3008F BODY MASS INDEX DOCD: CPT | Mod: CPTII,S$GLB,, | Performed by: INTERNAL MEDICINE

## 2021-06-16 RX ORDER — EPINEPHRINE 0.3 MG/.3ML
INJECTION SUBCUTANEOUS
COMMUNITY
Start: 2021-02-02

## 2021-06-16 RX ORDER — ROSUVASTATIN CALCIUM 5 MG/1
5 TABLET, COATED ORAL DAILY
Qty: 90 TABLET | Refills: 1 | Status: SHIPPED | OUTPATIENT
Start: 2021-06-16 | End: 2021-10-08 | Stop reason: SDUPTHER

## 2021-06-16 RX ORDER — VENLAFAXINE HYDROCHLORIDE 37.5 MG/1
37.5 CAPSULE, EXTENDED RELEASE ORAL DAILY
Qty: 30 CAPSULE | Refills: 0 | Status: SHIPPED | OUTPATIENT
Start: 2021-06-16 | End: 2021-07-14

## 2021-06-23 ENCOUNTER — PES CALL (OUTPATIENT)
Dept: ADMINISTRATIVE | Facility: CLINIC | Age: 66
End: 2021-06-23

## 2021-06-24 ENCOUNTER — OFFICE VISIT (OUTPATIENT)
Dept: OTOLARYNGOLOGY | Facility: CLINIC | Age: 66
End: 2021-06-24
Payer: MEDICARE

## 2021-06-24 VITALS
BODY MASS INDEX: 43.43 KG/M2 | TEMPERATURE: 96 F | DIASTOLIC BLOOD PRESSURE: 63 MMHG | SYSTOLIC BLOOD PRESSURE: 144 MMHG | WEIGHT: 237.44 LBS | HEART RATE: 94 BPM

## 2021-06-24 DIAGNOSIS — Z91.018 MULTIPLE FOOD ALLERGIES: Primary | ICD-10-CM

## 2021-06-24 DIAGNOSIS — L50.9 URTICARIA: ICD-10-CM

## 2021-06-24 DIAGNOSIS — J30.9 ALLERGIC RHINITIS, UNSPECIFIED SEASONALITY, UNSPECIFIED TRIGGER: ICD-10-CM

## 2021-06-24 DIAGNOSIS — J34.2 NASAL SEPTAL DEVIATION: ICD-10-CM

## 2021-06-24 DIAGNOSIS — H69.93 DYSFUNCTION OF BOTH EUSTACHIAN TUBES: ICD-10-CM

## 2021-06-24 PROCEDURE — 1125F AMNT PAIN NOTED PAIN PRSNT: CPT | Mod: S$GLB,,, | Performed by: SPECIALIST

## 2021-06-24 PROCEDURE — 99999 PR PBB SHADOW E&M-EST. PATIENT-LVL IV: ICD-10-PCS | Mod: PBBFAC,,, | Performed by: SPECIALIST

## 2021-06-24 PROCEDURE — 1125F PR PAIN SEVERITY QUANTIFIED, PAIN PRESENT: ICD-10-PCS | Mod: S$GLB,,, | Performed by: SPECIALIST

## 2021-06-24 PROCEDURE — 3008F BODY MASS INDEX DOCD: CPT | Mod: CPTII,S$GLB,, | Performed by: SPECIALIST

## 2021-06-24 PROCEDURE — 99999 PR PBB SHADOW E&M-EST. PATIENT-LVL IV: CPT | Mod: PBBFAC,,, | Performed by: SPECIALIST

## 2021-06-24 PROCEDURE — 3008F PR BODY MASS INDEX (BMI) DOCUMENTED: ICD-10-PCS | Mod: CPTII,S$GLB,, | Performed by: SPECIALIST

## 2021-06-24 PROCEDURE — 99214 PR OFFICE/OUTPT VISIT, EST, LEVL IV, 30-39 MIN: ICD-10-PCS | Mod: S$GLB,,, | Performed by: SPECIALIST

## 2021-06-24 PROCEDURE — 99214 OFFICE O/P EST MOD 30 MIN: CPT | Mod: S$GLB,,, | Performed by: SPECIALIST

## 2021-06-24 RX ORDER — FLUTICASONE PROPIONATE 50 MCG
SPRAY, SUSPENSION (ML) NASAL
Qty: 18.2 ML | Refills: 11 | Status: SHIPPED | OUTPATIENT
Start: 2021-06-24 | End: 2022-05-18

## 2021-06-24 RX ORDER — AZELASTINE 1 MG/ML
SPRAY, METERED NASAL
Qty: 30 ML | Refills: 11 | Status: SHIPPED | OUTPATIENT
Start: 2021-06-24 | End: 2022-02-08

## 2021-06-24 RX ORDER — MONTELUKAST SODIUM 10 MG/1
TABLET ORAL
Qty: 30 TABLET | Refills: 11 | Status: SHIPPED | OUTPATIENT
Start: 2021-06-24 | End: 2021-10-08 | Stop reason: SDUPTHER

## 2021-07-07 ENCOUNTER — HOSPITAL ENCOUNTER (OUTPATIENT)
Dept: RADIOLOGY | Facility: HOSPITAL | Age: 66
Discharge: HOME | End: 2021-07-07
Attending: INTERNAL MEDICINE
Payer: MEDICARE

## 2021-07-07 VITALS — BODY MASS INDEX: 44.16 KG/M2 | HEIGHT: 62 IN | WEIGHT: 240 LBS

## 2021-07-07 DIAGNOSIS — Z87.891 PERSONAL HISTORY OF NICOTINE DEPENDENCE: ICD-10-CM

## 2021-07-07 DIAGNOSIS — Z12.31 ENCOUNTER FOR SCREENING MAMMOGRAM FOR HIGH-RISK PATIENT: ICD-10-CM

## 2021-07-07 PROCEDURE — 77063 MAMMO DIGITAL SCREENING BILAT WITH TOMO: ICD-10-PCS | Mod: 26,,, | Performed by: RADIOLOGY

## 2021-07-07 PROCEDURE — 71271 CT THORAX LUNG CANCER SCR C-: CPT | Mod: 26,,, | Performed by: RADIOLOGY

## 2021-07-07 PROCEDURE — 71271 CT CHEST LUNG SCREENING LOW DOSE: ICD-10-PCS | Mod: 26,,, | Performed by: RADIOLOGY

## 2021-07-07 PROCEDURE — 77067 MAMMO DIGITAL SCREENING BILAT WITH TOMO: ICD-10-PCS | Mod: 26,,, | Performed by: RADIOLOGY

## 2021-07-07 PROCEDURE — 77067 SCR MAMMO BI INCL CAD: CPT | Mod: TC

## 2021-07-07 PROCEDURE — 77067 SCR MAMMO BI INCL CAD: CPT | Mod: 26,,, | Performed by: RADIOLOGY

## 2021-07-07 PROCEDURE — 71271 CT THORAX LUNG CANCER SCR C-: CPT | Mod: TC

## 2021-07-07 PROCEDURE — 77063 BREAST TOMOSYNTHESIS BI: CPT | Mod: 26,,, | Performed by: RADIOLOGY

## 2021-07-09 ENCOUNTER — TELEPHONE (OUTPATIENT)
Dept: INTERNAL MEDICINE | Facility: CLINIC | Age: 66
End: 2021-07-09

## 2021-07-13 ENCOUNTER — PATIENT MESSAGE (OUTPATIENT)
Dept: INTERNAL MEDICINE | Facility: CLINIC | Age: 66
End: 2021-07-13

## 2021-07-13 DIAGNOSIS — F32.A DEPRESSION, UNSPECIFIED DEPRESSION TYPE: ICD-10-CM

## 2021-07-14 RX ORDER — VENLAFAXINE HYDROCHLORIDE 37.5 MG/1
CAPSULE, EXTENDED RELEASE ORAL
Qty: 30 CAPSULE | Refills: 2 | Status: SHIPPED | OUTPATIENT
Start: 2021-07-14 | End: 2021-08-19 | Stop reason: SDUPTHER

## 2021-07-14 RX ORDER — ERGOCALCIFEROL 1.25 MG/1
CAPSULE ORAL
Qty: 12 CAPSULE | Refills: 0 | Status: SHIPPED | OUTPATIENT
Start: 2021-07-14 | End: 2021-10-11

## 2021-07-15 ENCOUNTER — TELEPHONE (OUTPATIENT)
Dept: INTERNAL MEDICINE | Facility: CLINIC | Age: 66
End: 2021-07-15

## 2021-07-15 ENCOUNTER — PATIENT MESSAGE (OUTPATIENT)
Dept: INTERNAL MEDICINE | Facility: CLINIC | Age: 66
End: 2021-07-15

## 2021-07-20 ENCOUNTER — TELEPHONE (OUTPATIENT)
Dept: INTERNAL MEDICINE | Facility: CLINIC | Age: 66
End: 2021-07-20

## 2021-07-20 DIAGNOSIS — Z00.00 ANNUAL PHYSICAL EXAM: Primary | ICD-10-CM

## 2021-07-20 DIAGNOSIS — I10 ESSENTIAL HYPERTENSION: ICD-10-CM

## 2021-07-20 DIAGNOSIS — R73.9 ELEVATED BLOOD SUGAR: ICD-10-CM

## 2021-08-05 ENCOUNTER — OFFICE VISIT (OUTPATIENT)
Dept: OTOLARYNGOLOGY | Facility: CLINIC | Age: 66
End: 2021-08-05
Payer: MEDICARE

## 2021-08-05 VITALS
TEMPERATURE: 98 F | BODY MASS INDEX: 43.02 KG/M2 | WEIGHT: 235.25 LBS | HEART RATE: 87 BPM | DIASTOLIC BLOOD PRESSURE: 59 MMHG | SYSTOLIC BLOOD PRESSURE: 127 MMHG

## 2021-08-05 DIAGNOSIS — J30.9 ALLERGIC RHINITIS, UNSPECIFIED SEASONALITY, UNSPECIFIED TRIGGER: Primary | ICD-10-CM

## 2021-08-05 DIAGNOSIS — Z91.018 MULTIPLE FOOD ALLERGIES: ICD-10-CM

## 2021-08-05 DIAGNOSIS — H69.93 DYSFUNCTION OF BOTH EUSTACHIAN TUBES: ICD-10-CM

## 2021-08-05 DIAGNOSIS — J34.2 NASAL SEPTAL DEVIATION: ICD-10-CM

## 2021-08-05 DIAGNOSIS — L50.9 URTICARIA: ICD-10-CM

## 2021-08-05 PROCEDURE — 3008F PR BODY MASS INDEX (BMI) DOCUMENTED: ICD-10-PCS | Mod: CPTII,S$GLB,, | Performed by: SPECIALIST

## 2021-08-05 PROCEDURE — 99213 OFFICE O/P EST LOW 20 MIN: CPT | Mod: S$GLB,,, | Performed by: SPECIALIST

## 2021-08-05 PROCEDURE — 99213 PR OFFICE/OUTPT VISIT, EST, LEVL III, 20-29 MIN: ICD-10-PCS | Mod: S$GLB,,, | Performed by: SPECIALIST

## 2021-08-05 PROCEDURE — 3008F BODY MASS INDEX DOCD: CPT | Mod: CPTII,S$GLB,, | Performed by: SPECIALIST

## 2021-08-05 PROCEDURE — 3078F PR MOST RECENT DIASTOLIC BLOOD PRESSURE < 80 MM HG: ICD-10-PCS | Mod: CPTII,S$GLB,, | Performed by: SPECIALIST

## 2021-08-05 PROCEDURE — 99999 PR PBB SHADOW E&M-EST. PATIENT-LVL V: CPT | Mod: PBBFAC,,, | Performed by: SPECIALIST

## 2021-08-05 PROCEDURE — 1126F PR PAIN SEVERITY QUANTIFIED, NO PAIN PRESENT: ICD-10-PCS | Mod: CPTII,S$GLB,, | Performed by: SPECIALIST

## 2021-08-05 PROCEDURE — 99999 PR PBB SHADOW E&M-EST. PATIENT-LVL V: ICD-10-PCS | Mod: PBBFAC,,, | Performed by: SPECIALIST

## 2021-08-05 PROCEDURE — 3074F SYST BP LT 130 MM HG: CPT | Mod: CPTII,S$GLB,, | Performed by: SPECIALIST

## 2021-08-05 PROCEDURE — 1126F AMNT PAIN NOTED NONE PRSNT: CPT | Mod: CPTII,S$GLB,, | Performed by: SPECIALIST

## 2021-08-05 PROCEDURE — 1159F MED LIST DOCD IN RCRD: CPT | Mod: CPTII,S$GLB,, | Performed by: SPECIALIST

## 2021-08-05 PROCEDURE — 1160F RVW MEDS BY RX/DR IN RCRD: CPT | Mod: CPTII,S$GLB,, | Performed by: SPECIALIST

## 2021-08-05 PROCEDURE — 1159F PR MEDICATION LIST DOCUMENTED IN MEDICAL RECORD: ICD-10-PCS | Mod: CPTII,S$GLB,, | Performed by: SPECIALIST

## 2021-08-05 PROCEDURE — 3074F PR MOST RECENT SYSTOLIC BLOOD PRESSURE < 130 MM HG: ICD-10-PCS | Mod: CPTII,S$GLB,, | Performed by: SPECIALIST

## 2021-08-05 PROCEDURE — 3078F DIAST BP <80 MM HG: CPT | Mod: CPTII,S$GLB,, | Performed by: SPECIALIST

## 2021-08-05 PROCEDURE — 1160F PR REVIEW ALL MEDS BY PRESCRIBER/CLIN PHARMACIST DOCUMENTED: ICD-10-PCS | Mod: CPTII,S$GLB,, | Performed by: SPECIALIST

## 2021-08-05 RX ORDER — AZELASTINE HYDROCHLORIDE 0.5 MG/ML
1 SOLUTION/ DROPS OPHTHALMIC 2 TIMES DAILY
Qty: 4 ML | Refills: 11 | Status: SHIPPED | OUTPATIENT
Start: 2021-08-05 | End: 2022-02-08

## 2021-08-09 ENCOUNTER — PATIENT MESSAGE (OUTPATIENT)
Dept: ADMINISTRATIVE | Facility: HOSPITAL | Age: 66
End: 2021-08-09

## 2021-08-09 ENCOUNTER — PATIENT OUTREACH (OUTPATIENT)
Dept: ADMINISTRATIVE | Facility: HOSPITAL | Age: 66
End: 2021-08-09

## 2021-08-09 DIAGNOSIS — Z12.11 SCREENING FOR COLON CANCER: Primary | ICD-10-CM

## 2021-08-19 ENCOUNTER — PATIENT MESSAGE (OUTPATIENT)
Dept: INTERNAL MEDICINE | Facility: CLINIC | Age: 66
End: 2021-08-19

## 2021-08-19 DIAGNOSIS — F32.A DEPRESSION, UNSPECIFIED DEPRESSION TYPE: ICD-10-CM

## 2021-08-19 RX ORDER — VENLAFAXINE HYDROCHLORIDE 75 MG/1
75 CAPSULE, EXTENDED RELEASE ORAL DAILY
Qty: 30 CAPSULE | Refills: 0 | Status: SHIPPED | OUTPATIENT
Start: 2021-08-19 | End: 2021-09-21

## 2021-09-20 ENCOUNTER — HOSPITAL ENCOUNTER (OUTPATIENT)
Dept: RADIOLOGY | Facility: HOSPITAL | Age: 66
Discharge: HOME | End: 2021-09-20
Attending: INTERNAL MEDICINE
Payer: MEDICARE

## 2021-09-20 ENCOUNTER — OFFICE VISIT (OUTPATIENT)
Dept: INTERNAL MEDICINE | Facility: CLINIC | Age: 66
End: 2021-09-20
Payer: MEDICARE

## 2021-09-20 VITALS
DIASTOLIC BLOOD PRESSURE: 74 MMHG | HEART RATE: 98 BPM | RESPIRATION RATE: 12 BRPM | BODY MASS INDEX: 43 KG/M2 | WEIGHT: 233.69 LBS | SYSTOLIC BLOOD PRESSURE: 132 MMHG | OXYGEN SATURATION: 99 % | HEIGHT: 62 IN | TEMPERATURE: 98 F

## 2021-09-20 DIAGNOSIS — J06.9 UPPER RESPIRATORY INFECTION WITH COUGH AND CONGESTION: Primary | ICD-10-CM

## 2021-09-20 DIAGNOSIS — R05.3 PERSISTENT COUGH: ICD-10-CM

## 2021-09-20 DIAGNOSIS — M54.42 CHRONIC BILATERAL LOW BACK PAIN WITH LEFT-SIDED SCIATICA: ICD-10-CM

## 2021-09-20 DIAGNOSIS — G89.29 CHRONIC BILATERAL LOW BACK PAIN WITH LEFT-SIDED SCIATICA: ICD-10-CM

## 2021-09-20 DIAGNOSIS — I10 ESSENTIAL HYPERTENSION: ICD-10-CM

## 2021-09-20 PROCEDURE — 71046 X-RAY EXAM CHEST 2 VIEWS: CPT | Mod: TC,HCNC,PN

## 2021-09-20 PROCEDURE — 99999 PR PBB SHADOW E&M-EST. PATIENT-LVL V: CPT | Mod: PBBFAC,HCNC,, | Performed by: INTERNAL MEDICINE

## 2021-09-20 PROCEDURE — 3008F BODY MASS INDEX DOCD: CPT | Mod: HCNC,CPTII,S$GLB, | Performed by: INTERNAL MEDICINE

## 2021-09-20 PROCEDURE — 3078F PR MOST RECENT DIASTOLIC BLOOD PRESSURE < 80 MM HG: ICD-10-PCS | Mod: HCNC,CPTII,S$GLB, | Performed by: INTERNAL MEDICINE

## 2021-09-20 PROCEDURE — 1159F PR MEDICATION LIST DOCUMENTED IN MEDICAL RECORD: ICD-10-PCS | Mod: HCNC,CPTII,S$GLB, | Performed by: INTERNAL MEDICINE

## 2021-09-20 PROCEDURE — 71046 XR CHEST PA AND LATERAL: ICD-10-PCS | Mod: 26,HCNC,, | Performed by: RADIOLOGY

## 2021-09-20 PROCEDURE — 1160F PR REVIEW ALL MEDS BY PRESCRIBER/CLIN PHARMACIST DOCUMENTED: ICD-10-PCS | Mod: HCNC,CPTII,S$GLB, | Performed by: INTERNAL MEDICINE

## 2021-09-20 PROCEDURE — 1160F RVW MEDS BY RX/DR IN RCRD: CPT | Mod: HCNC,CPTII,S$GLB, | Performed by: INTERNAL MEDICINE

## 2021-09-20 PROCEDURE — 3075F PR MOST RECENT SYSTOLIC BLOOD PRESS GE 130-139MM HG: ICD-10-PCS | Mod: HCNC,CPTII,S$GLB, | Performed by: INTERNAL MEDICINE

## 2021-09-20 PROCEDURE — 99214 OFFICE O/P EST MOD 30 MIN: CPT | Mod: HCNC,S$GLB,, | Performed by: INTERNAL MEDICINE

## 2021-09-20 PROCEDURE — 71046 X-RAY EXAM CHEST 2 VIEWS: CPT | Mod: 26,HCNC,, | Performed by: RADIOLOGY

## 2021-09-20 PROCEDURE — 3075F SYST BP GE 130 - 139MM HG: CPT | Mod: HCNC,CPTII,S$GLB, | Performed by: INTERNAL MEDICINE

## 2021-09-20 PROCEDURE — 99214 PR OFFICE/OUTPT VISIT, EST, LEVL IV, 30-39 MIN: ICD-10-PCS | Mod: HCNC,S$GLB,, | Performed by: INTERNAL MEDICINE

## 2021-09-20 PROCEDURE — 99999 PR PBB SHADOW E&M-EST. PATIENT-LVL V: ICD-10-PCS | Mod: PBBFAC,HCNC,, | Performed by: INTERNAL MEDICINE

## 2021-09-20 PROCEDURE — 4010F PR ACE/ARB THEARPY RXD/TAKEN: ICD-10-PCS | Mod: HCNC,CPTII,S$GLB, | Performed by: INTERNAL MEDICINE

## 2021-09-20 PROCEDURE — 4010F ACE/ARB THERAPY RXD/TAKEN: CPT | Mod: HCNC,CPTII,S$GLB, | Performed by: INTERNAL MEDICINE

## 2021-09-20 PROCEDURE — 1159F MED LIST DOCD IN RCRD: CPT | Mod: HCNC,CPTII,S$GLB, | Performed by: INTERNAL MEDICINE

## 2021-09-20 PROCEDURE — 3078F DIAST BP <80 MM HG: CPT | Mod: HCNC,CPTII,S$GLB, | Performed by: INTERNAL MEDICINE

## 2021-09-20 PROCEDURE — 3008F PR BODY MASS INDEX (BMI) DOCUMENTED: ICD-10-PCS | Mod: HCNC,CPTII,S$GLB, | Performed by: INTERNAL MEDICINE

## 2021-09-20 RX ORDER — HYDROCODONE BITARTRATE AND ACETAMINOPHEN 5; 325 MG/1; MG/1
1 TABLET ORAL EVERY 6 HOURS PRN
Qty: 90 TABLET | Refills: 0 | Status: SHIPPED | OUTPATIENT
Start: 2021-09-20 | End: 2021-12-10 | Stop reason: SDUPTHER

## 2021-09-20 RX ORDER — METHYLPREDNISOLONE 4 MG/1
TABLET ORAL
Qty: 1 PACKAGE | Refills: 0 | Status: SHIPPED | OUTPATIENT
Start: 2021-09-20 | End: 2021-11-15

## 2021-09-22 ENCOUNTER — PATIENT MESSAGE (OUTPATIENT)
Dept: INTERNAL MEDICINE | Facility: CLINIC | Age: 66
End: 2021-09-22

## 2021-09-22 ENCOUNTER — TELEPHONE (OUTPATIENT)
Dept: INTERNAL MEDICINE | Facility: CLINIC | Age: 66
End: 2021-09-22

## 2021-10-08 DIAGNOSIS — E78.2 MIXED HYPERLIPIDEMIA: ICD-10-CM

## 2021-10-08 DIAGNOSIS — I10 ESSENTIAL HYPERTENSION: ICD-10-CM

## 2021-10-08 RX ORDER — MONTELUKAST SODIUM 10 MG/1
TABLET ORAL
Qty: 90 TABLET | Refills: 3 | Status: SHIPPED | OUTPATIENT
Start: 2021-10-08 | End: 2021-10-15 | Stop reason: SDUPTHER

## 2021-10-09 RX ORDER — ROSUVASTATIN CALCIUM 5 MG/1
5 TABLET, COATED ORAL DAILY
Qty: 90 TABLET | Refills: 1 | Status: SHIPPED | OUTPATIENT
Start: 2021-10-09 | End: 2021-11-15 | Stop reason: SDUPTHER

## 2021-10-09 RX ORDER — LOSARTAN POTASSIUM 100 MG/1
100 TABLET ORAL DAILY
Qty: 90 TABLET | Refills: 1 | Status: SHIPPED | OUTPATIENT
Start: 2021-10-09 | End: 2022-04-13

## 2021-10-12 RX ORDER — AMLODIPINE BESYLATE 5 MG/1
5 TABLET ORAL DAILY
Qty: 90 TABLET | Refills: 1 | Status: SHIPPED | OUTPATIENT
Start: 2021-10-12 | End: 2022-04-12

## 2021-10-15 RX ORDER — MONTELUKAST SODIUM 10 MG/1
TABLET ORAL
Qty: 90 TABLET | Refills: 3 | Status: SHIPPED | OUTPATIENT
Start: 2021-10-15 | End: 2022-05-18

## 2021-11-05 ENCOUNTER — LAB VISIT (OUTPATIENT)
Dept: LAB | Facility: HOSPITAL | Age: 66
End: 2021-11-05
Attending: INTERNAL MEDICINE
Payer: MEDICARE

## 2021-11-05 DIAGNOSIS — Z00.00 ANNUAL PHYSICAL EXAM: ICD-10-CM

## 2021-11-05 DIAGNOSIS — R73.9 ELEVATED BLOOD SUGAR: ICD-10-CM

## 2021-11-05 DIAGNOSIS — I10 ESSENTIAL HYPERTENSION: ICD-10-CM

## 2021-11-05 LAB
25(OH)D3+25(OH)D2 SERPL-MCNC: 26 NG/ML (ref 30–96)
ALBUMIN SERPL BCP-MCNC: 3.8 G/DL (ref 3.5–5.2)
ALP SERPL-CCNC: 90 U/L (ref 55–135)
ALT SERPL W/O P-5'-P-CCNC: 23 U/L (ref 10–44)
ANION GAP SERPL CALC-SCNC: 7 MMOL/L (ref 8–16)
AST SERPL-CCNC: 17 U/L (ref 10–40)
BASOPHILS # BLD AUTO: 0.04 K/UL (ref 0–0.2)
BASOPHILS NFR BLD: 0.7 % (ref 0–1.9)
BILIRUB SERPL-MCNC: 0.4 MG/DL (ref 0.1–1)
BUN SERPL-MCNC: 7 MG/DL (ref 8–23)
CALCIUM SERPL-MCNC: 9.5 MG/DL (ref 8.7–10.5)
CHLORIDE SERPL-SCNC: 103 MMOL/L (ref 95–110)
CHOLEST SERPL-MCNC: 203 MG/DL (ref 120–199)
CHOLEST/HDLC SERPL: 3.9 {RATIO} (ref 2–5)
CO2 SERPL-SCNC: 30 MMOL/L (ref 23–29)
CREAT SERPL-MCNC: 0.8 MG/DL (ref 0.5–1.4)
DIFFERENTIAL METHOD: ABNORMAL
EOSINOPHIL # BLD AUTO: 0.2 K/UL (ref 0–0.5)
EOSINOPHIL NFR BLD: 2.6 % (ref 0–8)
ERYTHROCYTE [DISTWIDTH] IN BLOOD BY AUTOMATED COUNT: 14.4 % (ref 11.5–14.5)
EST. GFR  (AFRICAN AMERICAN): >60 ML/MIN/1.73 M^2
EST. GFR  (NON AFRICAN AMERICAN): >60 ML/MIN/1.73 M^2
ESTIMATED AVG GLUCOSE: 103 MG/DL (ref 68–131)
GLUCOSE SERPL-MCNC: 113 MG/DL (ref 70–110)
HBA1C MFR BLD: 5.2 % (ref 4–5.6)
HCT VFR BLD AUTO: 45.2 % (ref 37–48.5)
HDLC SERPL-MCNC: 52 MG/DL (ref 40–75)
HDLC SERPL: 25.6 % (ref 20–50)
HGB BLD-MCNC: 14.4 G/DL (ref 12–16)
IMM GRANULOCYTES # BLD AUTO: 0.02 K/UL (ref 0–0.04)
IMM GRANULOCYTES NFR BLD AUTO: 0.3 % (ref 0–0.5)
LDLC SERPL CALC-MCNC: 128.6 MG/DL (ref 63–159)
LYMPHOCYTES # BLD AUTO: 1.3 K/UL (ref 1–4.8)
LYMPHOCYTES NFR BLD: 23 % (ref 18–48)
MCH RBC QN AUTO: 31.3 PG (ref 27–31)
MCHC RBC AUTO-ENTMCNC: 31.9 G/DL (ref 32–36)
MCV RBC AUTO: 98 FL (ref 82–98)
MONOCYTES # BLD AUTO: 0.4 K/UL (ref 0.3–1)
MONOCYTES NFR BLD: 6.5 % (ref 4–15)
NEUTROPHILS # BLD AUTO: 3.8 K/UL (ref 1.8–7.7)
NEUTROPHILS NFR BLD: 66.9 % (ref 38–73)
NONHDLC SERPL-MCNC: 151 MG/DL
NRBC BLD-RTO: 0 /100 WBC
PLATELET # BLD AUTO: 229 K/UL (ref 150–450)
PMV BLD AUTO: 9.7 FL (ref 9.2–12.9)
POTASSIUM SERPL-SCNC: 4.3 MMOL/L (ref 3.5–5.1)
PROT SERPL-MCNC: 6.9 G/DL (ref 6–8.4)
RBC # BLD AUTO: 4.6 M/UL (ref 4–5.4)
SODIUM SERPL-SCNC: 140 MMOL/L (ref 136–145)
TRIGL SERPL-MCNC: 112 MG/DL (ref 30–150)
TSH SERPL DL<=0.005 MIU/L-ACNC: 2.24 UIU/ML (ref 0.4–4)
WBC # BLD AUTO: 5.73 K/UL (ref 3.9–12.7)

## 2021-11-05 PROCEDURE — 80061 LIPID PANEL: CPT | Mod: HCNC | Performed by: INTERNAL MEDICINE

## 2021-11-05 PROCEDURE — 36415 COLL VENOUS BLD VENIPUNCTURE: CPT | Mod: HCNC,PO | Performed by: INTERNAL MEDICINE

## 2021-11-05 PROCEDURE — 80053 COMPREHEN METABOLIC PANEL: CPT | Mod: HCNC | Performed by: INTERNAL MEDICINE

## 2021-11-05 PROCEDURE — 83036 HEMOGLOBIN GLYCOSYLATED A1C: CPT | Mod: HCNC | Performed by: INTERNAL MEDICINE

## 2021-11-05 PROCEDURE — 84443 ASSAY THYROID STIM HORMONE: CPT | Mod: HCNC | Performed by: INTERNAL MEDICINE

## 2021-11-05 PROCEDURE — 85025 COMPLETE CBC W/AUTO DIFF WBC: CPT | Mod: HCNC | Performed by: INTERNAL MEDICINE

## 2021-11-05 PROCEDURE — 82306 VITAMIN D 25 HYDROXY: CPT | Mod: HCNC | Performed by: INTERNAL MEDICINE

## 2021-11-08 ENCOUNTER — TELEPHONE (OUTPATIENT)
Dept: INTERNAL MEDICINE | Facility: CLINIC | Age: 66
End: 2021-11-08
Payer: MEDICARE

## 2021-11-08 ENCOUNTER — PATIENT MESSAGE (OUTPATIENT)
Dept: INTERNAL MEDICINE | Facility: CLINIC | Age: 66
End: 2021-11-08
Payer: MEDICARE

## 2021-11-09 ENCOUNTER — PATIENT MESSAGE (OUTPATIENT)
Dept: INTERNAL MEDICINE | Facility: CLINIC | Age: 66
End: 2021-11-09
Payer: MEDICARE

## 2021-11-09 DIAGNOSIS — R82.90 ABNORMAL URINALYSIS: Primary | ICD-10-CM

## 2021-11-10 ENCOUNTER — LAB VISIT (OUTPATIENT)
Dept: LAB | Facility: HOSPITAL | Age: 66
End: 2021-11-10
Attending: INTERNAL MEDICINE
Payer: MEDICARE

## 2021-11-10 DIAGNOSIS — R82.90 ABNORMAL URINALYSIS: ICD-10-CM

## 2021-11-10 LAB
BILIRUB UR QL STRIP: NEGATIVE
CLARITY UR REFRACT.AUTO: CLEAR
COLOR UR AUTO: YELLOW
GLUCOSE UR QL STRIP: NEGATIVE
HGB UR QL STRIP: NEGATIVE
KETONES UR QL STRIP: NEGATIVE
LEUKOCYTE ESTERASE UR QL STRIP: NEGATIVE
NITRITE UR QL STRIP: NEGATIVE
PH UR STRIP: 6 [PH] (ref 5–8)
PROT UR QL STRIP: NEGATIVE
SP GR UR STRIP: 1.01 (ref 1–1.03)
URN SPEC COLLECT METH UR: NORMAL

## 2021-11-10 PROCEDURE — 87086 URINE CULTURE/COLONY COUNT: CPT | Mod: HCNC | Performed by: INTERNAL MEDICINE

## 2021-11-10 PROCEDURE — 81003 URINALYSIS AUTO W/O SCOPE: CPT | Mod: HCNC | Performed by: INTERNAL MEDICINE

## 2021-11-11 LAB — BACTERIA UR CULT: NO GROWTH

## 2021-11-12 ENCOUNTER — PATIENT MESSAGE (OUTPATIENT)
Dept: INTERNAL MEDICINE | Facility: CLINIC | Age: 66
End: 2021-11-12
Payer: MEDICARE

## 2021-11-15 ENCOUNTER — OFFICE VISIT (OUTPATIENT)
Dept: INTERNAL MEDICINE | Facility: CLINIC | Age: 66
End: 2021-11-15
Payer: MEDICARE

## 2021-11-15 VITALS
TEMPERATURE: 98 F | HEART RATE: 100 BPM | RESPIRATION RATE: 18 BRPM | DIASTOLIC BLOOD PRESSURE: 72 MMHG | SYSTOLIC BLOOD PRESSURE: 120 MMHG | WEIGHT: 236.38 LBS | OXYGEN SATURATION: 96 % | HEIGHT: 62 IN | BODY MASS INDEX: 43.5 KG/M2

## 2021-11-15 DIAGNOSIS — M54.16 LUMBAR RADICULOPATHY: ICD-10-CM

## 2021-11-15 DIAGNOSIS — E27.8 ADRENAL MASS, LEFT: ICD-10-CM

## 2021-11-15 DIAGNOSIS — E55.9 VITAMIN D INSUFFICIENCY: ICD-10-CM

## 2021-11-15 DIAGNOSIS — F32.A DEPRESSION, UNSPECIFIED DEPRESSION TYPE: ICD-10-CM

## 2021-11-15 DIAGNOSIS — Z00.00 ANNUAL PHYSICAL EXAM: ICD-10-CM

## 2021-11-15 DIAGNOSIS — E66.01 MORBID OBESITY WITH BMI OF 40.0-44.9, ADULT: ICD-10-CM

## 2021-11-15 DIAGNOSIS — M17.0 BILATERAL PRIMARY OSTEOARTHRITIS OF KNEE: ICD-10-CM

## 2021-11-15 DIAGNOSIS — E78.2 MIXED HYPERLIPIDEMIA: ICD-10-CM

## 2021-11-15 DIAGNOSIS — M47.812 FACET ARTHRITIS OF CERVICAL REGION: ICD-10-CM

## 2021-11-15 DIAGNOSIS — J45.20 MILD INTERMITTENT ASTHMA WITHOUT COMPLICATION: ICD-10-CM

## 2021-11-15 DIAGNOSIS — I10 ESSENTIAL HYPERTENSION: Primary | ICD-10-CM

## 2021-11-15 DIAGNOSIS — L50.8 CHRONIC URTICARIA: ICD-10-CM

## 2021-11-15 DIAGNOSIS — M79.7 FIBROMYALGIA: ICD-10-CM

## 2021-11-15 PROCEDURE — 99499 RISK ADDL DX/OHS AUDIT: ICD-10-PCS | Mod: HCNC,S$GLB,, | Performed by: INTERNAL MEDICINE

## 2021-11-15 PROCEDURE — 3044F HG A1C LEVEL LT 7.0%: CPT | Mod: HCNC,CPTII,S$GLB, | Performed by: INTERNAL MEDICINE

## 2021-11-15 PROCEDURE — 3078F DIAST BP <80 MM HG: CPT | Mod: HCNC,CPTII,S$GLB, | Performed by: INTERNAL MEDICINE

## 2021-11-15 PROCEDURE — 3008F PR BODY MASS INDEX (BMI) DOCUMENTED: ICD-10-PCS | Mod: HCNC,CPTII,S$GLB, | Performed by: INTERNAL MEDICINE

## 2021-11-15 PROCEDURE — 4010F PR ACE/ARB THEARPY RXD/TAKEN: ICD-10-PCS | Mod: HCNC,CPTII,S$GLB, | Performed by: INTERNAL MEDICINE

## 2021-11-15 PROCEDURE — 90686 FLU VACCINE (QUAD) GREATER THAN OR EQUAL TO 3YO PRESERVATIVE FREE IM: ICD-10-PCS | Mod: HCNC,S$GLB,, | Performed by: INTERNAL MEDICINE

## 2021-11-15 PROCEDURE — 1159F MED LIST DOCD IN RCRD: CPT | Mod: HCNC,CPTII,S$GLB, | Performed by: INTERNAL MEDICINE

## 2021-11-15 PROCEDURE — G0008 ADMIN INFLUENZA VIRUS VAC: HCPCS | Mod: HCNC,S$GLB,, | Performed by: INTERNAL MEDICINE

## 2021-11-15 PROCEDURE — 99999 PR PBB SHADOW E&M-EST. PATIENT-LVL V: ICD-10-PCS | Mod: PBBFAC,HCNC,, | Performed by: INTERNAL MEDICINE

## 2021-11-15 PROCEDURE — 1159F PR MEDICATION LIST DOCUMENTED IN MEDICAL RECORD: ICD-10-PCS | Mod: HCNC,CPTII,S$GLB, | Performed by: INTERNAL MEDICINE

## 2021-11-15 PROCEDURE — 1160F RVW MEDS BY RX/DR IN RCRD: CPT | Mod: HCNC,CPTII,S$GLB, | Performed by: INTERNAL MEDICINE

## 2021-11-15 PROCEDURE — 99499 UNLISTED E&M SERVICE: CPT | Mod: HCNC,S$GLB,, | Performed by: INTERNAL MEDICINE

## 2021-11-15 PROCEDURE — G0008 FLU VACCINE (QUAD) GREATER THAN OR EQUAL TO 3YO PRESERVATIVE FREE IM: ICD-10-PCS | Mod: HCNC,S$GLB,, | Performed by: INTERNAL MEDICINE

## 2021-11-15 PROCEDURE — 3074F SYST BP LT 130 MM HG: CPT | Mod: HCNC,CPTII,S$GLB, | Performed by: INTERNAL MEDICINE

## 2021-11-15 PROCEDURE — 1160F PR REVIEW ALL MEDS BY PRESCRIBER/CLIN PHARMACIST DOCUMENTED: ICD-10-PCS | Mod: HCNC,CPTII,S$GLB, | Performed by: INTERNAL MEDICINE

## 2021-11-15 PROCEDURE — 3044F PR MOST RECENT HEMOGLOBIN A1C LEVEL <7.0%: ICD-10-PCS | Mod: HCNC,CPTII,S$GLB, | Performed by: INTERNAL MEDICINE

## 2021-11-15 PROCEDURE — 3078F PR MOST RECENT DIASTOLIC BLOOD PRESSURE < 80 MM HG: ICD-10-PCS | Mod: HCNC,CPTII,S$GLB, | Performed by: INTERNAL MEDICINE

## 2021-11-15 PROCEDURE — 90686 IIV4 VACC NO PRSV 0.5 ML IM: CPT | Mod: HCNC,S$GLB,, | Performed by: INTERNAL MEDICINE

## 2021-11-15 PROCEDURE — 3074F PR MOST RECENT SYSTOLIC BLOOD PRESSURE < 130 MM HG: ICD-10-PCS | Mod: HCNC,CPTII,S$GLB, | Performed by: INTERNAL MEDICINE

## 2021-11-15 PROCEDURE — 4010F ACE/ARB THERAPY RXD/TAKEN: CPT | Mod: HCNC,CPTII,S$GLB, | Performed by: INTERNAL MEDICINE

## 2021-11-15 PROCEDURE — 99999 PR PBB SHADOW E&M-EST. PATIENT-LVL V: CPT | Mod: PBBFAC,HCNC,, | Performed by: INTERNAL MEDICINE

## 2021-11-15 PROCEDURE — 3008F BODY MASS INDEX DOCD: CPT | Mod: HCNC,CPTII,S$GLB, | Performed by: INTERNAL MEDICINE

## 2021-11-15 RX ORDER — ROSUVASTATIN CALCIUM 10 MG/1
10 TABLET, COATED ORAL DAILY
Qty: 90 TABLET | Refills: 0 | Status: SHIPPED | OUTPATIENT
Start: 2021-11-15 | End: 2022-02-02 | Stop reason: SDUPTHER

## 2021-11-15 RX ORDER — DESVENLAFAXINE SUCCINATE 50 MG/1
50 TABLET, EXTENDED RELEASE ORAL DAILY
Qty: 30 TABLET | Refills: 0 | Status: SHIPPED | OUTPATIENT
Start: 2021-11-15 | End: 2021-12-10

## 2021-11-16 ENCOUNTER — TELEPHONE (OUTPATIENT)
Dept: INTERNAL MEDICINE | Facility: CLINIC | Age: 66
End: 2021-11-16
Payer: MEDICARE

## 2021-12-02 ENCOUNTER — PATIENT MESSAGE (OUTPATIENT)
Dept: ADMINISTRATIVE | Facility: HOSPITAL | Age: 66
End: 2021-12-02
Payer: MEDICARE

## 2021-12-05 LAB — NONINV COLON CA DNA+OCC BLD SCRN STL QL: NEGATIVE

## 2021-12-09 DIAGNOSIS — E78.2 MIXED HYPERLIPIDEMIA: ICD-10-CM

## 2021-12-09 RX ORDER — ROSUVASTATIN CALCIUM 10 MG/1
10 TABLET, COATED ORAL DAILY
Qty: 90 TABLET | Refills: 0 | Status: CANCELLED | OUTPATIENT
Start: 2021-12-09

## 2021-12-10 ENCOUNTER — OFFICE VISIT (OUTPATIENT)
Dept: INTERNAL MEDICINE | Facility: CLINIC | Age: 66
End: 2021-12-10
Payer: MEDICARE

## 2021-12-10 VITALS
DIASTOLIC BLOOD PRESSURE: 78 MMHG | OXYGEN SATURATION: 94 % | BODY MASS INDEX: 43.82 KG/M2 | HEART RATE: 92 BPM | RESPIRATION RATE: 24 BRPM | SYSTOLIC BLOOD PRESSURE: 126 MMHG | WEIGHT: 238.13 LBS | TEMPERATURE: 98 F | HEIGHT: 62 IN

## 2021-12-10 DIAGNOSIS — M62.830 MUSCLE SPASM OF BACK: ICD-10-CM

## 2021-12-10 DIAGNOSIS — F32.A ANXIETY AND DEPRESSION: ICD-10-CM

## 2021-12-10 DIAGNOSIS — K21.9 GASTROESOPHAGEAL REFLUX DISEASE, UNSPECIFIED WHETHER ESOPHAGITIS PRESENT: ICD-10-CM

## 2021-12-10 DIAGNOSIS — G89.29 CHRONIC BILATERAL LOW BACK PAIN WITH LEFT-SIDED SCIATICA: Primary | ICD-10-CM

## 2021-12-10 DIAGNOSIS — I10 ESSENTIAL HYPERTENSION: ICD-10-CM

## 2021-12-10 DIAGNOSIS — F41.9 ANXIETY AND DEPRESSION: ICD-10-CM

## 2021-12-10 DIAGNOSIS — M54.42 CHRONIC BILATERAL LOW BACK PAIN WITH LEFT-SIDED SCIATICA: Primary | ICD-10-CM

## 2021-12-10 PROCEDURE — 3078F DIAST BP <80 MM HG: CPT | Mod: HCNC,CPTII,S$GLB, | Performed by: INTERNAL MEDICINE

## 2021-12-10 PROCEDURE — 3074F PR MOST RECENT SYSTOLIC BLOOD PRESSURE < 130 MM HG: ICD-10-PCS | Mod: HCNC,CPTII,S$GLB, | Performed by: INTERNAL MEDICINE

## 2021-12-10 PROCEDURE — 1159F PR MEDICATION LIST DOCUMENTED IN MEDICAL RECORD: ICD-10-PCS | Mod: HCNC,CPTII,S$GLB, | Performed by: INTERNAL MEDICINE

## 2021-12-10 PROCEDURE — 1159F MED LIST DOCD IN RCRD: CPT | Mod: HCNC,CPTII,S$GLB, | Performed by: INTERNAL MEDICINE

## 2021-12-10 PROCEDURE — 3074F SYST BP LT 130 MM HG: CPT | Mod: HCNC,CPTII,S$GLB, | Performed by: INTERNAL MEDICINE

## 2021-12-10 PROCEDURE — 3008F PR BODY MASS INDEX (BMI) DOCUMENTED: ICD-10-PCS | Mod: HCNC,CPTII,S$GLB, | Performed by: INTERNAL MEDICINE

## 2021-12-10 PROCEDURE — 99999 PR PBB SHADOW E&M-EST. PATIENT-LVL V: ICD-10-PCS | Mod: PBBFAC,HCNC,, | Performed by: INTERNAL MEDICINE

## 2021-12-10 PROCEDURE — 96372 THER/PROPH/DIAG INJ SC/IM: CPT | Mod: HCNC,S$GLB,, | Performed by: INTERNAL MEDICINE

## 2021-12-10 PROCEDURE — 99214 PR OFFICE/OUTPT VISIT, EST, LEVL IV, 30-39 MIN: ICD-10-PCS | Mod: HCNC,25,S$GLB, | Performed by: INTERNAL MEDICINE

## 2021-12-10 PROCEDURE — 99999 PR PBB SHADOW E&M-EST. PATIENT-LVL V: CPT | Mod: PBBFAC,HCNC,, | Performed by: INTERNAL MEDICINE

## 2021-12-10 PROCEDURE — 1160F PR REVIEW ALL MEDS BY PRESCRIBER/CLIN PHARMACIST DOCUMENTED: ICD-10-PCS | Mod: HCNC,CPTII,S$GLB, | Performed by: INTERNAL MEDICINE

## 2021-12-10 PROCEDURE — 96372 PR INJECTION,THERAP/PROPH/DIAG2ST, IM OR SUBCUT: ICD-10-PCS | Mod: HCNC,S$GLB,, | Performed by: INTERNAL MEDICINE

## 2021-12-10 PROCEDURE — 1160F RVW MEDS BY RX/DR IN RCRD: CPT | Mod: HCNC,CPTII,S$GLB, | Performed by: INTERNAL MEDICINE

## 2021-12-10 PROCEDURE — 99214 OFFICE O/P EST MOD 30 MIN: CPT | Mod: HCNC,25,S$GLB, | Performed by: INTERNAL MEDICINE

## 2021-12-10 PROCEDURE — 3008F BODY MASS INDEX DOCD: CPT | Mod: HCNC,CPTII,S$GLB, | Performed by: INTERNAL MEDICINE

## 2021-12-10 PROCEDURE — 3078F PR MOST RECENT DIASTOLIC BLOOD PRESSURE < 80 MM HG: ICD-10-PCS | Mod: HCNC,CPTII,S$GLB, | Performed by: INTERNAL MEDICINE

## 2021-12-10 RX ORDER — HYDROCODONE BITARTRATE AND ACETAMINOPHEN 5; 325 MG/1; MG/1
1 TABLET ORAL EVERY 6 HOURS PRN
Qty: 90 TABLET | Refills: 0 | Status: SHIPPED | OUTPATIENT
Start: 2021-12-10 | End: 2022-03-03 | Stop reason: SDUPTHER

## 2021-12-10 RX ORDER — BUSPIRONE HYDROCHLORIDE 7.5 MG/1
1 TABLET ORAL 2 TIMES DAILY
COMMUNITY
Start: 2021-12-01 | End: 2022-05-18

## 2021-12-10 RX ORDER — KETOROLAC TROMETHAMINE 30 MG/ML
30 INJECTION, SOLUTION INTRAMUSCULAR; INTRAVENOUS
Status: COMPLETED | OUTPATIENT
Start: 2021-12-10 | End: 2021-12-10

## 2021-12-10 RX ORDER — METHOCARBAMOL 500 MG/1
500 TABLET, FILM COATED ORAL 3 TIMES DAILY PRN
Qty: 30 TABLET | Refills: 2 | Status: SHIPPED | OUTPATIENT
Start: 2021-12-10 | End: 2022-02-02 | Stop reason: SDUPTHER

## 2021-12-10 RX ORDER — ESOMEPRAZOLE MAGNESIUM 40 MG/1
40 CAPSULE, DELAYED RELEASE ORAL 2 TIMES DAILY
COMMUNITY
End: 2022-05-18

## 2021-12-10 RX ADMIN — KETOROLAC TROMETHAMINE 30 MG: 30 INJECTION, SOLUTION INTRAMUSCULAR; INTRAVENOUS at 12:12

## 2021-12-10 NOTE — PROGRESS NOTES
Subjective:     Kaleigh Solo is a 66 y.o. female who presents for   Chief Complaint   Patient presents with    medication discussion    Back Pain    Anxiety    Hypertension       Low-back Pain  This is a chronic problem. The current episode started more than 1 month ago. The problem occurs intermittently. The problem has been waxing and waning. Associated symptoms include arthralgias, headaches, joint swelling, neck pain and weakness. Pertinent negatives include no abdominal pain, chest pain, chills, congestion, coughing, diaphoresis, fever, sore throat or vomiting. The symptoms are aggravated by bending. She has tried oral narcotics, acetaminophen and rest for the symptoms. The treatment provided moderate relief.   Anxiety  Presents for follow-up visit. Symptoms include excessive worry and nervous/anxious behavior. Patient reports no chest pain, confusion, malaise, palpitations or shortness of breath. Symptoms occur most days. The severity of symptoms is moderate.       Hypertension: The patient has been taking medications as instructed, no medication side effects noted, no chest pain on exertion and no swelling of ankles.    BP Readings from Last 3 Encounters:   12/10/21 126/78   11/15/21 120/72   09/20/21 132/74     Respiratory symptoms/allergies resolved      Review of Systems   Constitutional: Negative for activity change, chills, diaphoresis, fever and unexpected weight change.   HENT: Positive for hearing loss. Negative for congestion, rhinorrhea, sore throat and trouble swallowing.    Eyes: Negative for discharge and visual disturbance.   Respiratory: Negative for cough, chest tightness, shortness of breath and wheezing.    Cardiovascular: Negative for chest pain and palpitations.   Gastrointestinal: Negative for abdominal pain, blood in stool, constipation, diarrhea and vomiting.   Endocrine: Negative for polydipsia and polyuria.   Genitourinary: Negative for difficulty urinating, dysuria,  hematuria and menstrual problem.   Musculoskeletal: Positive for arthralgias, joint swelling and neck pain.   Neurological: Positive for weakness and headaches.   Psychiatric/Behavioral: Negative for confusion and dysphoric mood. The patient is nervous/anxious.         Answers for HPI/ROS submitted by the patient on 12/9/2021  activity change: No  unexpected weight change: No  neck pain: Yes  hearing loss: Yes  rhinorrhea: No  trouble swallowing: No  eye discharge: No  visual disturbance: No  chest tightness: No  wheezing: No  chest pain: No  palpitations: No  blood in stool: No  constipation: No  vomiting: No  diarrhea: No  polydipsia: No  polyuria: No  difficulty urinating: No  hematuria: No  menstrual problem: No  dysuria: No  joint swelling: Yes  arthralgias: Yes  headaches: Yes  weakness: Yes  confusion: No  dysphoric mood: No        Objective:     Physical Exam  Vitals reviewed.   Constitutional:       General: She is awake. She is not in acute distress.     Appearance: Normal appearance. She is well-developed and well-groomed.   HENT:      Head: Normocephalic and atraumatic.      Right Ear: Hearing and external ear normal.      Left Ear: Hearing and external ear normal.      Nose: Nose normal.   Eyes:      General: Lids are normal. Vision grossly intact.   Cardiovascular:      Rate and Rhythm: Normal rate and regular rhythm.      Heart sounds: Normal heart sounds. No murmur heard.      Pulmonary:      Effort: Pulmonary effort is normal.      Breath sounds: Normal breath sounds. No decreased breath sounds or wheezing.   Abdominal:      General: Bowel sounds are normal. There is no distension.   Musculoskeletal:         General: Normal range of motion.      Right lower leg: No edema.      Left lower leg: No edema.   Skin:     General: Skin is warm and dry.      Findings: No lesion or rash.   Neurological:      Mental Status: She is alert and oriented to person, place, and time.   Psychiatric:         Attention  and Perception: Attention normal.         Mood and Affect: Mood normal.         Behavior: Behavior is cooperative.          Assessment:      1. Chronic bilateral low back pain with left-sided sciatica    2. Muscle spasm of back    3. Anxiety and depression    4. Essential hypertension    5. Gastroesophageal reflux disease, unspecified whether esophagitis present           Plan:     1. Chronic bilateral low back pain with left-sided sciatica  - HYDROcodone-acetaminophen (NORCO) 5-325 mg per tablet; Take 1 tablet by mouth every 6 (six) hours as needed for Pain.  Dispense: 90 tablet; Refill: 0  - ketorolac injection 30 mg    2. Muscle spasm of back  - methocarbamoL (ROBAXIN) 500 MG Tab; Take 1 tablet (500 mg total) by mouth 3 (three) times daily as needed (muscle relaxant).  Dispense: 30 tablet; Refill: 2    3. Anxiety and depression  - patient stopped Effexor, will try Buspar, increase if needed  - busPIRone (BUSPAR) 7.5 MG tablet; Take 1 tablet by mouth 2 (two) times a day.    4. Essential hypertension  - BP is controlled, continue amlodipine, losartan    5. Gastroesophageal reflux disease, unspecified whether esophagitis present  - esomeprazole (NEXIUM) 40 MG capsule; Take 40 mg by mouth 2 (two) times a day.      RTC PRN    __________________________    Felicity Middleton MD, PharmD  [facility]- Internal Medicine  American Board of Obesity Medicine diplomate  Office [phone]

## 2021-12-20 ENCOUNTER — IMMUNIZATION (OUTPATIENT)
Dept: PHARMACY | Facility: CLINIC | Age: 66
End: 2021-12-20
Payer: MEDICARE

## 2021-12-20 DIAGNOSIS — Z23 NEED FOR VACCINATION: Primary | ICD-10-CM

## 2022-01-04 ENCOUNTER — TELEPHONE (OUTPATIENT)
Dept: INTERNAL MEDICINE | Facility: CLINIC | Age: 67
End: 2022-01-04
Payer: MEDICARE

## 2022-01-17 PROBLEM — K21.9 GASTROESOPHAGEAL REFLUX DISEASE: Status: ACTIVE | Noted: 2022-01-17

## 2022-01-25 ENCOUNTER — PATIENT MESSAGE (OUTPATIENT)
Dept: ADMINISTRATIVE | Facility: HOSPITAL | Age: 67
End: 2022-01-25
Payer: MEDICARE

## 2022-02-02 DIAGNOSIS — E78.2 MIXED HYPERLIPIDEMIA: ICD-10-CM

## 2022-02-02 DIAGNOSIS — M62.830 MUSCLE SPASM OF BACK: ICD-10-CM

## 2022-02-02 RX ORDER — ROSUVASTATIN CALCIUM 10 MG/1
10 TABLET, COATED ORAL DAILY
Qty: 90 TABLET | Refills: 1 | Status: SHIPPED | OUTPATIENT
Start: 2022-02-02 | End: 2022-07-30

## 2022-02-02 RX ORDER — METHOCARBAMOL 500 MG/1
500 TABLET, FILM COATED ORAL 3 TIMES DAILY PRN
Qty: 30 TABLET | Refills: 2 | Status: SHIPPED | OUTPATIENT
Start: 2022-02-02 | End: 2023-08-29

## 2022-02-02 NOTE — TELEPHONE ENCOUNTER
No new care gaps identified.  Powered by [employer]. Reference number: [id number].   2/02/2022 9:45:54 AM CST

## 2022-02-07 ENCOUNTER — TELEPHONE (OUTPATIENT)
Dept: PAIN MEDICINE | Facility: CLINIC | Age: 67
End: 2022-02-07
Payer: MEDICARE

## 2022-02-07 NOTE — TELEPHONE ENCOUNTER
This message is for patient in regards to his/her appointment 02/08/22 at 09:00a       [facility] Policy: For the safety of all patients and staff members.     Patient Visitor policy: Due to social distancing and limited seating staff are requesting patient to arrive to their schedule appointments on time. During this visit we're asking all patients to only have one visitor over the age of 18yrs old to accompany to be seen by Sadie Cowan np. If patient do not required assistance with their visit, we're asking all visitors to remain outside the waiting area.    Upon arriving to your schedule appointment; patients are required to wear a face mask, if patient do not have a face mask one will be provided entering the facility. We ask patients to contact clinical staff at this number [phone] to notify staff that they have arrived or they may do so by utilizing the Mobile checked in [place](if patient have patient portal; clinical staff will send a message through there letting them know it's okay to proceed to their visit). If you have any questions or concerns please contact [phone]    Patient verbalized understanding

## 2022-02-08 ENCOUNTER — OFFICE VISIT (OUTPATIENT)
Dept: PAIN MEDICINE | Facility: CLINIC | Age: 67
End: 2022-02-08
Payer: MEDICARE

## 2022-02-08 VITALS
HEART RATE: 83 BPM | BODY MASS INDEX: 43.24 KG/M2 | HEIGHT: 62 IN | WEIGHT: 235 LBS | DIASTOLIC BLOOD PRESSURE: 95 MMHG | SYSTOLIC BLOOD PRESSURE: 156 MMHG | RESPIRATION RATE: 18 BRPM | TEMPERATURE: 98 F

## 2022-02-08 DIAGNOSIS — M25.552 LEFT HIP PAIN: Primary | ICD-10-CM

## 2022-02-08 DIAGNOSIS — G89.4 CHRONIC PAIN DISORDER: ICD-10-CM

## 2022-02-08 DIAGNOSIS — M54.16 LUMBAR RADICULOPATHY: ICD-10-CM

## 2022-02-08 PROCEDURE — 1159F PR MEDICATION LIST DOCUMENTED IN MEDICAL RECORD: ICD-10-PCS | Mod: HCNC,CPTII,S$GLB, | Performed by: NURSE PRACTITIONER

## 2022-02-08 PROCEDURE — 99999 PR PBB SHADOW E&M-EST. PATIENT-LVL V: ICD-10-PCS | Mod: PBBFAC,HCNC,, | Performed by: NURSE PRACTITIONER

## 2022-02-08 PROCEDURE — 3077F PR MOST RECENT SYSTOLIC BLOOD PRESSURE >= 140 MM HG: ICD-10-PCS | Mod: HCNC,CPTII,S$GLB, | Performed by: NURSE PRACTITIONER

## 2022-02-08 PROCEDURE — 99499 UNLISTED E&M SERVICE: CPT | Mod: S$GLB,,, | Performed by: NURSE PRACTITIONER

## 2022-02-08 PROCEDURE — 1159F MED LIST DOCD IN RCRD: CPT | Mod: HCNC,CPTII,S$GLB, | Performed by: NURSE PRACTITIONER

## 2022-02-08 PROCEDURE — 99214 PR OFFICE/OUTPT VISIT, EST, LEVL IV, 30-39 MIN: ICD-10-PCS | Mod: HCNC,S$GLB,, | Performed by: NURSE PRACTITIONER

## 2022-02-08 PROCEDURE — 3080F PR MOST RECENT DIASTOLIC BLOOD PRESSURE >= 90 MM HG: ICD-10-PCS | Mod: HCNC,CPTII,S$GLB, | Performed by: NURSE PRACTITIONER

## 2022-02-08 PROCEDURE — 3077F SYST BP >= 140 MM HG: CPT | Mod: HCNC,CPTII,S$GLB, | Performed by: NURSE PRACTITIONER

## 2022-02-08 PROCEDURE — 1160F RVW MEDS BY RX/DR IN RCRD: CPT | Mod: HCNC,CPTII,S$GLB, | Performed by: NURSE PRACTITIONER

## 2022-02-08 PROCEDURE — 99214 OFFICE O/P EST MOD 30 MIN: CPT | Mod: HCNC,S$GLB,, | Performed by: NURSE PRACTITIONER

## 2022-02-08 PROCEDURE — 3008F BODY MASS INDEX DOCD: CPT | Mod: HCNC,CPTII,S$GLB, | Performed by: NURSE PRACTITIONER

## 2022-02-08 PROCEDURE — 99499 RISK ADDL DX/OHS AUDIT: ICD-10-PCS | Mod: S$GLB,,, | Performed by: NURSE PRACTITIONER

## 2022-02-08 PROCEDURE — 99999 PR PBB SHADOW E&M-EST. PATIENT-LVL V: CPT | Mod: PBBFAC,HCNC,, | Performed by: NURSE PRACTITIONER

## 2022-02-08 PROCEDURE — 1160F PR REVIEW ALL MEDS BY PRESCRIBER/CLIN PHARMACIST DOCUMENTED: ICD-10-PCS | Mod: HCNC,CPTII,S$GLB, | Performed by: NURSE PRACTITIONER

## 2022-02-08 PROCEDURE — 3008F PR BODY MASS INDEX (BMI) DOCUMENTED: ICD-10-PCS | Mod: HCNC,CPTII,S$GLB, | Performed by: NURSE PRACTITIONER

## 2022-02-08 PROCEDURE — 3080F DIAST BP >= 90 MM HG: CPT | Mod: HCNC,CPTII,S$GLB, | Performed by: NURSE PRACTITIONER

## 2022-02-08 RX ORDER — MELOXICAM 7.5 MG/1
7.5 TABLET ORAL DAILY PRN
Qty: 30 TABLET | Refills: 0 | Status: SHIPPED | OUTPATIENT
Start: 2022-02-08 | End: 2022-05-18

## 2022-02-08 RX ORDER — GABAPENTIN 300 MG/1
300 CAPSULE ORAL 3 TIMES DAILY
Qty: 270 CAPSULE | Refills: 0 | Status: SHIPPED | OUTPATIENT
Start: 2022-02-08 | End: 2022-05-18

## 2022-02-08 NOTE — PROGRESS NOTES
Referring Physician: No ref. provider found    Chief Complaint:   Chief Complaint   Patient presents with    Low-back Pain    Mid-back Pain        SUBJECTIVE:    Interval History 2/8/2022:  Patient here to discuss new pain in lumbar back and legs. Patient states she is experiencing new pain in her left hip that radiates around to her groin on the left side. Pain on the left side does not radiate down her leg and stays local to the hip. Patient also states she has pain on her right buttocks that is radiating down the posterior aspect of her right leg down to her calf. She states the pain is worse with prolonged standing and sitting. She has been using her SCS which she states she does get some relief with. Patient states that she stopped taking gabapentin due to symptoms resolved but thinks she might want to restart.     Interval History 1/29/2020:  The patient is here to discuss new symptoms.  We have not seen the patient in over one year.  She reports that she has been doing very well.  Her SCS incision healed well.  She has had minimal pain.  However, she reports that a few months ago she started experiences lower leg cramping and aching.  Her cardiologist stopped her statin about 2 months ago.  She says that since that time, her symptoms have improved.  However, she still has some aching to hamstring area and would like to know my opinion.  She has tried Zanaflex but does not like how it makes her feel.  Her pain today is 3/10.    Interval History 12/13/2018:  The patient presents for follow up.  She was evaluated by wound care on 11/28/18 and told to use medihoney gel to help with wound healing.  She has been doing this and noticed significant improvement.  She would like to recheck her skin today.  She is no longer taking pain medication.  Her pain today is 2/10.    Interval History 11/19/2018:  The patient returns for follow up and wound check.  She reports overall feeling well.  She says that she has not  seen her wound and her bandage has stayed on.  Her pain today is 2/10.    Interval History 11/24/2018:  The patient returns for wound check follow up.  She reports feeling better overall.  She denies fever or drainage.  Her pain today is 0/10.    Interval History 11/8/2018:  Patient presents for wound check.  She did take PO antibiotics for 5 additional days but stopped due to significant nausea.  Otherwise, she has been feeling well.  She denies fever or malaise.  She has not noticed any drainage.  Her steristrips fell off 2 days ago and she applied Bacitracin and a bandage.  Her pain today is 0/10.    Interval History 11/2/2018:  The patient presents for follow up.  She reports doing well with SCS device.  Her pain has been very mild.  She is not having any fever or malaise.  She has not noticed any drainage.  She completed PO antibiotics.  She has been applying Bacitracin BID.  Her pain today is 0/10.    Interval History 10/25/2018:  The patient returns for follow up and staple removal.  She reports feeling well- denies fever or malaise.  She completed oral antibiotics.  She is having benefit from SCS.  Wendy is here to meet with her today.  She has decreased her pain medications which she is happy about.  Her pain today is 4/10.    Interval History 10/22/2018:  The patient presents for wound check.  She is s/p lumbar SCS implant on 10/15/18.  She states that the surgery was painful for her but she has started feeling better.  She denies any fever or malaise.  She denies any drainage.  She is finishing antibiotics today.  Her pain today is 2/10.    Interval History 10/1/2018:  The patient returns for follow up and lead pull.  She is s/p SCS trial with 100% pain relief.  She would like to move forward with implant.  She denies any fever or malaise during the trial period.  Her pain today is 0/10.     Interval History 8/10/2018:  The patient presents today to discuss procedure for left sided back and leg pain.   Since her last OV, she underwent left L5 and S1 TF CIRILO in January.  Initially she thought that it did not help.  However, about one month later, she was having about 75% relief.  This lasted for about 2 months.  Prior to this, she had left L4 and L5 TF CIRILO in December.  At this point, she feels as though both injections have worn off.  Her pain is affecting her daily activities.  She states that she previously discussed SCS trial with Dr. Lopez and would like to proceed with this.  She has seen Dr. Rashid and was not deemed a candidate for surgery.  Her pain today is 8/10.  The patient denies any bowel or bladder incontinence or signs of saddle paresthesia.      Interval history 01/15/2018  The patient returns to the clinic for a follow up visit, she is reporting pain of 10 /10 today.  She is s/p Left L4 and L5 TFESI performed on 12/20/17.  She estimates 100% pain relief for ~ 2 weeks.  Was able to be physically active during that time period, with significantly improved functional mobility.  Within past 2 weeks she has had recurrence of pain in same location, quality, intensity.   Continued pain in lumbar area radiating to the lateral/posterolateral thigh and to the posterior calf. Leg pain is worse than back pain. Additionally has developed pain at lateral hip area - greater trochanteric bursa. Now walking ~ 1 block.    Denies infection, new weakness, bowel/bladder dysfunction/incontinence, saddle anesthesia.     Interval history 12/01/2017   The patient returns to the clinic for a follow up visit, she is reporting pain of 5 /10 today.  She has been doing better since cervical surgery but has been having occasional radicular symptoms.  Currently she comes in for lower extremities radiculopathy predominantly in the left lower extremity in the L4-5 distribution.  She had an MRI of the lumbar spine which shows  cyst at the encroachment on the left L5 nerve root.  Additionally the patient has significant facet  arthropathy throughout the lumbar spine    Interval history 04/02/2015:  Since previous encounter patient reports low back pain radiating down both lower extremities. Patient stated that she had Synvisc injection on 03/20/15 and this helped with her knee pain. Patient stated that she still has neck pain that radiates to both upper extremities. She stated that she needs a refill on her Gabapentin and Flexeril these medications have been helping with the pain. Patient also reported that she sees a chiropractor for her back pain. Patient reports no other health changes since her last visit. Patient reports her pain 6/10 today.    Interval history 03/02/2015:  Since previous encounter the patient reported having had knee arthroscopy and was told to try euflexxa injections versus total knee replacement by her orthopedist.  She was originally scheduled to have her orthopedist inject her knee with corticosteroids this morning but due to scheduling conflict the patient was placed on my schedule for an injection.  Unfortunately the patient has also been actively treated for upper respiratory infection and is currently on antibiotics.  She has been continuing home exercises with regularity stating good days and bad days with regards to her knee pain.  Additionally she continues to have upper neck and bilateral shoulder pain upper extremity radiculopathy and lower back pain.  Since her arthroscopic surgery the patient has been having intermittent hives and has been placed by an allergist on hydroxyzine and famotidine but has not had complete relief of these symptoms.      Interval history 10/21/2014:  Since previous encounter patient was scheduled for an CIRILO but cancelled due to her having a cold. Patient reports she still has a cold. Patient reports neck radiating down her right arm with numbness and tingling. Patient stated on the way over her arm went numb. She states that driving is becoming harder as she feels she has a  decreased hand  with opening of jars. Patient reports that she's been taking Mucinex and Thera Flu for her cold symptoms. Patient reports she been having hives which has been followed by an allergist with testing pending.  Additionally she has been seen by rheumatologist who diagnosed her with fibromyalgia.She states that she takes 6 caps of Gabapentin a day however she was titrating down down she states that when she gets to 4 caps a day she breaks out in hives and then quickly re escalates back to 6 tablets per day. Patient states that she's starting to have pain at the bottom of her feet when walking on her rugs in her home she states that just started yesterday. Patient reports her pain 8/10.  She says that she has been increasing her protein intake and has been continuing to do exercises improving her range of motion in her shoulder and neck and states that overall she feels as if she is doing better.  Patient reports no other health changes since her last visit.     Interval history 8/25/2014:  Patient continues to have radicular symptoms in the neck radiating down to the right upper extremity.  Additionally since previous encounter she had a dental abscess which is being treated with accommodation of antibiotics and having had her tooth pulled.  She is still antibiotics and has a followup visit in mid-September with her dentist to evaluate efficacy of treatment.    Interval history 7/25/2014:  Since previous encounter the patient is on gabapentin 1800 mg per day and this is helping with her shoulder pain although she does state that she is having restless legs at night which is preventing her from being able to sleep.  Additionally she is having anterior thigh radicular pain.  She has had no other health changes she continues to do physical therapy exercises at home.  She reports her pain level is a 6/10 today.    Interval history 7/2/2014:  Patient is status post ACDF in 4/2014.  She is healing very well  "from surgery, but continues to have right shoulder pain which she had prior to her surgery despite having a labral repair.  Additionally she continues to have neuropathic symptoms in bilateral arms and in the axilla bilaterally.  She has been undergoing physical therapy and states that it does help her including heat ultrasound to the muscles of the neck.  She states that this is only temporary relief.  She continues to use the topical compounded pain cream which does help her.  After her surgery she discontinued taking gabapentin which was previously helping her and which she was tolerating without side effects.  She reports her pain as a 3/10 today.  She had been taking hydrocodone/acetaminophen 10/325 3 times a day when necessary as prescribed by her surgeon, but this is being discontinued.    Interval history 3/11/2014:  Since previous encounter patient reports that she had an episode where she felt as if her neck "locked up" in that now when she is waking up in the morning she has bilateral upper extremity numbness which improves throughout the day.  She does not report any weakness.  She states that she has been having persistent daily headaches from the occiput over the vertex of the supraorbital ridge bilaterally, and continues to have pain in the right side of her neck and shoulder.  She reports that she is still taking the gabapentin 1800 mg per day, and hydrocodone/acetaminophen when necessary which helps a little.    Interval history 2/24/2014:  Patient is status post cervical interlaminar epidural steroid injection by 2 on 1/24/2014 and 2/5/2014.  Patient has persisting radicular symptoms into her right upper extremity and shoulder the anterior aspect of the neck and base of the jaw.  Patient reported approximately 10% improvement in her pain symptoms after the first injection similar improvement in her pain symptoms after the second injection no sustained relief she states that the pain relief lasted " her approximately one week and will go away.  She is taking gabapentin 300 mg 3 times a day, hydrocodone/acetaminophen 7.5/325 one tablet approximately 2-3 times per day.  She states that the Vicodin helps dull the pain but the does not eliminate it entirely, and she's not having any adverse problems in the gabapentin.  Patient continues in physical therapy for her right shoulder status post arthroscopic surgery, and has good range of motion in the shoulder.  Since previous exam the patient has a sinus infection with congestion.    Interval history 1/21/2014:  Since reducing her patient was only able to have one of the cervical intralaminar epidural steroid injections which approximately help her 80% in her pain symptoms in the back of her neck, but she was also having symptoms into the right shoulder and had a rotator cuff tear which was repaired arthroscopically which prevented us from being able to perform the second cervical intralaminar epidural steroid injection on 12/23/2013.  Patient reports that she continues to do home exercises for her shoulder surgery but was unable to perform PT secondary to pain.  She states that her pain was actually better up until 1/12/2014 where she developed severe pain on the right side of her neck and posterior side of her neck going down the back of the scapula and also across the trapezius muscles of the shoulder.  Her MRI which was done in outside facility reports that she has severe C5-6 neural foraminal stenosis.  For her pain the patient has been taking hydrocodone/acetaminophen 7.5/325 as needed she reports there are occasional days where she takes it every 4 hours and some days where she doesn't take it at all.  Resting her head on the pillow helps alleviate the pain.  Sitting and working at computers when her pain is the worst.  She denies any problems with movement of her hands or weakness in her arms.  She has had no other health changes since previous  encounter.    Initial history of present illness 10/2013:    Kaleigh Solo presents to the clinic for the evaluation of neck pain with radiation in right upper shoulder to the elbow, and down the right side of the chest wall. The pain started insidiously in the back of the neck in july  and symptoms have been worsening.  The pain has begun going into the shoulder, and there was swelling in the arm that began after starting Robaxin for muscle aches, and then took predisone taper and bactrim for the possibility of infection which did not help.  Patient stopped her medications and the swelling improved.  US of the upper extremity was negative for DVT.   Patient takes vicodin for pain which she states helps only a little bit, and doesn't take the pain go away.  MRI of cervical spine revealed severe neuroforaminal stenosis on the right at C5-6 with central to right neural foraminal disc protrusion with generalized bulging discs and uncovertebral joint osteophytic change.  No abnormal signal in the cord.      Pain Description:    The pain is located in the neck and radiates to the right shoulder .  The pain is described as aching and tight band      Symptoms interfere with daily activity and sleeping.     Exacerbating factors: Sitting, Bending, Touching, Coughing/Sneezing, Eating, Night Time, Morning, Flexing and Lifting.      Mitigating factors heat, laying down and medications.     She reports 2 hours of uninterrupted sleep per night.    Patient denies night fever/night sweats, urinary incontinence, bowel incontinence, significant weight loss, significant motor weakness and loss of sensations.  Patient denies any suicidal or homicidal ideations    Pain Medications:  Current:  Norco 5/325 mg PRN- PCP  Robaxin 500 mg PRN    Tried in Past:  Gabapentin  Hydrocodone  ibuprofen    Physical Therapy/Home Exercise: yes per self     report:  Not applicable    Pain Procedures:   12/20/17 Left L4 and L5 TF CIRILO- 75%  relief  1/24/18 Left L5 and S1 TF CIRILO- 75% relief  9/25/18 Lumbar SCS trial- 100% relief  10/15/18 Lumbar SCS implant- significant relief    Chiropractor -No treatments yet.  Right shoulder arthroscopy 12/10/2013  Right knee arthroscopy 12/19/14    Imaging:     10/7/2013  MRI of cervical spine revealed severe neuroforaminal stenosis on the right at C5-6 with central to right neural foraminal disc protrusion with generalized bulging discs and uncovertebral joint osteophytic change.  No abnormal signal in the cord.  Minor disc bulging at C3-4, C4-5 without central canal stenosis, spinal cord impingement or neural foraminal stenosis.    CT chest: 10/02/2013  No definite acute process or obvious etiology of the right-sided chest pain, axillary pain, neck and supraclavicular pain.  (full report scanned into record)    Right upper extremity venous ultrasound 10/02/2013  No evidence of DVT  lumbar spine 11/28/2017  Impression     MRI LUMBAR SPINE    TECHNIQUE: MRI lumbar spine was performed without contrast. The following sequences were obtained: Localizer; sagittal T1, T2, STIR; axial T1 and T2.    COMPARISON: None.    FINDINGS:    There are 5 lumbar vertebrae.  Vertebral body heights and alignment are maintained.  Bone marrow signal is preserved.  There is multilevel disc desiccation with preservation of disc heights. Conus terminates at L1 and appears unremarkable. Limited evaluation of posterior abdominal structures is unremarkable.  Paraspinal musculature is within normal limits.  Evaluation of sacroiliac joints is unremarkable.    L1-L2: No spinal canal stenosis or neuroforaminal narrowing.    L2-L3: Mild bilateral facet arthropathy noted.  No spinal canal stenosis or neuroforaminal narrowing.    L3-L4: Mild bilateral facet arthropathy and circumferential disc bulge noted.  No spinal canal stenosis or neuroforaminal narrowing.    L4-L5: Severe bilateral facet arthropathy and circumferential disc bulge result in  moderate spinal canal stenosis.    L5-S1: Circumferential disc bulge and severe bilateral facet arthropathy result in severe spinal canal stenosis.  Note made of synovial cyst in the left foramen, contacting the exiting L5 nerve root.     Lab Results   Component Value Date    WBC 5.73 11/05/2021    HGB 14.4 11/05/2021    HCT 45.2 11/05/2021    MCV 98 11/05/2021     11/05/2021       CMP  Sodium   Date Value Ref Range Status   11/05/2021 140 136 - 145 mmol/L Final     Potassium   Date Value Ref Range Status   11/05/2021 4.3 3.5 - 5.1 mmol/L Final     Chloride   Date Value Ref Range Status   11/05/2021 103 95 - 110 mmol/L Final     CO2   Date Value Ref Range Status   11/05/2021 30 (H) 23 - 29 mmol/L Final     Glucose   Date Value Ref Range Status   11/05/2021 113 (H) 70 - 110 mg/dL Final     BUN   Date Value Ref Range Status   11/05/2021 7 (L) 8 - 23 mg/dL Final     Creatinine   Date Value Ref Range Status   11/05/2021 0.8 0.5 - 1.4 mg/dL Final     Calcium   Date Value Ref Range Status   11/05/2021 9.5 8.7 - 10.5 mg/dL Final     Total Protein   Date Value Ref Range Status   11/05/2021 6.9 6.0 - 8.4 g/dL Final     Albumin   Date Value Ref Range Status   11/05/2021 3.8 3.5 - 5.2 g/dL Final     Total Bilirubin   Date Value Ref Range Status   11/05/2021 0.4 0.1 - 1.0 mg/dL Final     Comment:     For infants and newborns, interpretation of results should be based  on gestational age, weight and in agreement with clinical  observations.    Premature Infant recommended reference ranges:  Up to 24 hours.............<8.0 mg/dL  Up to 48 hours............<12.0 mg/dL  3-5 days..................<15.0 mg/dL  6-29 days.................<15.0 mg/dL       Alkaline Phosphatase   Date Value Ref Range Status   11/05/2021 90 55 - 135 U/L Final     AST   Date Value Ref Range Status   11/05/2021 17 10 - 40 U/L Final     ALT   Date Value Ref Range Status   11/05/2021 23 10 - 44 U/L Final     Anion Gap   Date Value Ref Range Status    11/05/2021 7 (L) 8 - 16 mmol/L Final     eGFR if    Date Value Ref Range Status   11/05/2021 >60.0 >60 mL/min/1.73 m^2 Final     eGFR if non    Date Value Ref Range Status   11/05/2021 >60.0 >60 mL/min/1.73 m^2 Final     Comment:     Calculation used to obtain the estimated glomerular filtration  rate (eGFR) is the CKD-EPI equation.            Past Medical History:   Diagnosis Date    Arthritis     Asthma     Cervical radiculopathy 10/14/2013    Cervicalgia     2013 tx by chiropractor    Closed dislocation of acromioclavicular joint 2013    Degenerative disc disease     Family history of aortic aneurysm 10/21/2019    Fatty liver     Fibromyalgia     GERD (gastroesophageal reflux disease)     HPV (human papilloma virus) anogenital infection     conization in past-remote history-1990    HTN (hypertension)     Sciatica     Tobacco use     Ulcer 2007    stomach ulcer     Past Surgical History:   Procedure Laterality Date    acdf  4/2014    C5-6    BACK SURGERY      CERVICAL    CERVICAL CONIZATION   W/ LASER      KNEE ARTHROSCOPY      right shoulder surger      arthroscopic surgery Dec 2013    TRIAL OF SPINAL CORD NERVE STIMULATOR N/A 9/25/2018    Procedure: TRIAL, NEUROSTIMULATOR, SPINAL CORD;  Surgeon: Candice Lopez MD;  Location: [facility];  Service: Pain Management;  Laterality: N/A;  SCS Trial  [facility] notified of date and time     Social History     Socioeconomic History    Marital status:    Tobacco Use    Smoking status: Current Every Day Smoker     Packs/day: 1.00     Years: 48.00     Pack years: 48.00     Types: Cigarettes    Smokeless tobacco: Never Used   Substance and Sexual Activity    Alcohol use: Yes     Comment: occasional    Drug use: No   Social History Narrative    , not working, 3 children (1 passed at 9 mo old), tobacco daily, ETOH socially, GYN 2013 dtrs of [facility]     Social Determinants of Health      Financial Resource Strain: Low Risk     Difficulty of Paying Living Expenses: Not hard at all   Food Insecurity: No Food Insecurity    Worried About Running Out of Food in the Last Year: Never true    Ran Out of Food in the Last Year: Never true   Transportation Needs: No Transportation Needs    Lack of Transportation (Medical): No    Lack of Transportation (Non-Medical): No   Physical Activity: Inactive    Days of Exercise per Week: 0 days    Minutes of Exercise per Session: 0 min   Stress: Stress Concern Present    Feeling of Stress : To some extent   Social Connections: Unknown    Frequency of Communication with Friends and Family: More than three times a week    Frequency of Social Gatherings with Friends and Family: More than three times a week    Active Member of Clubs or Organizations: No    Attends Club or Organization Meetings: Never    Marital Status:    Housing Stability: Unknown    Unable to Pay for Housing in the Last Year: No    Number of Places Lived in the Last Year: 1     Family History   Problem Relation Age of Onset    Cancer Mother         lung-was tobacco user  of lung cancer at 78    Aneurysm Father         abdominal burst-  of stroke at 71 years    Heart disease Father         had HTN       Allergies   Allergen Reactions    Ambien [Zolpidem] Other (See Comments)     Pt hyperactive    Lunesta [Eszopiclone] Other (See Comments)     Severely depressed    Shellfish Containing Products Swelling and Other (See Comments)     Metallic taste in mouth  Swells all over,including the tongue and throat  Feels likes insides are swollen  Allergic to crawfish,Shrimp    Ancef [Cefazolin]      Facial swelling  Swelling abdomen    Flexeril [Cyclobenzaprine] Swelling    Gabapentin Hives    Sulfa (Sulfonamide Antibiotics) Nausea Only       Current Outpatient Medications   Medication Sig    albuterol (PROAIR HFA) 90 mcg/actuation inhaler Inhale 2 puffs into the lungs  every 6 (six) hours as needed for Wheezing or Shortness of Breath.    amLODIPine (NORVASC) 5 MG tablet Take 1 tablet (5 mg total) by mouth once daily.    busPIRone (BUSPAR) 7.5 MG tablet Take 1 tablet by mouth 2 (two) times a day.    CALCIUM-MAG OXIDE-VITAMIN D3 ORAL Take by mouth.    cetirizine (ZYRTEC) 10 MG tablet Take 10 mg by mouth daily as needed.     clotrimazole-betamethasone 1-0.05% (LOTRISONE) cream Apply topically 2 (two) times daily.    diphenhydrAMINE (BENADRYL) 50 MG tablet Take 50 mg by mouth nightly as needed for Itching.    EPINEPHrine (EPIPEN) 0.3 mg/0.3 mL AtIn INJECT INTRAMUSCULARLY AS DIRECTED    ergocalciferol (ERGOCALCIFEROL) 50,000 unit Cap TAKE 1 CAPSULE BY MOUTH EVERY 7 DAYS    esomeprazole (NEXIUM) 40 MG capsule Take 40 mg by mouth 2 (two) times a day.    famotidine (PEPCID) 20 MG tablet Take 20 mg by mouth 2 (two) times daily as needed.     fluticasone propionate (FLONASE) 50 mcg/actuation nasal spray One spray in each nostril twice daily after 1st using azelastine nasal spray    folic acid (FOLVITE) 800 MCG Tab Take 400 mcg by mouth once daily.    furosemide (LASIX) 20 MG tablet TAKE 1 TABLET BY MOUTH AS NEEDED FOR LEG SWELLING    HYDROcodone-acetaminophen (NORCO) 5-325 mg per tablet Take 1 tablet by mouth every 6 (six) hours as needed for Pain.    hydrOXYzine HCL (ATARAX) 25 MG tablet TAKE 1 TABLET(25 MG) BY MOUTH THREE TIMES DAILY AS NEEDED FOR ITCHING    losartan (COZAAR) 100 MG tablet Take 1 tablet (100 mg total) by mouth once daily.    methocarbamoL (ROBAXIN) 500 MG Tab Take 1 tablet (500 mg total) by mouth 3 (three) times daily as needed (muscle relaxant).    montelukast (SINGULAIR) 10 mg tablet One tablet by mouth every evening    rosuvastatin (CRESTOR) 10 MG tablet Take 1 tablet (10 mg total) by mouth once daily.    simethicone 250 mg Cap Take 500 mg by mouth 2 (two) times daily.    triamcinolone acetonide 0.5% (KENALOG) 0.5 % Crea     azelastine (ASTELIN)  "137 mcg (0.1 %) nasal spray Two sprays in each nostril, sniff until absorbed, then follow with 1 spray of fluticasone.  Use both sprays twice daily.    azelastine (OPTIVAR) 0.05 % ophthalmic solution Place 1 drop into both eyes 2 (two) times daily.     No current facility-administered medications for this visit.       REVIEW OF SYSTEMS:    GENERAL:  No weight loss, malaise or fevers.  NECK:  Negative for lumps, no difficulty with swallowing.   RESPIRATORY:  No recent URI  CARDIOVASCULAR:  Negative for chest pain, leg swelling or palpitations.  GI:  Negative for abdominal discomfort, blood in stools or black stools or change in bowel habits.  MUSCULOSKELETAL:  See HPI.   SKIN: no new lesions  HEMATOLOGY/LYMPHOLOGY:  Negative for prolonged bleeding, bruising easily or swollen nodes.  Patient is not currently taking any anti-coagulants  NEURO:   No history of syncope, paralysis, seizures or tremors.  All other reviewed and negative other than HPI.    OBJECTIVE:    BP (!) 156/95   Pulse 83   Temp 97.9 °F (36.6 °C)   Resp 18   Ht 5' 2" (1.575 m)   Wt 106.6 kg (235 lb)   BMI 42.98 kg/m²     PHYSICAL EXAMINATION:    GENERAL: Well appearing, in no acute distress, alert and oriented x3.  PSYCH:  Mood and affect appropriate.  SKIN: Skin color, texture, turgor normal, no rashes or lesions.  HEAD/FACE:  Normocephalic, atraumatic. Cranial nerves grossly intact.  PULM: No evidence of respiratory difficulty, symmetric chest rise.  BACK: Straight leg raising in the sitting and supine positions is negative to radicular pain.  There is tightening discomfort to hamstring muscles with forward flexion.  There is decreased range of motion with extension to 15 degrees, and facet loading maneuvers cause reproducible pain.    EXTREMITIES: Peripheral joint ROM is full and pain free without obvious instability or laxity in all four extremities. No deformities, edema, or skin discoloration. Good capillary refill.  MUSCULOSKELETAL: No " palpable mass to left inguinal area. No pain to palpation of lumbar spine. No pain with flexion and extension of lumbar spine. Tenderness to deep palpation of left hip join. Positive LUDY on left for hip pain. Pain with internal rotation of left hip. 5/5 strength in right ankle with plantar and dorsiflexion, 5/5 strength in left ankle with plantar and 5/5 dorsiflexion, 5/5 strength with right knee flexion extension, 5/5 strength with knee flexion extension on the left .  No atrophy or tone abnormalities are noted.  NEURO: Bilateral lower extremity coordination and muscle stretch reflexes are physiologic and symmetric.  Plantar response are downgoing. No clonus.  No loss of sensation is noted.  GAIT: Antalgic, ambulates without assistance    ASSESSMENT: 66 y.o. year old female with neck and lower back pain, consistent with the following diagnoses:    1. Left hip pain     2. Lumbar radiculopathy     3. Chronic pain disorder          PLAN:     - Recent labs and previous imaging reviewed with patient.    - Recommended Left hip Xray which patient declined at this time.    - Discussed adjusting SCS for leg pain. Can also consider CIRILO in future    - Discussed targeting stretching exercises to help with pain.    - Can continue current medication from PCP.    - Restart Gabapentin to titrate too 900mg daily.     - Start Mobic 7.5mg PRN daily, for one month supply only. Recommend lowest dose for shortest duration possible. Pt denies hx of GI ulcers or bleeds, no blood thinners, no significant known cardiac disease, no kidney disease.     - If no improvement with above, she will notify me.    - The patient will continue a home exercise routine to help with pain and strengthening.      - RTC PRN.      The above plan and management options were discussed at length with patient. Patient is in agreement with the above and verbalized understanding.     Sadie Cowan  02/08/2022

## 2022-02-08 NOTE — PROGRESS NOTES
Patient, Kaleigh Solo (MRN #[id number]), presented with a recorded BMI of 42.98 kg/m^2 consistent with the definition of morbid obesity (ICD-10 E66.01). The patient's morbid obesity was monitored, evaluated, addressed and/or treated. This addendum to the medical record is made on 02/08/2022.

## 2022-03-03 ENCOUNTER — PATIENT MESSAGE (OUTPATIENT)
Dept: INTERNAL MEDICINE | Facility: CLINIC | Age: 67
End: 2022-03-03
Payer: MEDICARE

## 2022-03-03 DIAGNOSIS — G89.29 CHRONIC BILATERAL LOW BACK PAIN WITH LEFT-SIDED SCIATICA: ICD-10-CM

## 2022-03-03 DIAGNOSIS — M54.42 CHRONIC BILATERAL LOW BACK PAIN WITH LEFT-SIDED SCIATICA: ICD-10-CM

## 2022-03-03 NOTE — TELEPHONE ENCOUNTER
No new care gaps identified.  Powered by [employer]. Reference number: [id number].   3/03/2022 1:31:31 PM CST

## 2022-03-07 RX ORDER — HYDROCODONE BITARTRATE AND ACETAMINOPHEN 5; 325 MG/1; MG/1
1 TABLET ORAL EVERY 6 HOURS PRN
Qty: 90 TABLET | Refills: 0 | Status: SHIPPED | OUTPATIENT
Start: 2022-03-07 | End: 2022-05-18 | Stop reason: SDUPTHER

## 2022-04-11 DIAGNOSIS — I10 ESSENTIAL HYPERTENSION: ICD-10-CM

## 2022-04-11 NOTE — TELEPHONE ENCOUNTER
No new care gaps identified.  Powered by [employer]. Reference number: [id number].   4/11/2022 7:37:37 AM CDT

## 2022-04-12 NOTE — TELEPHONE ENCOUNTER
Refill Routing Note   Medication(s) are not appropriate for processing by [facility] for the following reason(s):      - Required vitals are abnormal    [facility] action(s):  Defer          Medication reconciliation completed: No     Appointments  past 12m or future 3m with PCP    Date Provider   Last Visit   12/10/2021 Felicity Middleton MD   Next Visit   5/18/2022 Felicity Middleton MD   ED visits in past 90 days: 0        Note composed:8:58 AM 04/12/2022

## 2022-04-13 RX ORDER — LOSARTAN POTASSIUM 100 MG/1
TABLET ORAL
Qty: 90 TABLET | Refills: 1 | Status: SHIPPED | OUTPATIENT
Start: 2022-04-13 | End: 2022-09-07

## 2022-04-13 RX ORDER — AMLODIPINE BESYLATE 5 MG/1
5 TABLET ORAL DAILY
Qty: 90 TABLET | Refills: 2 | Status: SHIPPED | OUTPATIENT
Start: 2022-04-13 | End: 2022-11-19

## 2022-04-27 ENCOUNTER — OFFICE VISIT (OUTPATIENT)
Dept: OBSTETRICS AND GYNECOLOGY | Facility: CLINIC | Age: 67
End: 2022-04-27
Payer: MEDICARE

## 2022-04-27 VITALS
DIASTOLIC BLOOD PRESSURE: 78 MMHG | HEIGHT: 62 IN | SYSTOLIC BLOOD PRESSURE: 140 MMHG | BODY MASS INDEX: 43.02 KG/M2 | WEIGHT: 233.81 LBS

## 2022-04-27 DIAGNOSIS — Z12.31 SCREENING MAMMOGRAM, ENCOUNTER FOR: ICD-10-CM

## 2022-04-27 DIAGNOSIS — N90.89 VULVAR IRRITATION: ICD-10-CM

## 2022-04-27 DIAGNOSIS — Z01.419 WELL WOMAN EXAM WITH ROUTINE GYNECOLOGICAL EXAM: Primary | ICD-10-CM

## 2022-04-27 PROCEDURE — 4010F ACE/ARB THERAPY RXD/TAKEN: CPT | Mod: CPTII,S$GLB,, | Performed by: OBSTETRICS & GYNECOLOGY

## 2022-04-27 PROCEDURE — 88175 CYTOPATH C/V AUTO FLUID REDO: CPT | Performed by: OBSTETRICS & GYNECOLOGY

## 2022-04-27 PROCEDURE — 99999 PR PBB SHADOW E&M-EST. PATIENT-LVL III: CPT | Mod: PBBFAC,,, | Performed by: OBSTETRICS & GYNECOLOGY

## 2022-04-27 PROCEDURE — 3008F BODY MASS INDEX DOCD: CPT | Mod: CPTII,S$GLB,, | Performed by: OBSTETRICS & GYNECOLOGY

## 2022-04-27 PROCEDURE — 3078F DIAST BP <80 MM HG: CPT | Mod: CPTII,S$GLB,, | Performed by: OBSTETRICS & GYNECOLOGY

## 2022-04-27 PROCEDURE — 3077F SYST BP >= 140 MM HG: CPT | Mod: CPTII,S$GLB,, | Performed by: OBSTETRICS & GYNECOLOGY

## 2022-04-27 PROCEDURE — 4010F PR ACE/ARB THEARPY RXD/TAKEN: ICD-10-PCS | Mod: CPTII,S$GLB,, | Performed by: OBSTETRICS & GYNECOLOGY

## 2022-04-27 PROCEDURE — 3078F PR MOST RECENT DIASTOLIC BLOOD PRESSURE < 80 MM HG: ICD-10-PCS | Mod: CPTII,S$GLB,, | Performed by: OBSTETRICS & GYNECOLOGY

## 2022-04-27 PROCEDURE — 99999 PR PBB SHADOW E&M-EST. PATIENT-LVL III: ICD-10-PCS | Mod: PBBFAC,,, | Performed by: OBSTETRICS & GYNECOLOGY

## 2022-04-27 PROCEDURE — G0101 PR CA SCREEN;PELVIC/BREAST EXAM: ICD-10-PCS | Mod: S$GLB,,, | Performed by: OBSTETRICS & GYNECOLOGY

## 2022-04-27 PROCEDURE — G0101 CA SCREEN;PELVIC/BREAST EXAM: HCPCS | Mod: S$GLB,,, | Performed by: OBSTETRICS & GYNECOLOGY

## 2022-04-27 PROCEDURE — 3008F PR BODY MASS INDEX (BMI) DOCUMENTED: ICD-10-PCS | Mod: CPTII,S$GLB,, | Performed by: OBSTETRICS & GYNECOLOGY

## 2022-04-27 PROCEDURE — 87624 HPV HI-RISK TYP POOLED RSLT: CPT | Performed by: OBSTETRICS & GYNECOLOGY

## 2022-04-27 PROCEDURE — 3077F PR MOST RECENT SYSTOLIC BLOOD PRESSURE >= 140 MM HG: ICD-10-PCS | Mod: CPTII,S$GLB,, | Performed by: OBSTETRICS & GYNECOLOGY

## 2022-04-27 RX ORDER — NYSTATIN AND TRIAMCINOLONE ACETONIDE 100000; 1 [USP'U]/G; MG/G
OINTMENT TOPICAL 2 TIMES DAILY
Qty: 60 G | Refills: 2 | Status: SHIPPED | OUTPATIENT
Start: 2022-04-27 | End: 2023-04-14

## 2022-04-27 NOTE — PROGRESS NOTES
History & Physical  Gynecology      SUBJECTIVE:     Chief Complaint: Annual Exam       History of Present Illness:    Kaleigh Solo is a 66 y.o. female  ( x 3 - 1 boy, 2 girls) here for annual routine Pap and checkup. No LMP recorded. Patient is postmenopausal.  She has no unusual complaints.      No vaginal bleeding  denies break through bleeding.   denies vaginal itching or irritation.  Notes some vulvar irritation  denies vaginal discharge.    She is not sexually active     History of abnormal pap: No, hx of HPV in the past  Last Pap: none of file  Last MMG: Yes- 2021  Last Colonoscopy:  No- doing Cologard kit        Review of patient's allergies indicates:   Allergen Reactions    Ambien [zolpidem] Other (See Comments)     Pt hyperactive    Iodine and iodide containing products Swelling    Lunesta [eszopiclone] Other (See Comments)     Severely depressed    Orange Blisters and Swelling    Pineapple Blisters and Swelling    Shellfish containing products Swelling and Other (See Comments)     Metallic taste in mouth  Swells all over,including the tongue and throat  Feels likes insides are swollen  Allergic to crawfish,Shrimp    Ancef [cefazolin]      Facial swelling  Swelling abdomen    Egg derived Hives and Swelling    Flexeril [cyclobenzaprine] Swelling    Pork/porcine containing products Hives and Swelling    Sulfa (sulfonamide antibiotics) Nausea Only       Past Medical History:   Diagnosis Date    Arthritis     Asthma     Cervical radiculopathy 10/14/2013    Cervicalgia     2013 tx by chiropractor    Closed dislocation of acromioclavicular joint     Degenerative disc disease     Family history of aortic aneurysm 10/21/2019    Fatty liver     Fibromyalgia     GERD (gastroesophageal reflux disease)     HPV (human papilloma virus) anogenital infection     conization in past-remote history-    HTN (hypertension)     Sciatica     Tobacco use     Ulcer      stomach ulcer     Past Surgical History:   Procedure Laterality Date    acdf  2014    C5-6    BACK SURGERY      CERVICAL    CERVICAL CONIZATION   W/ LASER      KNEE ARTHROSCOPY      right shoulder surger      arthroscopic surgery Dec 2013    TRIAL OF SPINAL CORD NERVE STIMULATOR N/A 2018    Procedure: TRIAL, NEUROSTIMULATOR, SPINAL CORD;  Surgeon: Candice Lopez MD;  Location: [facility];  Service: Pain Management;  Laterality: N/A;  SCS Trial  Roy Rep Hankins notified of date and time     OB History        3    Para   3    Term   3            AB        Living           SAB        IAB        Ectopic        Multiple        Live Births                   Family History   Problem Relation Age of Onset    Cancer Mother         lung-was tobacco user  of lung cancer at 78    Aneurysm Father         abdominal burst-  of stroke at 71 years    Heart disease Father         had HTN     Social History     Tobacco Use    Smoking status: Current Every Day Smoker     Packs/day: 1.00     Years: 48.00     Pack years: 48.00     Types: Cigarettes    Smokeless tobacco: Never Used   Substance Use Topics    Alcohol use: Yes     Comment: occasional    Drug use: No       Current Outpatient Medications   Medication Sig    albuterol (PROAIR HFA) 90 mcg/actuation inhaler Inhale 2 puffs into the lungs every 6 (six) hours as needed for Wheezing or Shortness of Breath.    amLODIPine (NORVASC) 5 MG tablet Take 1 tablet (5 mg total) by mouth once daily.    busPIRone (BUSPAR) 7.5 MG tablet Take 1 tablet by mouth 2 (two) times a day.    CALCIUM-MAG OXIDE-VITAMIN D3 ORAL Take by mouth.    cetirizine (ZYRTEC) 10 MG tablet Take 10 mg by mouth daily as needed.     clotrimazole-betamethasone 1-0.05% (LOTRISONE) cream Apply topically 2 (two) times daily.    diphenhydrAMINE (BENADRYL) 50 MG tablet Take 50 mg by mouth nightly as needed for Itching.    EPINEPHrine (EPIPEN) 0.3 mg/0.3 mL AtIn INJECT  INTRAMUSCULARLY AS DIRECTED    ergocalciferol (ERGOCALCIFEROL) 50,000 unit Cap TAKE 1 CAPSULE BY MOUTH EVERY 7 DAYS    esomeprazole (NEXIUM) 40 MG capsule Take 40 mg by mouth 2 (two) times a day.    famotidine (PEPCID) 20 MG tablet Take 20 mg by mouth 2 (two) times daily as needed.     fluticasone propionate (FLONASE) 50 mcg/actuation nasal spray One spray in each nostril twice daily after 1st using azelastine nasal spray    folic acid (FOLVITE) 800 MCG Tab Take 400 mcg by mouth once daily.    furosemide (LASIX) 20 MG tablet TAKE 1 TABLET BY MOUTH AS NEEDED FOR LEG SWELLING    gabapentin (NEURONTIN) 300 MG capsule Take 1 capsule (300 mg total) by mouth 3 (three) times daily.    HYDROcodone-acetaminophen (NORCO) 5-325 mg per tablet Take 1 tablet by mouth every 6 (six) hours as needed for Pain.    hydrOXYzine HCL (ATARAX) 25 MG tablet TAKE 1 TABLET(25 MG) BY MOUTH THREE TIMES DAILY AS NEEDED FOR ITCHING    losartan (COZAAR) 100 MG tablet TAKE 1 TABLET EVERY DAY    meloxicam (MOBIC) 7.5 MG tablet Take 1 tablet (7.5 mg total) by mouth daily as needed for Pain.    methocarbamoL (ROBAXIN) 500 MG Tab Take 1 tablet (500 mg total) by mouth 3 (three) times daily as needed (muscle relaxant).    montelukast (SINGULAIR) 10 mg tablet One tablet by mouth every evening    nystatin-triamcinolone (MYCOLOG) ointment Apply topically 2 (two) times daily.    rosuvastatin (CRESTOR) 10 MG tablet Take 1 tablet (10 mg total) by mouth once daily.    simethicone 250 mg Cap Take 500 mg by mouth 2 (two) times daily.    triamcinolone acetonide 0.5% (KENALOG) 0.5 % Crea      No current facility-administered medications for this visit.         Review of Systems:  Review of Systems   Constitutional: Negative for activity change, appetite change, fatigue, fever and unexpected weight change.   Respiratory: Negative for cough and shortness of breath.    Cardiovascular: Negative for chest pain and leg swelling.   Gastrointestinal:  Negative for abdominal pain, blood in stool, constipation, diarrhea, nausea and vomiting.   Endocrine: Negative for diabetes, hair loss and hot flashes.   Genitourinary: Negative for pelvic pain, postcoital bleeding and postmenopausal bleeding.   Musculoskeletal: Negative for back pain.   Integumentary:  Negative for hair changes, breast mass, nipple discharge and breast skin changes.   Psychiatric/Behavioral: Negative for sleep disturbance. The patient is not nervous/anxious.    Breast: Negative for mass, mastodynia, nipple discharge and skin changes       OBJECTIVE:     Physical Exam:  Physical Exam  Constitutional:       Appearance: She is well-developed.   HENT:      Head: Normocephalic and atraumatic.   Eyes:      General: No scleral icterus.        Right eye: No discharge.         Left eye: No discharge.      Conjunctiva/sclera: Conjunctivae normal.   Pulmonary:      Effort: Pulmonary effort is normal.      Breath sounds: No stridor.   Chest:      Chest wall: No mass or tenderness.   Breasts: Breasts are symmetrical.      Right: No inverted nipple, mass, nipple discharge, skin change or tenderness.      Left: No inverted nipple, mass, nipple discharge, skin change or tenderness.       Abdominal:      General: There is no distension.      Palpations: Abdomen is soft.      Tenderness: There is no abdominal tenderness.   Genitourinary:     Labia:         Right: No rash, tenderness, lesion or injury.         Left: No rash, tenderness, lesion or injury.       Vagina: Normal.      Cervix: No cervical motion tenderness, discharge or friability.      Adnexa:         Right: No mass, tenderness or fullness.          Left: No mass, tenderness or fullness.     Musculoskeletal:         General: Normal range of motion.   Skin:     General: Skin is warm and dry.   Neurological:      Mental Status: She is alert and oriented to person, place, and time.   Psychiatric:         Behavior: Behavior normal.         Thought Content:  Thought content normal.         Judgment: Judgment normal.           ASSESSMENT:       ICD-10-CM ICD-9-CM    1. Well woman exam with routine gynecological exam  Z01.419 V72.31 Ambulatory referral/consult to Obstetrics / Gynecology      Liquid-Based Pap Smear, Screening      HPV High Risk Genotypes, PCR   2. Screening mammogram, encounter for  Z12.31 V76.12 Mammo Digital Screening Bilat w/ Ervin   3. Vulvar irritation  N90.89 624.8 nystatin-triamcinolone (MYCOLOG) ointment          Plan:     Kaleigh was seen today for annual exam.    Diagnoses and all orders for this visit:    Well woman exam with routine gynecological exam  -     Ambulatory referral/consult to Obstetrics / Gynecology  -     Liquid-Based Pap Smear, Screening  -     HPV High Risk Genotypes, PCR  - Cotesting today  - MMG ordered  - Cscope- doing cologard kits.  Defer to PCP    Screening mammogram, encounter for  -     Mammo Digital Screening Bilat w/ Ervin; Future    Vulvar irritation  - Having some vulvar and groin irritation  - Will send in mycolog  -     nystatin-triamcinolone (MYCOLOG) ointment; Apply topically 2 (two) times daily.        Orders Placed This Encounter   Procedures    HPV High Risk Genotypes, PCR    Mammo Digital Screening Bilat w/ Ervin       Follow up in about 1 year (around 4/27/2023) for annual.    Counseling time: 15 minutes    Janette Stewart

## 2022-05-04 ENCOUNTER — PATIENT MESSAGE (OUTPATIENT)
Dept: OBSTETRICS AND GYNECOLOGY | Facility: CLINIC | Age: 67
End: 2022-05-04
Payer: MEDICARE

## 2022-05-18 ENCOUNTER — OFFICE VISIT (OUTPATIENT)
Dept: INTERNAL MEDICINE | Facility: CLINIC | Age: 67
End: 2022-05-18
Payer: MEDICARE

## 2022-05-18 ENCOUNTER — LAB VISIT (OUTPATIENT)
Dept: LAB | Facility: HOSPITAL | Age: 67
End: 2022-05-18
Attending: INTERNAL MEDICINE
Payer: MEDICARE

## 2022-05-18 VITALS
WEIGHT: 233 LBS | RESPIRATION RATE: 18 BRPM | HEIGHT: 62 IN | SYSTOLIC BLOOD PRESSURE: 126 MMHG | OXYGEN SATURATION: 97 % | DIASTOLIC BLOOD PRESSURE: 78 MMHG | HEART RATE: 91 BPM | TEMPERATURE: 97 F | BODY MASS INDEX: 42.88 KG/M2

## 2022-05-18 DIAGNOSIS — E78.2 MIXED HYPERLIPIDEMIA: ICD-10-CM

## 2022-05-18 DIAGNOSIS — E27.8 ADRENAL MASS, LEFT: ICD-10-CM

## 2022-05-18 DIAGNOSIS — F11.90 CHRONIC NARCOTIC USE: ICD-10-CM

## 2022-05-18 DIAGNOSIS — I10 ESSENTIAL HYPERTENSION: Primary | ICD-10-CM
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
## 2022-05-18 DIAGNOSIS — E66.01 MORBID OBESITY WITH BMI OF 40.0-44.9, ADULT: ICD-10-CM

## 2022-05-18 DIAGNOSIS — I25.10 ATHEROSCLEROSIS OF NATIVE CORONARY ARTERY OF NATIVE HEART WITHOUT ANGINA PECTORIS: ICD-10-CM

## 2022-05-18 DIAGNOSIS — R29.898 TRANSIENT WEAKNESS OF LEFT LEG: ICD-10-CM

## 2022-05-18 DIAGNOSIS — M46.92 UNSPECIFIED INFLAMMATORY SPONDYLOPATHY, CERVICAL REGION: ICD-10-CM

## 2022-05-18 DIAGNOSIS — I10 ESSENTIAL HYPERTENSION: ICD-10-CM

## 2022-05-18 DIAGNOSIS — I70.0 AORTIC CALCIFICATION: ICD-10-CM

## 2022-05-18 DIAGNOSIS — M54.16 LUMBAR RADICULOPATHY: ICD-10-CM

## 2022-05-18 DIAGNOSIS — M17.0 PRIMARY OSTEOARTHRITIS OF BOTH KNEES: ICD-10-CM

## 2022-05-18 LAB
ALBUMIN SERPL BCP-MCNC: 4.2 G/DL (ref 3.5–5.2)
ALP SERPL-CCNC: 98 U/L (ref 55–135)
ALT SERPL W/O P-5'-P-CCNC: 30 U/L (ref 10–44)
ANION GAP SERPL CALC-SCNC: 9 MMOL/L (ref 8–16)
AST SERPL-CCNC: 23 U/L (ref 10–40)
BILIRUB SERPL-MCNC: 0.5 MG/DL (ref 0.1–1)
BUN SERPL-MCNC: 9 MG/DL (ref 8–23)
CALCIUM SERPL-MCNC: 9.8 MG/DL (ref 8.7–10.5)
CHLORIDE SERPL-SCNC: 104 MMOL/L (ref 95–110)
CHOLEST SERPL-MCNC: 208 MG/DL (ref 120–199)
CHOLEST/HDLC SERPL: 3.9 {RATIO} (ref 2–5)
CK SERPL-CCNC: 61 U/L (ref 20–180)
CO2 SERPL-SCNC: 26 MMOL/L (ref 23–29)
CREAT SERPL-MCNC: 0.8 MG/DL (ref 0.5–1.4)
EST. GFR  (AFRICAN AMERICAN): >60 ML/MIN/1.73 M^2
EST. GFR  (NON AFRICAN AMERICAN): >60 ML/MIN/1.73 M^2
GLUCOSE SERPL-MCNC: 106 MG/DL (ref 70–110)
HDLC SERPL-MCNC: 54 MG/DL (ref 40–75)
HDLC SERPL: 26 % (ref 20–50)
LDLC SERPL CALC-MCNC: 137 MG/DL (ref 63–159)
NONHDLC SERPL-MCNC: 154 MG/DL
POTASSIUM SERPL-SCNC: 4.4 MMOL/L (ref 3.5–5.1)
PROT SERPL-MCNC: 7.4 G/DL (ref 6–8.4)
SODIUM SERPL-SCNC: 139 MMOL/L (ref 136–145)
TRIGL SERPL-MCNC: 85 MG/DL (ref 30–150)

## 2022-05-18 PROCEDURE — 99999 PR PBB SHADOW E&M-EST. PATIENT-LVL V: CPT | Mod: PBBFAC,,, | Performed by: INTERNAL MEDICINE

## 2022-05-18 PROCEDURE — 36415 COLL VENOUS BLD VENIPUNCTURE: CPT | Mod: PO | Performed by: INTERNAL MEDICINE

## 2022-05-18 PROCEDURE — 82550 ASSAY OF CK (CPK): CPT | Performed by: INTERNAL MEDICINE

## 2022-05-18 PROCEDURE — 80307 DRUG TEST PRSMV CHEM ANLYZR: CPT | Performed by: INTERNAL MEDICINE

## 2022-05-18 PROCEDURE — 4010F ACE/ARB THERAPY RXD/TAKEN: CPT | Mod: CPTII,S$GLB,, | Performed by: INTERNAL MEDICINE

## 2022-05-18 PROCEDURE — 3008F PR BODY MASS INDEX (BMI) DOCUMENTED: ICD-10-PCS | Mod: CPTII,S$GLB,, | Performed by: INTERNAL MEDICINE

## 2022-05-18 PROCEDURE — 3074F SYST BP LT 130 MM HG: CPT | Mod: CPTII,S$GLB,, | Performed by: INTERNAL MEDICINE

## 2022-05-18 PROCEDURE — 3074F PR MOST RECENT SYSTOLIC BLOOD PRESSURE < 130 MM HG: ICD-10-PCS | Mod: CPTII,S$GLB,, | Performed by: INTERNAL MEDICINE

## 2022-05-18 PROCEDURE — 80053 COMPREHEN METABOLIC PANEL: CPT | Performed by: INTERNAL MEDICINE

## 2022-05-18 PROCEDURE — 1159F PR MEDICATION LIST DOCUMENTED IN MEDICAL RECORD: ICD-10-PCS | Mod: CPTII,S$GLB,, | Performed by: INTERNAL MEDICINE

## 2022-05-18 PROCEDURE — 99215 PR OFFICE/OUTPT VISIT, EST, LEVL V, 40-54 MIN: ICD-10-PCS | Mod: S$GLB,,, | Performed by: INTERNAL MEDICINE

## 2022-05-18 PROCEDURE — 3008F BODY MASS INDEX DOCD: CPT | Mod: CPTII,S$GLB,, | Performed by: INTERNAL MEDICINE

## 2022-05-18 PROCEDURE — 99499 UNLISTED E&M SERVICE: CPT | Mod: S$GLB,,, | Performed by: INTERNAL MEDICINE

## 2022-05-18 PROCEDURE — 80061 LIPID PANEL: CPT | Performed by: INTERNAL MEDICINE

## 2022-05-18 PROCEDURE — 99999 PR PBB SHADOW E&M-EST. PATIENT-LVL V: ICD-10-PCS | Mod: PBBFAC,,, | Performed by: INTERNAL MEDICINE

## 2022-05-18 PROCEDURE — 4010F PR ACE/ARB THEARPY RXD/TAKEN: ICD-10-PCS | Mod: CPTII,S$GLB,, | Performed by: INTERNAL MEDICINE

## 2022-05-18 PROCEDURE — 1159F MED LIST DOCD IN RCRD: CPT | Mod: CPTII,S$GLB,, | Performed by: INTERNAL MEDICINE

## 2022-05-18 PROCEDURE — 3078F DIAST BP <80 MM HG: CPT | Mod: CPTII,S$GLB,, | Performed by: INTERNAL MEDICINE

## 2022-05-18 PROCEDURE — 99499 RISK ADDL DX/OHS AUDIT: ICD-10-PCS | Mod: S$GLB,,, | Performed by: INTERNAL MEDICINE

## 2022-05-18 PROCEDURE — 1160F RVW MEDS BY RX/DR IN RCRD: CPT | Mod: CPTII,S$GLB,, | Performed by: INTERNAL MEDICINE

## 2022-05-18 PROCEDURE — 99215 OFFICE O/P EST HI 40 MIN: CPT | Mod: S$GLB,,, | Performed by: INTERNAL MEDICINE

## 2022-05-18 PROCEDURE — 1160F PR REVIEW ALL MEDS BY PRESCRIBER/CLIN PHARMACIST DOCUMENTED: ICD-10-PCS | Mod: CPTII,S$GLB,, | Performed by: INTERNAL MEDICINE

## 2022-05-18 PROCEDURE — 3078F PR MOST RECENT DIASTOLIC BLOOD PRESSURE < 80 MM HG: ICD-10-PCS | Mod: CPTII,S$GLB,, | Performed by: INTERNAL MEDICINE

## 2022-05-18 RX ORDER — CLOTRIMAZOLE AND BETAMETHASONE DIPROPIONATE 10; .64 MG/G; MG/G
CREAM TOPICAL 2 TIMES DAILY
Qty: 45 G | Refills: 2 | Status: SHIPPED | OUTPATIENT
Start: 2022-05-18 | End: 2023-04-19 | Stop reason: SDUPTHER

## 2022-05-18 RX ORDER — HYDROCODONE BITARTRATE AND ACETAMINOPHEN 5; 325 MG/1; MG/1
1 TABLET ORAL EVERY 12 HOURS PRN
Qty: 60 TABLET | Refills: 0 | Status: SHIPPED | OUTPATIENT
Start: 2022-05-18 | End: 2022-07-06 | Stop reason: SDUPTHER

## 2022-05-18 NOTE — PROGRESS NOTES
Subjective:     Kaleigh Solo is a 66 y.o. female who presents for   Chief Complaint   Patient presents with    Hypertension     Follow-up    Fall     In office    Hyperlipidemia       HPI    Hypertension: The patient has been taking medications as instructed, no medication side effects noted, no chest pain on exertion, no dyspnea on exertion, no swelling of ankles, no orthostatic dizziness or lightheadedness and no palpitations.      Body mass index is 42.62 kg/m².    BP Readings from Last 3 Encounters:   05/18/22 126/78   04/27/22 (!) 140/78   02/08/22 (!) 156/95       Hyperlipidemia: Compliance with treatment has been good. The patient exercises rarely. Patient denies muscle pain associated with her medications. Previous history of cardiac disease includes: none.    Lab Results   Component Value Date    CHOL 208 (H) 05/18/2022    HDL 54 05/18/2022        Fall: Patient got up from chair to place an item in her purse behind me and she fell while I was typing. This occurred at approximately 1141. She reported that her left knee gave out on her and she fell straight to the floor. she reports that she pushed the nearby chair out of the way as she was falling. She denies hitting her head or arms but she landed on her left knee. She recently visited Dr. Rankin with Ortho and received an x-ray and steroid shot in the left knee. She denies dizziness, no vision change, no headache and no chest pain. She denies falls in the past and says that knee has never given out in the past.        Review of Systems   Constitutional: Negative for chills, diaphoresis, fatigue and fever.   HENT: Negative for congestion, ear pain, postnasal drip, rhinorrhea and sore throat.    Eyes: Negative for photophobia and visual disturbance.   Respiratory: Negative for cough and shortness of breath.    Cardiovascular: Negative for chest pain, palpitations and leg swelling.   Gastrointestinal: Negative for abdominal pain, diarrhea, nausea  and vomiting.   Endocrine: Negative for polydipsia and polyuria.   Musculoskeletal: Positive for arthralgias (knee pain), back pain (she reports turning on the spine stimulator today and she usually leaves it off when she leaves the house) and neck pain. Negative for gait problem.   Skin: Negative for color change and rash.   Neurological: Positive for weakness. Negative for dizziness, seizures, syncope, facial asymmetry, numbness and headaches.   Hematological: Bruises/bleeds easily.   Psychiatric/Behavioral: Negative for agitation and confusion.         Answers for HPI/ROS submitted by the patient on 5/17/2022  Chronicity: recurrent  Onset: more than 1 year ago  Progression since onset: resolved  Condition status: controlled  anxiety: No  blurred vision: No  chest pain: No  headaches: No  malaise/fatigue: Yes  neck pain: Yes  orthopnea: No  palpitations: No  peripheral edema: No  PND: No  shortness of breath: No  sweats: No  Agents associated with hypertension: no associated agents  CAD risks: obesity, smoking/tobacco exposure  Compliance problems: no compliance problems  Improvement on treatment: significant          Objective:     Physical Exam  Vitals reviewed.   Constitutional:       General: She is awake. She is not in acute distress.     Appearance: Normal appearance. She is well-developed and well-groomed.   HENT:      Head: Normocephalic and atraumatic.      Right Ear: Hearing and external ear normal.      Left Ear: Hearing and external ear normal.      Nose: Nose normal.      Mouth/Throat:      Mouth: Mucous membranes are moist.   Eyes:      General: Lids are normal. Vision grossly intact.   Cardiovascular:      Rate and Rhythm: Normal rate and regular rhythm.      Heart sounds: Normal heart sounds. No murmur heard.  Pulmonary:      Effort: Pulmonary effort is normal.      Breath sounds: Normal breath sounds. No decreased breath sounds or wheezing.   Abdominal:      General: Bowel sounds are normal. There  is no distension.   Musculoskeletal:         General: Normal range of motion.      Cervical back: Normal range of motion and neck supple. Spasms and tenderness present.      Thoracic back: Spasms and tenderness present.      Lumbar back: Spasms and tenderness present. No signs of trauma or bony tenderness.      Right lower leg: No edema.      Left lower leg: No edema.   Skin:     General: Skin is warm and dry.      Findings: Ecchymosis (2 small bruises on left lower arm but she reports that they have been present for a while) present. No erythema, lesion, rash or wound.   Neurological:      Mental Status: She is alert and oriented to person, place, and time.   Psychiatric:         Attention and Perception: Attention normal.         Mood and Affect: Mood normal.         Behavior: Behavior is cooperative.            Assessment:      1. Essential hypertension    2. Fall, initial encounter    3. Transient weakness of left leg    4. Primary osteoarthritis of both knees    5. Lumbar radiculopathy    6. Atherosclerosis of native coronary artery of native heart without angina pectoris    7. Mixed hyperlipidemia    8. Unspecified inflammatory spondylopathy, cervical region    9. Adrenal mass, left    10. Aortic calcification    11. Chronic narcotic use    12. Morbid obesity with BMI of 40.0-44.9, adult           Plan:     1. Essential hypertension  - BP is controlled, continue amlodipine, furosemide, losartan  -  Comprehensive Metabolic Panel; Future    2. Fall, initial encounter  - no LOC, no signs of injury, Incident report was completed    3. Transient weakness of left leg  - CK; Future    4. Primary osteoarthritis of both knees  - HYDROcodone-acetaminophen (NORCO) 5-325 mg per tablet; Take 1 tablet by mouth every 12 (twelve) hours as needed for Pain.  Dispense: 60 tablet; Refill: 0    5. Lumbar radiculopathy  - HYDROcodone-acetaminophen (NORCO) 5-325 mg per tablet; Take 1 tablet by mouth every 12 (twelve) hours as needed  for Pain.  Dispense: 60 tablet; Refill: 0    6. Atherosclerosis of native coronary artery of native heart without angina pectoris  - stable, no chest pain, sees Cardiology as needed    7. Mixed hyperlipidemia  - Lipid Panel; Future   - continue Crestor    8. Unspecified inflammatory spondylopathy, cervical region  - stable, uses Norco as needed     9. 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 Aortic calcification  - seen on past imaging, control HTN, on Crestor, continue to monitor    11. Chronic narcotic use  - DRUGS OF ABUSE SCREEN, BLOOD; Future    12. Morbid obesity with BMI of 40.0-44.9, adult    RTC in 3 months for follow-up or sooner if needed    __________________________    Felicity Middleton MD, PharmD  [facility]- [facility] Medicine  American Board of Obesity Medicine diplomate  Office [phone]          Over 50% of 40 minute visit was spent reviewing medical records, education and discussion of patient's medical conditions and medical management.

## 2022-05-19 ENCOUNTER — PATIENT MESSAGE (OUTPATIENT)
Dept: INTERNAL MEDICINE | Facility: CLINIC | Age: 67
End: 2022-05-19
Payer: MEDICARE

## 2022-05-21 LAB
AMPHETAMINES SERPL QL: NEGATIVE
BARBITURATES SERPL QL SCN: NEGATIVE
BENZODIAZ SERPL QL SCN: NEGATIVE
BZE SERPL QL: NEGATIVE
CARBOXYTHC SERPL QL SCN: NEGATIVE
ETHANOL SERPL QL SCN: NEGATIVE
METHADONE SERPL QL SCN: NEGATIVE
OPIATES SERPL QL SCN: NEGATIVE
PCP SERPL QL SCN: NEGATIVE
PROPOXYPH SERPL QL: NEGATIVE

## 2022-05-27 ENCOUNTER — PATIENT MESSAGE (OUTPATIENT)
Dept: INTERNAL MEDICINE | Facility: CLINIC | Age: 67
End: 2022-05-27
Payer: MEDICARE

## 2022-06-02 ENCOUNTER — TELEPHONE (OUTPATIENT)
Dept: INTERNAL MEDICINE | Facility: CLINIC | Age: 67
End: 2022-06-02
Payer: MEDICARE

## 2022-07-06 DIAGNOSIS — M54.16 LUMBAR RADICULOPATHY: ICD-10-CM

## 2022-07-06 DIAGNOSIS — M17.0 PRIMARY OSTEOARTHRITIS OF BOTH KNEES: ICD-10-CM

## 2022-07-06 RX ORDER — HYDROCODONE BITARTRATE AND ACETAMINOPHEN 5; 325 MG/1; MG/1
1 TABLET ORAL EVERY 12 HOURS PRN
Qty: 60 TABLET | Refills: 0 | Status: SHIPPED | OUTPATIENT
Start: 2022-07-06 | End: 2022-08-09 | Stop reason: SDUPTHER

## 2022-07-06 NOTE — TELEPHONE ENCOUNTER
No new care gaps identified.  [facility] Embedded Care Gaps. Reference number: [id number]. 7/06/2022   12:34:40 PM CDT

## 2022-07-08 DIAGNOSIS — F17.219 CIGARETTE NICOTINE DEPENDENCE WITH NICOTINE-INDUCED DISORDER: Primary | ICD-10-CM

## 2022-07-09 ENCOUNTER — PATIENT MESSAGE (OUTPATIENT)
Dept: INTERNAL MEDICINE | Facility: CLINIC | Age: 67
End: 2022-07-09
Payer: MEDICARE

## 2022-07-09 DIAGNOSIS — E55.9 VITAMIN D INSUFFICIENCY: ICD-10-CM

## 2022-07-09 DIAGNOSIS — R73.9 ELEVATED BLOOD SUGAR: ICD-10-CM

## 2022-07-09 DIAGNOSIS — E78.2 MIXED HYPERLIPIDEMIA: ICD-10-CM

## 2022-07-09 DIAGNOSIS — I10 ESSENTIAL HYPERTENSION: Primary | ICD-10-CM

## 2022-07-12 ENCOUNTER — IMMUNIZATION (OUTPATIENT)
Dept: PHARMACY | Facility: CLINIC | Age: 67
End: 2022-07-12
Payer: MEDICARE

## 2022-07-12 DIAGNOSIS — Z23 NEED FOR VACCINATION: Primary | ICD-10-CM

## 2022-07-21 ENCOUNTER — PATIENT OUTREACH (OUTPATIENT)
Dept: ADMINISTRATIVE | Facility: HOSPITAL | Age: 67
End: 2022-07-21
Payer: MEDICARE

## 2022-07-30 DIAGNOSIS — E78.2 MIXED HYPERLIPIDEMIA: ICD-10-CM

## 2022-07-30 RX ORDER — ROSUVASTATIN CALCIUM 10 MG/1
TABLET, COATED ORAL
Qty: 90 TABLET | Refills: 3 | Status: SHIPPED | OUTPATIENT
Start: 2022-07-30 | End: 2023-08-02

## 2022-07-30 NOTE — TELEPHONE ENCOUNTER
Refill Decision Note   Kaleigh Solo  is requesting a refill authorization.  Brief Assessment and Rationale for Refill:  Approve     Medication Therapy Plan:       Medication Reconciliation Completed: No   Comments:     No Care Gaps recommended.     Note composed:2:07 PM 07/30/2022

## 2022-07-30 NOTE — TELEPHONE ENCOUNTER
No new care gaps identified.  [facility] Embedded Care Gaps. Reference number: [id number]. 7/30/2022   2:40:31 AM CDT

## 2022-08-09 DIAGNOSIS — M17.0 PRIMARY OSTEOARTHRITIS OF BOTH KNEES: ICD-10-CM

## 2022-08-09 DIAGNOSIS — M54.16 LUMBAR RADICULOPATHY: ICD-10-CM

## 2022-08-09 NOTE — TELEPHONE ENCOUNTER
No new care gaps identified.  [facility] Embedded Care Gaps. Reference number: [id number]. 8/09/2022   11:26:08 AM CIERRA

## 2022-08-10 RX ORDER — HYDROCODONE BITARTRATE AND ACETAMINOPHEN 5; 325 MG/1; MG/1
1 TABLET ORAL EVERY 12 HOURS PRN
Qty: 60 TABLET | Refills: 0 | Status: SHIPPED | OUTPATIENT
Start: 2022-08-10 | End: 2022-09-09 | Stop reason: SDUPTHER

## 2022-08-11 ENCOUNTER — PATIENT OUTREACH (OUTPATIENT)
Dept: ADMINISTRATIVE | Facility: HOSPITAL | Age: 67
End: 2022-08-11
Payer: MEDICARE

## 2022-08-31 DIAGNOSIS — Z78.0 MENOPAUSE: ICD-10-CM

## 2022-09-09 DIAGNOSIS — M54.16 LUMBAR RADICULOPATHY: ICD-10-CM

## 2022-09-09 DIAGNOSIS — M17.0 PRIMARY OSTEOARTHRITIS OF BOTH KNEES: ICD-10-CM

## 2022-09-09 NOTE — TELEPHONE ENCOUNTER
No new care gaps identified.  [facility] Embedded Care Gaps. Reference number: [id number]. 9/09/2022   1:26:51 PM CDT

## 2022-09-12 RX ORDER — HYDROCODONE BITARTRATE AND ACETAMINOPHEN 5; 325 MG/1; MG/1
1 TABLET ORAL EVERY 12 HOURS PRN
Qty: 60 TABLET | Refills: 0 | Status: SHIPPED | OUTPATIENT
Start: 2022-09-12 | End: 2022-10-12 | Stop reason: SDUPTHER

## 2022-09-28 RX ORDER — ERGOCALCIFEROL 1.25 MG/1
CAPSULE ORAL
Qty: 12 CAPSULE | Refills: 0 | Status: SHIPPED | OUTPATIENT
Start: 2022-09-28 | End: 2022-12-14 | Stop reason: SDUPTHER

## 2022-10-12 DIAGNOSIS — M17.0 PRIMARY OSTEOARTHRITIS OF BOTH KNEES: ICD-10-CM

## 2022-10-12 DIAGNOSIS — M54.16 LUMBAR RADICULOPATHY: ICD-10-CM

## 2022-10-12 RX ORDER — HYDROCODONE BITARTRATE AND ACETAMINOPHEN 5; 325 MG/1; MG/1
1 TABLET ORAL EVERY 12 HOURS PRN
Qty: 60 TABLET | Refills: 0 | Status: SHIPPED | OUTPATIENT
Start: 2022-10-12 | End: 2022-12-01 | Stop reason: SDUPTHER

## 2022-10-12 NOTE — TELEPHONE ENCOUNTER
No new care gaps identified.  [facility] Embedded Care Gaps. Reference number: [id number]. 10/12/2022   11:49:27 AM PAULINET

## 2022-11-02 ENCOUNTER — LAB VISIT (OUTPATIENT)
Dept: LAB | Facility: HOSPITAL | Age: 67
End: 2022-11-02
Payer: MEDICARE

## 2022-11-02 DIAGNOSIS — E78.2 MIXED HYPERLIPIDEMIA: ICD-10-CM

## 2022-11-02 DIAGNOSIS — R73.9 ELEVATED BLOOD SUGAR: ICD-10-CM

## 2022-11-02 DIAGNOSIS — I10 ESSENTIAL HYPERTENSION: ICD-10-CM

## 2022-11-02 DIAGNOSIS — E55.9 VITAMIN D INSUFFICIENCY: ICD-10-CM

## 2022-11-02 LAB
25(OH)D3+25(OH)D2 SERPL-MCNC: 22 NG/ML (ref 30–96)
ALBUMIN SERPL BCP-MCNC: 4 G/DL (ref 3.5–5.2)
ALP SERPL-CCNC: 95 U/L (ref 55–135)
ALT SERPL W/O P-5'-P-CCNC: 18 U/L (ref 10–44)
ANION GAP SERPL CALC-SCNC: 12 MMOL/L (ref 8–16)
AST SERPL-CCNC: 14 U/L (ref 10–40)
BASOPHILS # BLD AUTO: 0.05 K/UL (ref 0–0.2)
BASOPHILS NFR BLD: 0.8 % (ref 0–1.9)
BILIRUB SERPL-MCNC: 0.7 MG/DL (ref 0.1–1)
BUN SERPL-MCNC: 10 MG/DL (ref 8–23)
CALCIUM SERPL-MCNC: 9.7 MG/DL (ref 8.7–10.5)
CHLORIDE SERPL-SCNC: 102 MMOL/L (ref 95–110)
CHOLEST SERPL-MCNC: 222 MG/DL (ref 120–199)
CHOLEST/HDLC SERPL: 4.5 {RATIO} (ref 2–5)
CO2 SERPL-SCNC: 25 MMOL/L (ref 23–29)
CREAT SERPL-MCNC: 0.9 MG/DL (ref 0.5–1.4)
DIFFERENTIAL METHOD: NORMAL
EOSINOPHIL # BLD AUTO: 0.3 K/UL (ref 0–0.5)
EOSINOPHIL NFR BLD: 4.1 % (ref 0–8)
ERYTHROCYTE [DISTWIDTH] IN BLOOD BY AUTOMATED COUNT: 13.6 % (ref 11.5–14.5)
EST. GFR  (NO RACE VARIABLE): >60 ML/MIN/1.73 M^2
ESTIMATED AVG GLUCOSE: 100 MG/DL (ref 68–131)
GLUCOSE SERPL-MCNC: 107 MG/DL (ref 70–110)
HBA1C MFR BLD: 5.1 % (ref 4–5.6)
HCT VFR BLD AUTO: 47.5 % (ref 37–48.5)
HDLC SERPL-MCNC: 49 MG/DL (ref 40–75)
HDLC SERPL: 22.1 % (ref 20–50)
HGB BLD-MCNC: 15.5 G/DL (ref 12–16)
IMM GRANULOCYTES # BLD AUTO: 0.01 K/UL (ref 0–0.04)
IMM GRANULOCYTES NFR BLD AUTO: 0.2 % (ref 0–0.5)
LDLC SERPL CALC-MCNC: 151.6 MG/DL (ref 63–159)
LYMPHOCYTES # BLD AUTO: 1.6 K/UL (ref 1–4.8)
LYMPHOCYTES NFR BLD: 26.4 % (ref 18–48)
MCH RBC QN AUTO: 30.9 PG (ref 27–31)
MCHC RBC AUTO-ENTMCNC: 32.6 G/DL (ref 32–36)
MCV RBC AUTO: 95 FL (ref 82–98)
MONOCYTES # BLD AUTO: 0.4 K/UL (ref 0.3–1)
MONOCYTES NFR BLD: 7.2 % (ref 4–15)
NEUTROPHILS # BLD AUTO: 3.7 K/UL (ref 1.8–7.7)
NEUTROPHILS NFR BLD: 61.3 % (ref 38–73)
NONHDLC SERPL-MCNC: 173 MG/DL
NRBC BLD-RTO: 0 /100 WBC
PLATELET # BLD AUTO: 242 K/UL (ref 150–450)
PMV BLD AUTO: 9.4 FL (ref 9.2–12.9)
POTASSIUM SERPL-SCNC: 4.1 MMOL/L (ref 3.5–5.1)
PROT SERPL-MCNC: 7.2 G/DL (ref 6–8.4)
RBC # BLD AUTO: 5.01 M/UL (ref 4–5.4)
SODIUM SERPL-SCNC: 139 MMOL/L (ref 136–145)
TRIGL SERPL-MCNC: 107 MG/DL (ref 30–150)
TSH SERPL DL<=0.005 MIU/L-ACNC: 3.37 UIU/ML (ref 0.4–4)
WBC # BLD AUTO: 6.1 K/UL (ref 3.9–12.7)

## 2022-11-02 PROCEDURE — 80061 LIPID PANEL: CPT | Performed by: INTERNAL MEDICINE

## 2022-11-02 PROCEDURE — 85025 COMPLETE CBC W/AUTO DIFF WBC: CPT | Performed by: INTERNAL MEDICINE

## 2022-11-02 PROCEDURE — 83036 HEMOGLOBIN GLYCOSYLATED A1C: CPT | Performed by: INTERNAL MEDICINE

## 2022-11-02 PROCEDURE — 84443 ASSAY THYROID STIM HORMONE: CPT | Performed by: INTERNAL MEDICINE

## 2022-11-02 PROCEDURE — 36415 COLL VENOUS BLD VENIPUNCTURE: CPT | Mod: PO | Performed by: INTERNAL MEDICINE

## 2022-11-02 PROCEDURE — 80053 COMPREHEN METABOLIC PANEL: CPT | Performed by: INTERNAL MEDICINE

## 2022-11-02 PROCEDURE — 82306 VITAMIN D 25 HYDROXY: CPT | Performed by: INTERNAL MEDICINE

## 2022-11-06 ENCOUNTER — PATIENT MESSAGE (OUTPATIENT)
Dept: INTERNAL MEDICINE | Facility: CLINIC | Age: 67
End: 2022-11-06
Payer: MEDICARE

## 2022-11-07 ENCOUNTER — PATIENT MESSAGE (OUTPATIENT)
Dept: INTERNAL MEDICINE | Facility: CLINIC | Age: 67
End: 2022-11-07
Payer: MEDICARE

## 2022-11-07 DIAGNOSIS — R30.0 DYSURIA: Primary | ICD-10-CM

## 2022-11-07 NOTE — TELEPHONE ENCOUNTER
UA showed RBC's but no WBC's so it did not clearly show signs of infection. Will need urine culture.

## 2022-11-07 NOTE — TELEPHONE ENCOUNTER
Pt had Urine test on November 2nd showed slight blood. Says she feels like she is starting to have a UTI. Slight pressure and uncomfortable feeling when urinate. No burning or severe pain, but feels a little uncomfortable with slight pain.

## 2022-11-08 ENCOUNTER — LAB VISIT (OUTPATIENT)
Dept: LAB | Facility: HOSPITAL | Age: 67
End: 2022-11-08
Attending: INTERNAL MEDICINE
Payer: MEDICARE

## 2022-11-08 DIAGNOSIS — R30.0 DYSURIA: ICD-10-CM

## 2022-11-08 PROCEDURE — 87086 URINE CULTURE/COLONY COUNT: CPT | Performed by: INTERNAL MEDICINE

## 2022-11-09 LAB — BACTERIA UR CULT: NO GROWTH

## 2022-11-10 ENCOUNTER — PATIENT MESSAGE (OUTPATIENT)
Dept: INTERNAL MEDICINE | Facility: CLINIC | Age: 67
End: 2022-11-10
Payer: MEDICARE

## 2022-11-18 ENCOUNTER — PATIENT MESSAGE (OUTPATIENT)
Dept: INTERNAL MEDICINE | Facility: CLINIC | Age: 67
End: 2022-11-18
Payer: MEDICARE

## 2022-11-19 RX ORDER — AMLODIPINE BESYLATE 5 MG/1
TABLET ORAL
Qty: 90 TABLET | Refills: 1 | Status: SHIPPED | OUTPATIENT
Start: 2022-11-19 | End: 2023-06-28

## 2022-11-19 NOTE — TELEPHONE ENCOUNTER
No new care gaps identified.  [facility] Embedded Care Gaps. Reference number: [id number]. 11/19/2022   4:12:06 AM CST

## 2022-11-20 NOTE — TELEPHONE ENCOUNTER
Refill Decision Note   Kaleigh Solo  is requesting a refill authorization.  Brief Assessment and Rationale for Refill:  Approve     Medication Therapy Plan:       Medication Reconciliation Completed: No   Comments:     No Care Gaps recommended.     Note composed:11:08 PM 11/19/2022

## 2022-12-01 DIAGNOSIS — M54.16 LUMBAR RADICULOPATHY: ICD-10-CM

## 2022-12-01 DIAGNOSIS — M17.0 PRIMARY OSTEOARTHRITIS OF BOTH KNEES: ICD-10-CM

## 2022-12-01 NOTE — TELEPHONE ENCOUNTER
No new care gaps identified.  [facility] Embedded Care Gaps. Reference number: [id number]. 12/01/2022   12:07:21 PM CST

## 2022-12-02 RX ORDER — HYDROCODONE BITARTRATE AND ACETAMINOPHEN 5; 325 MG/1; MG/1
1 TABLET ORAL EVERY 12 HOURS PRN
Qty: 60 TABLET | Refills: 0 | Status: SHIPPED | OUTPATIENT
Start: 2022-12-02 | End: 2022-12-14

## 2022-12-14 ENCOUNTER — OFFICE VISIT (OUTPATIENT)
Dept: INTERNAL MEDICINE | Facility: CLINIC | Age: 67
End: 2022-12-14
Payer: MEDICARE

## 2022-12-14 VITALS
HEART RATE: 88 BPM | BODY MASS INDEX: 43 KG/M2 | TEMPERATURE: 98 F | RESPIRATION RATE: 18 BRPM | DIASTOLIC BLOOD PRESSURE: 78 MMHG | OXYGEN SATURATION: 95 % | HEIGHT: 62 IN | WEIGHT: 233.69 LBS | SYSTOLIC BLOOD PRESSURE: 130 MMHG

## 2022-12-14 DIAGNOSIS — E27.8 ADRENAL MASS, LEFT: ICD-10-CM

## 2022-12-14 DIAGNOSIS — I10 ESSENTIAL HYPERTENSION: Primary | ICD-10-CM

## 2022-12-14 DIAGNOSIS — I70.0 AORTIC CALCIFICATION: ICD-10-CM

## 2022-12-14 DIAGNOSIS — M54.16 LUMBAR RADICULOPATHY: ICD-10-CM

## 2022-12-14 DIAGNOSIS — M48.062 SPINAL STENOSIS OF LUMBAR REGION WITH NEUROGENIC CLAUDICATION: ICD-10-CM

## 2022-12-14 DIAGNOSIS — E66.01 MORBID OBESITY WITH BMI OF 40.0-44.9, ADULT: ICD-10-CM

## 2022-12-14 DIAGNOSIS — E78.2 MIXED HYPERLIPIDEMIA: ICD-10-CM

## 2022-12-14 DIAGNOSIS — Z00.00 ANNUAL PHYSICAL EXAM: ICD-10-CM

## 2022-12-14 DIAGNOSIS — M46.92 UNSPECIFIED INFLAMMATORY SPONDYLOPATHY, CERVICAL REGION: ICD-10-CM

## 2022-12-14 DIAGNOSIS — E55.9 VITAMIN D INSUFFICIENCY: ICD-10-CM

## 2022-12-14 DIAGNOSIS — M17.0 PRIMARY OSTEOARTHRITIS OF BOTH KNEES: ICD-10-CM

## 2022-12-14 DIAGNOSIS — M79.7 FIBROMYALGIA: ICD-10-CM

## 2022-12-14 PROCEDURE — 3078F DIAST BP <80 MM HG: CPT | Mod: HCNC,CPTII,S$GLB, | Performed by: INTERNAL MEDICINE

## 2022-12-14 PROCEDURE — 1159F MED LIST DOCD IN RCRD: CPT | Mod: HCNC,CPTII,S$GLB, | Performed by: INTERNAL MEDICINE

## 2022-12-14 PROCEDURE — 90662 IIV NO PRSV INCREASED AG IM: CPT | Mod: HCNC,S$GLB,, | Performed by: INTERNAL MEDICINE

## 2022-12-14 PROCEDURE — 3075F SYST BP GE 130 - 139MM HG: CPT | Mod: HCNC,CPTII,S$GLB, | Performed by: INTERNAL MEDICINE

## 2022-12-14 PROCEDURE — G0008 FLU VACCINE - QUADRIVALENT - HIGH DOSE (65+) PRESERVATIVE FREE IM: ICD-10-PCS | Mod: HCNC,S$GLB,, | Performed by: INTERNAL MEDICINE

## 2022-12-14 PROCEDURE — 3075F PR MOST RECENT SYSTOLIC BLOOD PRESS GE 130-139MM HG: ICD-10-PCS | Mod: HCNC,CPTII,S$GLB, | Performed by: INTERNAL MEDICINE

## 2022-12-14 PROCEDURE — 99999 PR PBB SHADOW E&M-EST. PATIENT-LVL IV: ICD-10-PCS | Mod: PBBFAC,HCNC,, | Performed by: INTERNAL MEDICINE

## 2022-12-14 PROCEDURE — 99214 PR OFFICE/OUTPT VISIT, EST, LEVL IV, 30-39 MIN: ICD-10-PCS | Mod: HCNC,S$GLB,, | Performed by: INTERNAL MEDICINE

## 2022-12-14 PROCEDURE — 3288F PR FALLS RISK ASSESSMENT DOCUMENTED: ICD-10-PCS | Mod: HCNC,CPTII,S$GLB, | Performed by: INTERNAL MEDICINE

## 2022-12-14 PROCEDURE — 3288F FALL RISK ASSESSMENT DOCD: CPT | Mod: HCNC,CPTII,S$GLB, | Performed by: INTERNAL MEDICINE

## 2022-12-14 PROCEDURE — 1159F PR MEDICATION LIST DOCUMENTED IN MEDICAL RECORD: ICD-10-PCS | Mod: HCNC,CPTII,S$GLB, | Performed by: INTERNAL MEDICINE

## 2022-12-14 PROCEDURE — 3008F PR BODY MASS INDEX (BMI) DOCUMENTED: ICD-10-PCS | Mod: HCNC,CPTII,S$GLB, | Performed by: INTERNAL MEDICINE

## 2022-12-14 PROCEDURE — 1125F PR PAIN SEVERITY QUANTIFIED, PAIN PRESENT: ICD-10-PCS | Mod: HCNC,CPTII,S$GLB, | Performed by: INTERNAL MEDICINE

## 2022-12-14 PROCEDURE — 1125F AMNT PAIN NOTED PAIN PRSNT: CPT | Mod: HCNC,CPTII,S$GLB, | Performed by: INTERNAL MEDICINE

## 2022-12-14 PROCEDURE — 3078F PR MOST RECENT DIASTOLIC BLOOD PRESSURE < 80 MM HG: ICD-10-PCS | Mod: HCNC,CPTII,S$GLB, | Performed by: INTERNAL MEDICINE

## 2022-12-14 PROCEDURE — 99214 OFFICE O/P EST MOD 30 MIN: CPT | Mod: HCNC,S$GLB,, | Performed by: INTERNAL MEDICINE

## 2022-12-14 PROCEDURE — 1160F RVW MEDS BY RX/DR IN RCRD: CPT | Mod: HCNC,CPTII,S$GLB, | Performed by: INTERNAL MEDICINE

## 2022-12-14 PROCEDURE — 99999 PR PBB SHADOW E&M-EST. PATIENT-LVL IV: CPT | Mod: PBBFAC,HCNC,, | Performed by: INTERNAL MEDICINE

## 2022-12-14 PROCEDURE — 1101F PT FALLS ASSESS-DOCD LE1/YR: CPT | Mod: HCNC,CPTII,S$GLB, | Performed by: INTERNAL MEDICINE

## 2022-12-14 PROCEDURE — 1160F PR REVIEW ALL MEDS BY PRESCRIBER/CLIN PHARMACIST DOCUMENTED: ICD-10-PCS | Mod: HCNC,CPTII,S$GLB, | Performed by: INTERNAL MEDICINE

## 2022-12-14 PROCEDURE — 90662 FLU VACCINE - QUADRIVALENT - HIGH DOSE (65+) PRESERVATIVE FREE IM: ICD-10-PCS | Mod: HCNC,S$GLB,, | Performed by: INTERNAL MEDICINE

## 2022-12-14 PROCEDURE — 1157F ADVNC CARE PLAN IN RCRD: CPT | Mod: HCNC,CPTII,S$GLB, | Performed by: INTERNAL MEDICINE

## 2022-12-14 PROCEDURE — 1157F PR ADVANCE CARE PLAN OR EQUIV PRESENT IN MEDICAL RECORD: ICD-10-PCS | Mod: HCNC,CPTII,S$GLB, | Performed by: INTERNAL MEDICINE

## 2022-12-14 PROCEDURE — 1101F PR PT FALLS ASSESS DOC 0-1 FALLS W/OUT INJ PAST YR: ICD-10-PCS | Mod: HCNC,CPTII,S$GLB, | Performed by: INTERNAL MEDICINE

## 2022-12-14 PROCEDURE — 3008F BODY MASS INDEX DOCD: CPT | Mod: HCNC,CPTII,S$GLB, | Performed by: INTERNAL MEDICINE

## 2022-12-14 PROCEDURE — G0008 ADMIN INFLUENZA VIRUS VAC: HCPCS | Mod: HCNC,S$GLB,, | Performed by: INTERNAL MEDICINE

## 2022-12-14 RX ORDER — TRAMADOL HYDROCHLORIDE 50 MG/1
50 TABLET ORAL EVERY 12 HOURS PRN
Qty: 60 TABLET | Refills: 0 | Status: SHIPPED | OUTPATIENT
Start: 2022-12-14 | End: 2023-01-09 | Stop reason: ALTCHOICE

## 2022-12-14 RX ORDER — ERGOCALCIFEROL 1.25 MG/1
50000 CAPSULE ORAL
Qty: 12 CAPSULE | Refills: 1 | Status: SHIPPED | OUTPATIENT
Start: 2022-12-14 | End: 2023-02-22

## 2022-12-14 NOTE — PROGRESS NOTES
Subjective:     PCP: Felicity Middleton MD    Kaleigh Solo is a 67 y.o. female who presents for an annual exam.    Chronic Medical Problems:      Hypertension: The patient has been taking medications as instructed, no medication side effects noted, no chest pain on exertion, no dyspnea on exertion, and no swelling of ankles    BP Readings from Last 3 Encounters:   12/14/22 130/78   05/18/22 126/78   04/27/22 (!) 140/78       Hyperlipidemia: Compliance with treatment has been good. The patient exercises never. Patient denies muscle pain associated with his medications. Previous history of cardiac disease includes: none.    Lab Results   Component Value Date    CHOL 222 (H) 11/02/2022    HDL 49 11/02/2022        Back Pain: The patient has had recurrent self limited episodes of low back pain in the past. Pain is severe at times. She has a nerve stimulator in place. She has also tried acetaminophen, exercise, muscle relaxants, narcotic pain medications, and NSAIDs which provided mild-moderate symptom relief. She currently takes Berkey      Medical History:   Past Medical History:   Diagnosis Date    Arthritis     Asthma     Cervical radiculopathy 10/14/2013    Cervicalgia     2013 tx by chiropractor    Closed dislocation of acromioclavicular joint 2013    Degenerative disc disease     Family history of aortic aneurysm 10/21/2019    Fatty liver     Fibromyalgia     GERD (gastroesophageal reflux disease)     HPV (human papilloma virus) anogenital infection     conization in past-remote history-1990    HTN (hypertension)     Sciatica     Tobacco use     Ulcer 2007    stomach ulcer       Family History: family history includes Aneurysm in her father; Cancer in her mother; Heart disease in her father.    Surgical History:   Past Surgical History:   Procedure Laterality Date    acdf  4/2014    C5-6    BACK SURGERY      CERVICAL    CERVICAL CONIZATION   W/ LASER      KNEE ARTHROSCOPY      right shoulder surger       arthroscopic surgery Dec 2013    TRIAL OF SPINAL CORD NERVE STIMULATOR N/A 9/25/2018    Procedure: TRIAL, NEUROSTIMULATOR, SPINAL CORD;  Surgeon: Candice Lopez MD;  Location: [facility];  Service: Pain Management;  Laterality: N/A;  SCS Trial  Pj Hankins notified of date and time        Social History:  reports that she has been smoking cigarettes. She has a 48.00 pack-year smoking history. She has never used smokeless tobacco. She reports current alcohol use. She reports that she does not use drugs.     Allergies:   Review of patient's allergies indicates:   Allergen Reactions    Ambien [zolpidem] Other (See Comments)     Pt hyperactive    Iodine and iodide containing products Swelling    Lunesta [eszopiclone] Other (See Comments)     Severely depressed    Orange Blisters and Swelling    Pineapple Blisters and Swelling    Shellfish containing products Swelling and Other (See Comments)     Metallic taste in mouth  Swells all over,including the tongue and throat  Feels likes insides are swollen  Allergic to crawfish,Shrimp    Ancef [cefazolin]      Facial swelling  Swelling abdomen    Egg derived Hives and Swelling    Flexeril [cyclobenzaprine] Swelling    Pork/porcine containing products Hives and Swelling    Sulfa (sulfonamide antibiotics) Nausea Only       Medications:   Current Outpatient Medications   Medication Sig    albuterol (PROAIR HFA) 90 mcg/actuation inhaler Inhale 2 puffs into the lungs every 6 (six) hours as needed for Wheezing or Shortness of Breath.    amLODIPine (NORVASC) 5 MG tablet TAKE 1 TABLET EVERY DAY    CALCIUM-MAG OXIDE-VITAMIN D3 ORAL Take by mouth.    clotrimazole-betamethasone 1-0.05% (LOTRISONE) cream Apply topically 2 (two) times daily.    EPINEPHrine (EPIPEN) 0.3 mg/0.3 mL AtIn INJECT INTRAMUSCULARLY AS DIRECTED    folic acid (FOLVITE) 800 MCG Tab Take 400 mcg by mouth once daily.    furosemide (LASIX) 20 MG tablet TAKE 1 TABLET BY MOUTH AS NEEDED FOR LEG SWELLING     losartan (COZAAR) 100 MG tablet TAKE 1 TABLET EVERY DAY    methocarbamoL (ROBAXIN) 500 MG Tab Take 1 tablet (500 mg total) by mouth 3 (three) times daily as needed (muscle relaxant).    nystatin-triamcinolone (MYCOLOG) ointment Apply topically 2 (two) times daily.    rosuvastatin (CRESTOR) 10 MG tablet TAKE 1 TABLET EVERY DAY    triamcinolone acetonide 0.5% (KENALOG) 0.5 % Crea     ergocalciferol (ERGOCALCIFEROL) 50,000 unit Cap Take 1 capsule (50,000 Units total) by mouth every 7 days.    HYDROcodone-acetaminophen (NORCO) 5-325 mg per tablet Take 1 tablet by mouth every 12 (twelve) hours as needed for Pain.     No current facility-administered medications for this visit.       Health Maintenance:   Health Maintenance Topics with due status: Not Due       Topic Last Completion Date    TETANUS VACCINE 10/22/2018    Mammogram 2021    Colorectal Cancer Screening 2021    Hemoglobin A1c (Diabetic Prevention Screening) 2022    Lipid Panel 2022       Eye Exam: Last Ophthalmology Visit: 10/22/2018 [facility] EYE LASER SURGERY   Dental Exam: done  OB/GYN: Dr. Stewart  Mammogram: scheduled  DEXA scan: due  Cologuard:   2021  Hepatitis C screenin2017, neg      Vaccinations:  Immunization History   Administered Date(s) Administered    COVID-19 MRNA, LN-S PF (MODERNA HALF 0.25 ML DOSE) 2021, 2022    COVID-19 Vaccine 2022    COVID-19, MRNA, LN-S, PF (MODERNA FULL 0.5 ML DOSE) 2021, 2021    Influenza - Quadrivalent - High Dose - PF (65 years and older) 2022    Influenza - Quadrivalent - MDCK - PF 2017    Influenza - Quadrivalent - PF *Preferred* (6 months and older) 10/22/2018, 10/21/2019, 2020, 11/15/2021    Pneumococcal Polysaccharide - 23 Valent 2017    Tdap 10/22/2018    Zoster Recombinant 2018, 2019     Influenza:  due  Tetanus:  2018  Shingrix: done  Pneumovax:  2017  Prevnar-20: due  Covid vaccine: booster due    ADL's: independent but  uses   Memory: normal  Mental health:   Advance Directives: Received  Falls:  Recent fall at last office visit  Nutrition: normal  Home Safety: has safety rails in [place]    Body mass index is 42.74 kg/m².  Wt Readings from Last 3 Encounters:   12/14/22 106 kg (233 lb 11 oz)   05/18/22 105.7 kg (233 lb 0.4 oz)   04/27/22 106.1 kg (233 lb 12.8 oz)       Review of Systems   Constitutional:  Negative for activity change, chills, diaphoresis, fatigue, fever and unexpected weight change.   HENT:  Negative for congestion, dental problem, ear discharge, ear pain, hearing loss, postnasal drip, rhinorrhea, sinus pressure, sore throat and trouble swallowing.    Eyes:  Negative for discharge, redness and visual disturbance.   Respiratory:  Negative for cough, chest tightness, shortness of breath and wheezing.    Cardiovascular:  Negative for chest pain, palpitations and leg swelling.   Gastrointestinal:  Negative for abdominal pain, blood in stool, constipation, diarrhea, nausea and vomiting.   Endocrine: Negative for polydipsia and polyuria.   Genitourinary:  Negative for decreased urine volume, difficulty urinating, dysuria, frequency, hematuria and menstrual problem.   Musculoskeletal:  Positive for arthralgias (knee pain), back pain, gait problem and neck pain. Negative for joint swelling and myalgias.   Skin:  Negative for rash and wound.   Neurological:  Positive for weakness (legs). Negative for dizziness, numbness and headaches.   Hematological:  Negative for adenopathy.   Psychiatric/Behavioral:  Negative for confusion, dysphoric mood and sleep disturbance. The patient is not nervous/anxious.         Answers submitted by the patient for this visit:  Review of Systems Questionnaire (Submitted on 12/13/2022)  activity change: No  unexpected weight change: No  neck pain: Yes  hearing loss: No  rhinorrhea: No  trouble swallowing: No  eye discharge: No  visual disturbance: No  chest tightness: No  wheezing: No  chest pain:  No  palpitations: No  blood in stool: No  constipation: No  vomiting: No  diarrhea: No  polydipsia: No  polyuria: No  difficulty urinating: No  hematuria: No  menstrual problem: No  dysuria: No  joint swelling: No  arthralgias: No  headaches: No  weakness: No  confusion: No  dysphoric mood: No      Objective:     Physical Exam  Vitals reviewed.   Constitutional:       General: She is awake. She is not in acute distress.     Appearance: Normal appearance. She is well-developed and well-groomed. She is not diaphoretic.   HENT:      Head: Normocephalic and atraumatic.      Right Ear: Hearing, tympanic membrane, ear canal and external ear normal. Tympanic membrane is not erythematous or bulging.      Left Ear: Hearing, tympanic membrane, ear canal and external ear normal. Tympanic membrane is not erythematous or bulging.      Nose: Nose normal. No congestion.      Mouth/Throat:      Mouth: Mucous membranes are moist.      Tongue: No lesions.      Pharynx: Oropharynx is clear. Uvula midline. No oropharyngeal exudate or posterior oropharyngeal erythema.   Eyes:      General: Lids are normal. Vision grossly intact. Gaze aligned appropriately. No scleral icterus.     Conjunctiva/sclera:      Right eye: Right conjunctiva is not injected.      Left eye: Left conjunctiva is not injected.      Pupils: Pupils are equal, round, and reactive to light.   Neck:      Thyroid: No thyroid mass or thyromegaly.   Cardiovascular:      Rate and Rhythm: Normal rate and regular rhythm.      Pulses: Normal pulses.      Heart sounds: Normal heart sounds. No murmur heard.  Pulmonary:      Effort: Pulmonary effort is normal. No respiratory distress.      Breath sounds: Normal breath sounds. No decreased breath sounds or wheezing.   Abdominal:      General: Bowel sounds are normal. There is no distension.      Palpations: Abdomen is soft. Abdomen is not rigid.      Tenderness: There is no abdominal tenderness. There is no guarding or rebound.    Musculoskeletal:         General: Normal range of motion.      Cervical back: Normal range of motion and neck supple. Tenderness and bony tenderness present.      Thoracic back: Tenderness present. No bony tenderness.      Lumbar back: Spasms and tenderness present. No bony tenderness.      Right lower leg: No edema.      Left lower leg: No edema.   Lymphadenopathy:      Cervical: No cervical adenopathy.      Upper Body:      Right upper body: No supraclavicular adenopathy.      Left upper body: No supraclavicular adenopathy.   Skin:     General: Skin is warm and dry.      Coloration: Skin is not cyanotic.      Findings: No lesion or rash.      Nails: There is no clubbing.   Neurological:      General: No focal deficit present.      Mental Status: She is alert and oriented to person, place, and time.      Sensory: Sensation is intact.      Coordination: Coordination is intact.      Gait: Gait is intact.      Deep Tendon Reflexes: Reflexes are normal and symmetric.   Psychiatric:         Attention and Perception: Attention normal.         Mood and Affect: Mood normal.         Behavior: Behavior is cooperative.            Assessment:        1. Essential hypertension    2. Mixed hyperlipidemia    3. Fibromyalgia    4. Spinal stenosis of lumbar region with neurogenic claudication    5. Lumbar radiculopathy    6. Primary osteoarthritis of both knees    7. Adrenal mass, left    8. Unspecified inflammatory spondylopathy, cervical region    9. Vitamin D insufficiency    10. Annual physical exam    11. Morbid obesity with BMI of 40.0-44.9, adult    12. Aortic calcification           Plan:     1. Essential hypertension  - BP controlled, continue amlodipine, furosemide, losartan    2. Mixed hyperlipidemia  - lipid panel not at goal, take Crestor daily, repeat labs after next appointment    3. Fibromyalgia  - stable, continue to monitor    4. Spinal stenosis of lumbar region with neurogenic claudication  - stable, nerve  stimulator placed in 2018, continue to monitor    5. Lumbar radiculopathy  - may continue Norco as needed for pain, muscle relaxant prn    6. Primary osteoarthritis of both knees  - may continue Norco prn    7. Adrenal mass, left  - incidentally seen on CT in 2017, slightly smaller on CT in 2021    8. Unspecified inflammatory spondylopathy, cervical region  - stable, continue narcotic and muscle relaxant    9. Vitamin D insufficiency  - ergocalciferol (ERGOCALCIFEROL) 50,000 unit Cap; Take 1 capsule (50,000 Units total) by mouth every 7 days.  Dispense: 12 capsule; Refill: 1    10. Annual physical exam  - reviewed recent labs with patient    11. Morbid obesity with BMI of 40.0-44.9, adult  - try to increase physical activity level when pain is controlled    12. Aortic calcification  - seen on past imaging, continue antihypertensives  -  improve lipid panel readings, continue statin and adjust dose if needed    RTC in 4 months for follow-up or sooner if needed    __________________________    Felicity Middleton MD, PharmD  [facility]- [facility]  American Board of Obesity Medicine diplomate  Office [phone]

## 2022-12-28 RX ORDER — FUROSEMIDE 20 MG/1
TABLET ORAL
Qty: 30 TABLET | Refills: 3 | Status: SHIPPED | OUTPATIENT
Start: 2022-12-28 | End: 2023-05-03

## 2023-01-04 ENCOUNTER — PATIENT MESSAGE (OUTPATIENT)
Dept: INTERNAL MEDICINE | Facility: CLINIC | Age: 68
End: 2023-01-04
Payer: MEDICARE

## 2023-01-06 ENCOUNTER — PATIENT MESSAGE (OUTPATIENT)
Dept: INTERNAL MEDICINE | Facility: CLINIC | Age: 68
End: 2023-01-06
Payer: MEDICARE

## 2023-01-06 NOTE — TELEPHONE ENCOUNTER
Spoke to pt regarding getting pt schedule. Pt is schedule with Za Ball on 1/12/23 at 11:30 am for Rt Flank pain.

## 2023-01-09 ENCOUNTER — PATIENT MESSAGE (OUTPATIENT)
Dept: INTERNAL MEDICINE | Facility: CLINIC | Age: 68
End: 2023-01-09
Payer: MEDICARE

## 2023-01-09 DIAGNOSIS — M54.16 LUMBAR RADICULOPATHY: Primary | ICD-10-CM

## 2023-01-09 DIAGNOSIS — M17.0 PRIMARY OSTEOARTHRITIS OF BOTH KNEES: ICD-10-CM

## 2023-01-09 RX ORDER — HYDROCODONE BITARTRATE AND ACETAMINOPHEN 5; 325 MG/1; MG/1
1 TABLET ORAL EVERY 12 HOURS PRN
Qty: 60 TABLET | Refills: 0 | Status: SHIPPED | OUTPATIENT
Start: 2023-01-09 | End: 2023-02-22 | Stop reason: SDUPTHER

## 2023-01-09 NOTE — TELEPHONE ENCOUNTER
LOV 12/14/22  LRF 3/25/15      Pt requesting Norco for Hip Pain. Pt schedule with Za Ball on 1/12/23 at 11:30 am because of Rt Flank Pain

## 2023-01-11 ENCOUNTER — PATIENT MESSAGE (OUTPATIENT)
Dept: INTERNAL MEDICINE | Facility: CLINIC | Age: 68
End: 2023-01-11
Payer: MEDICARE

## 2023-01-31 ENCOUNTER — PATIENT MESSAGE (OUTPATIENT)
Dept: INTERNAL MEDICINE | Facility: CLINIC | Age: 68
End: 2023-01-31
Payer: MEDICARE

## 2023-01-31 NOTE — TELEPHONE ENCOUNTER
"MARCIAL from pt "Saw Dr. Dean Guerrier, have not seen him in 15 years. He prescribed hyoscyamine had severe muscle spasms in his office. Having a endoscopy and colonoscopy next week. Will keep in touch. Have a good week."  "

## 2023-02-01 ENCOUNTER — TELEPHONE (OUTPATIENT)
Dept: INTERNAL MEDICINE | Facility: CLINIC | Age: 68
End: 2023-02-01
Payer: MEDICARE

## 2023-02-01 NOTE — TELEPHONE ENCOUNTER
----- Message from Felicity Middleton MD sent at 1/31/2023  1:03 PM CST -----  >>>>>>>>>> annual recall 12/15/2023  >>>>> 4 mo f/u

## 2023-02-07 DIAGNOSIS — Z00.00 ENCOUNTER FOR MEDICARE ANNUAL WELLNESS EXAM: ICD-10-CM

## 2023-02-09 DIAGNOSIS — Z00.00 ENCOUNTER FOR MEDICARE ANNUAL WELLNESS EXAM: ICD-10-CM

## 2023-02-21 DIAGNOSIS — E55.9 VITAMIN D INSUFFICIENCY: ICD-10-CM

## 2023-02-22 RX ORDER — ERGOCALCIFEROL 1.25 MG/1
CAPSULE ORAL
Qty: 12 CAPSULE | Refills: 1 | Status: SHIPPED | OUTPATIENT
Start: 2023-02-22 | End: 2023-12-20 | Stop reason: SDUPTHER

## 2023-02-23 ENCOUNTER — PATIENT MESSAGE (OUTPATIENT)
Dept: INTERNAL MEDICINE | Facility: CLINIC | Age: 68
End: 2023-02-23
Payer: MEDICARE

## 2023-02-23 ENCOUNTER — OFFICE VISIT (OUTPATIENT)
Dept: URGENT CARE | Facility: CLINIC | Age: 68
End: 2023-02-23
Payer: MEDICARE

## 2023-02-23 VITALS
TEMPERATURE: 98 F | HEIGHT: 62 IN | RESPIRATION RATE: 20 BRPM | OXYGEN SATURATION: 96 % | BODY MASS INDEX: 42.88 KG/M2 | DIASTOLIC BLOOD PRESSURE: 97 MMHG | HEART RATE: 92 BPM | WEIGHT: 233 LBS | SYSTOLIC BLOOD PRESSURE: 143 MMHG

## 2023-02-23 DIAGNOSIS — B37.31 YEAST INFECTION INVOLVING THE VAGINA AND SURROUNDING AREA: ICD-10-CM

## 2023-02-23 DIAGNOSIS — R19.7 DIARRHEA, UNSPECIFIED TYPE: Primary | ICD-10-CM

## 2023-02-23 LAB
CTP QC/QA: YES
SARS-COV-2 AG RESP QL IA.RAPID: NEGATIVE

## 2023-02-23 PROCEDURE — 4010F ACE/ARB THERAPY RXD/TAKEN: CPT | Mod: CPTII,S$GLB,,

## 2023-02-23 PROCEDURE — 3077F SYST BP >= 140 MM HG: CPT | Mod: CPTII,S$GLB,,

## 2023-02-23 PROCEDURE — 1159F PR MEDICATION LIST DOCUMENTED IN MEDICAL RECORD: ICD-10-PCS | Mod: CPTII,S$GLB,,

## 2023-02-23 PROCEDURE — 3008F PR BODY MASS INDEX (BMI) DOCUMENTED: ICD-10-PCS | Mod: CPTII,S$GLB,,

## 2023-02-23 PROCEDURE — 1125F PR PAIN SEVERITY QUANTIFIED, PAIN PRESENT: ICD-10-PCS | Mod: CPTII,S$GLB,,

## 2023-02-23 PROCEDURE — 99213 PR OFFICE/OUTPT VISIT, EST, LEVL III, 20-29 MIN: ICD-10-PCS | Mod: S$GLB,,,

## 2023-02-23 PROCEDURE — 87811 SARS-COV-2 COVID19 W/OPTIC: CPT | Mod: QW,S$GLB,,

## 2023-02-23 PROCEDURE — 3077F PR MOST RECENT SYSTOLIC BLOOD PRESSURE >= 140 MM HG: ICD-10-PCS | Mod: CPTII,S$GLB,,

## 2023-02-23 PROCEDURE — 99213 OFFICE O/P EST LOW 20 MIN: CPT | Mod: S$GLB,,,

## 2023-02-23 PROCEDURE — 1125F AMNT PAIN NOTED PAIN PRSNT: CPT | Mod: CPTII,S$GLB,,

## 2023-02-23 PROCEDURE — 87811 SARS CORONAVIRUS 2 ANTIGEN POCT, MANUAL READ: ICD-10-PCS | Mod: QW,S$GLB,,

## 2023-02-23 PROCEDURE — 3080F PR MOST RECENT DIASTOLIC BLOOD PRESSURE >= 90 MM HG: ICD-10-PCS | Mod: CPTII,S$GLB,,

## 2023-02-23 PROCEDURE — 3008F BODY MASS INDEX DOCD: CPT | Mod: CPTII,S$GLB,,

## 2023-02-23 PROCEDURE — 3080F DIAST BP >= 90 MM HG: CPT | Mod: CPTII,S$GLB,,

## 2023-02-23 PROCEDURE — 1160F PR REVIEW ALL MEDS BY PRESCRIBER/CLIN PHARMACIST DOCUMENTED: ICD-10-PCS | Mod: CPTII,S$GLB,,

## 2023-02-23 PROCEDURE — 1160F RVW MEDS BY RX/DR IN RCRD: CPT | Mod: CPTII,S$GLB,,

## 2023-02-23 PROCEDURE — 4010F PR ACE/ARB THEARPY RXD/TAKEN: ICD-10-PCS | Mod: CPTII,S$GLB,,

## 2023-02-23 PROCEDURE — 1157F PR ADVANCE CARE PLAN OR EQUIV PRESENT IN MEDICAL RECORD: ICD-10-PCS | Mod: CPTII,S$GLB,,

## 2023-02-23 PROCEDURE — 1157F ADVNC CARE PLAN IN RCRD: CPT | Mod: CPTII,S$GLB,,

## 2023-02-23 PROCEDURE — 1159F MED LIST DOCD IN RCRD: CPT | Mod: CPTII,S$GLB,,

## 2023-02-23 RX ORDER — ESOMEPRAZOLE MAGNESIUM 40 MG/1
CAPSULE, DELAYED RELEASE ORAL
COMMUNITY
Start: 2023-01-04 | End: 2023-08-29

## 2023-02-23 RX ORDER — FLUCONAZOLE 150 MG/1
150 TABLET ORAL DAILY
Qty: 2 TABLET | Refills: 0 | Status: SHIPPED | OUTPATIENT
Start: 2023-02-23 | End: 2023-02-24

## 2023-02-23 RX ORDER — FLUTICASONE PROPIONATE 50 UG/1
SPRAY, METERED NASAL
COMMUNITY
Start: 2023-01-16 | End: 2023-08-29

## 2023-02-23 RX ORDER — HYOSCYAMINE SULFATE 0.125 MG
125 TABLET ORAL EVERY 6 HOURS PRN
COMMUNITY
Start: 2023-02-16

## 2023-02-23 NOTE — PROGRESS NOTES
"Subjective:       Patient ID: Kaleigh Solo is a 67 y.o. female.    Vitals:  height is 5' 2" (1.575 m) and weight is 105.7 kg (233 lb). Her oral temperature is 98.2 °F (36.8 °C). Her blood pressure is 143/97 (abnormal) and her pulse is 92. Her respiration is 20 and oxygen saturation is 96%.     Chief Complaint: COVID-19 Concerns (GI symptoms and rash. Dr Dean Guerrier office suggested I get a covid test. I just had a colonoscopy and esophagram on Feb 8th. Symptoms started Feb 17th. I would like to see someone and have covid test. - Entered by patient) and Sinus Problem    Pt states she has had symptoms since Friday. Pt states she has improved in most of her symptoms but her stomach pain, bloating/belching, and  diarrhea are still consistent. Having 3 episodes of diarrhea a day but starting to form structure.  No blood in stool.  Patient denies recent travel or unknown food substance. States she had EGD/colonoscopy about 2 weeks ago and states everything came back normal.  Denies fever, n/v.  "My GI doctor stated it might be viral but requested that I get ruled out for COVID."  Pt states her pcp wants her to get a covid test perform so pt came to be tested and evaluated and treated for symptoms. She has tried Peptobismol, and recently tried Pedialyte.  Denies urinary symptoms, back pain, abdominal pain.Patient has extensive allergy list.     Patient also mentioned a rash on genital region and underneath breasts.     Sinus Problem  This is a new problem. The current episode started in the past 7 days. The problem has been gradually improving since onset. There has been no fever. Her pain is at a severity of 5/10. The pain is moderate. Associated symptoms include headaches. Pertinent negatives include no chills. Treatments tried: pepto bismol, excedrin, gatorade, pedia lyte. The treatment provided moderate relief.     Constitution: Negative for appetite change, chills and fever.   Gastrointestinal:  Positive for " abdominal bloating and diarrhea. Negative for abdominal pain, nausea, vomiting, constipation, bright red blood in stool, rectal bleeding and bowel incontinence. Blood in stool: on peptobismol.  Genitourinary:  Negative for dysuria, frequency, urgency, urine decreased, flank pain and bladder incontinence.   Skin:  Negative for hives.   Allergic/Immunologic: Negative for hives.   Neurological:  Positive for headaches.     Objective:      Vitals:    02/23/23 1233   BP: (!) 143/97   Pulse: 92   Resp: 20   Temp: 98.2 °F (36.8 °C)       Physical Exam   Constitutional: She is oriented to person, place, and time. She appears well-developed.   HENT:   Head: Normocephalic and atraumatic.   Ears:   Right Ear: External ear normal.   Left Ear: External ear normal.   Nose: Nose normal.   Mouth/Throat: Mucous membranes are normal.   Eyes: Conjunctivae and lids are normal.   Neck: Trachea normal. Neck supple.   Cardiovascular: Normal rate, regular rhythm and normal heart sounds.   Pulmonary/Chest: Effort normal and breath sounds normal. No respiratory distress. She has no wheezes. She has no rhonchi. She has no rales.   Abdominal: Normal appearance and bowel sounds are normal. She exhibits no distension and no mass. Soft. There is no abdominal tenderness. There is no rebound, no guarding, no left CVA tenderness and no right CVA tenderness.   Genitourinary:    Vulva normal.   There is rash on the right labia. There is rash on the left labia.         Comments: Patient has irritated and wet red rash on folds and on vulva. Patient states it is also underneath breasts. No recent abx or DM history.      Musculoskeletal: Normal range of motion.         General: Normal range of motion.   Neurological: She is alert and oriented to person, place, and time. She has normal strength.   Skin: Skin is warm, dry, intact, not diaphoretic and not pale.   Psychiatric: Her speech is normal and behavior is normal. Judgment and thought content normal.    Nursing note and vitals reviewed.chaperone present         Assessment:       1. Diarrhea, unspecified type    2. Yeast infection involving the vagina and surrounding area          Results for orders placed or performed in visit on 02/23/23   SARS Coronavirus 2 Antigen, POCT Manual Read   Result Value Ref Range    SARS Coronavirus 2 Antigen Negative Negative     Acceptable Yes        Plan:         Diarrhea, unspecified type  -     SARS Coronavirus 2 Antigen, POCT Manual Read    Yeast infection involving the vagina and surrounding area  -     fluconazole (DIFLUCAN) 150 MG Tab; Take 1 tablet (150 mg total) by mouth once daily. Please take one dose now, if you are still having symptoms in 72 hours (3 days) take second dose. for 1 day  Dispense: 2 tablet; Refill: 0         Patient Instructions   Abdominal Pain   If your condition worsens or fails to improve we recommend that you receive another evaluation at the ER immediately or contact your PCP to discuss your concerns or return here. You must understand that you've received an urgent care treatment only and that you may be released before all your medical problems are known or treated. You the patient will arrange for followup care as instructed.     PLEASE RETURN HERE OR GO TO THE EMERGENCY DEPARTMENT FOR ANY CONCERNS OR WORSENING OF YOUR CONDITION (I.E., BLOOD IN VOMIT OR STOOL, WORSENING ABDOMINAL PAIN, FEVER, WEAKNESS, PASSING OUT, INABILITY TO PASS GAS OR STOOL)    Diarrhea    If you have diarrhea you can use Pepto Bismol.  Avoid Imodium unless you have more than 6 episodes of diarrhea in 24 hours.     Avoid any greasy, spicy, fatty or acidic foods at this time.   Do not eat dairy products while you have symptoms of diarrhea, they can make the diarrhea worse.   Increase fluids and rest is important.    Start a clear liquid diet and progress to BRAT diet     Watch for any increase pain, fever, localized pain to right lower abdomen or continued  vomiting or diarrhea.     You must understand that you have received an Urgent Care treatment only and that you may be released before all of your medical problems are known or treated.  WE CANNOT RULE OUT ALL POSSIBLE CAUSES OF YOUR SYMPTOMS IN THE URGENT CARE SETTING PLEASE GO TO THE ER IF YOU FEELS YOUR CONDITION IS WORSENING OR YOU WOULD LIKE EMERGENT EVALUATION.    PLEASE READ YOUR DISCHARGE INSTRUCTIONS ENTIRELY AS IT CONTAINS IMPORTANT INFORMATION.     Take one diflucan when you get it, take the other in 72 hours IF NEEDED     Try taking an over the counter probiotic or eating yogurt.     You can use over the counter clotrimazole (lotrimin) cream to the outer vagina for irritation.      Please return or see your primary care doctor if you develop new or worsening symptoms.      Please arrange follow up with your primary medical clinic as soon as possible. You must understand that you've received an Urgent Care treatment only and that you may be released before all of your medical problems are known or treated. You, the patient, will arrange for follow up as instructed. If your symptoms worsen or fail to improve you should go to the Emergency Room.      Keep your next appointment in 1 week.

## 2023-02-23 NOTE — PATIENT INSTRUCTIONS
Abdominal Pain   If your condition worsens or fails to improve we recommend that you receive another evaluation at the ER immediately or contact your PCP to discuss your concerns or return here. You must understand that you've received an urgent care treatment only and that you may be released before all your medical problems are known or treated. You the patient will arrange for followup care as instructed.     PLEASE RETURN HERE OR GO TO THE EMERGENCY DEPARTMENT FOR ANY CONCERNS OR WORSENING OF YOUR CONDITION (I.E., BLOOD IN VOMIT OR STOOL, WORSENING ABDOMINAL PAIN, FEVER, WEAKNESS, PASSING OUT, INABILITY TO PASS GAS OR STOOL)    Diarrhea    If you have diarrhea you can use Pepto Bismol.  Avoid Imodium unless you have more than 6 episodes of diarrhea in 24 hours.     Avoid any greasy, spicy, fatty or acidic foods at this time.   Do not eat dairy products while you have symptoms of diarrhea, they can make the diarrhea worse.   Increase fluids and rest is important.    Start a clear liquid diet and progress to BRAT diet     Watch for any increase pain, fever, localized pain to right lower abdomen or continued vomiting or diarrhea.     You must understand that you have received an Urgent Care treatment only and that you may be released before all of your medical problems are known or treated.  WE CANNOT RULE OUT ALL POSSIBLE CAUSES OF YOUR SYMPTOMS IN THE URGENT CARE SETTING PLEASE GO TO THE ER IF YOU FEELS YOUR CONDITION IS WORSENING OR YOU WOULD LIKE EMERGENT EVALUATION.    PLEASE READ YOUR DISCHARGE INSTRUCTIONS ENTIRELY AS IT CONTAINS IMPORTANT INFORMATION.     Take one diflucan when you get it, take the other in 72 hours IF NEEDED     Try taking an over the counter probiotic or eating yogurt.     You can use over the counter clotrimazole (lotrimin) cream to the outer vagina for irritation.      Please return or see your primary care doctor if you develop new or worsening symptoms.      Please arrange follow up  with your primary medical clinic as soon as possible. You must understand that you've received an Urgent Care treatment only and that you may be released before all of your medical problems are known or treated. You, the patient, will arrange for follow up as instructed. If your symptoms worsen or fail to improve you should go to the Emergency Room.      Keep your next appointment in 1 week.

## 2023-02-23 NOTE — PROGRESS NOTES
"Subjective:       Patient ID: Kaleigh Solo is a 67 y.o. female.    Vitals:  height is 5' 2" (1.575 m) and weight is 105.7 kg (233 lb). Her oral temperature is 98.2 °F (36.8 °C). Her blood pressure is 143/97 (abnormal) and her pulse is 92. Her respiration is 20 and oxygen saturation is 96%.     Chief Complaint: COVID-19 Concerns (GI symptoms and rash. Dr Dean Guerrier office suggested I get a covid test. I just had a colonoscopy and esophagram on Feb 8th. Symptoms started Feb 17th. I would like to see someone and have covid test. - Entered by patient) and Sinus Problem    HPI  ROS    Objective:      Physical Exam      Assessment:       1. Diarrhea, unspecified type    2. Yeast infection involving the vagina and surrounding area          Plan:         Diarrhea, unspecified type  -     SARS Coronavirus 2 Antigen, POCT Manual Read    Yeast infection involving the vagina and surrounding area  -     fluconazole (DIFLUCAN) 150 MG Tab; Take 1 tablet (150 mg total) by mouth once daily. Please take one dose now, if you are still having symptoms in 72 hours (3 days) take second dose. for 1 day  Dispense: 2 tablet; Refill: 0                     "

## 2023-03-14 ENCOUNTER — PATIENT MESSAGE (OUTPATIENT)
Dept: INTERNAL MEDICINE | Facility: CLINIC | Age: 68
End: 2023-03-14
Payer: MEDICARE

## 2023-03-16 ENCOUNTER — PATIENT MESSAGE (OUTPATIENT)
Dept: PAIN MEDICINE | Facility: CLINIC | Age: 68
End: 2023-03-16
Payer: MEDICARE

## 2023-03-22 ENCOUNTER — PATIENT MESSAGE (OUTPATIENT)
Dept: INTERNAL MEDICINE | Facility: CLINIC | Age: 68
End: 2023-03-22
Payer: MEDICARE

## 2023-03-23 ENCOUNTER — HOSPITAL ENCOUNTER (OUTPATIENT)
Dept: RADIOLOGY | Facility: HOSPITAL | Age: 68
Discharge: HOME | End: 2023-03-23
Attending: OBSTETRICS & GYNECOLOGY
Payer: MEDICARE

## 2023-03-23 DIAGNOSIS — Z12.31 SCREENING MAMMOGRAM, ENCOUNTER FOR: ICD-10-CM

## 2023-03-23 PROCEDURE — 77067 SCR MAMMO BI INCL CAD: CPT | Mod: 26,HCNC,, | Performed by: RADIOLOGY

## 2023-03-23 PROCEDURE — 77063 BREAST TOMOSYNTHESIS BI: CPT | Mod: 26,HCNC,, | Performed by: RADIOLOGY

## 2023-03-23 PROCEDURE — 77067 SCR MAMMO BI INCL CAD: CPT | Mod: TC,HCNC,PO

## 2023-03-23 PROCEDURE — 77067 MAMMO DIGITAL SCREENING BILAT WITH TOMO: ICD-10-PCS | Mod: 26,HCNC,, | Performed by: RADIOLOGY

## 2023-03-23 PROCEDURE — 77063 MAMMO DIGITAL SCREENING BILAT WITH TOMO: ICD-10-PCS | Mod: 26,HCNC,, | Performed by: RADIOLOGY

## 2023-03-27 DIAGNOSIS — M17.0 PRIMARY OSTEOARTHRITIS OF BOTH KNEES: ICD-10-CM

## 2023-03-27 DIAGNOSIS — M54.16 LUMBAR RADICULOPATHY: ICD-10-CM

## 2023-03-27 NOTE — TELEPHONE ENCOUNTER
No new care gaps identified.  [facility] Embedded Care Gaps. Reference number: [id number]. 3/27/2023   10:26:40 AM PAULINET

## 2023-03-28 ENCOUNTER — PATIENT MESSAGE (OUTPATIENT)
Dept: PAIN MEDICINE | Facility: CLINIC | Age: 68
End: 2023-03-28
Payer: MEDICARE

## 2023-03-28 RX ORDER — HYDROCODONE BITARTRATE AND ACETAMINOPHEN 5; 325 MG/1; MG/1
1 TABLET ORAL EVERY 12 HOURS PRN
Qty: 60 TABLET | Refills: 0 | Status: SHIPPED | OUTPATIENT
Start: 2023-03-28 | End: 2023-04-28 | Stop reason: SDUPTHER

## 2023-04-11 ENCOUNTER — APPOINTMENT (OUTPATIENT)
Dept: RADIOLOGY | Facility: CLINIC | Age: 68
End: 2023-04-11
Attending: INTERNAL MEDICINE
Payer: MEDICARE

## 2023-04-11 DIAGNOSIS — Z78.0 MENOPAUSE: ICD-10-CM

## 2023-04-11 PROCEDURE — 77080 DXA BONE DENSITY AXIAL: CPT | Mod: 26,HCNC,, | Performed by: INTERNAL MEDICINE

## 2023-04-11 PROCEDURE — 77080 DXA BONE DENSITY AXIAL: CPT | Mod: TC,HCNC,PO

## 2023-04-11 PROCEDURE — 77080 DEXA BONE DENSITY SPINE HIP: ICD-10-PCS | Mod: 26,HCNC,, | Performed by: INTERNAL MEDICINE

## 2023-04-14 ENCOUNTER — OFFICE VISIT (OUTPATIENT)
Dept: INTERNAL MEDICINE | Facility: CLINIC | Age: 68
End: 2023-04-14
Payer: MEDICARE

## 2023-04-14 VITALS
SYSTOLIC BLOOD PRESSURE: 120 MMHG | RESPIRATION RATE: 18 BRPM | BODY MASS INDEX: 42.92 KG/M2 | WEIGHT: 233.25 LBS | HEIGHT: 62 IN | HEART RATE: 79 BPM | TEMPERATURE: 98 F | DIASTOLIC BLOOD PRESSURE: 74 MMHG | OXYGEN SATURATION: 97 %

## 2023-04-14 DIAGNOSIS — M54.16 LUMBAR RADICULOPATHY: ICD-10-CM

## 2023-04-14 DIAGNOSIS — E66.01 MORBID OBESITY WITH BMI OF 40.0-44.9, ADULT: ICD-10-CM

## 2023-04-14 DIAGNOSIS — Z87.891 PERSONAL HISTORY OF NICOTINE DEPENDENCE: ICD-10-CM

## 2023-04-14 DIAGNOSIS — I10 ESSENTIAL HYPERTENSION: Primary | ICD-10-CM

## 2023-04-14 DIAGNOSIS — I70.0 AORTIC CALCIFICATION: ICD-10-CM

## 2023-04-14 DIAGNOSIS — E27.8 LEFT ADRENAL MASS: ICD-10-CM

## 2023-04-14 PROCEDURE — 1160F PR REVIEW ALL MEDS BY PRESCRIBER/CLIN PHARMACIST DOCUMENTED: ICD-10-PCS | Mod: CPTII,S$GLB,, | Performed by: INTERNAL MEDICINE

## 2023-04-14 PROCEDURE — 4010F PR ACE/ARB THEARPY RXD/TAKEN: ICD-10-PCS | Mod: CPTII,S$GLB,, | Performed by: INTERNAL MEDICINE

## 2023-04-14 PROCEDURE — 3008F BODY MASS INDEX DOCD: CPT | Mod: CPTII,S$GLB,, | Performed by: INTERNAL MEDICINE

## 2023-04-14 PROCEDURE — 3074F PR MOST RECENT SYSTOLIC BLOOD PRESSURE < 130 MM HG: ICD-10-PCS | Mod: CPTII,S$GLB,, | Performed by: INTERNAL MEDICINE

## 2023-04-14 PROCEDURE — 1157F ADVNC CARE PLAN IN RCRD: CPT | Mod: CPTII,S$GLB,, | Performed by: INTERNAL MEDICINE

## 2023-04-14 PROCEDURE — 1159F PR MEDICATION LIST DOCUMENTED IN MEDICAL RECORD: ICD-10-PCS | Mod: CPTII,S$GLB,, | Performed by: INTERNAL MEDICINE

## 2023-04-14 PROCEDURE — 99999 PR PBB SHADOW E&M-EST. PATIENT-LVL V: CPT | Mod: PBBFAC,,, | Performed by: INTERNAL MEDICINE

## 2023-04-14 PROCEDURE — 1101F PT FALLS ASSESS-DOCD LE1/YR: CPT | Mod: CPTII,S$GLB,, | Performed by: INTERNAL MEDICINE

## 2023-04-14 PROCEDURE — 3288F FALL RISK ASSESSMENT DOCD: CPT | Mod: CPTII,S$GLB,, | Performed by: INTERNAL MEDICINE

## 2023-04-14 PROCEDURE — 1159F MED LIST DOCD IN RCRD: CPT | Mod: CPTII,S$GLB,, | Performed by: INTERNAL MEDICINE

## 2023-04-14 PROCEDURE — 1101F PR PT FALLS ASSESS DOC 0-1 FALLS W/OUT INJ PAST YR: ICD-10-PCS | Mod: CPTII,S$GLB,, | Performed by: INTERNAL MEDICINE

## 2023-04-14 PROCEDURE — 99214 PR OFFICE/OUTPT VISIT, EST, LEVL IV, 30-39 MIN: ICD-10-PCS | Mod: S$GLB,,, | Performed by: INTERNAL MEDICINE

## 2023-04-14 PROCEDURE — 3074F SYST BP LT 130 MM HG: CPT | Mod: CPTII,S$GLB,, | Performed by: INTERNAL MEDICINE

## 2023-04-14 PROCEDURE — 3078F PR MOST RECENT DIASTOLIC BLOOD PRESSURE < 80 MM HG: ICD-10-PCS | Mod: CPTII,S$GLB,, | Performed by: INTERNAL MEDICINE

## 2023-04-14 PROCEDURE — 1160F RVW MEDS BY RX/DR IN RCRD: CPT | Mod: CPTII,S$GLB,, | Performed by: INTERNAL MEDICINE

## 2023-04-14 PROCEDURE — 99999 PR PBB SHADOW E&M-EST. PATIENT-LVL V: ICD-10-PCS | Mod: PBBFAC,,, | Performed by: INTERNAL MEDICINE

## 2023-04-14 PROCEDURE — 3288F PR FALLS RISK ASSESSMENT DOCUMENTED: ICD-10-PCS | Mod: CPTII,S$GLB,, | Performed by: INTERNAL MEDICINE

## 2023-04-14 PROCEDURE — 1157F PR ADVANCE CARE PLAN OR EQUIV PRESENT IN MEDICAL RECORD: ICD-10-PCS | Mod: CPTII,S$GLB,, | Performed by: INTERNAL MEDICINE

## 2023-04-14 PROCEDURE — 3008F PR BODY MASS INDEX (BMI) DOCUMENTED: ICD-10-PCS | Mod: CPTII,S$GLB,, | Performed by: INTERNAL MEDICINE

## 2023-04-14 PROCEDURE — 99214 OFFICE O/P EST MOD 30 MIN: CPT | Mod: S$GLB,,, | Performed by: INTERNAL MEDICINE

## 2023-04-14 PROCEDURE — 1126F AMNT PAIN NOTED NONE PRSNT: CPT | Mod: CPTII,S$GLB,, | Performed by: INTERNAL MEDICINE

## 2023-04-14 PROCEDURE — 4010F ACE/ARB THERAPY RXD/TAKEN: CPT | Mod: CPTII,S$GLB,, | Performed by: INTERNAL MEDICINE

## 2023-04-14 PROCEDURE — 1126F PR PAIN SEVERITY QUANTIFIED, NO PAIN PRESENT: ICD-10-PCS | Mod: CPTII,S$GLB,, | Performed by: INTERNAL MEDICINE

## 2023-04-14 PROCEDURE — 3078F DIAST BP <80 MM HG: CPT | Mod: CPTII,S$GLB,, | Performed by: INTERNAL MEDICINE

## 2023-04-14 RX ORDER — SODIUM, POTASSIUM,MAG SULFATES 17.5-3.13G
SOLUTION, RECONSTITUTED, ORAL ORAL
COMMUNITY
Start: 2023-01-20 | End: 2023-04-14 | Stop reason: ALTCHOICE

## 2023-04-14 NOTE — PROGRESS NOTES
Subjective:     Kaleigh Solo is a 67 y.o. female who presents for   Chief Complaint   Patient presents with    Follow-up     4 Month Follow up    Hypertension    Low-back Pain    Hyperlipidemia       HPI    Hypertension: The patient has been taking medications as instructed, no medication side effects noted, no chest pain on exertion, no dyspnea on exertion, and no swelling of ankles.     BP Readings from Last 3 Encounters:   04/14/23 120/74   02/23/23 (!) 143/97   12/14/22 130/78       Chronic Low Back Pain: was previously managed by Pain Management, nerve stimulator is in place, takes tramadol as needed for pain. She denies leg weakness or numbness and had no recent falls.     Hyperlipidemia: Compliance with treatment has been good. The patient exercises never. Patient denies muscle pain associated with his medications. Previous history of cardiac disease includes: none.    Lab Results   Component Value Date    CHOL 222 (H) 11/02/2022    HDL 49 11/02/2022          Body mass index is 42.66 kg/m².      Review of Systems   Constitutional:  Negative for chills, diaphoresis and fever.   HENT:  Negative for congestion, ear pain, sinus pressure and sore throat.    Eyes:  Negative for discharge and visual disturbance.   Respiratory:  Negative for cough and shortness of breath.    Cardiovascular:  Negative for chest pain and palpitations.   Gastrointestinal:  Negative for abdominal pain, constipation, diarrhea, nausea and vomiting.   Musculoskeletal:  Positive for arthralgias and back pain. Negative for myalgias.   Skin:  Positive for color change and rash. Negative for wound.   Neurological:  Negative for dizziness, tremors, numbness and headaches.   Psychiatric/Behavioral:  Negative for sleep disturbance. The patient is not nervous/anxious.         Objective:     Physical Exam  Vitals reviewed.   Constitutional:       General: She is awake. She is not in acute distress.     Appearance: Normal appearance. She is  well-developed and well-groomed.   HENT:      Head: Normocephalic and atraumatic.      Right Ear: Hearing and external ear normal.      Left Ear: Hearing and external ear normal.      Nose: Nose normal. No congestion.      Mouth/Throat:      Mouth: Mucous membranes are moist.   Eyes:      General: Lids are normal. Vision grossly intact.   Cardiovascular:      Rate and Rhythm: Normal rate and regular rhythm.      Heart sounds: Normal heart sounds. No murmur heard.  Pulmonary:      Effort: Pulmonary effort is normal.      Breath sounds: Normal breath sounds. No decreased breath sounds or wheezing.   Abdominal:      General: Bowel sounds are normal. There is no distension.   Musculoskeletal:         General: Normal range of motion.      Cervical back: Normal range of motion.      Thoracic back: No tenderness.      Lumbar back: Tenderness present.      Right lower leg: No edema.      Left lower leg: No edema.   Skin:     General: Skin is warm and dry.      Findings: No lesion or rash.   Neurological:      Mental Status: She is alert and oriented to person, place, and time.   Psychiatric:         Attention and Perception: Attention normal.         Mood and Affect: Mood normal.         Behavior: Behavior is cooperative.          Assessment:      1. Essential hypertension    2. Lumbar radiculopathy    3. Left adrenal mass    4. Aortic calcification    5. Personal history of nicotine dependence    6. Morbid obesity with BMI of 40.0-44.9, adult           Plan:     1. Essential hypertension  - control, continue amlodipine, losartan    2. Lumbar radiculopathy  - symptoms stable, no longer sees Pain Management, continue Norco    3. Left adrenal mass  - seen on past imaging, size stable, continue to monitor    4. Aortic calcification  - seen on past imaging, control HTN, on statin, continue to monitor    5. Personal history of nicotine dependence  - CT Chest Lung Screening Low Dose; Future    6. Morbid obesity with BMI of  40.0-44.9, adult  - weight has been stable, does not follow any dietary restrictions, will address at next appointment    RTC in 4 months for follow-up or sooner if needed    __________________________    Felicity Middleton MD, PharmD  [facility]  American Board of Obesity Medicine diplomate  Office [phone]

## 2023-04-19 DIAGNOSIS — M17.0 PRIMARY OSTEOARTHRITIS OF BOTH KNEES: ICD-10-CM

## 2023-04-19 DIAGNOSIS — M54.16 LUMBAR RADICULOPATHY: ICD-10-CM

## 2023-04-19 RX ORDER — HYDROCODONE BITARTRATE AND ACETAMINOPHEN 5; 325 MG/1; MG/1
1 TABLET ORAL EVERY 12 HOURS PRN
Qty: 60 TABLET | Refills: 0 | OUTPATIENT
Start: 2023-04-19

## 2023-04-19 RX ORDER — CLOTRIMAZOLE AND BETAMETHASONE DIPROPIONATE 10; .64 MG/G; MG/G
CREAM TOPICAL 2 TIMES DAILY
Qty: 45 G | Refills: 2 | Status: SHIPPED | OUTPATIENT
Start: 2023-04-19

## 2023-04-19 NOTE — TELEPHONE ENCOUNTER
No new care gaps identified.  [facility] Embedded Care Gaps. Reference number: [id number]. 4/19/2023   8:29:39 AM PAULINET

## 2023-04-20 ENCOUNTER — PATIENT MESSAGE (OUTPATIENT)
Dept: INTERNAL MEDICINE | Facility: CLINIC | Age: 68
End: 2023-04-20
Payer: MEDICARE

## 2023-04-24 NOTE — TELEPHONE ENCOUNTER
Please advise pt stated no don't need Refill but stated she told  she would send in refill request a week early.

## 2023-04-28 DIAGNOSIS — M17.0 PRIMARY OSTEOARTHRITIS OF BOTH KNEES: ICD-10-CM

## 2023-04-28 DIAGNOSIS — M54.16 LUMBAR RADICULOPATHY: ICD-10-CM

## 2023-04-28 NOTE — TELEPHONE ENCOUNTER
No care due was identified.  [facility] Embedded Care Due Messages. Reference number: [id number].   4/28/2023 10:52:18 AM CDT

## 2023-05-01 RX ORDER — HYDROCODONE BITARTRATE AND ACETAMINOPHEN 5; 325 MG/1; MG/1
1 TABLET ORAL EVERY 12 HOURS PRN
Qty: 60 TABLET | Refills: 0 | Status: SHIPPED | OUTPATIENT
Start: 2023-05-01 | End: 2023-05-23 | Stop reason: SDUPTHER

## 2023-05-03 RX ORDER — FUROSEMIDE 20 MG/1
TABLET ORAL
Qty: 30 TABLET | Refills: 1 | Status: SHIPPED | OUTPATIENT
Start: 2023-05-03

## 2023-05-03 NOTE — TELEPHONE ENCOUNTER
No care due was identified.  [facility] Embedded Care Due Messages. Reference number: [id number].   5/03/2023 1:25:54 PM CDT

## 2023-05-03 NOTE — TELEPHONE ENCOUNTER
Refill Decision Note   Kaleigh Solo  is requesting a refill authorization.  Brief Assessment and Rationale for Refill:  Approve     Medication Therapy Plan:         Comments:     Note composed:1:32 PM 05/03/2023             Appointments     Last Visit   4/14/2023 Felicity Middleton MD   Next Visit   8/15/2023 Felicity Middleton MD

## 2023-05-17 ENCOUNTER — HOSPITAL ENCOUNTER (OUTPATIENT)
Dept: RADIOLOGY | Facility: HOSPITAL | Age: 68
Discharge: HOME | End: 2023-05-17
Attending: INTERNAL MEDICINE
Payer: MEDICARE

## 2023-05-17 DIAGNOSIS — Z87.891 PERSONAL HISTORY OF NICOTINE DEPENDENCE: ICD-10-CM

## 2023-05-17 PROCEDURE — 71271 CT CHEST LUNG SCREENING LOW DOSE: ICD-10-PCS | Mod: 26,,, | Performed by: RADIOLOGY

## 2023-05-17 PROCEDURE — 71271 CT THORAX LUNG CANCER SCR C-: CPT | Mod: 26,,, | Performed by: RADIOLOGY

## 2023-05-17 PROCEDURE — 71271 CT THORAX LUNG CANCER SCR C-: CPT | Mod: TC

## 2023-05-19 ENCOUNTER — TELEPHONE (OUTPATIENT)
Dept: PHYSICAL MEDICINE AND REHAB | Facility: CLINIC | Age: 68
End: 2023-05-19
Payer: MEDICARE

## 2023-05-19 NOTE — TELEPHONE ENCOUNTER
----- Message from Trent Marin sent at 5/19/2023  1:01 PM CDT -----  Regarding: Appt  Contact: @[phone]  Patient is calling to schedule an appt. Patient would like for someone to reach out to her as soon as possible. Please call and advise @[phone]

## 2023-05-23 ENCOUNTER — PATIENT MESSAGE (OUTPATIENT)
Dept: INTERNAL MEDICINE | Facility: CLINIC | Age: 68
End: 2023-05-23
Payer: MEDICARE

## 2023-05-23 DIAGNOSIS — M54.16 LUMBAR RADICULOPATHY: ICD-10-CM

## 2023-05-23 DIAGNOSIS — M17.0 PRIMARY OSTEOARTHRITIS OF BOTH KNEES: ICD-10-CM

## 2023-05-23 RX ORDER — HYDROCORTISONE 1 %
CREAM (GRAM) TOPICAL 2 TIMES DAILY
Qty: 30 G | Refills: 2 | Status: CANCELLED | OUTPATIENT
Start: 2023-05-23

## 2023-05-23 RX ORDER — HYDROCODONE BITARTRATE AND ACETAMINOPHEN 5; 325 MG/1; MG/1
1 TABLET ORAL EVERY 12 HOURS PRN
Qty: 60 TABLET | Refills: 0 | Status: SHIPPED | OUTPATIENT
Start: 2023-05-30 | End: 2023-06-27 | Stop reason: SDUPTHER

## 2023-05-23 RX ORDER — HYDROXYZINE HYDROCHLORIDE 25 MG/1
25 TABLET, FILM COATED ORAL 3 TIMES DAILY PRN
Qty: 30 TABLET | Refills: 1 | Status: SHIPPED | OUTPATIENT
Start: 2023-05-23

## 2023-05-23 NOTE — TELEPHONE ENCOUNTER
No care due was identified.  Health [facility] Embedded Care Due Messages. Reference number: [id number].   5/23/2023 11:44:00 AM CDT

## 2023-05-23 NOTE — TELEPHONE ENCOUNTER
No care due was identified.  Health [facility] Embedded Care Due Messages. Reference number: [id number].   5/23/2023 11:04:08 AM CDT

## 2023-06-27 DIAGNOSIS — M17.0 PRIMARY OSTEOARTHRITIS OF BOTH KNEES: ICD-10-CM

## 2023-06-27 DIAGNOSIS — M54.16 LUMBAR RADICULOPATHY: ICD-10-CM

## 2023-06-27 RX ORDER — HYDROCODONE BITARTRATE AND ACETAMINOPHEN 5; 325 MG/1; MG/1
1 TABLET ORAL EVERY 12 HOURS PRN
Qty: 60 TABLET | Refills: 0 | Status: SHIPPED | OUTPATIENT
Start: 2023-06-27 | End: 2023-08-03 | Stop reason: SDUPTHER

## 2023-06-27 NOTE — TELEPHONE ENCOUNTER
No care due was identified.  [facility] Embedded Care Due Messages. Reference number: [id number].   6/27/2023 11:27:19 AM CDT

## 2023-06-28 DIAGNOSIS — I10 ESSENTIAL HYPERTENSION: ICD-10-CM

## 2023-06-28 RX ORDER — AMLODIPINE BESYLATE 5 MG/1
TABLET ORAL
Qty: 90 TABLET | Refills: 3 | Status: SHIPPED | OUTPATIENT
Start: 2023-06-28

## 2023-06-28 RX ORDER — LOSARTAN POTASSIUM 100 MG/1
TABLET ORAL
Qty: 90 TABLET | Refills: 1 | Status: SHIPPED | OUTPATIENT
Start: 2023-06-28 | End: 2023-12-20 | Stop reason: SDUPTHER

## 2023-06-28 NOTE — TELEPHONE ENCOUNTER
No care due was identified.  Health [facility] Embedded Care Due Messages. Reference number: [id number].   6/28/2023 2:53:07 AM CDT

## 2023-06-28 NOTE — TELEPHONE ENCOUNTER
Refill Decision Note   Kaleigh Solo  is requesting a refill authorization.  Brief Assessment and Rationale for Refill:  Approve     Medication Therapy Plan:         Comments:     Note composed:10:58 AM 06/28/2023

## 2023-07-07 ENCOUNTER — PES CALL (OUTPATIENT)
Dept: ADMINISTRATIVE | Facility: CLINIC | Age: 68
End: 2023-07-07
Payer: MEDICARE

## 2023-08-02 DIAGNOSIS — E78.2 MIXED HYPERLIPIDEMIA: ICD-10-CM

## 2023-08-02 RX ORDER — ROSUVASTATIN CALCIUM 10 MG/1
TABLET, COATED ORAL
Qty: 90 TABLET | Refills: 0 | Status: SHIPPED | OUTPATIENT
Start: 2023-08-02 | End: 2023-12-20 | Stop reason: SDUPTHER

## 2023-08-02 NOTE — TELEPHONE ENCOUNTER
Provider Staff:  Action required for this patient     Please see care gap opportunities below in Care Due Message.    Thanks!  [facility]     Appointments      Date Provider   Last Visit   4/14/2023 Felicity Middleton MD   Next Visit   8/29/2023 Felicity Middleton MD     Refill Decision Note   Kaleigh Solo  is requesting a refill authorization.  Brief Assessment and Rationale for Refill:  Approve     Medication Therapy Plan:         Comments:     Note composed:1:28 PM 08/02/2023

## 2023-08-02 NOTE — TELEPHONE ENCOUNTER
Care Due:                  Date            Visit Type   Department     Provider  --------------------------------------------------------------------------------                                EP -                              PRIMARY      MET INTERNAL  Last Visit: 04-      CARE ([facility])   MEDICINE       Felicity Middleton                              EP -                              PRIMARY      MET INTERNAL  Next Visit: 08-      CARE ([facility])   MEDICINE       Felicity Middleton                                                            Last  Test          Frequency    Reason                     Performed    Due Date  --------------------------------------------------------------------------------    CMP.........  12 months..  losartan, rosuvastatin...  11-   10-    Lipid Panel.  12 months..  rosuvastatin.............  11-   10-    Health [facility] Embedded Care Due Messages. Reference number: [id number].   8/02/2023 2:07:39 AM CDT

## 2023-08-03 DIAGNOSIS — M54.16 LUMBAR RADICULOPATHY: ICD-10-CM

## 2023-08-03 DIAGNOSIS — M17.0 PRIMARY OSTEOARTHRITIS OF BOTH KNEES: ICD-10-CM

## 2023-08-03 NOTE — TELEPHONE ENCOUNTER
No care due was identified.  [facility] Embedded Care Due Messages. Reference number: [id number].   8/03/2023 9:15:10 AM CDT

## 2023-08-04 RX ORDER — HYDROCODONE BITARTRATE AND ACETAMINOPHEN 5; 325 MG/1; MG/1
1 TABLET ORAL EVERY 12 HOURS PRN
Qty: 60 TABLET | Refills: 0 | Status: SHIPPED | OUTPATIENT
Start: 2023-08-11 | End: 2023-09-14 | Stop reason: SDUPTHER

## 2023-08-14 ENCOUNTER — OFFICE VISIT (OUTPATIENT)
Dept: OTOLARYNGOLOGY | Facility: CLINIC | Age: 68
End: 2023-08-14
Payer: MEDICARE

## 2023-08-14 VITALS
WEIGHT: 238.13 LBS | HEART RATE: 87 BPM | SYSTOLIC BLOOD PRESSURE: 141 MMHG | BODY MASS INDEX: 43.55 KG/M2 | DIASTOLIC BLOOD PRESSURE: 92 MMHG

## 2023-08-14 DIAGNOSIS — J34.3 HYPERTROPHY OF INFERIOR NASAL TURBINATE: Chronic | ICD-10-CM

## 2023-08-14 DIAGNOSIS — J01.00 ACUTE NON-RECURRENT MAXILLARY SINUSITIS: Primary | ICD-10-CM

## 2023-08-14 DIAGNOSIS — J30.9 CHRONIC ALLERGIC RHINITIS: Chronic | ICD-10-CM

## 2023-08-14 DIAGNOSIS — J34.2 NASAL SEPTAL DEVIATION: Chronic | ICD-10-CM

## 2023-08-14 PROCEDURE — 3008F BODY MASS INDEX DOCD: CPT | Mod: HCNC,CPTII,S$GLB, | Performed by: OTOLARYNGOLOGY

## 2023-08-14 PROCEDURE — 99214 PR OFFICE/OUTPT VISIT, EST, LEVL IV, 30-39 MIN: ICD-10-PCS | Mod: HCNC,S$GLB,, | Performed by: OTOLARYNGOLOGY

## 2023-08-14 PROCEDURE — 1160F PR REVIEW ALL MEDS BY PRESCRIBER/CLIN PHARMACIST DOCUMENTED: ICD-10-PCS | Mod: HCNC,CPTII,S$GLB, | Performed by: OTOLARYNGOLOGY

## 2023-08-14 PROCEDURE — 3288F PR FALLS RISK ASSESSMENT DOCUMENTED: ICD-10-PCS | Mod: HCNC,CPTII,S$GLB, | Performed by: OTOLARYNGOLOGY

## 2023-08-14 PROCEDURE — 1101F PT FALLS ASSESS-DOCD LE1/YR: CPT | Mod: HCNC,CPTII,S$GLB, | Performed by: OTOLARYNGOLOGY

## 2023-08-14 PROCEDURE — 1157F ADVNC CARE PLAN IN RCRD: CPT | Mod: HCNC,CPTII,S$GLB, | Performed by: OTOLARYNGOLOGY

## 2023-08-14 PROCEDURE — 4010F ACE/ARB THERAPY RXD/TAKEN: CPT | Mod: HCNC,CPTII,S$GLB, | Performed by: OTOLARYNGOLOGY

## 2023-08-14 PROCEDURE — 1101F PR PT FALLS ASSESS DOC 0-1 FALLS W/OUT INJ PAST YR: ICD-10-PCS | Mod: HCNC,CPTII,S$GLB, | Performed by: OTOLARYNGOLOGY

## 2023-08-14 PROCEDURE — 99214 OFFICE O/P EST MOD 30 MIN: CPT | Mod: HCNC,S$GLB,, | Performed by: OTOLARYNGOLOGY

## 2023-08-14 PROCEDURE — 99999 PR PBB SHADOW E&M-EST. PATIENT-LVL IV: CPT | Mod: PBBFAC,HCNC,, | Performed by: OTOLARYNGOLOGY

## 2023-08-14 PROCEDURE — 1160F RVW MEDS BY RX/DR IN RCRD: CPT | Mod: HCNC,CPTII,S$GLB, | Performed by: OTOLARYNGOLOGY

## 2023-08-14 PROCEDURE — 1157F PR ADVANCE CARE PLAN OR EQUIV PRESENT IN MEDICAL RECORD: ICD-10-PCS | Mod: HCNC,CPTII,S$GLB, | Performed by: OTOLARYNGOLOGY

## 2023-08-14 PROCEDURE — 1125F PR PAIN SEVERITY QUANTIFIED, PAIN PRESENT: ICD-10-PCS | Mod: HCNC,CPTII,S$GLB, | Performed by: OTOLARYNGOLOGY

## 2023-08-14 PROCEDURE — 3008F PR BODY MASS INDEX (BMI) DOCUMENTED: ICD-10-PCS | Mod: HCNC,CPTII,S$GLB, | Performed by: OTOLARYNGOLOGY

## 2023-08-14 PROCEDURE — 4010F PR ACE/ARB THEARPY RXD/TAKEN: ICD-10-PCS | Mod: HCNC,CPTII,S$GLB, | Performed by: OTOLARYNGOLOGY

## 2023-08-14 PROCEDURE — 3077F SYST BP >= 140 MM HG: CPT | Mod: HCNC,CPTII,S$GLB, | Performed by: OTOLARYNGOLOGY

## 2023-08-14 PROCEDURE — 3077F PR MOST RECENT SYSTOLIC BLOOD PRESSURE >= 140 MM HG: ICD-10-PCS | Mod: HCNC,CPTII,S$GLB, | Performed by: OTOLARYNGOLOGY

## 2023-08-14 PROCEDURE — 3288F FALL RISK ASSESSMENT DOCD: CPT | Mod: HCNC,CPTII,S$GLB, | Performed by: OTOLARYNGOLOGY

## 2023-08-14 PROCEDURE — 1125F AMNT PAIN NOTED PAIN PRSNT: CPT | Mod: HCNC,CPTII,S$GLB, | Performed by: OTOLARYNGOLOGY

## 2023-08-14 PROCEDURE — 99999 PR PBB SHADOW E&M-EST. PATIENT-LVL IV: ICD-10-PCS | Mod: PBBFAC,HCNC,, | Performed by: OTOLARYNGOLOGY

## 2023-08-14 PROCEDURE — 3080F PR MOST RECENT DIASTOLIC BLOOD PRESSURE >= 90 MM HG: ICD-10-PCS | Mod: HCNC,CPTII,S$GLB, | Performed by: OTOLARYNGOLOGY

## 2023-08-14 PROCEDURE — 1159F MED LIST DOCD IN RCRD: CPT | Mod: HCNC,CPTII,S$GLB, | Performed by: OTOLARYNGOLOGY

## 2023-08-14 PROCEDURE — 3080F DIAST BP >= 90 MM HG: CPT | Mod: HCNC,CPTII,S$GLB, | Performed by: OTOLARYNGOLOGY

## 2023-08-14 PROCEDURE — 1159F PR MEDICATION LIST DOCUMENTED IN MEDICAL RECORD: ICD-10-PCS | Mod: HCNC,CPTII,S$GLB, | Performed by: OTOLARYNGOLOGY

## 2023-08-14 RX ORDER — AMOXICILLIN AND CLAVULANATE POTASSIUM 875; 125 MG/1; MG/1
1 TABLET, FILM COATED ORAL 2 TIMES DAILY
Qty: 28 TABLET | Refills: 0 | Status: SHIPPED | OUTPATIENT
Start: 2023-08-14 | End: 2023-08-28

## 2023-08-14 RX ORDER — AZELASTINE 1 MG/ML
1 SPRAY, METERED NASAL 2 TIMES DAILY
Qty: 30 ML | Refills: 0 | Status: SHIPPED | OUTPATIENT
Start: 2023-08-14 | End: 2023-12-20

## 2023-08-14 RX ORDER — BUSPIRONE HYDROCHLORIDE 7.5 MG/1
7.5 TABLET ORAL 2 TIMES DAILY
COMMUNITY
Start: 2023-05-30

## 2023-08-14 RX ORDER — METHYLPREDNISOLONE 4 MG/1
TABLET ORAL
Qty: 1 EACH | Refills: 0 | Status: SHIPPED | OUTPATIENT
Start: 2023-08-14 | End: 2023-08-29

## 2023-08-14 RX ORDER — FLUTICASONE PROPIONATE 50 MCG
2 SPRAY, SUSPENSION (ML) NASAL DAILY
Qty: 9.9 ML | Refills: 11 | Status: SHIPPED | OUTPATIENT
Start: 2023-08-14

## 2023-08-14 NOTE — PROGRESS NOTES
Chief Complaint   Patient presents with    Other     Post nasal drip    Facial Pain     Midfacial and teeth, started Friday   .    HPI:     Kaleigh Solo is a 68 y.o. female who presents for evaluation of a several day history of facial pain and tooth pain which is mostly on the right side. She is established with Dr. John Dunham and has history of inhalent and food allergies which he has been managing. Her last visit with him was in 2021. She reports nasal congestion and postnasal drip. She describes difficulty breathing at night. There is bilateral nasal obstruction. She admits to midface pain and pressure.  She admits to rhinorrhea and postnasal drip. There is maxillary tooth pain. She  admits to headaches.  She has not had sinus or nasal surgery. She started mucinex and has taken Vicodin every 5 hours for the pain. She denies fever.  She had chills yesterday.           Past Medical History:   Diagnosis Date    Arthritis     Asthma     Cervical radiculopathy 10/14/2013    Cervicalgia     2013 tx by chiropractor    Closed dislocation of acromioclavicular joint 2013    Degenerative disc disease     Family history of aortic aneurysm 10/21/2019    Fatty liver     Fibromyalgia     GERD (gastroesophageal reflux disease)     HPV (human papilloma virus) anogenital infection     conization in past-remote history-1990    HTN (hypertension)     Sciatica     Tobacco use     Ulcer 2007    stomach ulcer     Social History     Socioeconomic History    Marital status:    Tobacco Use    Smoking status: Every Day     Current packs/day: 1.00     Average packs/day: 1 pack/day for 48.0 years (48.0 ttl pk-yrs)     Types: Cigarettes    Smokeless tobacco: Never   Substance and Sexual Activity    Alcohol use: Yes     Comment: occasional    Drug use: No    Sexual activity: Not Currently   Social History Narrative    , not working, 3 children (1 passed at 9 mo old), tobacco daily, ETOH socially, GYN 2013 [facility]trs of [facility]      Social Determinants of Health     Financial Resource Strain: Low Risk  (4/11/2023)    Overall Financial Resource Strain (CARDIA)     Difficulty of Paying Living Expenses: Not hard at all   Food Insecurity: No Food Insecurity (4/11/2023)    Hunger Vital Sign     Worried About Running Out of Food in the Last Year: Never true     Ran Out of Food in the Last Year: Never true   Transportation Needs: No Transportation Needs (4/11/2023)    PRAPARE - Transportation     Lack of Transportation (Medical): No     Lack of Transportation (Non-Medical): No   Physical Activity: Unknown (4/11/2023)    Exercise Vital Sign     Days of Exercise per Week: Patient refused     Minutes of Exercise per Session: 0 min   Stress: No Stress Concern Present (4/11/2023)    [employer] [employer] - Occupational Stress Questionnaire     Feeling of Stress : Not at all   Social Connections: Unknown (4/11/2023)    Social Connection and Isolation Panel [NHANES]     Frequency of Communication with Friends and Family: More than three times a week     Frequency of Social Gatherings with Friends and Family: More than three times a week     Active Member of Clubs or Organizations: Yes     Attends Club or Organization Meetings: More than 4 times per year     Marital Status:    Housing Stability: Low Risk  (4/11/2023)    Housing Stability Vital Sign     Unable to Pay for Housing in the Last Year: No     Number of Places Lived in the Last Year: 1     Unstable Housing in the Last Year: No     Past Surgical History:   Procedure Laterality Date    acdf  4/2014    C5-6    BACK SURGERY      CERVICAL    CERVICAL CONIZATION   W/ LASER      KNEE ARTHROSCOPY      right shoulder surger      arthroscopic surgery Dec 2013    TRIAL OF SPINAL CORD NERVE STIMULATOR N/A 9/25/2018    Procedure: TRIAL, NEUROSTIMULATOR, SPINAL CORD;  Surgeon: Candice Lopez MD;  Location: [facility];  Service: Pain Management;  Laterality: N/A;  SCS  Trial  [place] [facility] notified of date and time     Family History   Problem Relation Age of Onset    Cancer Mother         lung-was tobacco user  of lung cancer at 78    Aneurysm Father         abdominal burst-  of stroke at 71 years    Heart disease Father         had HTN           Review of Systems  General: negative for chills, fever or weight loss  Psychological: negative for mood changes or depression  Ophthalmic: negative for blurry vision, photophobia or eye pain  ENT: see HPI  Respiratory: no cough, shortness of breath, or wheezing  Cardiovascular: no chest pain or dyspnea on exertion  Gastrointestinal: no abdominal pain, change in bowel habits, or black/ bloody stools  Musculoskeletal: negative for gait disturbance or muscular weakness  Neurological: no syncope or seizures; no ataxia  Dermatological: negative for puritis,  rash and jaundice  Hematologic/lymphatic: no easy bruising, no new lumps or bumps      Physical Exam:    Vitals:    23 1417   BP: (!) 141/92   Pulse: 87       Constitutional: Well appearing / communicating without difficutly.  NAD.  Eyes: EOM I Bilaterally  Head/Face: Normocephalic.  Negative paranasal sinus pressure/tenderness.  Salivary glands WNL.  House Brackmann I Bilaterally.    Right Ear: Auricle normal appearance. External Auditory Canal within normal limits,TM w/o masses/lesions/perforations. TM mobility noted.   Left Ear: Auricle normal appearance. External Auditory Canal WNL,TM w/o masses/lesions/perforations. TM mobility noted.  Nose:+ nasal septal deviation. Inferior Turbinates 3+ bilaterally. No septal perforation. No masses/lesions. External nasal skin appears normal without masses/lesions.  Oral Cavity: Gingiva/lips within normal limits.  Dentition/gingiva healthy appearing. Mucus membranes moist. Floor of mouth soft, no masses palpated. Oral Tongue mobile. Hard Palate appears normal.    Oropharynx: Base of tongue appears normal. No masses/lesions noted.  Tonsillar fossa/pharyngeal wall without lesions. Posterior oropharynx WNL.  Soft palate without masses. Midline uvula.   Neck/Lymphatic: No LAD I-VI bilaterally.  No thyromegaly.  No masses noted on exam.    Mirror laryngoscopy/nasopharyngoscopy: Active gag reflex.  Unable to perform.        Assessment:    ICD-10-CM ICD-9-CM    1. Acute non-recurrent maxillary sinusitis  J01.00 461.0       2. Nasal septal deviation  J34.2 470       3. Hypertrophy of inferior nasal turbinate  J34.3 478.0       4. Chronic allergic rhinitis  J30.9 477.9         The primary encounter diagnosis was Acute non-recurrent maxillary sinusitis. Diagnoses of Nasal septal deviation, Hypertrophy of inferior nasal turbinate, and Chronic allergic rhinitis were also pertinent to this visit.      Plan:  No orders of the defined types were placed in this encounter.    Start nasal saline rinses BID  Start Flonase 2 sprays per nostril daily  Start Astelin 1 spray per nostril daily  May continue mucinex   Start Augmentin 875 mg PO BID for 14 days  Start medrol dose erich  Call or return to clinic prn if these symptoms worsen or fail to improve as anticipated.      Frances Gentile MD

## 2023-08-21 ENCOUNTER — PATIENT MESSAGE (OUTPATIENT)
Dept: OTOLARYNGOLOGY | Facility: CLINIC | Age: 68
End: 2023-08-21
Payer: MEDICARE

## 2023-08-21 RX ORDER — FLUCONAZOLE 150 MG/1
150 TABLET ORAL DAILY
Qty: 2 TABLET | Refills: 0 | Status: SHIPPED | OUTPATIENT
Start: 2023-08-21 | End: 2023-08-29

## 2023-08-29 ENCOUNTER — LAB VISIT (OUTPATIENT)
Dept: LAB | Facility: HOSPITAL | Age: 68
End: 2023-08-29
Attending: INTERNAL MEDICINE
Payer: MEDICARE

## 2023-08-29 ENCOUNTER — OFFICE VISIT (OUTPATIENT)
Dept: INTERNAL MEDICINE | Facility: CLINIC | Age: 68
End: 2023-08-29
Payer: MEDICARE

## 2023-08-29 VITALS
SYSTOLIC BLOOD PRESSURE: 134 MMHG | RESPIRATION RATE: 20 BRPM | DIASTOLIC BLOOD PRESSURE: 70 MMHG | HEART RATE: 100 BPM | BODY MASS INDEX: 43.53 KG/M2 | OXYGEN SATURATION: 93 % | WEIGHT: 236.56 LBS | HEIGHT: 62 IN | TEMPERATURE: 98 F

## 2023-08-29 DIAGNOSIS — M54.16 LUMBAR RADICULOPATHY: ICD-10-CM

## 2023-08-29 DIAGNOSIS — I10 ESSENTIAL HYPERTENSION: Primary | ICD-10-CM

## 2023-08-29 DIAGNOSIS — I10 ESSENTIAL HYPERTENSION: ICD-10-CM

## 2023-08-29 DIAGNOSIS — J45.20 MILD INTERMITTENT ASTHMA WITHOUT COMPLICATION: ICD-10-CM

## 2023-08-29 LAB
ANION GAP SERPL CALC-SCNC: 11 MMOL/L (ref 8–16)
BASOPHILS # BLD AUTO: 0.06 K/UL (ref 0–0.2)
BASOPHILS NFR BLD: 0.7 % (ref 0–1.9)
BUN SERPL-MCNC: 7 MG/DL (ref 8–23)
CALCIUM SERPL-MCNC: 9.9 MG/DL (ref 8.7–10.5)
CHLORIDE SERPL-SCNC: 106 MMOL/L (ref 95–110)
CO2 SERPL-SCNC: 25 MMOL/L (ref 23–29)
CREAT SERPL-MCNC: 0.8 MG/DL (ref 0.5–1.4)
DIFFERENTIAL METHOD: NORMAL
EOSINOPHIL # BLD AUTO: 0.2 K/UL (ref 0–0.5)
EOSINOPHIL NFR BLD: 1.9 % (ref 0–8)
ERYTHROCYTE [DISTWIDTH] IN BLOOD BY AUTOMATED COUNT: 13.9 % (ref 11.5–14.5)
EST. GFR  (NO RACE VARIABLE): >60 ML/MIN/1.73 M^2
GLUCOSE SERPL-MCNC: 96 MG/DL (ref 70–110)
HCT VFR BLD AUTO: 46.8 % (ref 37–48.5)
HGB BLD-MCNC: 15.2 G/DL (ref 12–16)
IMM GRANULOCYTES # BLD AUTO: 0.04 K/UL (ref 0–0.04)
IMM GRANULOCYTES NFR BLD AUTO: 0.4 % (ref 0–0.5)
LYMPHOCYTES # BLD AUTO: 1.7 K/UL (ref 1–4.8)
LYMPHOCYTES NFR BLD: 19 % (ref 18–48)
MCH RBC QN AUTO: 30.6 PG (ref 27–31)
MCHC RBC AUTO-ENTMCNC: 32.5 G/DL (ref 32–36)
MCV RBC AUTO: 94 FL (ref 82–98)
MONOCYTES # BLD AUTO: 0.7 K/UL (ref 0.3–1)
MONOCYTES NFR BLD: 7.7 % (ref 4–15)
NEUTROPHILS # BLD AUTO: 6.5 K/UL (ref 1.8–7.7)
NEUTROPHILS NFR BLD: 70.3 % (ref 38–73)
NRBC BLD-RTO: 0 /100 WBC
PLATELET # BLD AUTO: 249 K/UL (ref 150–450)
PMV BLD AUTO: 9.2 FL (ref 9.2–12.9)
POTASSIUM SERPL-SCNC: 4.1 MMOL/L (ref 3.5–5.1)
RBC # BLD AUTO: 4.97 M/UL (ref 4–5.4)
SODIUM SERPL-SCNC: 142 MMOL/L (ref 136–145)
WBC # BLD AUTO: 9.18 K/UL (ref 3.9–12.7)

## 2023-08-29 PROCEDURE — 3078F DIAST BP <80 MM HG: CPT | Mod: HCNC,CPTII,S$GLB, | Performed by: INTERNAL MEDICINE

## 2023-08-29 PROCEDURE — 3078F PR MOST RECENT DIASTOLIC BLOOD PRESSURE < 80 MM HG: ICD-10-PCS | Mod: HCNC,CPTII,S$GLB, | Performed by: INTERNAL MEDICINE

## 2023-08-29 PROCEDURE — 3288F FALL RISK ASSESSMENT DOCD: CPT | Mod: HCNC,CPTII,S$GLB, | Performed by: INTERNAL MEDICINE

## 2023-08-29 PROCEDURE — 99214 OFFICE O/P EST MOD 30 MIN: CPT | Mod: HCNC,S$GLB,, | Performed by: INTERNAL MEDICINE

## 2023-08-29 PROCEDURE — 85025 COMPLETE CBC W/AUTO DIFF WBC: CPT | Mod: HCNC | Performed by: INTERNAL MEDICINE

## 2023-08-29 PROCEDURE — 1157F PR ADVANCE CARE PLAN OR EQUIV PRESENT IN MEDICAL RECORD: ICD-10-PCS | Mod: HCNC,CPTII,S$GLB, | Performed by: INTERNAL MEDICINE

## 2023-08-29 PROCEDURE — 3288F PR FALLS RISK ASSESSMENT DOCUMENTED: ICD-10-PCS | Mod: HCNC,CPTII,S$GLB, | Performed by: INTERNAL MEDICINE

## 2023-08-29 PROCEDURE — 1101F PT FALLS ASSESS-DOCD LE1/YR: CPT | Mod: HCNC,CPTII,S$GLB, | Performed by: INTERNAL MEDICINE

## 2023-08-29 PROCEDURE — 3008F PR BODY MASS INDEX (BMI) DOCUMENTED: ICD-10-PCS | Mod: HCNC,CPTII,S$GLB, | Performed by: INTERNAL MEDICINE

## 2023-08-29 PROCEDURE — 99999 PR PBB SHADOW E&M-EST. PATIENT-LVL V: ICD-10-PCS | Mod: PBBFAC,HCNC,, | Performed by: INTERNAL MEDICINE

## 2023-08-29 PROCEDURE — 3008F BODY MASS INDEX DOCD: CPT | Mod: HCNC,CPTII,S$GLB, | Performed by: INTERNAL MEDICINE

## 2023-08-29 PROCEDURE — 1125F PR PAIN SEVERITY QUANTIFIED, PAIN PRESENT: ICD-10-PCS | Mod: HCNC,CPTII,S$GLB, | Performed by: INTERNAL MEDICINE

## 2023-08-29 PROCEDURE — 1159F MED LIST DOCD IN RCRD: CPT | Mod: HCNC,CPTII,S$GLB, | Performed by: INTERNAL MEDICINE

## 2023-08-29 PROCEDURE — 3075F SYST BP GE 130 - 139MM HG: CPT | Mod: HCNC,CPTII,S$GLB, | Performed by: INTERNAL MEDICINE

## 2023-08-29 PROCEDURE — 36415 COLL VENOUS BLD VENIPUNCTURE: CPT | Mod: HCNC,PO | Performed by: INTERNAL MEDICINE

## 2023-08-29 PROCEDURE — 80048 BASIC METABOLIC PNL TOTAL CA: CPT | Mod: HCNC | Performed by: INTERNAL MEDICINE

## 2023-08-29 PROCEDURE — 1160F RVW MEDS BY RX/DR IN RCRD: CPT | Mod: HCNC,CPTII,S$GLB, | Performed by: INTERNAL MEDICINE

## 2023-08-29 PROCEDURE — 3075F PR MOST RECENT SYSTOLIC BLOOD PRESS GE 130-139MM HG: ICD-10-PCS | Mod: HCNC,CPTII,S$GLB, | Performed by: INTERNAL MEDICINE

## 2023-08-29 PROCEDURE — 99999 PR PBB SHADOW E&M-EST. PATIENT-LVL V: CPT | Mod: PBBFAC,HCNC,, | Performed by: INTERNAL MEDICINE

## 2023-08-29 PROCEDURE — 1157F ADVNC CARE PLAN IN RCRD: CPT | Mod: HCNC,CPTII,S$GLB, | Performed by: INTERNAL MEDICINE

## 2023-08-29 PROCEDURE — 4010F ACE/ARB THERAPY RXD/TAKEN: CPT | Mod: HCNC,CPTII,S$GLB, | Performed by: INTERNAL MEDICINE

## 2023-08-29 PROCEDURE — 4010F PR ACE/ARB THEARPY RXD/TAKEN: ICD-10-PCS | Mod: HCNC,CPTII,S$GLB, | Performed by: INTERNAL MEDICINE

## 2023-08-29 PROCEDURE — 99214 PR OFFICE/OUTPT VISIT, EST, LEVL IV, 30-39 MIN: ICD-10-PCS | Mod: HCNC,S$GLB,, | Performed by: INTERNAL MEDICINE

## 2023-08-29 PROCEDURE — 1101F PR PT FALLS ASSESS DOC 0-1 FALLS W/OUT INJ PAST YR: ICD-10-PCS | Mod: HCNC,CPTII,S$GLB, | Performed by: INTERNAL MEDICINE

## 2023-08-29 PROCEDURE — 1160F PR REVIEW ALL MEDS BY PRESCRIBER/CLIN PHARMACIST DOCUMENTED: ICD-10-PCS | Mod: HCNC,CPTII,S$GLB, | Performed by: INTERNAL MEDICINE

## 2023-08-29 PROCEDURE — 1159F PR MEDICATION LIST DOCUMENTED IN MEDICAL RECORD: ICD-10-PCS | Mod: HCNC,CPTII,S$GLB, | Performed by: INTERNAL MEDICINE

## 2023-08-29 PROCEDURE — 1125F AMNT PAIN NOTED PAIN PRSNT: CPT | Mod: HCNC,CPTII,S$GLB, | Performed by: INTERNAL MEDICINE

## 2023-08-29 RX ORDER — ALBUTEROL SULFATE 90 UG/1
2 AEROSOL, METERED RESPIRATORY (INHALATION) EVERY 6 HOURS PRN
Qty: 18 G | Refills: 11 | Status: SHIPPED | OUTPATIENT
Start: 2023-08-29

## 2023-08-29 NOTE — PROGRESS NOTES
Subjective:     Kaleigh Solo is a 68 y.o. female who presents for   Chief Complaint   Patient presents with    Follow-up    Asthma    Hypertension    Low-back Pain       HPI    Hypertension: The patient has been taking medications as instructed, no medication side effects noted, no chest pain on exertion, no dyspnea on exertion.    BP Readings from Last 3 Encounters:   08/29/23 134/70   08/14/23 (!) 141/92   04/14/23 120/74     Asthma: Patient presents for follow-up of  asthma . T Symptoms currently include dyspnea and non-productive cough and occur weekly. Current limitations in activity from asthma: none. Uses albuterol as needed.    Lower Back Pain: The patient has chronic low back pain with spinal stimulator placement and symptoms remain stable.  Symptoms have been present for  many years. The pain is described as aching and occurs intermittently. Symptoms are improved by narcotic pain medications (Norco) which she takes chronically and she tries to limit use.      Review of Systems   Constitutional:  Negative for chills, diaphoresis and fever.   HENT:  Positive for dental problem and facial swelling (recently had a right upper tooth pulled, facial swelling has improved). Negative for congestion, ear pain, postnasal drip and sinus pressure.    Eyes:  Negative for discharge and visual disturbance.   Respiratory:  Positive for shortness of breath. Negative for cough and chest tightness.    Cardiovascular:  Negative for chest pain and palpitations.   Gastrointestinal:  Negative for abdominal pain, constipation, diarrhea, nausea and vomiting.   Genitourinary:  Negative for dysuria and frequency.   Musculoskeletal:  Positive for arthralgias and back pain. Negative for myalgias.   Skin:  Positive for rash (lower abdomen). Negative for wound.   Neurological:  Negative for dizziness, tremors and headaches.          Objective:     Physical Exam  Vitals reviewed.   Constitutional:       General: She is awake. She  is not in acute distress.     Appearance: Normal appearance. She is well-developed and well-groomed.   HENT:      Head: Normocephalic and atraumatic.      Right Ear: Hearing and external ear normal.      Left Ear: Hearing and external ear normal.      Nose: Nose normal. No congestion.      Mouth/Throat:      Mouth: Mucous membranes are moist.   Eyes:      General: Lids are normal. Vision grossly intact.   Cardiovascular:      Rate and Rhythm: Normal rate and regular rhythm.      Heart sounds: Normal heart sounds. No murmur heard.  Pulmonary:      Effort: Pulmonary effort is normal.      Breath sounds: Normal breath sounds. No decreased breath sounds or wheezing.   Abdominal:      General: Bowel sounds are normal. There is no distension.   Musculoskeletal:         General: Normal range of motion.      Cervical back: Normal range of motion.      Lumbar back: Tenderness and bony tenderness present.      Right lower leg: No edema.      Left lower leg: No edema.   Skin:     General: Skin is warm and dry.      Findings: No lesion or rash.   Neurological:      Mental Status: She is alert and oriented to person, place, and time.   Psychiatric:         Attention and Perception: Attention normal.         Mood and Affect: Mood normal.         Behavior: Behavior is cooperative.            Assessment:      1. Essential hypertension    2. Mild intermittent asthma without complication    3. Lumbar radiculopathy           Plan:     1. Essential hypertension  - controlled, continue losartan, amlodipine  - Basic Metabolic Panel; Future    2. Mild intermittent asthma without complication  - stable, continue albuterol  - albuterol (PROAIR HFA) 90 mcg/actuation inhaler; Inhale 2 puffs into the lungs every 6 (six) hours as needed for Wheezing or Shortness of Breath.  Dispense: 18 g; Refill: 11    3. Lumbar radiculopathy  - stable, on chronic treatment with Norco, continue to monitor      RTC in December 2023 for annual exam or sooner if  needed    __________________________    Felicity Middleton MD, PharmD  [facility]- [facility]  American Board of Obesity Medicine diplomate  Office [phone]

## 2023-08-29 NOTE — Clinical Note
>>>>>>> please schedule labs before annual in December/ Let me know after they are scheduled, may want to add labs

## 2023-09-14 DIAGNOSIS — M54.16 LUMBAR RADICULOPATHY: ICD-10-CM

## 2023-09-14 DIAGNOSIS — M17.0 PRIMARY OSTEOARTHRITIS OF BOTH KNEES: ICD-10-CM

## 2023-09-14 RX ORDER — HYDROCODONE BITARTRATE AND ACETAMINOPHEN 5; 325 MG/1; MG/1
1 TABLET ORAL EVERY 12 HOURS PRN
Qty: 60 TABLET | Refills: 0 | Status: SHIPPED | OUTPATIENT
Start: 2023-09-14 | End: 2023-10-17 | Stop reason: SDUPTHER

## 2023-09-14 NOTE — TELEPHONE ENCOUNTER
No care due was identified.  Health [facility] Embedded Care Due Messages. Reference number: [id number].   9/14/2023 12:19:03 PM CDT

## 2023-09-15 ENCOUNTER — OFFICE VISIT (OUTPATIENT)
Dept: OPTOMETRY | Facility: CLINIC | Age: 68
End: 2023-09-15
Payer: COMMERCIAL

## 2023-09-15 DIAGNOSIS — H25.13 NS (NUCLEAR SCLEROSIS), BILATERAL: ICD-10-CM

## 2023-09-15 DIAGNOSIS — Z01.00 ENCOUNTER FOR EXAMINATION OF EYES AND VISION WITHOUT ABNORMAL FINDINGS: ICD-10-CM

## 2023-09-15 DIAGNOSIS — H52.4 PRESBYOPIA: Primary | ICD-10-CM

## 2023-09-15 PROCEDURE — 92015 PR REFRACTION: ICD-10-PCS | Mod: S$GLB,,, | Performed by: OPTOMETRIST

## 2023-09-15 PROCEDURE — 99999 PR PBB SHADOW E&M-EST. PATIENT-LVL III: CPT | Mod: PBBFAC,,, | Performed by: OPTOMETRIST

## 2023-09-15 PROCEDURE — 99999 PR PBB SHADOW E&M-EST. PATIENT-LVL III: ICD-10-PCS | Mod: PBBFAC,,, | Performed by: OPTOMETRIST

## 2023-09-15 PROCEDURE — 92004 PR EYE EXAM, NEW PATIENT,COMPREHESV: ICD-10-PCS | Mod: S$GLB,,, | Performed by: OPTOMETRIST

## 2023-09-15 PROCEDURE — 92015 DETERMINE REFRACTIVE STATE: CPT | Mod: S$GLB,,, | Performed by: OPTOMETRIST

## 2023-09-15 PROCEDURE — 92004 COMPRE OPH EXAM NEW PT 1/>: CPT | Mod: S$GLB,,, | Performed by: OPTOMETRIST

## 2023-09-15 NOTE — PROGRESS NOTES
HPI    PT Here for routine visit   Would like to see about new eye glasses    No pain or irritation   No f/f  No double or blurry vision    Eye GTTS:  None    Last edited by Wayne Colon on 9/15/2023 10:06 AM.            Assessment /Plan     For exam results, see Encounter Report.    Presbyopia    NS (nuclear sclerosis), bilateral    Encounter for examination of eyes and vision without abnormal findings      Rx specs, discussed PAL and adaptation    Good overall ocular health, monitor yearly

## 2023-10-17 DIAGNOSIS — M17.0 PRIMARY OSTEOARTHRITIS OF BOTH KNEES: ICD-10-CM

## 2023-10-17 DIAGNOSIS — M54.16 LUMBAR RADICULOPATHY: ICD-10-CM

## 2023-10-17 NOTE — TELEPHONE ENCOUNTER
Care Due:                  Date            Visit Type   Department     Provider  --------------------------------------------------------------------------------                                EP -                              PRIMARY      MET INTERNAL  Last Visit: 08-      CARE ([facility])   MEDICINE       Felicity Middleton                              EP -                              PRIMARY      MET INTERNAL  Next Visit: 12-      CARE ([facility])   MEDICINE       Felicity Middleton                                                            Last  Test          Frequency    Reason                     Performed    Due Date  --------------------------------------------------------------------------------    CMP.........  12 months..  rosuvastatin.............  11-   10-    Lipid Panel.  12 months..  rosuvastatin.............  11-   10-    Health [facility] Embedded Care Due Messages. Reference number: [id number].   10/17/2023 10:26:18 AM CDT

## 2023-10-18 RX ORDER — HYDROCODONE BITARTRATE AND ACETAMINOPHEN 5; 325 MG/1; MG/1
1 TABLET ORAL EVERY 12 HOURS PRN
Qty: 60 TABLET | Refills: 0 | Status: SHIPPED | OUTPATIENT
Start: 2023-10-18 | End: 2023-11-20 | Stop reason: SDUPTHER

## 2023-11-20 DIAGNOSIS — E55.9 VITAMIN D INSUFFICIENCY: ICD-10-CM

## 2023-11-20 DIAGNOSIS — E78.2 MIXED HYPERLIPIDEMIA: ICD-10-CM

## 2023-11-20 DIAGNOSIS — I10 ESSENTIAL HYPERTENSION: Primary | ICD-10-CM

## 2023-11-20 DIAGNOSIS — M54.16 LUMBAR RADICULOPATHY: ICD-10-CM

## 2023-11-20 DIAGNOSIS — Z79.891 CHRONICALLY ON OPIATE THERAPY: ICD-10-CM

## 2023-11-20 DIAGNOSIS — M17.0 PRIMARY OSTEOARTHRITIS OF BOTH KNEES: ICD-10-CM

## 2023-11-20 RX ORDER — HYDROCODONE BITARTRATE AND ACETAMINOPHEN 5; 325 MG/1; MG/1
1 TABLET ORAL EVERY 12 HOURS PRN
Qty: 60 TABLET | Refills: 0 | Status: SHIPPED | OUTPATIENT
Start: 2023-11-20 | End: 2023-12-20 | Stop reason: SDUPTHER

## 2023-11-20 NOTE — TELEPHONE ENCOUNTER
No care due was identified.  [facility] Embedded Care Due Messages. Reference number: [id number].   11/20/2023 10:36:13 AM CST

## 2023-12-13 ENCOUNTER — LAB VISIT (OUTPATIENT)
Dept: LAB | Facility: HOSPITAL | Age: 68
End: 2023-12-13
Attending: INTERNAL MEDICINE
Payer: MEDICARE

## 2023-12-13 DIAGNOSIS — E78.2 MIXED HYPERLIPIDEMIA: ICD-10-CM

## 2023-12-13 DIAGNOSIS — I10 ESSENTIAL HYPERTENSION: ICD-10-CM

## 2023-12-13 DIAGNOSIS — E55.9 VITAMIN D INSUFFICIENCY: ICD-10-CM

## 2023-12-13 DIAGNOSIS — Z79.891 CHRONICALLY ON OPIATE THERAPY: ICD-10-CM

## 2023-12-13 LAB
25(OH)D3+25(OH)D2 SERPL-MCNC: 28 NG/ML (ref 30–96)
ALBUMIN SERPL BCP-MCNC: 4 G/DL (ref 3.5–5.2)
ALP SERPL-CCNC: 88 U/L (ref 55–135)
ALT SERPL W/O P-5'-P-CCNC: 22 U/L (ref 10–44)
ANION GAP SERPL CALC-SCNC: 12 MMOL/L (ref 8–16)
AST SERPL-CCNC: 16 U/L (ref 10–40)
BASOPHILS # BLD AUTO: 0.05 K/UL (ref 0–0.2)
BASOPHILS NFR BLD: 0.8 % (ref 0–1.9)
BILIRUB SERPL-MCNC: 0.4 MG/DL (ref 0.1–1)
BUN SERPL-MCNC: 8 MG/DL (ref 8–23)
CALCIUM SERPL-MCNC: 9.9 MG/DL (ref 8.7–10.5)
CHLORIDE SERPL-SCNC: 109 MMOL/L (ref 95–110)
CHOLEST SERPL-MCNC: 191 MG/DL (ref 120–199)
CHOLEST/HDLC SERPL: 4.1 {RATIO} (ref 2–5)
CO2 SERPL-SCNC: 24 MMOL/L (ref 23–29)
CREAT SERPL-MCNC: 0.8 MG/DL (ref 0.5–1.4)
DIFFERENTIAL METHOD: ABNORMAL
EOSINOPHIL # BLD AUTO: 0.2 K/UL (ref 0–0.5)
EOSINOPHIL NFR BLD: 2.3 % (ref 0–8)
ERYTHROCYTE [DISTWIDTH] IN BLOOD BY AUTOMATED COUNT: 13.6 % (ref 11.5–14.5)
EST. GFR  (NO RACE VARIABLE): >60 ML/MIN/1.73 M^2
GLUCOSE SERPL-MCNC: 103 MG/DL (ref 70–110)
HCT VFR BLD AUTO: 45 % (ref 37–48.5)
HDLC SERPL-MCNC: 47 MG/DL (ref 40–75)
HDLC SERPL: 24.6 % (ref 20–50)
HGB BLD-MCNC: 14.7 G/DL (ref 12–16)
IMM GRANULOCYTES # BLD AUTO: 0.02 K/UL (ref 0–0.04)
IMM GRANULOCYTES NFR BLD AUTO: 0.3 % (ref 0–0.5)
LDLC SERPL CALC-MCNC: 129.4 MG/DL (ref 63–159)
LYMPHOCYTES # BLD AUTO: 1.4 K/UL (ref 1–4.8)
LYMPHOCYTES NFR BLD: 21.4 % (ref 18–48)
MCH RBC QN AUTO: 31.3 PG (ref 27–31)
MCHC RBC AUTO-ENTMCNC: 32.7 G/DL (ref 32–36)
MCV RBC AUTO: 96 FL (ref 82–98)
MONOCYTES # BLD AUTO: 0.4 K/UL (ref 0.3–1)
MONOCYTES NFR BLD: 5.6 % (ref 4–15)
NEUTROPHILS # BLD AUTO: 4.6 K/UL (ref 1.8–7.7)
NEUTROPHILS NFR BLD: 69.6 % (ref 38–73)
NONHDLC SERPL-MCNC: 144 MG/DL
NRBC BLD-RTO: 0 /100 WBC
PLATELET # BLD AUTO: 265 K/UL (ref 150–450)
PMV BLD AUTO: 9.7 FL (ref 9.2–12.9)
POTASSIUM SERPL-SCNC: 3.9 MMOL/L (ref 3.5–5.1)
PROT SERPL-MCNC: 7.1 G/DL (ref 6–8.4)
RBC # BLD AUTO: 4.7 M/UL (ref 4–5.4)
SODIUM SERPL-SCNC: 145 MMOL/L (ref 136–145)
TRIGL SERPL-MCNC: 73 MG/DL (ref 30–150)
TSH SERPL DL<=0.005 MIU/L-ACNC: 2.08 UIU/ML (ref 0.4–4)
WBC # BLD AUTO: 6.65 K/UL (ref 3.9–12.7)

## 2023-12-13 PROCEDURE — 36415 COLL VENOUS BLD VENIPUNCTURE: CPT | Mod: HCNC,PO | Performed by: INTERNAL MEDICINE

## 2023-12-13 PROCEDURE — 80053 COMPREHEN METABOLIC PANEL: CPT | Mod: HCNC | Performed by: INTERNAL MEDICINE

## 2023-12-13 PROCEDURE — 80061 LIPID PANEL: CPT | Mod: HCNC | Performed by: INTERNAL MEDICINE

## 2023-12-13 PROCEDURE — 82306 VITAMIN D 25 HYDROXY: CPT | Mod: HCNC | Performed by: INTERNAL MEDICINE

## 2023-12-13 PROCEDURE — 80307 DRUG TEST PRSMV CHEM ANLYZR: CPT | Mod: HCNC | Performed by: INTERNAL MEDICINE

## 2023-12-13 PROCEDURE — 84443 ASSAY THYROID STIM HORMONE: CPT | Mod: HCNC | Performed by: INTERNAL MEDICINE

## 2023-12-13 PROCEDURE — 85025 COMPLETE CBC W/AUTO DIFF WBC: CPT | Mod: HCNC | Performed by: INTERNAL MEDICINE

## 2023-12-20 ENCOUNTER — OFFICE VISIT (OUTPATIENT)
Dept: INTERNAL MEDICINE | Facility: CLINIC | Age: 68
End: 2023-12-20
Payer: MEDICARE

## 2023-12-20 VITALS
HEIGHT: 62 IN | WEIGHT: 235.88 LBS | OXYGEN SATURATION: 97 % | TEMPERATURE: 98 F | BODY MASS INDEX: 43.41 KG/M2 | DIASTOLIC BLOOD PRESSURE: 70 MMHG | HEART RATE: 87 BPM | RESPIRATION RATE: 18 BRPM | SYSTOLIC BLOOD PRESSURE: 118 MMHG

## 2023-12-20 DIAGNOSIS — M17.0 PRIMARY OSTEOARTHRITIS OF BOTH KNEES: ICD-10-CM

## 2023-12-20 DIAGNOSIS — M79.7 FIBROMYALGIA: ICD-10-CM

## 2023-12-20 DIAGNOSIS — E78.2 MIXED HYPERLIPIDEMIA: ICD-10-CM

## 2023-12-20 DIAGNOSIS — I70.0 AORTIC CALCIFICATION: ICD-10-CM

## 2023-12-20 DIAGNOSIS — E66.01 MORBID OBESITY WITH BMI OF 40.0-44.9, ADULT: ICD-10-CM

## 2023-12-20 DIAGNOSIS — Z00.00 ANNUAL PHYSICAL EXAM: ICD-10-CM

## 2023-12-20 DIAGNOSIS — I10 ESSENTIAL HYPERTENSION: Primary | ICD-10-CM

## 2023-12-20 DIAGNOSIS — E55.9 VITAMIN D INSUFFICIENCY: ICD-10-CM

## 2023-12-20 DIAGNOSIS — M54.16 LUMBAR RADICULOPATHY: ICD-10-CM

## 2023-12-20 DIAGNOSIS — E27.8 LEFT ADRENAL MASS: ICD-10-CM

## 2023-12-20 DIAGNOSIS — Z23 NEED FOR VACCINATION: ICD-10-CM

## 2023-12-20 DIAGNOSIS — J45.20 MILD INTERMITTENT ASTHMA WITHOUT COMPLICATION: ICD-10-CM

## 2023-12-20 PROCEDURE — 99999 PR PBB SHADOW E&M-EST. PATIENT-LVL IV: CPT | Mod: PBBFAC,HCNC,, | Performed by: INTERNAL MEDICINE

## 2023-12-20 PROCEDURE — 90694 VACC AIIV4 NO PRSRV 0.5ML IM: CPT | Mod: HCNC,S$GLB,, | Performed by: INTERNAL MEDICINE

## 2023-12-20 PROCEDURE — 1160F RVW MEDS BY RX/DR IN RCRD: CPT | Mod: HCNC,CPTII,S$GLB, | Performed by: INTERNAL MEDICINE

## 2023-12-20 PROCEDURE — 99214 OFFICE O/P EST MOD 30 MIN: CPT | Mod: HCNC,S$GLB,, | Performed by: INTERNAL MEDICINE

## 2023-12-20 PROCEDURE — 3074F SYST BP LT 130 MM HG: CPT | Mod: HCNC,CPTII,S$GLB, | Performed by: INTERNAL MEDICINE

## 2023-12-20 PROCEDURE — 3288F FALL RISK ASSESSMENT DOCD: CPT | Mod: HCNC,CPTII,S$GLB, | Performed by: INTERNAL MEDICINE

## 2023-12-20 PROCEDURE — 1125F AMNT PAIN NOTED PAIN PRSNT: CPT | Mod: HCNC,CPTII,S$GLB, | Performed by: INTERNAL MEDICINE

## 2023-12-20 PROCEDURE — 1157F ADVNC CARE PLAN IN RCRD: CPT | Mod: HCNC,CPTII,S$GLB, | Performed by: INTERNAL MEDICINE

## 2023-12-20 PROCEDURE — G0008 ADMIN INFLUENZA VIRUS VAC: HCPCS | Mod: HCNC,S$GLB,, | Performed by: INTERNAL MEDICINE

## 2023-12-20 PROCEDURE — 1101F PT FALLS ASSESS-DOCD LE1/YR: CPT | Mod: HCNC,CPTII,S$GLB, | Performed by: INTERNAL MEDICINE

## 2023-12-20 PROCEDURE — 3008F BODY MASS INDEX DOCD: CPT | Mod: HCNC,CPTII,S$GLB, | Performed by: INTERNAL MEDICINE

## 2023-12-20 PROCEDURE — 1159F MED LIST DOCD IN RCRD: CPT | Mod: HCNC,CPTII,S$GLB, | Performed by: INTERNAL MEDICINE

## 2023-12-20 PROCEDURE — 3078F DIAST BP <80 MM HG: CPT | Mod: HCNC,CPTII,S$GLB, | Performed by: INTERNAL MEDICINE

## 2023-12-20 RX ORDER — ERGOCALCIFEROL 1.25 MG/1
50000 CAPSULE ORAL
Qty: 12 CAPSULE | Refills: 1 | Status: SHIPPED | OUTPATIENT
Start: 2023-12-20

## 2023-12-20 RX ORDER — ROSUVASTATIN CALCIUM 10 MG/1
10 TABLET, COATED ORAL DAILY
Qty: 90 TABLET | Refills: 3 | Status: SHIPPED | OUTPATIENT
Start: 2023-12-20

## 2023-12-20 RX ORDER — LOSARTAN POTASSIUM 100 MG/1
100 TABLET ORAL DAILY
Qty: 90 TABLET | Refills: 3 | Status: SHIPPED | OUTPATIENT
Start: 2023-12-20 | End: 2024-02-04

## 2023-12-20 RX ORDER — HYDROCODONE BITARTRATE AND ACETAMINOPHEN 5; 325 MG/1; MG/1
1 TABLET ORAL EVERY 12 HOURS PRN
Qty: 60 TABLET | Refills: 0 | Status: SHIPPED | OUTPATIENT
Start: 2023-12-20 | End: 2024-01-19

## 2023-12-20 NOTE — PROGRESS NOTES
Subjective:     PCP: Felicity Middleton MD    Kaleigh Solo is a 68 y.o. female who presents for an annual exam.    Chronic Medical Problems:      Hypertension: The patient has been taking medications as instructed, no medication side effects noted, no chest pain on exertion, no dyspnea on exertion, and no swelling of ankles.    BP Readings from Last 3 Encounters:   12/31/23 116/78   12/20/23 118/70   08/29/23 134/70     Hyperlipidemia: Compliance with treatment has been good. The patient exercises never. Patient denies muscle pain associated with his medications. Previous history of cardiac disease includes: none.    Lab Results   Component Value Date    CHOL 191 12/13/2023    HDL 47 12/13/2023        Back Pain: The patient has had previous osteoarthritis of lumbar spine and a previous herniated disc. Symptoms have been present for  many  years and are unchanged. The pain was previously managed by an outside Orthopedist Dr. Thomson. A nerve stimulator was implanted and for a while, the pain was controlled. She stopped going to the orthpedist and has been taking Norco regularly to control the pain.      Medical History:   Past Medical History:   Diagnosis Date    Arthritis     Asthma     Cervical radiculopathy 10/14/2013    Cervicalgia     2013 tx by chiropractor    Closed dislocation of acromioclavicular joint 2013    Degenerative disc disease     Family history of aortic aneurysm 10/21/2019    Fatty liver     Fibromyalgia     GERD (gastroesophageal reflux disease)     HPV (human papilloma virus) anogenital infection     conization in past-remote history-1990    HTN (hypertension)     Sciatica     Tobacco use     Ulcer 2007    stomach ulcer       Family History: family history includes Aneurysm in her father; Cancer in her mother; Heart disease in her father.    Surgical History:   Past Surgical History:   Procedure Laterality Date    acdf  4/2014    C5-6    BACK SURGERY      CERVICAL    CERVICAL  CONIZATION   W/ LASER      KNEE ARTHROSCOPY      right shoulder surger      arthroscopic surgery Dec 2013    TRIAL OF SPINAL CORD NERVE STIMULATOR N/A 9/25/2018    Procedure: TRIAL, NEUROSTIMULATOR, SPINAL CORD;  Surgeon: Candice Lopez MD;  Location: [facility];  Service: Pain Management;  Laterality: N/A;  SCS Trial  Pj Hankins notified of date and time        Social History:  reports that she has been smoking cigarettes. She has a 48.0 pack-year smoking history. She has never used smokeless tobacco. She reports current alcohol use. She reports that she does not use drugs.     Allergies:   Review of patient's allergies indicates:   Allergen Reactions    Ambien [zolpidem] Other (See Comments)     Pt hyperactive    Esomeprazole magnesium Swelling    Iodine and iodide containing products Swelling    Lunesta [eszopiclone] Other (See Comments)     Severely depressed    Orange Blisters and Swelling    Pineapple Blisters and Swelling    Shellfish containing products Swelling and Other (See Comments)     Metallic taste in mouth  Swells all over,including the tongue and throat  Feels likes insides are swollen  Allergic to crawfish,Shrimp    Ancef [cefazolin]      Facial swelling  Swelling abdomen    Egg derived Hives and Swelling    Flexeril [cyclobenzaprine] Swelling    Pork/porcine containing products Hives and Swelling    Sulfa (sulfonamide antibiotics) Nausea Only    Align [bifidobacterium infantis] Blisters, Diarrhea and Rash       Medications:   Current Outpatient Medications   Medication Sig    albuterol (PROAIR HFA) 90 mcg/actuation inhaler Inhale 2 puffs into the lungs every 6 (six) hours as needed for Wheezing or Shortness of Breath.    amLODIPine (NORVASC) 5 MG tablet TAKE 1 TABLET EVERY DAY    busPIRone (BUSPAR) 7.5 MG tablet Take 7.5 mg by mouth 2 (two) times daily.    clotrimazole-betamethasone 1-0.05% (LOTRISONE) cream Apply topically 2 (two) times daily.    EPINEPHrine (EPIPEN) 0.3 mg/0.3 mL  AtIn INJECT INTRAMUSCULARLY AS DIRECTED    fluticasone propionate (FLONASE) 50 mcg/actuation nasal spray 2 sprays (100 mcg total) by Each Nostril route once daily.    folic acid (FOLVITE) 800 MCG Tab Take 400 mcg by mouth once daily.    furosemide (LASIX) 20 MG tablet TAKE 1 TABLET BY MOUTH DAILY AS NEEDED FOR LEG SWELLING    hydrOXYzine HCL (ATARAX) 25 MG tablet Take 1 tablet (25 mg total) by mouth 3 (three) times daily as needed for Itching.    hyoscyamine (ANASPAZ,LEVSIN) 0.125 mg Tab Take 125 mcg by mouth every 6 (six) hours as needed.    ergocalciferol (ERGOCALCIFEROL) 50,000 unit Cap Take 1 capsule (50,000 Units total) by mouth every 7 days.    HYDROcodone-acetaminophen (NORCO) 5-325 mg per tablet Take 1 tablet by mouth every 12 (twelve) hours as needed for Pain.    losartan (COZAAR) 100 MG tablet Take 1 tablet (100 mg total) by mouth once daily.    rosuvastatin (CRESTOR) 10 MG tablet Take 1 tablet (10 mg total) by mouth once daily.     No current facility-administered medications for this visit.       Health Maintenance:   Health Maintenance Topics with due status: Not Due       Topic Last Completion Date    TETANUS VACCINE 10/22/2018    Hemoglobin A1c (Diabetic Prevention Screening) 2022    Colorectal Cancer Screening 2023    Mammogram 2023    DEXA Scan 2023    LDCT Lung Screen 2023    Lipid Panel 2023       Eye Exam: 2023  Dental Exam: due  OB/GYN: Dr. Stewart  Mammogram: 3/2023    DEXA scan: 2023  Colonoscopy:   2023  Hepatitis C screenin2017, neg       Vaccinations:  Immunization History   Administered Date(s) Administered    COVID-19 MRNA, LN-S PF (MODERNA HALF 0.25 ML DOSE) 2021, 2022    COVID-19 Vaccine 2022    COVID-19, MRNA, LN-S, PF (MODERNA FULL 0.5 ML DOSE) 2021, 2021    Influenza (FLUAD) - Quadrivalent - Adjuvanted - PF *Preferred* (65+) 2023    Influenza - Quadrivalent - High Dose - PF (65 years and older)  12/14/2022    Influenza - Quadrivalent - MDCK - PF 11/13/2017    Influenza - Quadrivalent - PF *Preferred* (6 months and older) 10/22/2018, 10/21/2019, 11/11/2020, 11/15/2021    Pneumococcal Polysaccharide - 23 Valent 08/22/2017    Tdap 10/22/2018    Zoster Recombinant 12/13/2018, 04/09/2019     Influenza: due  Tetanus: 2018  Shingrix: done  Pneumovax: 2017  Prevnar-20: due  Covid vaccine: boosted x1    ADL's: independent  Memory: normal  Mental health: receives treatment for anxiety  Advance Directives: Received  Falls: no recent falls  Nutrition: normal  Home Safety: no issues    Body mass index is 43.15 kg/m².  Wt Readings from Last 3 Encounters:   12/31/23 106.6 kg (235 lb)   12/20/23 107 kg (235 lb 14.3 oz)   08/29/23 107.3 kg (236 lb 8.9 oz)       Review of Systems   Constitutional:  Positive for fatigue. Negative for chills, diaphoresis and fever.   HENT:  Negative for congestion, dental problem, ear discharge, ear pain, postnasal drip, rhinorrhea, sinus pressure, sore throat and trouble swallowing.    Eyes:  Negative for redness and visual disturbance.   Respiratory:  Negative for cough, chest tightness and shortness of breath.         She does report snoring   Cardiovascular:  Negative for chest pain, palpitations and leg swelling.   Gastrointestinal:  Negative for abdominal pain, blood in stool, constipation, diarrhea, nausea and vomiting.   Endocrine: Negative for cold intolerance and heat intolerance.   Genitourinary:  Negative for decreased urine volume, dysuria, frequency and hematuria.   Musculoskeletal:  Positive for arthralgias (bilateral knee pain), back pain, myalgias and neck pain.   Skin:  Negative for rash and wound.   Allergic/Immunologic: Positive for environmental allergies and food allergies.   Neurological:  Negative for dizziness, weakness, numbness and headaches.   Hematological:  Negative for adenopathy.   Psychiatric/Behavioral:  Negative for dysphoric mood and sleep disturbance. The  patient is not nervous/anxious.           Objective:     Physical Exam  Vitals reviewed.   Constitutional:       General: She is awake. She is not in acute distress.     Appearance: Normal appearance. She is well-developed and well-groomed. She is not diaphoretic.   HENT:      Head: Normocephalic and atraumatic.      Right Ear: Hearing, tympanic membrane, ear canal and external ear normal. Tympanic membrane is not erythematous or bulging.      Left Ear: Hearing, tympanic membrane, ear canal and external ear normal. Tympanic membrane is not erythematous or bulging.      Nose: Nose normal. No congestion.      Mouth/Throat:      Mouth: Mucous membranes are moist.      Tongue: No lesions.      Pharynx: Oropharynx is clear. Uvula midline. No oropharyngeal exudate or posterior oropharyngeal erythema.   Eyes:      General: Lids are normal. Vision grossly intact. Gaze aligned appropriately. No scleral icterus.     Conjunctiva/sclera:      Right eye: Right conjunctiva is not injected.      Left eye: Left conjunctiva is not injected.      Pupils: Pupils are equal, round, and reactive to light.   Neck:      Thyroid: No thyroid mass or thyromegaly.   Cardiovascular:      Rate and Rhythm: Normal rate and regular rhythm.      Pulses: Normal pulses.      Heart sounds: Normal heart sounds. No murmur heard.  Pulmonary:      Effort: Pulmonary effort is normal. No respiratory distress.      Breath sounds: Normal breath sounds. No decreased breath sounds or wheezing.   Abdominal:      General: Bowel sounds are normal. There is no distension.      Palpations: Abdomen is soft. Abdomen is not rigid.      Tenderness: There is no abdominal tenderness. There is no guarding or rebound.   Musculoskeletal:         General: Normal range of motion.      Cervical back: Normal range of motion and neck supple.      Right lower leg: No edema.      Left lower leg: No edema.   Lymphadenopathy:      Cervical: No cervical adenopathy.      Upper Body:       Right upper body: No supraclavicular adenopathy.      Left upper body: No supraclavicular adenopathy.   Skin:     General: Skin is warm and dry.      Coloration: Skin is not cyanotic.      Findings: No lesion or rash.      Nails: There is no clubbing.   Neurological:      General: No focal deficit present.      Mental Status: She is alert and oriented to person, place, and time.      Sensory: Sensation is intact.      Coordination: Coordination is intact.      Gait: Gait is intact.      Deep Tendon Reflexes: Reflexes are normal and symmetric.   Psychiatric:         Attention and Perception: Attention normal.         Mood and Affect: Mood normal.         Behavior: Behavior is cooperative.              Assessment:        1. Essential hypertension    2. Mixed hyperlipidemia    3. Mild intermittent asthma without complication    4. Lumbar radiculopathy    5. Primary osteoarthritis of both knees    6. Fibromyalgia    7. Vitamin D insufficiency    8. Left adrenal mass    9. Annual physical exam    10. Need for vaccination    11. Aortic calcification    12. Morbid obesity with BMI of 40.0-44.9, adult           Plan:     1. Essential hypertension  - controlled, continue losartan, furosemide, amlodipine  - losartan (COZAAR) 100 MG tablet; Take 1 tablet (100 mg total) by mouth once daily.  Dispense: 90 tablet; Refill: 3    2. Mixed hyperlipidemia  - stable, continue Crestor  - rosuvastatin (CRESTOR) 10 MG tablet; Take 1 tablet (10 mg total) by mouth once daily.  Dispense: 90 tablet; Refill: 3    3. Mild intermittent asthma without complication  - continue albuterol as needed, no recent flare-ups, will monitor    4. Lumbar radiculopathy  - HYDROcodone-acetaminophen (NORCO) 5-325 mg per tablet; Take 1 tablet by mouth every 12 (twelve) hours as needed for Pain.  Dispense: 60 tablet; Refill: 0    5. Primary osteoarthritis of both knees  - HYDROcodone-acetaminophen (NORCO) 5-325 mg per tablet; Take 1 tablet by mouth every 12  (twelve) hours as needed for Pain.  Dispense: 60 tablet; Refill: 0    6. Fibromyalgia    7. Vitamin D insufficiency  - Vitamin D level is improving, continue weekly Vit D  - ergocalciferol (ERGOCALCIFEROL) 50,000 unit Cap; Take 1 capsule (50,000 Units total) by mouth every 7 days.  Dispense: 12 capsule; Refill: 1    8. Left adrenal mass  - stable on imaging, continue to monitor    9. Annual physical exam  - reviewed recent labs with patient    10. Need for vaccination  - Influenza - Quadrivalent (Adjuvanted)    11. Aortic calcification  - seen on past imaging, HTN is controlled, on statin, continue to monitor    12. Morbid obesity with BMI of 40.0-44.9, adult  - physical activity is limited by arthritis, intake is limited due to food allergies, will continue to monitor    RTC in 4 months for follow-up or sooner if needed    __________________________    Felicity Middleton MD, PharmD  [facility]- [facility] Medicine  American Board of Obesity Medicine diplomate  Office [phone]

## 2023-12-22 ENCOUNTER — PATIENT MESSAGE (OUTPATIENT)
Dept: INTERNAL MEDICINE | Facility: CLINIC | Age: 68
End: 2023-12-22
Payer: MEDICARE

## 2023-12-22 NOTE — TELEPHONE ENCOUNTER
Spoke with [facility], they state that pt picked up rx on 11/27, pt states she picked it up 12/20, pt will  rx on 12/27 and for next refill will chosse a different pharmacy

## 2023-12-31 ENCOUNTER — OFFICE VISIT (OUTPATIENT)
Dept: URGENT CARE | Facility: CLINIC | Age: 68
End: 2023-12-31
Payer: MEDICARE

## 2023-12-31 VITALS
WEIGHT: 235 LBS | SYSTOLIC BLOOD PRESSURE: 116 MMHG | BODY MASS INDEX: 43.24 KG/M2 | HEART RATE: 81 BPM | TEMPERATURE: 98 F | HEIGHT: 62 IN | DIASTOLIC BLOOD PRESSURE: 78 MMHG | OXYGEN SATURATION: 97 %

## 2023-12-31 DIAGNOSIS — U07.1 COVID-19: ICD-10-CM

## 2023-12-31 DIAGNOSIS — R05.9 COUGH, UNSPECIFIED TYPE: Primary | ICD-10-CM

## 2023-12-31 DIAGNOSIS — U07.1 COVID-19 VIRUS DETECTED: ICD-10-CM

## 2023-12-31 LAB
CTP QC/QA: YES
CTP QC/QA: YES
POC MOLECULAR INFLUENZA A AGN: NEGATIVE
POC MOLECULAR INFLUENZA B AGN: NEGATIVE
SARS-COV-2 AG RESP QL IA.RAPID: POSITIVE

## 2023-12-31 PROCEDURE — 87811 SARS-COV-2 COVID19 W/OPTIC: CPT | Mod: QW,S$GLB,, | Performed by: NURSE PRACTITIONER

## 2023-12-31 PROCEDURE — 99203 PR OFFICE/OUTPT VISIT, NEW, LEVL III, 30-44 MIN: ICD-10-PCS | Mod: S$GLB,,, | Performed by: NURSE PRACTITIONER

## 2023-12-31 PROCEDURE — 99203 OFFICE O/P NEW LOW 30 MIN: CPT | Mod: S$GLB,,, | Performed by: NURSE PRACTITIONER

## 2023-12-31 PROCEDURE — 87502 POCT INFLUENZA A/B MOLECULAR: ICD-10-PCS | Mod: QW,S$GLB,, | Performed by: NURSE PRACTITIONER

## 2023-12-31 PROCEDURE — 87811 SARS CORONAVIRUS 2 ANTIGEN POCT, MANUAL READ: ICD-10-PCS | Mod: QW,S$GLB,, | Performed by: NURSE PRACTITIONER

## 2023-12-31 PROCEDURE — 87502 INFLUENZA DNA AMP PROBE: CPT | Mod: QW,S$GLB,, | Performed by: NURSE PRACTITIONER

## 2023-12-31 NOTE — PROGRESS NOTES
"Subjective:      Patient ID: Kaleigh Solo is a 68 y.o. female.    Vitals:  height is 5' 2" (1.575 m) and weight is 106.6 kg (235 lb). Her oral temperature is 98.4 °F (36.9 °C). Her blood pressure is 116/78 and her pulse is 81. Her oxygen saturation is 97%.     Chief Complaint: Cough      Pt is a 67 yo female presenting with myalgia, fatigue, sore throat, sinus pain, congestion, rhinorrhea, and cough.  Onset of symptoms was 4 days ago but has had no improvement at all. Pt states she feels like her symptoms should be improving by now because she's on day 5.      Cough  This is a new problem. The current episode started in the past 7 days. The problem has been unchanged. The problem occurs every few minutes. The cough is Productive of sputum. Associated symptoms include myalgias, nasal congestion, postnasal drip, rhinorrhea and a sore throat. Pertinent negatives include no chest pain, chills, ear pain, fever, headaches or shortness of breath. Nothing aggravates the symptoms. She has tried OTC cough suppressant (thera flu , tussin) for the symptoms. The treatment provided no relief.       Constitution: Positive for fatigue. Negative for chills and fever.   HENT:  Positive for congestion, postnasal drip, sinus pain, sinus pressure and sore throat. Negative for ear pain.    Cardiovascular:  Negative for chest pain.   Respiratory:  Positive for cough. Negative for sputum production and shortness of breath.    Gastrointestinal:  Negative for nausea, vomiting and diarrhea.   Musculoskeletal:  Positive for muscle ache.   Neurological:  Negative for headaches.      Objective:     Physical Exam   Constitutional: She is oriented to person, place, and time.   HENT:   Head: Normocephalic.   Ears:   Right Ear: Hearing and external ear normal. No no drainage, swelling or tenderness. Tympanic membrane is not erythematous, not retracted and not bulging. No middle ear effusion.   Left Ear: Hearing and external ear normal. No no " "drainage, swelling or tenderness. Tympanic membrane is not erythematous, not retracted and not bulging.  No middle ear effusion.   Nose: Rhinorrhea and purulent discharge present. No mucosal edema. Right sinus exhibits frontal sinus tenderness. Right sinus exhibits no maxillary sinus tenderness. Left sinus exhibits frontal sinus tenderness. Left sinus exhibits no maxillary sinus tenderness.   Mouth/Throat: Uvula is midline and mucous membranes are normal. No trismus in the jaw. No uvula swelling. Posterior oropharyngeal erythema present. No oropharyngeal exudate or posterior oropharyngeal edema.   Cardiovascular: Normal rate and regular rhythm.   Pulmonary/Chest: Effort normal and breath sounds normal.   Neurological: She is alert and oriented to person, place, and time.   Skin: Skin is warm and dry.   Nursing note and vitals reviewed.      Assessment:     1. Cough, unspecified type    2. COVID-19        Plan:       Cough, unspecified type  -     SARS Coronavirus 2 Antigen, POCT Manual Read  -     POCT Influenza A/B MOLECULAR    COVID-19      Patient Instructions     Cough, unspecified type    -     SARS Coronavirus 2 Antigen, POCT Manual Read  -     POCT Influenza A/B MOLECULAR    COVID-19      I spoke with the patient and reviewed results.  Covid 19 counseling and education reviewed.  No questions.      - Rest at home.     - Drink plenty of fluids so you won't get dehydrated.      Avoid taking Decongestants such as Sudafed, pseudoephedrine, phenylephrine or meds that say "cold," "sinus" or "-D".      - Cough recommendations:   Warm tea with honey can help with cough. Honey is a natural cough suppressant.  - Dextromethorphan (DM) is a cough suppressant over the counter (ie. mucinex DM OR delsym).        - Congestion recommendations:    - Mucinex (guaifenesin) twice a day (or as directed) to help loosen mucous.       - Fever/Pain recommendations:  Alternate Tylenol or Ibuprofen as directed for fever/pain.   - " Motrin/Ibuprofen every 6-8 hours for pain and inflammation. Do not take ibuprofen if you have a history of GI bleeding, kidney disease, or if you take blood thinners.    - Tylenol/acetaminophen every 6-8 hours for added pain relief.  Avoid tylenol if you have a history of liver disease.       -Sore throat recommendations: Warm fluids, warm salt water gargles, throat lozenges, tea, honey, soup, or drinking something cold or frozen.  Throat lozenges or sprays help reduce pain. Gargling with warm saltwater (1/4 teaspoon of salt in 1/2 cup of warm water) or an OTC anesthetic gargle may be useful for irritation.      - Go to the ER if you develop new or worsening symptoms.      When to seek medical advice  Call your healthcare provider right away if any of these occur:  Fever that is poorly controlled with OTC fever reducing medication  New or worsening ear pain, sinus pain, or headache  Stiff neck  You can't swallow liquids or you can't open your mouth wide because of throat pain  Signs of dehydration. These include very dark urine or no urine, sunken eyes, and dizziness.  Trouble breathing or noisy breathing  Muffled voice  Rash

## 2023-12-31 NOTE — PATIENT INSTRUCTIONS
"  Cough, unspecified type    -     SARS Coronavirus 2 Antigen, POCT Manual Read  -     POCT Influenza A/B MOLECULAR    COVID-19      I spoke with the patient and reviewed results.  Covid 19 counseling and education reviewed.  No questions.      - Rest at home.     - Drink plenty of fluids so you won't get dehydrated.      Avoid taking Decongestants such as Sudafed, pseudoephedrine, phenylephrine or meds that say "cold," "sinus" or "-D".      - Cough recommendations:   Warm tea with honey can help with cough. Honey is a natural cough suppressant.  - Dextromethorphan (DM) is a cough suppressant over the counter (ie. mucinex DM OR delsym).        - Congestion recommendations:    - Mucinex (guaifenesin) twice a day (or as directed) to help loosen mucous.       - Fever/Pain recommendations:  Alternate Tylenol or Ibuprofen as directed for fever/pain.   - Motrin/Ibuprofen every 6-8 hours for pain and inflammation. Do not take ibuprofen if you have a history of GI bleeding, kidney disease, or if you take blood thinners.    - Tylenol/acetaminophen every 6-8 hours for added pain relief.  Avoid tylenol if you have a history of liver disease.       -Sore throat recommendations: Warm fluids, warm salt water gargles, throat lozenges, tea, honey, soup, or drinking something cold or frozen.  Throat lozenges or sprays help reduce pain. Gargling with warm saltwater (1/4 teaspoon of salt in 1/2 cup of warm water) or an OTC anesthetic gargle may be useful for irritation.      - Go to the ER if you develop new or worsening symptoms.      When to seek medical advice  Call your healthcare provider right away if any of these occur:  Fever that is poorly controlled with OTC fever reducing medication  New or worsening ear pain, sinus pain, or headache  Stiff neck  You can't swallow liquids or you can't open your mouth wide because of throat pain  Signs of dehydration. These include very dark urine or no urine, sunken eyes, and " dizziness.  Trouble breathing or noisy breathing  Muffled voice  Rash

## 2024-02-03 DIAGNOSIS — I10 ESSENTIAL HYPERTENSION: ICD-10-CM

## 2024-02-03 NOTE — TELEPHONE ENCOUNTER
No care due was identified.  Health [facility] Embedded Care Due Messages. Reference number: [id number].   2/03/2024 3:06:16 AM CST

## 2024-02-04 RX ORDER — LOSARTAN POTASSIUM 100 MG/1
100 TABLET ORAL
Qty: 90 TABLET | Refills: 3 | Status: SHIPPED | OUTPATIENT
Start: 2024-02-04

## 2024-02-04 NOTE — TELEPHONE ENCOUNTER
Refill Decision Note   Kaleigh Solo  is requesting a refill authorization.  Brief Assessment and Rationale for Refill:  Approve     Medication Therapy Plan:         Comments:     Note composed:2:16 AM 02/04/2024

## 2024-02-05 ENCOUNTER — PATIENT MESSAGE (OUTPATIENT)
Dept: INTERNAL MEDICINE | Facility: CLINIC | Age: 69
End: 2024-02-05
Payer: MEDICARE

## 2024-02-06 ENCOUNTER — CLINICAL SUPPORT (OUTPATIENT)
Dept: SMOKING CESSATION | Facility: CLINIC | Age: 69
End: 2024-02-06
Payer: COMMERCIAL

## 2024-02-06 DIAGNOSIS — F17.200 NICOTINE DEPENDENCE: Primary | ICD-10-CM

## 2024-02-06 PROCEDURE — 99404 PREV MED CNSL INDIV APPRX 60: CPT | Mod: S$GLB,,,

## 2024-02-06 PROCEDURE — 99999 PR PBB SHADOW E&M-EST. PATIENT-LVL II: CPT | Mod: PBBFAC,,,

## 2024-02-06 RX ORDER — BUPROPION HYDROCHLORIDE 150 MG/1
150 TABLET, EXTENDED RELEASE ORAL 2 TIMES DAILY
Qty: 60 TABLET | Refills: 0 | Status: SHIPPED | OUTPATIENT
Start: 2024-02-06 | End: 2024-04-11

## 2024-02-06 RX ORDER — IBUPROFEN 200 MG
1 TABLET ORAL DAILY
Qty: 28 PATCH | Refills: 0 | Status: SHIPPED | OUTPATIENT
Start: 2024-02-06 | End: 2024-03-07 | Stop reason: DRUGHIGH

## 2024-02-06 NOTE — PROGRESS NOTES
Patient seen in clinic for Quit 2 intake. Patient reports smoking 1 pack cigarettes a day. FTND score of 5 indicates a high level of nicotine dependence, ILEANA-D score of 20 perceived as some degree of mental distress /possible depression. Patient will begin the prescribed tobacco cessation medication regimen of 21 mg Nicotine patch and 150 mg Wellbutrin BID.  Discussed and reviewed with the patient regarding NRT's and medication along with behavioral therapy & counseling to assist patient with meeting her goal of being smoke free. Patient is agreeable to participate in bi-weekly sessions as well as group therapy when it is available. Patient educated on role & usage possible side effects. Patient provided with smoking diary to log /her daily cigarette usage. Reviewed behavioral modification strategy of rate reduction and wait time of 15 min prior to smoking. Patient verbalized understanding & willingness to apply. Patient instructed to call TTS with any questions or concerns. Current CO measurement = 21  ppm (0-6 ppm is a non-smoker). Patient has smoking room in the back of the house where she and her  sit to smoke. They are both here ready to quit.

## 2024-02-22 ENCOUNTER — CLINICAL SUPPORT (OUTPATIENT)
Dept: SMOKING CESSATION | Facility: CLINIC | Age: 69
End: 2024-02-22
Payer: COMMERCIAL

## 2024-02-22 DIAGNOSIS — F17.200 NICOTINE DEPENDENCE: Primary | ICD-10-CM

## 2024-02-22 PROCEDURE — 90853 GROUP PSYCHOTHERAPY: CPT | Mod: S$GLB,,,

## 2024-02-22 PROCEDURE — 99999 PR PBB SHADOW E&M-EST. PATIENT-LVL II: CPT | Mod: PBBFAC,,,

## 2024-02-22 NOTE — PROGRESS NOTES
Site: [facility]  Date:  2/22/2024  Clinical Status of Patient: Outpatient   Length of Service and Code: 60 minutes - [id number]   Number in Attendance: 3  CO Monitor Score: 2 pmm  Group Activities/Focus of Group:  orientation, client introductions, completion of TCRS (Tobacco Cessation Rating Scale) learned addiction model, cues/triggers, personal reasons for quitting, medications, goals, quit date    Target symptoms:  withdrawal and medication side effects             The following were rated moderate (3) to severe (4) on TCRS:       Moderate 3: none     Severe 4:   none  Patient's Response to Intervention: Patient reported that she had not smoked since 2/11/24. Congratulated patient on her quit. She just decided that she was not going to smoke any more once she got down to 4 cpd. The patient remains on the prescribed tobacco cessation medication regimen of 150 mg Wellbutrin and 21 mg Nicotine patchwithout any negative side effects at this time.    Progress Toward Goals and Other Mental Status Changes: The patient denies any abnormal behavioral or mental changes at this time.       Diagnosis: Z17.200  Plan: The patient will continue with group therapy sessions and medication regimen prescribed with management by physician or Cessation Clinic Provider. Patient will inform Smoking Clinic Cessation Counselor of symptoms as rated high on TCRS.    Return to Clinic: 2 weeks

## 2024-02-23 ENCOUNTER — PATIENT MESSAGE (OUTPATIENT)
Dept: PAIN MEDICINE | Facility: CLINIC | Age: 69
End: 2024-02-23
Payer: MEDICARE

## 2024-03-07 ENCOUNTER — PATIENT MESSAGE (OUTPATIENT)
Dept: INTERNAL MEDICINE | Facility: CLINIC | Age: 69
End: 2024-03-07
Payer: MEDICARE

## 2024-03-07 ENCOUNTER — CLINICAL SUPPORT (OUTPATIENT)
Dept: SMOKING CESSATION | Facility: CLINIC | Age: 69
End: 2024-03-07
Payer: COMMERCIAL

## 2024-03-07 DIAGNOSIS — F17.200 NICOTINE DEPENDENCE: Primary | ICD-10-CM

## 2024-03-07 DIAGNOSIS — M54.16 LUMBAR RADICULOPATHY: ICD-10-CM

## 2024-03-07 DIAGNOSIS — M17.0 PRIMARY OSTEOARTHRITIS OF BOTH KNEES: ICD-10-CM

## 2024-03-07 PROCEDURE — 90853 GROUP PSYCHOTHERAPY: CPT | Mod: S$GLB,,,

## 2024-03-07 PROCEDURE — 99999 PR PBB SHADOW E&M-EST. PATIENT-LVL II: CPT | Mod: PBBFAC,,,

## 2024-03-07 RX ORDER — IBUPROFEN 200 MG
1 TABLET ORAL DAILY
Qty: 28 PATCH | Refills: 0 | Status: SHIPPED | OUTPATIENT
Start: 2024-03-07 | End: 2024-04-11

## 2024-03-07 NOTE — TELEPHONE ENCOUNTER
No care due was identified.  Health [facility] Embedded Care Due Messages. Reference number: [id number].   3/07/2024 1:15:35 PM CST

## 2024-03-07 NOTE — PROGRESS NOTES
Smoking Cessation Group Session #2    Site: [facility]  Date:  3/7/2024  Clinical Status of Patient: Outpatient   Length of Service and Code: 60 minutes - 03597   Number in Attendance: 2  CO Monitor Score: 1 ppm  Group Activities/Focus of Group:  Sharing last weeks challenges, triggers, and coping activities to remain quit and/ or keep making progress toward cessation, completion of TCRS (Tobacco Cessation Rating Scale) learned addiction model, personal reasons for quitting, medications, goals, quit date.    Specific session focus: Completion of TCRS (Tobacco Cessation Rating Scale) reviewed strategies, cues, and triggers. Introduced the negative impact of tobacco on health, the health advantages of discontinuing the use of tobacco, time line improved health changes after a quit, withdrawal issues to expect from nicotine and habit, and ways to achieve the goal of a quit.    Target symptoms:  withdrawal and medication side effects             The following were rated moderate (3) to severe (4) on TCRS:       Moderate 3: fatigue, sleepy     Severe 4:   none  Patient's Response to Intervention: Patient was seen in clinic this morning in regard to smoking cessation progress update. Patient continues to remain tobacco free since 2/11/24. Congratulated patient on her continued quit episode. Patient stop taking Wellbutrin was making her tired and fatigue during the day. She is just utilizing 21 mg nicotine patch. She would like to decrease to 14 mg nicotine patch. She is proud of her quit and this is her longest quit thus far. Encouraged patient to stay focused. Good support for each other.  Progress Toward Goals and Other Mental Status Changes: The patient denies any abnormal behavioral or mental changes at this time.       Diagnosis: Z17.200  Plan: The patient will continue with group therapy sessions and medication regimen prescribed with management by physician or by the Cessation Clinic Provider. Patient will inform  Smoking Cessation Counselor of symptoms as rated high on TCRS.    Return to Clinic: 3 weeks    Quit Date: 2/11/24

## 2024-03-08 RX ORDER — HYDROCODONE BITARTRATE AND ACETAMINOPHEN 5; 325 MG/1; MG/1
1 TABLET ORAL EVERY 12 HOURS PRN
Qty: 60 TABLET | Refills: 0 | Status: SHIPPED | OUTPATIENT
Start: 2024-03-08 | End: 2024-04-07

## 2024-03-13 ENCOUNTER — PATIENT MESSAGE (OUTPATIENT)
Dept: SMOKING CESSATION | Facility: CLINIC | Age: 69
End: 2024-03-13
Payer: MEDICARE

## 2024-03-13 ENCOUNTER — CLINICAL SUPPORT (OUTPATIENT)
Dept: SMOKING CESSATION | Facility: CLINIC | Age: 69
End: 2024-03-13
Payer: COMMERCIAL

## 2024-03-13 DIAGNOSIS — F17.200 NICOTINE DEPENDENCE: Primary | ICD-10-CM

## 2024-03-13 DIAGNOSIS — M17.0 PRIMARY OSTEOARTHRITIS OF BOTH KNEES: ICD-10-CM

## 2024-03-13 DIAGNOSIS — M54.16 LUMBAR RADICULOPATHY: ICD-10-CM

## 2024-03-13 PROCEDURE — 99999 PR PBB SHADOW E&M-EST. PATIENT-LVL I: CPT | Mod: PBBFAC,,,

## 2024-03-13 PROCEDURE — 99407 BEHAV CHNG SMOKING > 10 MIN: CPT | Mod: S$GLB,,,

## 2024-03-13 RX ORDER — HYDROCODONE BITARTRATE AND ACETAMINOPHEN 5; 325 MG/1; MG/1
1 TABLET ORAL EVERY 12 HOURS PRN
Qty: 60 TABLET | Refills: 0 | Status: CANCELLED | OUTPATIENT
Start: 2024-03-13 | End: 2024-04-12

## 2024-03-13 NOTE — TELEPHONE ENCOUNTER
No care due was identified.  Health [facility] Embedded Care Due Messages. Reference number: [id number].   3/13/2024 10:48:08 AM CDT

## 2024-03-27 ENCOUNTER — CLINICAL SUPPORT (OUTPATIENT)
Dept: SMOKING CESSATION | Facility: CLINIC | Age: 69
End: 2024-03-27
Payer: COMMERCIAL

## 2024-03-27 DIAGNOSIS — F17.200 NICOTINE DEPENDENCE: Primary | ICD-10-CM

## 2024-03-27 PROCEDURE — 99999 PR PBB SHADOW E&M-EST. PATIENT-LVL II: CPT | Mod: PBBFAC,,,

## 2024-03-27 PROCEDURE — 90853 GROUP PSYCHOTHERAPY: CPT | Mod: S$GLB,,,

## 2024-03-27 NOTE — PROGRESS NOTES
Smoking Cessation Group Session #3    Site: [facility]  Date:  3/27/2024  Clinical Status of Patient: Outpatient   Length of Service and Code: 60 minutes - 32736   Number in Attendance: 2  CO Monitor Score: 1 ppm  Group Activities/Focus of Group:  Sharing last weeks challenges, triggers, and coping activities to remain quit and/ or keep making progress toward cessation, completion of TCRS (Tobacco Cessation Rating Scale) learned addiction model, personal reasons for quitting, medications, goals, quit date.    Specific session focus: completion of TCRS (Tobacco Cessation Rating Scale) reviewed strategies, controlling environment, cues, triggers, new goals set. Introduced high risk situations with preparation interventions, caffeine similarities with withdrawal issues of habit and nicotine, Alcohol, Understanding urges, cravings, stress and relaxation. Open discussion with intervention discussion.      Target symptoms:  withdrawal and medication side effects             The following were rated moderate (3) to severe (4) on TCRS:       Moderate 3: desire, crave     Severe 4:   none  Patient's Response to Intervention: Patient was seen in clinic this morning along with her . Patient continues to remain quit since 2/10/24. Congratulated patient on her quit. Patient stated that she still has desire or urge, but using jolly rancher candies several times a day to help with cravings. Patient reported that she stopped wearing patch 2 days ago. She feels that she is dealing ok with just using candies. Encouraged patient to stay focused on her quit episode. Reviewed that if she had not started program back on 2/6/24 that she would have smoked on average 1020 cigarettes by now. Commended both on taking that step and becoming tobacco free.   Progress Toward Goals and Other Mental Status Changes: The patient denies any abnormal behavioral or mental changes at this time.       Diagnosis: Z17.200  Plan: The patient will continue  with group therapy sessions and medication regimen prescribed with management by physician or by the Cessation Clinic Provider. Patient will inform Smoking Cessation Counselor of symptoms as rated high on TCRS.    Return to Clinic: 3 weeks    Quit Date: 2/11/24   Planned Quit Date:

## 2024-04-08 ENCOUNTER — PATIENT MESSAGE (OUTPATIENT)
Dept: SMOKING CESSATION | Facility: CLINIC | Age: 69
End: 2024-04-08
Payer: MEDICARE

## 2024-04-08 ENCOUNTER — CLINICAL SUPPORT (OUTPATIENT)
Dept: SMOKING CESSATION | Facility: CLINIC | Age: 69
End: 2024-04-08
Payer: COMMERCIAL

## 2024-04-08 DIAGNOSIS — F17.200 NICOTINE DEPENDENCE: Primary | ICD-10-CM

## 2024-04-08 PROCEDURE — 99999 PR PBB SHADOW E&M-EST. PATIENT-LVL I: CPT | Mod: PBBFAC,,,

## 2024-04-08 PROCEDURE — 99407 BEHAV CHNG SMOKING > 10 MIN: CPT | Mod: S$GLB,,,

## 2024-04-11 ENCOUNTER — OFFICE VISIT (OUTPATIENT)
Dept: INTERNAL MEDICINE | Facility: CLINIC | Age: 69
End: 2024-04-11
Payer: MEDICARE

## 2024-04-11 ENCOUNTER — LAB VISIT (OUTPATIENT)
Dept: LAB | Facility: HOSPITAL | Age: 69
End: 2024-04-11
Attending: INTERNAL MEDICINE
Payer: MEDICARE

## 2024-04-11 VITALS
HEIGHT: 62 IN | DIASTOLIC BLOOD PRESSURE: 78 MMHG | WEIGHT: 240.06 LBS | TEMPERATURE: 97 F | SYSTOLIC BLOOD PRESSURE: 112 MMHG | HEART RATE: 83 BPM | OXYGEN SATURATION: 95 % | BODY MASS INDEX: 44.18 KG/M2 | RESPIRATION RATE: 20 BRPM

## 2024-04-11 DIAGNOSIS — E27.8 LEFT ADRENAL MASS: ICD-10-CM

## 2024-04-11 DIAGNOSIS — M54.16 LUMBAR RADICULOPATHY: ICD-10-CM

## 2024-04-11 DIAGNOSIS — Z12.31 ENCOUNTER FOR SCREENING MAMMOGRAM FOR HIGH-RISK PATIENT: ICD-10-CM

## 2024-04-11 DIAGNOSIS — I70.0 AORTIC CALCIFICATION: ICD-10-CM

## 2024-04-11 DIAGNOSIS — I10 ESSENTIAL HYPERTENSION: Primary | ICD-10-CM

## 2024-04-11 DIAGNOSIS — R73.09 ELEVATED GLUCOSE: ICD-10-CM

## 2024-04-11 DIAGNOSIS — I10 ESSENTIAL HYPERTENSION: ICD-10-CM

## 2024-04-11 DIAGNOSIS — E78.2 MIXED HYPERLIPIDEMIA: ICD-10-CM

## 2024-04-11 DIAGNOSIS — F17.211 NICOTINE DEPENDENCE, CIGARETTES, IN REMISSION: ICD-10-CM

## 2024-04-11 DIAGNOSIS — E66.01 MORBID OBESITY WITH BMI OF 40.0-44.9, ADULT: ICD-10-CM

## 2024-04-11 LAB
ANION GAP SERPL CALC-SCNC: 8 MMOL/L (ref 8–16)
BUN SERPL-MCNC: 10 MG/DL (ref 8–23)
CALCIUM SERPL-MCNC: 10.2 MG/DL (ref 8.7–10.5)
CHLORIDE SERPL-SCNC: 103 MMOL/L (ref 95–110)
CO2 SERPL-SCNC: 30 MMOL/L (ref 23–29)
CREAT SERPL-MCNC: 0.9 MG/DL (ref 0.5–1.4)
EST. GFR  (NO RACE VARIABLE): >60 ML/MIN/1.73 M^2
ESTIMATED AVG GLUCOSE: 103 MG/DL (ref 68–131)
GLUCOSE SERPL-MCNC: 98 MG/DL (ref 70–110)
HBA1C MFR BLD: 5.2 % (ref 4–5.6)
POTASSIUM SERPL-SCNC: 4.5 MMOL/L (ref 3.5–5.1)
SODIUM SERPL-SCNC: 141 MMOL/L (ref 136–145)

## 2024-04-11 PROCEDURE — 1101F PT FALLS ASSESS-DOCD LE1/YR: CPT | Mod: HCNC,CPTII,S$GLB, | Performed by: INTERNAL MEDICINE

## 2024-04-11 PROCEDURE — 80048 BASIC METABOLIC PNL TOTAL CA: CPT | Mod: HCNC | Performed by: INTERNAL MEDICINE

## 2024-04-11 PROCEDURE — 1159F MED LIST DOCD IN RCRD: CPT | Mod: HCNC,CPTII,S$GLB, | Performed by: INTERNAL MEDICINE

## 2024-04-11 PROCEDURE — 3288F FALL RISK ASSESSMENT DOCD: CPT | Mod: HCNC,CPTII,S$GLB, | Performed by: INTERNAL MEDICINE

## 2024-04-11 PROCEDURE — 99214 OFFICE O/P EST MOD 30 MIN: CPT | Mod: HCNC,S$GLB,, | Performed by: INTERNAL MEDICINE

## 2024-04-11 PROCEDURE — 1157F ADVNC CARE PLAN IN RCRD: CPT | Mod: HCNC,CPTII,S$GLB, | Performed by: INTERNAL MEDICINE

## 2024-04-11 PROCEDURE — 36415 COLL VENOUS BLD VENIPUNCTURE: CPT | Mod: HCNC,PO | Performed by: INTERNAL MEDICINE

## 2024-04-11 PROCEDURE — 3074F SYST BP LT 130 MM HG: CPT | Mod: HCNC,CPTII,S$GLB, | Performed by: INTERNAL MEDICINE

## 2024-04-11 PROCEDURE — 3008F BODY MASS INDEX DOCD: CPT | Mod: HCNC,CPTII,S$GLB, | Performed by: INTERNAL MEDICINE

## 2024-04-11 PROCEDURE — 83036 HEMOGLOBIN GLYCOSYLATED A1C: CPT | Mod: HCNC | Performed by: INTERNAL MEDICINE

## 2024-04-11 PROCEDURE — 99999 PR PBB SHADOW E&M-EST. PATIENT-LVL V: CPT | Mod: PBBFAC,HCNC,, | Performed by: INTERNAL MEDICINE

## 2024-04-11 PROCEDURE — 4010F ACE/ARB THERAPY RXD/TAKEN: CPT | Mod: HCNC,CPTII,S$GLB, | Performed by: INTERNAL MEDICINE

## 2024-04-11 PROCEDURE — 1125F AMNT PAIN NOTED PAIN PRSNT: CPT | Mod: HCNC,CPTII,S$GLB, | Performed by: INTERNAL MEDICINE

## 2024-04-11 PROCEDURE — 3078F DIAST BP <80 MM HG: CPT | Mod: HCNC,CPTII,S$GLB, | Performed by: INTERNAL MEDICINE

## 2024-04-11 RX ORDER — INFLUENZA A VIRUS A/VICTORIA/4897/2022 IVR-238 (H1N1) ANTIGEN (FORMALDEHYDE INACTIVATED), INFLUENZA A VIRUS A/DARWIN/6/2021 IVR-227 (H3N2) ANTIGEN (FORMALDEHYDE INACTIVATED), INFLUENZA B VIRUS B/AUSTRIA/1359417/2021 BVR-26 ANTIGEN (FORMALDEHYDE INACTIVATED), INFLUENZA B VIRUS B/PHUKET/3073/2013 BVR-1B ANTIGEN (FORMALDEHYDE INACTIVATED) 15; 15; 15; 15 UG/.5ML; UG/.5ML; UG/.5ML; UG/.5ML
INJECTION, SUSPENSION INTRAMUSCULAR
COMMUNITY
Start: 2023-12-20

## 2024-04-11 RX ORDER — CALCIUM CARBONATE 500(1250)
TABLET,CHEWABLE ORAL
COMMUNITY
Start: 2023-12-01

## 2024-04-11 NOTE — PROGRESS NOTES
Subjective:     Kaleigh Solo is a 68 y.o. female who presents for   Chief Complaint   Patient presents with    Follow-up    Hypertension    Hyperlipidemia    Back Pain       HPI    Hypertension: The patient has been taking medications as instructed, no medication side effects noted, no chest pain on exertion, and no dyspnea on exertion.    BP Readings from Last 3 Encounters:   04/11/24 112/78   12/31/23 116/78   12/20/23 118/70     Hyperlipidemia: Compliance with treatment has been good. The patient exercises never. Patient denies muscle pain associated with his medications. Previous history of cardiac disease includes: none.    Lab Results   Component Value Date    CHOL 191 12/13/2023    HDL 47 12/13/2023        Chronic Lower Back Pain: The patient has had previous osteoarthritis of lumbar spine and a previous herniated disc and she was monitored by Pain management in the past. Symptoms have been present for many years. The pain is located in the across the lower back. Symptoms are exacerbated by  walking . Symptoms are improved by narcotic pain medications and NSAIDs.     She quit smoking on 2/12/24 with help from the Smoking cessation program.    Body mass index is 43.91 kg/m².  Wt Readings from Last 3 Encounters:   04/11/24 108.9 kg (240 lb 1.3 oz)   12/31/23 106.6 kg (235 lb)   12/20/23 107 kg (235 lb 14.3 oz)     GI- Dr. Guerrier @ [facility]      Review of Systems   Constitutional:  Positive for unexpected weight change (weight gain). Negative for chills, diaphoresis and fever.   HENT:  Negative for congestion, ear pain, rhinorrhea, sinus pressure and sore throat.    Eyes:  Negative for discharge and visual disturbance.   Respiratory:  Positive for cough (with throat clearing, has not tried using antihistamine and flonase daily). Negative for shortness of breath.    Cardiovascular:  Negative for chest pain and palpitations.   Gastrointestinal:  Positive for abdominal pain (abdominal wall spasms with movement,  hyoscyamine helps). Negative for constipation, diarrhea, nausea and vomiting.   Endocrine: Positive for polyphagia (since she stopped smoking, she craves sugars).   Musculoskeletal:  Positive for arthralgias and back pain. Negative for myalgias.   Skin:  Negative for rash and wound.   Allergic/Immunologic: Positive for environmental allergies and food allergies.   Neurological:  Negative for dizziness, tremors, numbness and headaches.   Psychiatric/Behavioral:  Negative for dysphoric mood. The patient is not nervous/anxious.           Objective:     Physical Exam  Vitals reviewed.   Constitutional:       General: She is awake. She is not in acute distress.     Appearance: Normal appearance. She is well-developed and well-groomed.   HENT:      Head: Normocephalic and atraumatic.      Right Ear: Hearing and external ear normal.      Left Ear: Hearing and external ear normal.      Nose: Nose normal. No congestion.      Mouth/Throat:      Mouth: Mucous membranes are moist.   Eyes:      General: Lids are normal. Vision grossly intact.   Cardiovascular:      Rate and Rhythm: Normal rate and regular rhythm.      Heart sounds: Normal heart sounds. No murmur heard.  Pulmonary:      Effort: Pulmonary effort is normal.      Breath sounds: Normal breath sounds. No decreased breath sounds or wheezing.   Abdominal:      General: Bowel sounds are normal. There is no distension.   Musculoskeletal:         General: Normal range of motion.      Cervical back: Normal range of motion.      Right lower leg: No edema.      Left lower leg: No edema.   Skin:     General: Skin is warm and dry.      Findings: No lesion or rash.   Neurological:      Mental Status: She is alert and oriented to person, place, and time.   Psychiatric:         Attention and Perception: Attention normal.         Mood and Affect: Mood normal.         Behavior: Behavior is cooperative.            Assessment:      1. Essential hypertension    2. Mixed hyperlipidemia     3. Lumbar radiculopathy    4. Elevated glucose    5. Nicotine dependence, cigarettes, in remission    6. Left adrenal mass    7. Aortic calcification    8. Encounter for screening mammogram for high-risk patient    9. Morbid obesity with BMI of 40.0-44.9, adult           Plan:     1. Essential hypertension  - control, continue amlodipine, furosemide, losartan  - Basic Metabolic Panel; Future    2. Mixed hyperlipidemia  - reviewed recent labs, his consider increasing dose at next appointment, continue Crestor    3. Lumbar radiculopathy  - stable with Norco, continue as needed    4. Elevated glucose  - Hemoglobin A1C; Future  - Basic Metabolic Panel; Future    5. Nicotine dependence, cigarettes, in remission  - CT Chest Lung Screening Low Dose; Future    6. Left adrenal mass  - stable on past imaging, will check DST and DHEA-S, plasma renin-aldos    7. Aortic calcification  - seen on past imaging, continue antihypertensive medications, continue Crestor    8. Encounter for screening mammogram for high-risk patient  - Mammo Digital Screening Bilat w/ Ervin; Future    9. Morbid obesity with BMI of 40.0-44.9, adult  - patient requests use of GLP-1 Ag, discussed approved uses and it does not appear that she qualifies, advised increasing physical activity as tolerated, use sugar free gum and hard candies since she stopped smoking, will follow-up    RTC in 4 months for follow-up or sooner if needed    __________________________    Felicity Middleton MD, PharmD  [facility]- Internal Medicine  American Board of Obesity Medicine diplomate  Office [phone]

## 2024-04-18 ENCOUNTER — PATIENT MESSAGE (OUTPATIENT)
Dept: INTERNAL MEDICINE | Facility: CLINIC | Age: 69
End: 2024-04-18
Payer: MEDICARE

## 2024-05-01 ENCOUNTER — PATIENT MESSAGE (OUTPATIENT)
Dept: INTERNAL MEDICINE | Facility: CLINIC | Age: 69
End: 2024-05-01
Payer: MEDICARE

## 2024-05-01 DIAGNOSIS — M54.16 LUMBAR RADICULOPATHY: ICD-10-CM

## 2024-05-01 DIAGNOSIS — M17.0 PRIMARY OSTEOARTHRITIS OF BOTH KNEES: ICD-10-CM

## 2024-05-01 RX ORDER — HYDROCODONE BITARTRATE AND ACETAMINOPHEN 5; 325 MG/1; MG/1
1 TABLET ORAL EVERY 12 HOURS PRN
Qty: 60 TABLET | Refills: 0 | Status: SHIPPED | OUTPATIENT
Start: 2024-05-01 | End: 2024-06-13 | Stop reason: SDUPTHER

## 2024-05-01 NOTE — TELEPHONE ENCOUNTER
No care due was identified.  [facility] Embedded Care Due Messages. Reference number: [id number].   5/01/2024 1:04:05 PM CDT

## 2024-05-09 ENCOUNTER — PATIENT MESSAGE (OUTPATIENT)
Dept: INTERNAL MEDICINE | Facility: CLINIC | Age: 69
End: 2024-05-09
Payer: MEDICARE

## 2024-05-09 RX ORDER — PAROXETINE 10 MG/1
10 TABLET, FILM COATED ORAL EVERY MORNING
Qty: 30 TABLET | Refills: 0 | Status: SHIPPED | OUTPATIENT
Start: 2024-05-09 | End: 2024-05-23 | Stop reason: ALTCHOICE

## 2024-05-09 NOTE — ADDENDUM NOTE
Addended by: KARLEE VIRGEN on: 5/9/2024 03:36 PM     Modules accepted: Orders     Called and Located within [facility] for patient to call back herself

## 2024-05-09 NOTE — TELEPHONE ENCOUNTER
Ordered Paxil 10mg daily for 30 days.     Please call or email to inform of her response to the medication. Will increase the dose if needed.

## 2024-05-15 ENCOUNTER — HOSPITAL ENCOUNTER (OUTPATIENT)
Dept: RADIOLOGY | Facility: HOSPITAL | Age: 69
Discharge: HOME | End: 2024-05-15
Attending: INTERNAL MEDICINE
Payer: MEDICARE

## 2024-05-15 VITALS — BODY MASS INDEX: 44.16 KG/M2 | WEIGHT: 240 LBS | HEIGHT: 62 IN

## 2024-05-15 DIAGNOSIS — Z12.31 ENCOUNTER FOR SCREENING MAMMOGRAM FOR HIGH-RISK PATIENT: ICD-10-CM

## 2024-05-15 DIAGNOSIS — F17.211 NICOTINE DEPENDENCE, CIGARETTES, IN REMISSION: ICD-10-CM

## 2024-05-15 PROCEDURE — 77067 SCR MAMMO BI INCL CAD: CPT | Mod: TC

## 2024-05-15 PROCEDURE — 77067 SCR MAMMO BI INCL CAD: CPT | Mod: 26,,, | Performed by: RADIOLOGY

## 2024-05-15 PROCEDURE — 77063 BREAST TOMOSYNTHESIS BI: CPT | Mod: TC

## 2024-05-15 PROCEDURE — 71271 CT THORAX LUNG CANCER SCR C-: CPT | Mod: TC

## 2024-05-15 PROCEDURE — 71271 CT THORAX LUNG CANCER SCR C-: CPT | Mod: 26,,, | Performed by: STUDENT IN AN ORGANIZED HEALTH CARE EDUCATION/TRAINING PROGRAM

## 2024-05-15 PROCEDURE — 77063 BREAST TOMOSYNTHESIS BI: CPT | Mod: 26,,, | Performed by: RADIOLOGY

## 2024-05-21 ENCOUNTER — PATIENT MESSAGE (OUTPATIENT)
Dept: INTERNAL MEDICINE | Facility: CLINIC | Age: 69
End: 2024-05-21
Payer: MEDICARE

## 2024-05-21 DIAGNOSIS — F32.A ANXIETY AND DEPRESSION: Primary | ICD-10-CM

## 2024-05-21 DIAGNOSIS — F41.9 ANXIETY AND DEPRESSION: Primary | ICD-10-CM

## 2024-05-21 NOTE — TELEPHONE ENCOUNTER
Will d/c Paxil and then wait for diarrhea to resolve.     I would recommend changing to a different medication.     Cymbalta would help with anxiety. Why did she stop taking Cymbalta in 2020??

## 2024-05-23 RX ORDER — DULOXETIN HYDROCHLORIDE 30 MG/1
30 CAPSULE, DELAYED RELEASE ORAL DAILY
Qty: 30 CAPSULE | Refills: 0 | Status: SHIPPED | OUTPATIENT
Start: 2024-05-23

## 2024-05-23 NOTE — TELEPHONE ENCOUNTER
Pt is requesting to get back on Cymbalta bc Paxil is not agreeing with her.   Please send to Ann

## 2024-05-23 NOTE — TELEPHONE ENCOUNTER
Ordered Cymbalta 30mg daily. Let me know if it helps and will send refills or adjust the dose if needed.

## 2024-05-27 ENCOUNTER — CLINICAL SUPPORT (OUTPATIENT)
Dept: SMOKING CESSATION | Facility: CLINIC | Age: 69
End: 2024-05-27
Payer: COMMERCIAL

## 2024-05-27 DIAGNOSIS — F17.200 NICOTINE DEPENDENCE: Primary | ICD-10-CM

## 2024-05-27 PROCEDURE — 99407 BEHAV CHNG SMOKING > 10 MIN: CPT | Mod: S$GLB,,,

## 2024-05-27 NOTE — PROGRESS NOTES
Spoke with patient today in regard to smoking cessation progress for 3 month telephone follow up, she states tobacco free. Commended patient on the accomplishment thus far. Patient states the use of Wellbutrin and the nicotine patch to help aid in her quit. Informed patient of benefit period, future follow ups, and contact information if any further help or support is needed. Will complete smart form for 3 month follow up on Quit attempt #2.

## 2024-06-07 RX ORDER — PAROXETINE 10 MG/1
10 TABLET, FILM COATED ORAL EVERY MORNING
Qty: 30 TABLET | Refills: 0 | OUTPATIENT
Start: 2024-06-07

## 2024-06-07 NOTE — TELEPHONE ENCOUNTER
Quick DC. Inappropriate Request    Refill Authorization Note   Kaleigh Solo  is requesting a refill authorization.  Brief Assessment and Rationale for Refill:  Quick Discontinue  Medication Therapy Plan:  dc'D on 5/23/2024 by Felicity Middleton MD: Alternate therapy    Medication Reconciliation Completed:  No      Comments:     Note composed:6:01 AM 06/07/2024

## 2024-06-07 NOTE — TELEPHONE ENCOUNTER
No care due was identified.  [facility] Embedded Care Due Messages. Reference number: [id number].   6/07/2024 3:27:24 AM CDT

## 2024-06-10 ENCOUNTER — PATIENT MESSAGE (OUTPATIENT)
Dept: INTERNAL MEDICINE | Facility: CLINIC | Age: 69
End: 2024-06-10
Payer: MEDICARE

## 2024-06-11 ENCOUNTER — PATIENT MESSAGE (OUTPATIENT)
Dept: INTERNAL MEDICINE | Facility: CLINIC | Age: 69
End: 2024-06-11
Payer: MEDICARE

## 2024-06-13 DIAGNOSIS — M17.0 PRIMARY OSTEOARTHRITIS OF BOTH KNEES: ICD-10-CM

## 2024-06-13 DIAGNOSIS — M54.16 LUMBAR RADICULOPATHY: ICD-10-CM

## 2024-06-13 RX ORDER — HYDROCODONE BITARTRATE AND ACETAMINOPHEN 5; 325 MG/1; MG/1
1 TABLET ORAL EVERY 12 HOURS PRN
Qty: 60 TABLET | Refills: 0 | Status: SHIPPED | OUTPATIENT
Start: 2024-06-13 | End: 2024-07-13

## 2024-06-13 NOTE — TELEPHONE ENCOUNTER
No care due was identified.  Health [facility] Embedded Care Due Messages. Reference number: [id number].   6/13/2024 10:32:56 AM CDT

## 2024-07-03 DIAGNOSIS — E55.9 VITAMIN D INSUFFICIENCY: ICD-10-CM

## 2024-07-03 NOTE — TELEPHONE ENCOUNTER
No care due was identified.  Health [facility] Embedded Care Due Messages. Reference number: [id number].   7/03/2024 5:10:42 AM CDT

## 2024-07-05 RX ORDER — ERGOCALCIFEROL 1.25 MG/1
50000 CAPSULE ORAL
Qty: 12 CAPSULE | Refills: 1 | Status: SHIPPED | OUTPATIENT
Start: 2024-07-05

## 2024-07-11 DIAGNOSIS — M17.0 PRIMARY OSTEOARTHRITIS OF BOTH KNEES: ICD-10-CM

## 2024-07-11 DIAGNOSIS — M54.16 LUMBAR RADICULOPATHY: ICD-10-CM

## 2024-07-11 RX ORDER — HYDROCODONE BITARTRATE AND ACETAMINOPHEN 5; 325 MG/1; MG/1
1 TABLET ORAL EVERY 12 HOURS PRN
Qty: 60 TABLET | Refills: 0 | Status: SHIPPED | OUTPATIENT
Start: 2024-07-12 | End: 2024-08-11

## 2024-07-11 NOTE — TELEPHONE ENCOUNTER
No care due was identified.  [facility] Embedded Care Due Messages. Reference number: [id number].   7/11/2024 8:43:35 AM CDT

## 2024-07-12 PROCEDURE — 93010 ELECTROCARDIOGRAM REPORT: CPT | Mod: S$GLB,,, | Performed by: INTERNAL MEDICINE

## 2024-07-12 PROCEDURE — 93005 ELECTROCARDIOGRAM TRACING: CPT | Mod: S$GLB,,, | Performed by: FAMILY MEDICINE

## 2024-07-13 ENCOUNTER — OFFICE VISIT (OUTPATIENT)
Dept: URGENT CARE | Facility: CLINIC | Age: 69
End: 2024-07-13
Payer: MEDICARE

## 2024-07-13 VITALS
BODY MASS INDEX: 44.16 KG/M2 | HEART RATE: 112 BPM | SYSTOLIC BLOOD PRESSURE: 142 MMHG | RESPIRATION RATE: 18 BRPM | HEIGHT: 62 IN | WEIGHT: 240 LBS | TEMPERATURE: 98 F | OXYGEN SATURATION: 95 % | DIASTOLIC BLOOD PRESSURE: 85 MMHG

## 2024-07-13 DIAGNOSIS — R07.9 CHEST PAIN, UNSPECIFIED TYPE: Primary | ICD-10-CM

## 2024-07-13 DIAGNOSIS — K21.9 GASTROESOPHAGEAL REFLUX DISEASE, UNSPECIFIED WHETHER ESOPHAGITIS PRESENT: ICD-10-CM

## 2024-07-13 DIAGNOSIS — R60.9 EDEMA, UNSPECIFIED TYPE: ICD-10-CM

## 2024-07-13 DIAGNOSIS — K58.1 IRRITABLE BOWEL SYNDROME WITH CONSTIPATION: ICD-10-CM

## 2024-07-13 PROCEDURE — 99214 OFFICE O/P EST MOD 30 MIN: CPT | Mod: S$GLB,,, | Performed by: FAMILY MEDICINE

## 2024-07-13 RX ORDER — PANTOPRAZOLE SODIUM 40 MG/1
40 TABLET, DELAYED RELEASE ORAL
COMMUNITY
Start: 2024-05-23

## 2024-07-13 RX ORDER — ALUMINUM HYDROXIDE, MAGNESIUM HYDROXIDE, AND SIMETHICONE 1200; 120; 1200 MG/30ML; MG/30ML; MG/30ML
30 SUSPENSION ORAL
Status: COMPLETED | OUTPATIENT
Start: 2024-07-13 | End: 2024-07-13

## 2024-07-13 RX ORDER — FAMOTIDINE 40 MG/1
40 TABLET, FILM COATED ORAL DAILY
Qty: 30 TABLET | Refills: 0 | Status: SHIPPED | OUTPATIENT
Start: 2024-07-13 | End: 2025-07-13

## 2024-07-13 RX ORDER — LUBIPROSTONE 8 UG/1
8 CAPSULE ORAL 2 TIMES DAILY WITH MEALS
Qty: 60 CAPSULE | Refills: 0 | Status: SHIPPED | OUTPATIENT
Start: 2024-07-13

## 2024-07-13 RX ORDER — LIDOCAINE HYDROCHLORIDE 20 MG/ML
15 SOLUTION OROPHARYNGEAL
Status: COMPLETED | OUTPATIENT
Start: 2024-07-13 | End: 2024-07-13

## 2024-07-13 RX ADMIN — ALUMINUM HYDROXIDE, MAGNESIUM HYDROXIDE, AND SIMETHICONE 30 ML: 1200; 120; 1200 SUSPENSION ORAL at 05:07

## 2024-07-13 RX ADMIN — LIDOCAINE HYDROCHLORIDE 15 ML: 20 SOLUTION OROPHARYNGEAL at 05:07

## 2024-07-13 NOTE — PROGRESS NOTES
Subjective:      Patient ID: Kaleigh Solo is a 69 y.o. female.    Vitals:  vitals were not taken for this visit.     Chief Complaint: Leg Swelling    This is a 69 y.o. female who presents today with a chief complaint of leg and feet swelling onset 1 week. Pt is also complaining of Sob and chest tightness. Swelling was getting better but today it got worse. Pt is having trouble walking and has body aches. Pt was taking ipratropium for post nasal drip, last time taken was yesterday. Pt took flonase today.    Edema  This is a new problem. The current episode started in the past 7 days. The problem occurs constantly. The problem has been gradually worsening. Associated symptoms include congestion, fatigue, headaches and joint swelling. Pertinent negatives include no coughing, fever or sore throat. Nothing aggravates the symptoms.     Constitution: Positive for fatigue. Negative for fever.   HENT:  Positive for congestion. Negative for sore throat.    Respiratory:  Negative for cough.    Musculoskeletal:  Positive for joint swelling.   Neurological:  Positive for headaches.      Objective:     Physical Exam   Constitutional: She is oriented to person, place, and time. She does not appear ill. No distress. obesity  HENT:   Head: Normocephalic and atraumatic.   Ears:   Right Ear: Tympanic membrane, external ear and ear canal normal.   Left Ear: Tympanic membrane, external ear and ear canal normal.   Nose: No rhinorrhea or congestion.   Mouth/Throat: Mucous membranes are moist. Oropharynx is clear.   Eyes: Conjunctivae are normal. Pupils are equal, round, and reactive to light. Extraocular movement intact   Neck: Neck supple. No neck rigidity present.   Cardiovascular: Normal rate, regular rhythm, normal heart sounds and normal pulses.   Pulmonary/Chest: Effort normal and breath sounds normal.   Abdominal: Normal appearance and bowel sounds are normal. She exhibits distension. She exhibits no mass. Soft. flat abdomen  There is abdominal tenderness. There is no rebound and no guarding. No hernia.   Musculoskeletal: Normal range of motion.         General: Normal range of motion.      Right lower leg: Edema present.      Left lower leg: Edema present.      Comments: Each calf 53 cm left and right  Negative derik sign bilaterally  No ropey cords palpated   Neurological: no focal deficit. She is alert, oriented to person, place, and time and at baseline. No cranial nerve deficit.   Skin: Skin is warm and dry.   Psychiatric: Her behavior is normal. Mood, judgment and thought content normal.   Nursing note and vitals reviewed.      Assessment:     Plan:   EKG no changes from last tracing 12-16-14  1. Chest pain, unspecified type  - IN OFFICE EKG 12-LEAD (to Muse)  - aluminum-magnesium hydroxide-simethicone 200-200-20 mg/5 mL suspension 30 mL  - LIDOcaine viscous HCl 2% oral solution 15 mL    2. Gastroesophageal reflux disease, unspecified whether esophagitis present  - famotidine (PEPCID) 40 MG tablet; Take 1 tablet (40 mg total) by mouth once daily.  Dispense: 30 tablet; Refill: 0    3. Irritable bowel syndrome with constipation  - lubiprostone (AMITIZA) 8 MCG Cap; Take 1 capsule (8 mcg total) by mouth 2 (two) times daily with meals.  Dispense: 60 capsule; Refill: 0   F/u if getting worse

## 2024-07-14 PROBLEM — K58.1 IRRITABLE BOWEL SYNDROME WITH CONSTIPATION: Status: ACTIVE | Noted: 2024-07-14

## 2024-07-14 PROBLEM — R07.9 CHEST PAIN: Status: ACTIVE | Noted: 2024-07-14

## 2024-07-14 LAB
OHS QRS DURATION: 80 MS
OHS QTC CALCULATION: 443 MS

## 2024-07-15 ENCOUNTER — PATIENT MESSAGE (OUTPATIENT)
Dept: INTERNAL MEDICINE | Facility: CLINIC | Age: 69
End: 2024-07-15
Payer: MEDICARE

## 2024-07-15 DIAGNOSIS — R60.0 BILATERAL LEG EDEMA: Primary | ICD-10-CM

## 2024-07-15 DIAGNOSIS — I10 ESSENTIAL HYPERTENSION: ICD-10-CM

## 2024-07-15 DIAGNOSIS — R94.31 ABNORMAL EKG: ICD-10-CM

## 2024-07-15 NOTE — TELEPHONE ENCOUNTER
So the chest pain stopped, right? If it returns or the SOB worsens, advise her to go to the ER.    Was the SOB and CP associated with walking or activity? Would she be able to walk on a treadmill?     If symptoms occurred at rest, then will order echo. If they occurred with activity and she can walk on treadmill, then will order a (treadmill) stress echo.

## 2024-07-15 NOTE — TELEPHONE ENCOUNTER
She needs labs, please schedule CBC, CMP, BNP.    Reviewed note from 7/13/24 Urgent Care visit. What symptoms does she have now (chest pain, SOB, palpitations)? Was she having any palpitations before the UC appointment?

## 2024-07-15 NOTE — TELEPHONE ENCOUNTER
"Per pt"Would you like me to have a ultrasound of my heart before our next appointment?"    Pt hd sweling in legs and feet,and swelling in abdomen under breasts with SOB and slight chest pain, went to urgent care on 7/13  "

## 2024-07-15 NOTE — TELEPHONE ENCOUNTER
Spoke spoke with pt, scheduled labs         Pt having shortness of breath, swelling in legs, feet, and abdomen, pt did not feel any palpations before UC appt

## 2024-07-16 ENCOUNTER — LAB VISIT (OUTPATIENT)
Dept: LAB | Facility: HOSPITAL | Age: 69
End: 2024-07-16
Attending: INTERNAL MEDICINE
Payer: MEDICARE

## 2024-07-16 DIAGNOSIS — R60.0 BILATERAL LEG EDEMA: ICD-10-CM

## 2024-07-16 DIAGNOSIS — I10 ESSENTIAL HYPERTENSION: ICD-10-CM

## 2024-07-16 DIAGNOSIS — R94.31 ABNORMAL EKG: ICD-10-CM

## 2024-07-16 LAB
ALBUMIN SERPL BCP-MCNC: 3.7 G/DL (ref 3.5–5.2)
ALP SERPL-CCNC: 92 U/L (ref 55–135)
ALT SERPL W/O P-5'-P-CCNC: 34 U/L (ref 10–44)
ANION GAP SERPL CALC-SCNC: 10 MMOL/L (ref 8–16)
AST SERPL-CCNC: 25 U/L (ref 10–40)
BASOPHILS # BLD AUTO: 0.07 K/UL (ref 0–0.2)
BASOPHILS NFR BLD: 0.8 % (ref 0–1.9)
BILIRUB SERPL-MCNC: 0.3 MG/DL (ref 0.1–1)
BNP SERPL-MCNC: 32 PG/ML (ref 0–99)
BUN SERPL-MCNC: 11 MG/DL (ref 8–23)
CALCIUM SERPL-MCNC: 9.7 MG/DL (ref 8.7–10.5)
CHLORIDE SERPL-SCNC: 105 MMOL/L (ref 95–110)
CO2 SERPL-SCNC: 25 MMOL/L (ref 23–29)
CREAT SERPL-MCNC: 0.8 MG/DL (ref 0.5–1.4)
DIFFERENTIAL METHOD BLD: ABNORMAL
EOSINOPHIL # BLD AUTO: 0.3 K/UL (ref 0–0.5)
EOSINOPHIL NFR BLD: 3.2 % (ref 0–8)
ERYTHROCYTE [DISTWIDTH] IN BLOOD BY AUTOMATED COUNT: 14 % (ref 11.5–14.5)
EST. GFR  (NO RACE VARIABLE): >60 ML/MIN/1.73 M^2
GLUCOSE SERPL-MCNC: 104 MG/DL (ref 70–110)
HCT VFR BLD AUTO: 43.9 % (ref 37–48.5)
HGB BLD-MCNC: 13.9 G/DL (ref 12–16)
IMM GRANULOCYTES # BLD AUTO: 0.04 K/UL (ref 0–0.04)
IMM GRANULOCYTES NFR BLD AUTO: 0.5 % (ref 0–0.5)
LYMPHOCYTES # BLD AUTO: 1.8 K/UL (ref 1–4.8)
LYMPHOCYTES NFR BLD: 21.1 % (ref 18–48)
MCH RBC QN AUTO: 30.2 PG (ref 27–31)
MCHC RBC AUTO-ENTMCNC: 31.7 G/DL (ref 32–36)
MCV RBC AUTO: 95 FL (ref 82–98)
MONOCYTES # BLD AUTO: 0.6 K/UL (ref 0.3–1)
MONOCYTES NFR BLD: 7.4 % (ref 4–15)
NEUTROPHILS # BLD AUTO: 5.8 K/UL (ref 1.8–7.7)
NEUTROPHILS NFR BLD: 67 % (ref 38–73)
NRBC BLD-RTO: 0 /100 WBC
PLATELET # BLD AUTO: 270 K/UL (ref 150–450)
PMV BLD AUTO: 9.4 FL (ref 9.2–12.9)
POTASSIUM SERPL-SCNC: 4.2 MMOL/L (ref 3.5–5.1)
PROT SERPL-MCNC: 7.1 G/DL (ref 6–8.4)
RBC # BLD AUTO: 4.6 M/UL (ref 4–5.4)
SODIUM SERPL-SCNC: 140 MMOL/L (ref 136–145)
WBC # BLD AUTO: 8.66 K/UL (ref 3.9–12.7)

## 2024-07-16 PROCEDURE — 83880 ASSAY OF NATRIURETIC PEPTIDE: CPT | Mod: HCNC | Performed by: INTERNAL MEDICINE

## 2024-07-16 PROCEDURE — 85025 COMPLETE CBC W/AUTO DIFF WBC: CPT | Mod: HCNC | Performed by: INTERNAL MEDICINE

## 2024-07-16 PROCEDURE — 80053 COMPREHEN METABOLIC PANEL: CPT | Mod: HCNC | Performed by: INTERNAL MEDICINE

## 2024-07-16 PROCEDURE — 36415 COLL VENOUS BLD VENIPUNCTURE: CPT | Mod: HCNC,PO | Performed by: INTERNAL MEDICINE

## 2024-07-17 ENCOUNTER — HOSPITAL ENCOUNTER (OUTPATIENT)
Dept: CARDIOLOGY | Facility: HOSPITAL | Age: 69
Discharge: HOME | End: 2024-07-17
Attending: INTERNAL MEDICINE
Payer: MEDICARE

## 2024-07-17 ENCOUNTER — PATIENT MESSAGE (OUTPATIENT)
Dept: INTERNAL MEDICINE | Facility: CLINIC | Age: 69
End: 2024-07-17
Payer: MEDICARE

## 2024-07-17 VITALS
HEIGHT: 62 IN | SYSTOLIC BLOOD PRESSURE: 155 MMHG | HEART RATE: 90 BPM | DIASTOLIC BLOOD PRESSURE: 73 MMHG | WEIGHT: 250 LBS | BODY MASS INDEX: 46.01 KG/M2

## 2024-07-17 DIAGNOSIS — R60.0 BILATERAL LEG EDEMA: ICD-10-CM

## 2024-07-17 LAB
ASCENDING AORTA: 2.65 CM
AV INDEX (PROSTH): 0.94
AV MEAN GRADIENT: 7 MMHG
AV PEAK GRADIENT: 12 MMHG
AV VALVE AREA BY VELOCITY RATIO: 3.38 CM²
AV VALVE AREA: 3.72 CM²
AV VELOCITY RATIO: 0.85
BSA FOR ECHO PROCEDURE: 2.23 M2
CV ECHO LV RWT: 0.31 CM
DOP CALC AO PEAK VEL: 1.74 M/S
DOP CALC AO VTI: 28.1 CM
DOP CALC LVOT AREA: 4 CM2
DOP CALC LVOT DIAMETER: 2.25 CM
DOP CALC LVOT PEAK VEL: 1.48 M/S
DOP CALC LVOT STROKE VOLUME: 104.48 CM3
DOP CALCLVOT PEAK VEL VTI: 26.29 CM
E WAVE DECELERATION TIME: 253.94 MSEC
E/A RATIO: 1.04
E/E' RATIO: 9.55 M/S
ECHO LV POSTERIOR WALL: 0.74 CM (ref 0.6–1.1)
FRACTIONAL SHORTENING: 27 % (ref 28–44)
INTERVENTRICULAR SEPTUM: 0.75 CM (ref 0.6–1.1)
IVRT: 59.94 MSEC
LA MAJOR: 5.15 CM
LA MINOR: 4.68 CM
LA WIDTH: 3.35 CM
LEFT ATRIUM SIZE: 3.41 CM
LEFT ATRIUM VOLUME INDEX: 22.7 ML/M2
LEFT ATRIUM VOLUME: 47.62 CM3
LEFT INTERNAL DIMENSION IN SYSTOLE: 3.55 CM (ref 2.1–4)
LEFT VENTRICLE DIASTOLIC VOLUME INDEX: 52.08 ML/M2
LEFT VENTRICLE DIASTOLIC VOLUME: 109.37 ML
LEFT VENTRICLE MASS INDEX: 56 G/M2
LEFT VENTRICLE SYSTOLIC VOLUME INDEX: 25 ML/M2
LEFT VENTRICLE SYSTOLIC VOLUME: 52.49 ML
LEFT VENTRICULAR INTERNAL DIMENSION IN DIASTOLE: 4.84 CM (ref 3.5–6)
LEFT VENTRICULAR MASS: 117.29 G
LV LATERAL E/E' RATIO: 8.08 M/S
LV SEPTAL E/E' RATIO: 11.67 M/S
MV A" WAVE DURATION": 7.14 MSEC
MV PEAK A VEL: 1.01 M/S
MV PEAK E VEL: 1.05 M/S
MV STENOSIS PRESSURE HALF TIME: 73.64 MS
MV VALVE AREA P 1/2 METHOD: 2.99 CM2
PISA TR MAX VEL: 2.24 M/S
PULM VEIN S/D RATIO: 1.32
PV PEAK D VEL: 0.47 M/S
PV PEAK S VEL: 0.62 M/S
RA MAJOR: 4.46 CM
RA PRESSURE ESTIMATED: 3 MMHG
RA WIDTH: 3.54 CM
RIGHT VENTRICLE DIASTOLIC BASEL DIMENSION: 3.2 CM
RV TB RVSP: 5 MMHG
SINUS: 2.81 CM
STJ: 2.24 CM
TDI LATERAL: 0.13 M/S
TDI SEPTAL: 0.09 M/S
TDI: 0.11 M/S
TR MAX PG: 20 MMHG
TRICUSPID ANNULAR PLANE SYSTOLIC EXCURSION: 2.17 CM
TV REST PULMONARY ARTERY PRESSURE: 23 MMHG
Z-SCORE OF LEFT VENTRICULAR DIMENSION IN END DIASTOLE: -2.99
Z-SCORE OF LEFT VENTRICULAR DIMENSION IN END SYSTOLE: -0.92

## 2024-07-17 PROCEDURE — 93306 TTE W/DOPPLER COMPLETE: CPT | Mod: HCNC

## 2024-07-17 PROCEDURE — 93306 TTE W/DOPPLER COMPLETE: CPT | Mod: 26,HCNC,, | Performed by: INTERNAL MEDICINE

## 2024-07-18 ENCOUNTER — LAB VISIT (OUTPATIENT)
Dept: LAB | Facility: HOSPITAL | Age: 69
End: 2024-07-18
Payer: MEDICARE

## 2024-07-18 DIAGNOSIS — L50.8 AQUAGENIC URTICARIA: ICD-10-CM

## 2024-07-18 LAB — LDH SERPL L TO P-CCNC: 212 U/L (ref 110–260)

## 2024-07-18 PROCEDURE — 82955 ASSAY OF G6PD ENZYME: CPT | Mod: HCNC | Performed by: ALLERGY & IMMUNOLOGY

## 2024-07-18 PROCEDURE — 83615 LACTATE (LD) (LDH) ENZYME: CPT | Mod: HCNC | Performed by: ALLERGY & IMMUNOLOGY

## 2024-07-19 ENCOUNTER — PATIENT MESSAGE (OUTPATIENT)
Dept: INTERNAL MEDICINE | Facility: CLINIC | Age: 69
End: 2024-07-19
Payer: MEDICARE

## 2024-07-22 LAB — G6PD RBC-CCNT: 11.2 U/G HB (ref 8–11.9)

## 2024-08-05 ENCOUNTER — CLINICAL SUPPORT (OUTPATIENT)
Dept: SMOKING CESSATION | Facility: CLINIC | Age: 69
End: 2024-08-05
Payer: COMMERCIAL

## 2024-08-05 DIAGNOSIS — F17.200 NICOTINE DEPENDENCE: Primary | ICD-10-CM

## 2024-08-05 PROCEDURE — 99406 BEHAV CHNG SMOKING 3-10 MIN: CPT | Mod: S$GLB,,,

## 2024-08-15 DIAGNOSIS — M54.16 LUMBAR RADICULOPATHY: ICD-10-CM

## 2024-08-15 DIAGNOSIS — M17.0 PRIMARY OSTEOARTHRITIS OF BOTH KNEES: ICD-10-CM

## 2024-08-15 NOTE — TELEPHONE ENCOUNTER
No care due was identified.  [facility] Embedded Care Due Messages. Reference number: [id number].   8/15/2024 9:59:06 AM CDT

## 2024-08-16 RX ORDER — HYDROCODONE BITARTRATE AND ACETAMINOPHEN 5; 325 MG/1; MG/1
1 TABLET ORAL EVERY 12 HOURS PRN
Qty: 60 TABLET | Refills: 0 | Status: SHIPPED | OUTPATIENT
Start: 2024-08-16 | End: 2024-09-15

## 2024-09-18 ENCOUNTER — OFFICE VISIT (OUTPATIENT)
Dept: INTERNAL MEDICINE | Facility: CLINIC | Age: 69
End: 2024-09-18
Payer: MEDICARE

## 2024-09-18 ENCOUNTER — LAB VISIT (OUTPATIENT)
Dept: LAB | Facility: HOSPITAL | Age: 69
End: 2024-09-18
Attending: INTERNAL MEDICINE
Payer: MEDICARE

## 2024-09-18 VITALS
BODY MASS INDEX: 47.23 KG/M2 | HEIGHT: 62 IN | SYSTOLIC BLOOD PRESSURE: 136 MMHG | RESPIRATION RATE: 19 BRPM | WEIGHT: 256.63 LBS | HEART RATE: 63 BPM | OXYGEN SATURATION: 97 % | DIASTOLIC BLOOD PRESSURE: 82 MMHG

## 2024-09-18 DIAGNOSIS — F32.A ANXIETY AND DEPRESSION: ICD-10-CM

## 2024-09-18 DIAGNOSIS — M79.7 FIBROMYALGIA: ICD-10-CM

## 2024-09-18 DIAGNOSIS — E55.9 VITAMIN D INSUFFICIENCY: ICD-10-CM

## 2024-09-18 DIAGNOSIS — I10 ESSENTIAL HYPERTENSION: ICD-10-CM

## 2024-09-18 DIAGNOSIS — I70.0 AORTIC CALCIFICATION: ICD-10-CM

## 2024-09-18 DIAGNOSIS — R60.0 BILATERAL LEG EDEMA: ICD-10-CM

## 2024-09-18 DIAGNOSIS — F41.9 ANXIETY AND DEPRESSION: ICD-10-CM

## 2024-09-18 DIAGNOSIS — M17.0 PRIMARY OSTEOARTHRITIS OF BOTH KNEES: ICD-10-CM

## 2024-09-18 DIAGNOSIS — E66.01 MORBID OBESITY WITH BMI OF 40.0-44.9, ADULT: ICD-10-CM

## 2024-09-18 DIAGNOSIS — Z00.00 ANNUAL PHYSICAL EXAM: ICD-10-CM

## 2024-09-18 DIAGNOSIS — M54.16 LUMBAR RADICULOPATHY: ICD-10-CM

## 2024-09-18 DIAGNOSIS — I10 ESSENTIAL HYPERTENSION: Primary | ICD-10-CM

## 2024-09-18 DIAGNOSIS — J45.20 MILD INTERMITTENT ASTHMA WITHOUT COMPLICATION: ICD-10-CM

## 2024-09-18 DIAGNOSIS — E78.2 MIXED HYPERLIPIDEMIA: ICD-10-CM

## 2024-09-18 DIAGNOSIS — E27.8 LEFT ADRENAL MASS: ICD-10-CM

## 2024-09-18 LAB
25(OH)D3+25(OH)D2 SERPL-MCNC: 26 NG/ML (ref 30–96)
ALBUMIN SERPL BCP-MCNC: 4 G/DL (ref 3.5–5.2)
ALP SERPL-CCNC: 89 U/L (ref 55–135)
ALT SERPL W/O P-5'-P-CCNC: 34 U/L (ref 10–44)
ANION GAP SERPL CALC-SCNC: 12 MMOL/L (ref 8–16)
AST SERPL-CCNC: 26 U/L (ref 10–40)
BASOPHILS # BLD AUTO: 0.05 K/UL (ref 0–0.2)
BASOPHILS NFR BLD: 0.6 % (ref 0–1.9)
BILIRUB SERPL-MCNC: 0.5 MG/DL (ref 0.1–1)
BNP SERPL-MCNC: 31 PG/ML (ref 0–99)
BUN SERPL-MCNC: 8 MG/DL (ref 8–23)
CALCIUM SERPL-MCNC: 9.8 MG/DL (ref 8.7–10.5)
CHLORIDE SERPL-SCNC: 106 MMOL/L (ref 95–110)
CHOLEST SERPL-MCNC: 195 MG/DL (ref 120–199)
CHOLEST/HDLC SERPL: 4.1 {RATIO} (ref 2–5)
CO2 SERPL-SCNC: 23 MMOL/L (ref 23–29)
CREAT SERPL-MCNC: 0.9 MG/DL (ref 0.5–1.4)
DIFFERENTIAL METHOD BLD: ABNORMAL
EOSINOPHIL # BLD AUTO: 0.2 K/UL (ref 0–0.5)
EOSINOPHIL NFR BLD: 1.7 % (ref 0–8)
ERYTHROCYTE [DISTWIDTH] IN BLOOD BY AUTOMATED COUNT: 14.1 % (ref 11.5–14.5)
EST. GFR  (NO RACE VARIABLE): >60 ML/MIN/1.73 M^2
GLUCOSE SERPL-MCNC: 106 MG/DL (ref 70–110)
HCT VFR BLD AUTO: 42.9 % (ref 37–48.5)
HDLC SERPL-MCNC: 48 MG/DL (ref 40–75)
HDLC SERPL: 24.6 % (ref 20–50)
HGB BLD-MCNC: 14.2 G/DL (ref 12–16)
IMM GRANULOCYTES # BLD AUTO: 0.03 K/UL (ref 0–0.04)
IMM GRANULOCYTES NFR BLD AUTO: 0.3 % (ref 0–0.5)
LDLC SERPL CALC-MCNC: 124.8 MG/DL (ref 63–159)
LYMPHOCYTES # BLD AUTO: 1.5 K/UL (ref 1–4.8)
LYMPHOCYTES NFR BLD: 17.9 % (ref 18–48)
MCH RBC QN AUTO: 30.5 PG (ref 27–31)
MCHC RBC AUTO-ENTMCNC: 33.1 G/DL (ref 32–36)
MCV RBC AUTO: 92 FL (ref 82–98)
MONOCYTES # BLD AUTO: 0.5 K/UL (ref 0.3–1)
MONOCYTES NFR BLD: 6 % (ref 4–15)
NEUTROPHILS # BLD AUTO: 6.3 K/UL (ref 1.8–7.7)
NEUTROPHILS NFR BLD: 73.5 % (ref 38–73)
NONHDLC SERPL-MCNC: 147 MG/DL
NRBC BLD-RTO: 0 /100 WBC
PLATELET # BLD AUTO: 246 K/UL (ref 150–450)
PMV BLD AUTO: 9.5 FL (ref 9.2–12.9)
POTASSIUM SERPL-SCNC: 3.8 MMOL/L (ref 3.5–5.1)
PROT SERPL-MCNC: 7.5 G/DL (ref 6–8.4)
RBC # BLD AUTO: 4.66 M/UL (ref 4–5.4)
SODIUM SERPL-SCNC: 141 MMOL/L (ref 136–145)
TRIGL SERPL-MCNC: 111 MG/DL (ref 30–150)
TSH SERPL DL<=0.005 MIU/L-ACNC: 2.31 UIU/ML (ref 0.4–4)
WBC # BLD AUTO: 8.62 K/UL (ref 3.9–12.7)

## 2024-09-18 PROCEDURE — 99214 OFFICE O/P EST MOD 30 MIN: CPT | Mod: HCNC,S$GLB,, | Performed by: INTERNAL MEDICINE

## 2024-09-18 PROCEDURE — 4010F ACE/ARB THERAPY RXD/TAKEN: CPT | Mod: HCNC,CPTII,S$GLB, | Performed by: INTERNAL MEDICINE

## 2024-09-18 PROCEDURE — 84443 ASSAY THYROID STIM HORMONE: CPT | Mod: HCNC | Performed by: INTERNAL MEDICINE

## 2024-09-18 PROCEDURE — 36415 COLL VENOUS BLD VENIPUNCTURE: CPT | Mod: HCNC,PO | Performed by: INTERNAL MEDICINE

## 2024-09-18 PROCEDURE — 1159F MED LIST DOCD IN RCRD: CPT | Mod: HCNC,CPTII,S$GLB, | Performed by: INTERNAL MEDICINE

## 2024-09-18 PROCEDURE — 82306 VITAMIN D 25 HYDROXY: CPT | Mod: HCNC | Performed by: INTERNAL MEDICINE

## 2024-09-18 PROCEDURE — 85025 COMPLETE CBC W/AUTO DIFF WBC: CPT | Mod: HCNC | Performed by: INTERNAL MEDICINE

## 2024-09-18 PROCEDURE — 1157F ADVNC CARE PLAN IN RCRD: CPT | Mod: HCNC,CPTII,S$GLB, | Performed by: INTERNAL MEDICINE

## 2024-09-18 PROCEDURE — 99999 PR PBB SHADOW E&M-EST. PATIENT-LVL III: CPT | Mod: PBBFAC,HCNC,, | Performed by: INTERNAL MEDICINE

## 2024-09-18 PROCEDURE — 3008F BODY MASS INDEX DOCD: CPT | Mod: HCNC,CPTII,S$GLB, | Performed by: INTERNAL MEDICINE

## 2024-09-18 PROCEDURE — 80061 LIPID PANEL: CPT | Mod: HCNC | Performed by: INTERNAL MEDICINE

## 2024-09-18 PROCEDURE — 3044F HG A1C LEVEL LT 7.0%: CPT | Mod: HCNC,CPTII,S$GLB, | Performed by: INTERNAL MEDICINE

## 2024-09-18 PROCEDURE — 83880 ASSAY OF NATRIURETIC PEPTIDE: CPT | Mod: HCNC | Performed by: INTERNAL MEDICINE

## 2024-09-18 PROCEDURE — 1160F RVW MEDS BY RX/DR IN RCRD: CPT | Mod: HCNC,CPTII,S$GLB, | Performed by: INTERNAL MEDICINE

## 2024-09-18 PROCEDURE — 3079F DIAST BP 80-89 MM HG: CPT | Mod: HCNC,CPTII,S$GLB, | Performed by: INTERNAL MEDICINE

## 2024-09-18 PROCEDURE — 3075F SYST BP GE 130 - 139MM HG: CPT | Mod: HCNC,CPTII,S$GLB, | Performed by: INTERNAL MEDICINE

## 2024-09-18 PROCEDURE — 80053 COMPREHEN METABOLIC PANEL: CPT | Mod: HCNC | Performed by: INTERNAL MEDICINE

## 2024-09-18 RX ORDER — HYDROCODONE BITARTRATE AND ACETAMINOPHEN 5; 325 MG/1; MG/1
1 TABLET ORAL EVERY 12 HOURS PRN
Qty: 60 TABLET | Refills: 0 | Status: SHIPPED | OUTPATIENT
Start: 2024-09-18 | End: 2024-10-16 | Stop reason: SDUPTHER

## 2024-09-18 NOTE — PROGRESS NOTES
Subjective:     PCP: Felicity Middleton MD    Kaleigh Solo is a 69 y.o. female who presents for an annual exam.    Sees Dr Venegas for Allergy, tried dapsone and it helped, stopped blisters and itching      Medical History:   Past Medical History:   Diagnosis Date    Arthritis     Asthma     Cervical radiculopathy 10/14/2013    Cervicalgia     2013 tx by chiropractor    Closed dislocation of acromioclavicular joint 2013    Degenerative disc disease     Family history of aortic aneurysm 10/21/2019    Fatty liver     Fibromyalgia     GERD (gastroesophageal reflux disease)     HPV (human papilloma virus) anogenital infection     conization in past-remote history-1990    HTN (hypertension)     Sciatica     Tobacco use     Ulcer 2007    stomach ulcer       Family History: family history includes Aneurysm in her father; Cancer in her mother; Heart disease in her father.    Surgical History:   Past Surgical History:   Procedure Laterality Date    acdf  4/2014    C5-6    BACK SURGERY      CERVICAL    CERVICAL CONIZATION   W/ LASER      KNEE ARTHROSCOPY      right shoulder surger      arthroscopic surgery Dec 2013    TRIAL OF SPINAL CORD NERVE STIMULATOR N/A 9/25/2018    Procedure: TRIAL, NEUROSTIMULATOR, SPINAL CORD;  Surgeon: Candice Lopez MD;  Location: [facility];  Service: Pain Management;  Laterality: N/A;  SCS Trial  [facility] notified of date and time        Social History:  reports that she quit smoking about 9 months ago. Her smoking use included cigarettes. She started smoking about 52 years ago. She has a 52.1 pack-year smoking history. She has been exposed to tobacco smoke. She has never used smokeless tobacco. She reports current alcohol use. She reports that she does not use drugs.     Allergies:   Review of patient's allergies indicates:   Allergen Reactions    Ambien [zolpidem] Other (See Comments)     Pt hyperactive    Esomeprazole magnesium Swelling    Iodine and iodide containing  products Swelling    Lunesta [eszopiclone] Other (See Comments)     Severely depressed    Orange Blisters and Swelling    Pineapple Blisters and Swelling    Shellfish containing products Swelling and Other (See Comments)     Metallic taste in mouth  Swells all over,including the tongue and throat  Feels likes insides are swollen  Allergic to crawfish,Shrimp    Ancef [cefazolin]      Facial swelling  Swelling abdomen    Egg derived Hives and Swelling    Flexeril [cyclobenzaprine] Swelling    Pork/porcine containing products Hives and Swelling    Sulfa (sulfonamide antibiotics) Nausea Only    Align [bifidobacterium infantis] Blisters, Diarrhea and Rash       Medications:   Current Outpatient Medications   Medication Sig    albuterol (PROAIR HFA) 90 mcg/actuation inhaler Inhale 2 puffs into the lungs every 6 (six) hours as needed for Wheezing or Shortness of Breath.    clotrimazole-betamethasone 1-0.05% (LOTRISONE) cream Apply topically 2 (two) times daily.    EPINEPHrine (EPIPEN) 0.3 mg/0.3 mL AtIn INJECT INTRAMUSCULARLY AS DIRECTED    ergocalciferol (ERGOCALCIFEROL) 50,000 unit Cap TAKE 1 CAPSULE BY MOUTH EVERY 7 DAYS    fluticasone propionate (FLONASE) 50 mcg/actuation nasal spray 2 sprays (100 mcg total) by Each Nostril route once daily.    folic acid (FOLVITE) 800 MCG Tab Take 400 mcg by mouth once daily.    hydrOXYzine HCL (ATARAX) 25 MG tablet Take 1 tablet (25 mg total) by mouth 3 (three) times daily as needed for Itching.    hyoscyamine (ANASPAZ,LEVSIN) 0.125 mg Tab Take 125 mcg by mouth every 6 (six) hours as needed.    losartan (COZAAR) 100 MG tablet TAKE 1 TABLET EVERY DAY    DULoxetine (CYMBALTA) 30 MG capsule Take 1 capsule (30 mg total) by mouth once daily.    furosemide (LASIX) 20 MG tablet TAKE 1 TABLET BY MOUTH DAILY AS NEEDED FOR LEG SWELLING    HYDROcodone-acetaminophen (NORCO) 5-325 mg per tablet Take 1 tablet by mouth every 12 (twelve) hours as needed for Pain.    rosuvastatin (CRESTOR) 10 MG  tablet TAKE 1 TABLET ONE TIME DAILY     No current facility-administered medications for this visit.       Health Maintenance:   Health Maintenance Topics with due status: Not Due       Topic Last Completion Date    TETANUS VACCINE 10/22/2018    Colorectal Cancer Screening 2023    DEXA Scan 2023    Hemoglobin A1c (Diabetic Prevention Screening) 2024    LDCT Lung Screen 05/15/2024    Mammogram 05/15/2024    Lipid Panel 2024       Eye Exam: <1 year  Dental Exam: within 6 months  OB/GYN: Dr. Stewart  Mammogram: 2024  DEXA scan: 2023  Colonoscopy:   23 at outside facility, 10 years  Hepatitis C screenin2017, neg         Vaccinations:  Immunization History   Administered Date(s) Administered    COVID-19 MRNA, LN-S PF (MODERNA HALF 0.25 ML DOSE) 2021, 2022    COVID-19 Vaccine 2022    COVID-19, MRNA, LN-S, PF (MODERNA FULL 0.5 ML DOSE) 2021, 2021    Influenza (FLUAD) - Quadrivalent - Adjuvanted - PF *Preferred* (65+) 2023    Influenza - Quadrivalent - High Dose - PF (65 years and older) 2022    Influenza - Quadrivalent - MDCK - PF 2017    Influenza - Quadrivalent - PF *Preferred* (6 months and older) 10/22/2018, 10/21/2019, 2020, 11/15/2021    Influenza - Trivalent - Fluzone High Dose - PF (65 years and older) 2024    Pneumococcal Polysaccharide - 23 Valent 2017    RSVpreF (Arexvy) 2024    Tdap 10/22/2018    Zoster Recombinant 2018, 2019       Tetanus: 201  Shingrix: done  Pneumovax: 2017  Prevnar-20: due  Covid vaccine: new booster due soon    ADL's: independent  Memory: normal  Mental health: treated for depression/anxiety  Advance Directives: Received  Falls: no recent falls  Nutrition: normal  Home Safety: no issues    Body mass index is 46.94 kg/m².  Wt Readings from Last 3 Encounters:   24 116.4 kg (256 lb 9.9 oz)   24 113.4 kg (250 lb)   24 108.9 kg (240 lb)       Review of Systems    Constitutional:  Negative for chills, diaphoresis, fatigue and fever.   HENT:  Negative for congestion, dental problem, ear discharge, ear pain, postnasal drip, rhinorrhea, sinus pressure, sore throat and trouble swallowing.    Eyes:  Negative for redness and visual disturbance.   Respiratory:  Positive for shortness of breath. Negative for cough and chest tightness.    Cardiovascular:  Positive for leg swelling. Negative for chest pain and palpitations.   Gastrointestinal:  Negative for abdominal pain, blood in stool, constipation, diarrhea, nausea and vomiting.   Endocrine: Negative for polydipsia and polyuria.   Genitourinary:  Negative for decreased urine volume, dysuria, frequency and hematuria.   Musculoskeletal:  Positive for arthralgias, back pain, myalgias (related to fibromyalgia) and neck pain.   Skin:  Negative for rash and wound.   Allergic/Immunologic: Positive for environmental allergies and food allergies.   Neurological:  Negative for dizziness, weakness, numbness and headaches.   Hematological:  Negative for adenopathy.   Psychiatric/Behavioral:  Negative for dysphoric mood and sleep disturbance. The patient is nervous/anxious.           Objective:     Physical Exam  Vitals reviewed.   Constitutional:       General: She is awake. She is not in acute distress.     Appearance: Normal appearance. She is well-developed and well-groomed. She is not diaphoretic.   HENT:      Head: Normocephalic and atraumatic.      Right Ear: Hearing, tympanic membrane, ear canal and external ear normal. Tympanic membrane is not erythematous or bulging.      Left Ear: Hearing, tympanic membrane, ear canal and external ear normal. Tympanic membrane is not erythematous or bulging.      Nose: Nose normal. No congestion.      Mouth/Throat:      Mouth: Mucous membranes are moist.      Tongue: No lesions.      Pharynx: Oropharynx is clear. Uvula midline. No oropharyngeal exudate or posterior oropharyngeal erythema.   Eyes:       General: Lids are normal. Vision grossly intact. Gaze aligned appropriately. No scleral icterus.     Conjunctiva/sclera:      Right eye: Right conjunctiva is not injected.      Left eye: Left conjunctiva is not injected.      Pupils: Pupils are equal, round, and reactive to light.   Neck:      Thyroid: No thyroid mass or thyromegaly.   Cardiovascular:      Rate and Rhythm: Normal rate and regular rhythm.      Pulses: Normal pulses.      Heart sounds: Normal heart sounds. No murmur heard.  Pulmonary:      Effort: Pulmonary effort is normal. No respiratory distress.      Breath sounds: Normal breath sounds. No decreased breath sounds or wheezing.   Abdominal:      General: Bowel sounds are normal. There is no distension.      Palpations: Abdomen is soft. Abdomen is not rigid.      Tenderness: There is no abdominal tenderness. There is no guarding or rebound.   Musculoskeletal:         General: Normal range of motion.      Cervical back: Normal range of motion and neck supple. No spasms.      Thoracic back: Tenderness and bony tenderness present.      Lumbar back: Tenderness and bony tenderness present.      Right lower leg: No edema.      Left lower leg: No edema.   Lymphadenopathy:      Cervical: No cervical adenopathy.      Upper Body:      Right upper body: No supraclavicular adenopathy.      Left upper body: No supraclavicular adenopathy.   Skin:     General: Skin is warm and dry.      Coloration: Skin is not cyanotic.      Findings: No lesion or rash.      Nails: There is no clubbing.   Neurological:      General: No focal deficit present.      Mental Status: She is alert and oriented to person, place, and time.      Sensory: Sensation is intact.      Coordination: Coordination is intact.      Gait: Gait is intact.      Deep Tendon Reflexes: Reflexes are normal and symmetric.   Psychiatric:         Attention and Perception: Attention normal.         Mood and Affect: Mood normal.         Behavior: Behavior is  cooperative.          Answers submitted by the patient for this visit:  High Blood Pressure Questionnaire (Submitted on 9/12/2024)  Chief Complaint: Hypertension  Chronicity: chronic  Onset: more than 1 year ago  Progression since onset: resolved  Condition status: controlled  anxiety: No  blurred vision: No  malaise/fatigue: Yes  orthopnea: No  peripheral edema: Yes  PND: No  sweats: No  Agents associated with hypertension: no associated agents  CAD risks: dyslipidemia, family history, obesity, post-menopausal state, sedentary lifestyle, stress  Compliance problems: diet, exercise  Improvement on treatment: moderate        Assessment:        1. Essential hypertension    2. Mild intermittent asthma without complication    3. Anxiety and depression    4. Mixed hyperlipidemia    5. Primary osteoarthritis of both knees    6. Fibromyalgia    7. Lumbar radiculopathy    8. Bilateral leg edema    9. Left adrenal mass    10. Vitamin D insufficiency    11. Morbid obesity with BMI of 40.0-44.9, adult    12. Annual physical exam    13. Aortic calcification           Plan:     1. Essential hypertension  - CBC Auto Differential; Future  - Comprehensive Metabolic Panel; Future  - Lipid Panel; Future  - TSH; Future  - Urinalysis; Future    2. Mild intermittent asthma without complication  - no flare-ups, continue albuterol as needed    3. Anxiety and depression  - patient stopped taking Cymbalta, will monitor off medication    4. Mixed hyperlipidemia  - continue Crestor, check lipids  - Lipid Panel; Future    5. Primary osteoarthritis of both knees  - pain is managed with Norco    6. Fibromyalgia  - previously, there was improvement with Cymbalta but she stopped taking it, will monitor     7. Lumbar radiculopathy  - patient reports improvement with Norco    8. Bilateral leg edema  - B-TYPE NATRIURETIC PEPTIDE; Future    9. 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 Vitamin D insufficiency  -  Vitamin D; Future    11. Morbid obesity with BMI of 40.0-44.9, adult  - discussed, limited ability to exercise due to chronic pain, will monitor    12. Annual physical exam  - CBC Auto Differential; Future  - Comprehensive Metabolic Panel; Future  - Lipid Panel; Future  - TSH; Future  - Urinalysis; Future    13. Aortic calcification  - seen on past imaging, control BP, on statin, monitor    RTC in 3 months for follow-up or sooner if needed    __________________________    Felicity Middleton MD, PharmD  [facility]- Internal Medicine  American Board of Obesity Medicine diplomate  Office [phone]

## 2024-09-22 ENCOUNTER — PATIENT MESSAGE (OUTPATIENT)
Dept: INTERNAL MEDICINE | Facility: CLINIC | Age: 69
End: 2024-09-22
Payer: MEDICARE

## 2024-09-26 RX ORDER — FUROSEMIDE 20 MG/1
TABLET ORAL
Qty: 90 TABLET | Refills: 3 | Status: SHIPPED | OUTPATIENT
Start: 2024-09-26

## 2024-09-26 NOTE — TELEPHONE ENCOUNTER
Refill Decision Note   Kaleigh Solo  is requesting a refill authorization.  Brief Assessment and Rationale for Refill:  Approve     Medication Therapy Plan:         Comments:     Note composed:5:49 PM 09/26/2024             Appointments     Last Visit   9/18/2024 Felicity Middleton MD   Next Visit   Visit date not found Felicity Middleton MD

## 2024-09-26 NOTE — TELEPHONE ENCOUNTER
No care due was identified.  [facility] Embedded Care Due Messages. Reference number: [id number].   9/26/2024 10:31:12 AM CDT

## 2024-10-03 DIAGNOSIS — E78.2 MIXED HYPERLIPIDEMIA: ICD-10-CM

## 2024-10-03 RX ORDER — ROSUVASTATIN CALCIUM 10 MG/1
10 TABLET, COATED ORAL
Qty: 90 TABLET | Refills: 3 | Status: SHIPPED | OUTPATIENT
Start: 2024-10-03

## 2024-10-03 NOTE — TELEPHONE ENCOUNTER
No care due was identified.  [facility] Embedded Care Due Messages. Reference number: [id number].   10/03/2024 1:41:22 AM CDT

## 2024-10-03 NOTE — TELEPHONE ENCOUNTER
Refill Decision Note   Kaleigh Solo  is requesting a refill authorization.  Brief Assessment and Rationale for Refill:  Approve     Medication Therapy Plan:        Comments:     Note composed:10:54 AM 10/03/2024

## 2024-10-16 DIAGNOSIS — M17.0 PRIMARY OSTEOARTHRITIS OF BOTH KNEES: ICD-10-CM

## 2024-10-16 DIAGNOSIS — M54.16 LUMBAR RADICULOPATHY: ICD-10-CM

## 2024-10-16 RX ORDER — HYDROCODONE BITARTRATE AND ACETAMINOPHEN 5; 325 MG/1; MG/1
1 TABLET ORAL EVERY 12 HOURS PRN
Qty: 60 TABLET | Refills: 0 | Status: SHIPPED | OUTPATIENT
Start: 2024-10-16 | End: 2024-11-15

## 2024-10-16 NOTE — TELEPHONE ENCOUNTER
No care due was identified.  [facility] Embedded Care Due Messages. Reference number: [id number].   10/16/2024 8:55:10 AM CDT

## 2024-10-22 ENCOUNTER — PATIENT MESSAGE (OUTPATIENT)
Dept: INTERNAL MEDICINE | Facility: CLINIC | Age: 69
End: 2024-10-22
Payer: MEDICARE

## 2024-10-31 ENCOUNTER — PATIENT MESSAGE (OUTPATIENT)
Dept: INTERNAL MEDICINE | Facility: CLINIC | Age: 69
End: 2024-10-31
Payer: MEDICARE

## 2024-10-31 DIAGNOSIS — F32.A ANXIETY AND DEPRESSION: Primary | ICD-10-CM

## 2024-10-31 DIAGNOSIS — M79.7 FIBROMYALGIA: ICD-10-CM

## 2024-10-31 DIAGNOSIS — F41.9 ANXIETY AND DEPRESSION: Primary | ICD-10-CM

## 2024-11-04 RX ORDER — DULOXETIN HYDROCHLORIDE 30 MG/1
30 CAPSULE, DELAYED RELEASE ORAL DAILY
Qty: 90 CAPSULE | Refills: 1 | Status: SHIPPED | OUTPATIENT
Start: 2024-11-04

## 2024-11-04 NOTE — TELEPHONE ENCOUNTER
Cymbalta was started in June 2024 but stopped when she reported diarrhea. She was taking a few other new medications and may have contributed.    Resume Cymbalta 30mg daily.    Please schedule f/u appointment in December 2024.

## 2024-11-15 DIAGNOSIS — M17.0 PRIMARY OSTEOARTHRITIS OF BOTH KNEES: ICD-10-CM

## 2024-11-15 DIAGNOSIS — M54.16 LUMBAR RADICULOPATHY: ICD-10-CM

## 2024-11-15 NOTE — TELEPHONE ENCOUNTER
No care due was identified.  Health [facility] Embedded Care Due Messages. Reference number: [id number].   11/15/2024 9:15:51 AM CST

## 2024-11-20 RX ORDER — HYDROCODONE BITARTRATE AND ACETAMINOPHEN 5; 325 MG/1; MG/1
1 TABLET ORAL EVERY 12 HOURS PRN
Qty: 60 TABLET | Refills: 0 | Status: SHIPPED | OUTPATIENT
Start: 2024-11-20 | End: 2024-12-20

## 2024-12-23 ENCOUNTER — PATIENT MESSAGE (OUTPATIENT)
Dept: INTERNAL MEDICINE | Facility: CLINIC | Age: 69
End: 2024-12-23
Payer: MEDICARE

## 2024-12-23 DIAGNOSIS — M54.16 LUMBAR RADICULOPATHY: ICD-10-CM

## 2024-12-23 DIAGNOSIS — M17.0 PRIMARY OSTEOARTHRITIS OF BOTH KNEES: ICD-10-CM

## 2024-12-23 RX ORDER — HYDROCODONE BITARTRATE AND ACETAMINOPHEN 5; 325 MG/1; MG/1
1 TABLET ORAL EVERY 12 HOURS PRN
Qty: 60 TABLET | Refills: 0 | Status: SHIPPED | OUTPATIENT
Start: 2024-12-23 | End: 2025-01-22

## 2024-12-23 NOTE — TELEPHONE ENCOUNTER
No care due was identified.  Health [facility] Embedded Care Due Messages. Reference number: [id number].   12/23/2024 2:03:38 PM CST

## 2025-01-17 NOTE — TELEPHONE ENCOUNTER
"Subjective     Wanda Beth is a 24 y.o. female who presents with Back Pain (Sx started 4 days ago, Lump/swollen, on lower back after skiing, from left side of butt cheek down knee, area feels hot to the touch, hurts when touched. sx started 1 day ago fever. Irregular light bleeding in between periods. Urgency to pee. Loss of appetite. )            1 day pain, redness, swelling just left of upper buttocks cleft. No fever. No drainage. Unsure if related to recent skiing. No direct trauma. No PMH pilonidal cyst. No other aggravating or alleviating factors.          Review of Systems   Constitutional:  Negative for malaise/fatigue and weight loss.   Eyes:  Negative for discharge and redness.   Gastrointestinal:  Negative for nausea and vomiting.   Musculoskeletal:  Negative for joint pain and myalgias.   Skin:  Negative for itching and rash.   Endo/Heme/Allergies:  Does not bruise/bleed easily.              Objective     /70   Pulse 100   Temp 36.8 °C (98.2 °F) (Temporal)   Resp 18   Ht 1.676 m (5' 6\")   Wt 94.3 kg (208 lb)   SpO2 98%   BMI 33.57 kg/m²      Physical Exam  Constitutional:       Appearance: Normal appearance.   Skin:         Neurological:      Mental Status: She is alert.             Procedure: Incision and Drainage  -Risks, benefits, and alternatives discussed. Risks include infection, bleeding, nerve damage, and poor cosmetic outcome  -Clean technique with sterile instruments  -Local anesthesia with 1% lidocaine with epinephrine  -Incision with #11 blade into fluctuant area with purulent material expressed  ly taken down with hemostat  -Irrigated copiously with sterile saline  -Packed with 1/4\" gauze  -Minimal bleeding with good hemostasis achieved  -The patient tolerated the procedure well                  Assessment & Plan        1. Infected pilonidal cyst  amoxicillin-clavulanate (AUGMENTIN) 875-125 MG Tab    Referral to General Surgery        Differential diagnosis, " Spoke to patient 8/16/18, left message. Will discuss at appointment on 8/18/18.   natural history, supportive care, and indications for immediate follow-up were discussed.     Wound care  F/u 2 days for wound check

## 2025-01-25 DIAGNOSIS — I10 ESSENTIAL HYPERTENSION: ICD-10-CM

## 2025-01-25 RX ORDER — LOSARTAN POTASSIUM 100 MG/1
100 TABLET ORAL
Qty: 90 TABLET | Refills: 2 | Status: SHIPPED | OUTPATIENT
Start: 2025-01-25

## 2025-01-25 NOTE — TELEPHONE ENCOUNTER
Refill Decision Note   Kaleigh Solo  is requesting a refill authorization.  Brief Assessment and Rationale for Refill:  Approve     Medication Therapy Plan:        Comments:     Note composed:12:15 PM 01/25/2025

## 2025-01-25 NOTE — TELEPHONE ENCOUNTER
No care due was identified.  Health [facility] Embedded Care Due Messages. Reference number: [id number].   1/25/2025 2:09:04 AM CST

## 2025-01-28 DIAGNOSIS — M54.16 LUMBAR RADICULOPATHY: ICD-10-CM

## 2025-01-28 DIAGNOSIS — M17.0 PRIMARY OSTEOARTHRITIS OF BOTH KNEES: ICD-10-CM

## 2025-01-28 RX ORDER — HYDROCODONE BITARTRATE AND ACETAMINOPHEN 5; 325 MG/1; MG/1
1 TABLET ORAL EVERY 12 HOURS PRN
Qty: 60 TABLET | Refills: 0 | Status: SHIPPED | OUTPATIENT
Start: 2025-01-28 | End: 2025-02-27

## 2025-01-28 NOTE — TELEPHONE ENCOUNTER
No care due was identified.  [facility] Embedded Care Due Messages. Reference number: [id number].   1/28/2025 12:55:46 PM CST

## 2025-02-05 ENCOUNTER — TELEPHONE (OUTPATIENT)
Dept: OPTOMETRY | Facility: CLINIC | Age: 70
End: 2025-02-05
Payer: MEDICARE

## 2025-02-05 NOTE — TELEPHONE ENCOUNTER
LVM informing pt that their 4/2 appointment needs to be rescheduled due to Dr. Jin no longer being in clinic.

## 2025-02-06 ENCOUNTER — TELEPHONE (OUTPATIENT)
Dept: SMOKING CESSATION | Facility: CLINIC | Age: 70
End: 2025-02-06
Payer: MEDICARE

## 2025-02-06 NOTE — TELEPHONE ENCOUNTER
Called patient about 12 month follow up. Left message for patient to call [phone]. Resolved quit episode.

## 2025-02-13 ENCOUNTER — OFFICE VISIT (OUTPATIENT)
Dept: OPTOMETRY | Facility: CLINIC | Age: 70
End: 2025-02-13
Payer: MEDICARE

## 2025-02-13 DIAGNOSIS — H04.123 DRY EYE SYNDROME, BILATERAL: ICD-10-CM

## 2025-02-13 DIAGNOSIS — H25.13 NS (NUCLEAR SCLEROSIS), BILATERAL: ICD-10-CM

## 2025-02-13 DIAGNOSIS — H52.4 PRESBYOPIA: Primary | ICD-10-CM

## 2025-02-13 PROCEDURE — 1160F RVW MEDS BY RX/DR IN RCRD: CPT | Mod: HCNC,CPTII,S$GLB, | Performed by: OPTOMETRIST

## 2025-02-13 PROCEDURE — 92014 COMPRE OPH EXAM EST PT 1/>: CPT | Mod: HCNC,S$GLB,, | Performed by: OPTOMETRIST

## 2025-02-13 PROCEDURE — 1157F ADVNC CARE PLAN IN RCRD: CPT | Mod: HCNC,CPTII,S$GLB, | Performed by: OPTOMETRIST

## 2025-02-13 PROCEDURE — 99999 PR PBB SHADOW E&M-EST. PATIENT-LVL III: CPT | Mod: PBBFAC,HCNC,, | Performed by: OPTOMETRIST

## 2025-02-13 PROCEDURE — 1159F MED LIST DOCD IN RCRD: CPT | Mod: HCNC,CPTII,S$GLB, | Performed by: OPTOMETRIST

## 2025-02-13 PROCEDURE — 92015 DETERMINE REFRACTIVE STATE: CPT | Mod: HCNC,S$GLB,, | Performed by: OPTOMETRIST

## 2025-02-13 PROCEDURE — 1101F PT FALLS ASSESS-DOCD LE1/YR: CPT | Mod: HCNC,CPTII,S$GLB, | Performed by: OPTOMETRIST

## 2025-02-13 PROCEDURE — 1126F AMNT PAIN NOTED NONE PRSNT: CPT | Mod: HCNC,CPTII,S$GLB, | Performed by: OPTOMETRIST

## 2025-02-13 PROCEDURE — 3288F FALL RISK ASSESSMENT DOCD: CPT | Mod: HCNC,CPTII,S$GLB, | Performed by: OPTOMETRIST

## 2025-02-13 PROCEDURE — 4010F ACE/ARB THERAPY RXD/TAKEN: CPT | Mod: HCNC,CPTII,S$GLB, | Performed by: OPTOMETRIST

## 2025-02-13 RX ORDER — CYCLOSPORINE 0.5 MG/ML
1 EMULSION OPHTHALMIC 2 TIMES DAILY
Qty: 60 EACH | Refills: 11 | Status: SHIPPED | OUTPATIENT
Start: 2025-02-13 | End: 2026-02-13

## 2025-02-13 NOTE — PROGRESS NOTES
CARLOS    RICK: 9/15/2023 - Dr. Jin     CC: Pt is here today for a routine eye exam. Pt states that her right has   a FB sensation and tearing.     (-)Dryness  (-)Burning  (-)Itchiness  (+)Tearing  (-)Flashes  (-)Floaters   (-)Photophobia  (-)Eye Pain      Diabetic: no    Contact Lens: no    Eye Meds: Allergy drops PRN     PD: 63.5    Last edited by Staci Choi MA on 2/13/2025  9:56 AM.            Assessment /Plan     For exam results, see Encounter Report.    Presbyopia   Rx specs    NS (nuclear sclerosis), bilateral   Mild, monitor    Dry eye syndrome, bilateral  Start Eyesuvis (sample) QID x 1 week, then BID x 1 week, then stop and switch to  -     cycloSPORINE (RESTASIS) 0.05 % ophthalmic emulsion; Place 1 drop into both eyes 2 (two) times daily.  Dispense: 60 each; Refill: 11    Good internal ocular health, monitor yearly    RTC 1 year                    S Plasty Text: Given the location and shape of the defect, and the orientation of relaxed skin tension lines, an S-plasty was deemed most appropriate for repair.  Using a sterile surgical marker, the appropriate outline of the S-plasty was drawn, incorporating the defect and placing the expected incisions within the relaxed skin tension lines where possible.  The area thus outlined was incised deep to adipose tissue with a #15 scalpel blade.  The skin margins were undermined to an appropriate distance in all directions utilizing iris scissors. The skin flaps were advanced over the defect.  The opposing margins were then approximated with interrupted buried subcutaneous sutures.

## 2025-02-22 DIAGNOSIS — Z00.00 ENCOUNTER FOR MEDICARE ANNUAL WELLNESS EXAM: ICD-10-CM

## 2025-02-27 ENCOUNTER — LAB VISIT (OUTPATIENT)
Dept: LAB | Facility: HOSPITAL | Age: 70
End: 2025-02-27
Attending: INTERNAL MEDICINE
Payer: MEDICARE

## 2025-02-27 ENCOUNTER — OFFICE VISIT (OUTPATIENT)
Dept: INTERNAL MEDICINE | Facility: CLINIC | Age: 70
End: 2025-02-27
Payer: MEDICARE

## 2025-02-27 VITALS
WEIGHT: 240.31 LBS | SYSTOLIC BLOOD PRESSURE: 118 MMHG | BODY MASS INDEX: 44.22 KG/M2 | DIASTOLIC BLOOD PRESSURE: 82 MMHG | HEIGHT: 62 IN | OXYGEN SATURATION: 96 % | RESPIRATION RATE: 18 BRPM | TEMPERATURE: 98 F | HEART RATE: 83 BPM

## 2025-02-27 DIAGNOSIS — R52 BURNING PAIN: ICD-10-CM

## 2025-02-27 DIAGNOSIS — M79.89 LEG SWELLING: ICD-10-CM

## 2025-02-27 DIAGNOSIS — E55.9 VITAMIN D INSUFFICIENCY: ICD-10-CM

## 2025-02-27 DIAGNOSIS — R60.0 LOCALIZED EDEMA: ICD-10-CM

## 2025-02-27 DIAGNOSIS — E66.01 MORBID OBESITY WITH BMI OF 40.0-44.9, ADULT: ICD-10-CM

## 2025-02-27 DIAGNOSIS — R52 PAIN AGGRAVATED BY WALKING: ICD-10-CM

## 2025-02-27 DIAGNOSIS — M54.16 LUMBAR RADICULOPATHY: ICD-10-CM

## 2025-02-27 DIAGNOSIS — M17.0 PRIMARY OSTEOARTHRITIS OF BOTH KNEES: ICD-10-CM

## 2025-02-27 DIAGNOSIS — I10 ESSENTIAL HYPERTENSION: Primary | ICD-10-CM

## 2025-02-27 DIAGNOSIS — Z23 NEED FOR PNEUMOCOCCAL VACCINATION: ICD-10-CM

## 2025-02-27 LAB
25(OH)D3+25(OH)D2 SERPL-MCNC: 28 NG/ML (ref 30–96)
ANION GAP SERPL CALC-SCNC: 10 MMOL/L (ref 8–16)
BASOPHILS # BLD AUTO: 0.04 K/UL (ref 0–0.2)
BASOPHILS NFR BLD: 0.5 % (ref 0–1.9)
BUN SERPL-MCNC: 7 MG/DL (ref 8–23)
CALCIUM SERPL-MCNC: 10 MG/DL (ref 8.7–10.5)
CHLORIDE SERPL-SCNC: 105 MMOL/L (ref 95–110)
CO2 SERPL-SCNC: 23 MMOL/L (ref 23–29)
CREAT SERPL-MCNC: 0.7 MG/DL (ref 0.5–1.4)
DIFFERENTIAL METHOD BLD: ABNORMAL
EOSINOPHIL # BLD AUTO: 0.1 K/UL (ref 0–0.5)
EOSINOPHIL NFR BLD: 1.4 % (ref 0–8)
ERYTHROCYTE [DISTWIDTH] IN BLOOD BY AUTOMATED COUNT: 14.2 % (ref 11.5–14.5)
EST. GFR  (NO RACE VARIABLE): >60 ML/MIN/1.73 M^2
GLUCOSE SERPL-MCNC: 99 MG/DL (ref 70–110)
HCT VFR BLD AUTO: 47.1 % (ref 37–48.5)
HGB BLD-MCNC: 14.9 G/DL (ref 12–16)
IMM GRANULOCYTES # BLD AUTO: 0.02 K/UL (ref 0–0.04)
IMM GRANULOCYTES NFR BLD AUTO: 0.2 % (ref 0–0.5)
LYMPHOCYTES # BLD AUTO: 1.4 K/UL (ref 1–4.8)
LYMPHOCYTES NFR BLD: 17 % (ref 18–48)
MCH RBC QN AUTO: 30.3 PG (ref 27–31)
MCHC RBC AUTO-ENTMCNC: 31.6 G/DL (ref 32–36)
MCV RBC AUTO: 96 FL (ref 82–98)
MONOCYTES # BLD AUTO: 0.5 K/UL (ref 0.3–1)
MONOCYTES NFR BLD: 6.1 % (ref 4–15)
NEUTROPHILS # BLD AUTO: 6.3 K/UL (ref 1.8–7.7)
NEUTROPHILS NFR BLD: 74.8 % (ref 38–73)
NRBC BLD-RTO: 0 /100 WBC
PLATELET # BLD AUTO: 246 K/UL (ref 150–450)
PMV BLD AUTO: 9.6 FL (ref 9.2–12.9)
POTASSIUM SERPL-SCNC: 4.4 MMOL/L (ref 3.5–5.1)
RBC # BLD AUTO: 4.92 M/UL (ref 4–5.4)
SODIUM SERPL-SCNC: 138 MMOL/L (ref 136–145)
WBC # BLD AUTO: 8.37 K/UL (ref 3.9–12.7)

## 2025-02-27 PROCEDURE — 90677 PCV20 VACCINE IM: CPT | Mod: HCNC,S$GLB,, | Performed by: INTERNAL MEDICINE

## 2025-02-27 PROCEDURE — 85025 COMPLETE CBC W/AUTO DIFF WBC: CPT | Mod: HCNC | Performed by: INTERNAL MEDICINE

## 2025-02-27 PROCEDURE — 99214 OFFICE O/P EST MOD 30 MIN: CPT | Mod: HCNC,25,S$GLB, | Performed by: INTERNAL MEDICINE

## 2025-02-27 PROCEDURE — 36415 COLL VENOUS BLD VENIPUNCTURE: CPT | Mod: HCNC,PO | Performed by: INTERNAL MEDICINE

## 2025-02-27 PROCEDURE — 82306 VITAMIN D 25 HYDROXY: CPT | Mod: HCNC | Performed by: INTERNAL MEDICINE

## 2025-02-27 PROCEDURE — 1125F AMNT PAIN NOTED PAIN PRSNT: CPT | Mod: HCNC,CPTII,S$GLB, | Performed by: INTERNAL MEDICINE

## 2025-02-27 PROCEDURE — 3079F DIAST BP 80-89 MM HG: CPT | Mod: HCNC,CPTII,S$GLB, | Performed by: INTERNAL MEDICINE

## 2025-02-27 PROCEDURE — 3008F BODY MASS INDEX DOCD: CPT | Mod: HCNC,CPTII,S$GLB, | Performed by: INTERNAL MEDICINE

## 2025-02-27 PROCEDURE — G0009 ADMIN PNEUMOCOCCAL VACCINE: HCPCS | Mod: HCNC,S$GLB,, | Performed by: INTERNAL MEDICINE

## 2025-02-27 PROCEDURE — 3074F SYST BP LT 130 MM HG: CPT | Mod: HCNC,CPTII,S$GLB, | Performed by: INTERNAL MEDICINE

## 2025-02-27 PROCEDURE — 1157F ADVNC CARE PLAN IN RCRD: CPT | Mod: HCNC,CPTII,S$GLB, | Performed by: INTERNAL MEDICINE

## 2025-02-27 PROCEDURE — 80048 BASIC METABOLIC PNL TOTAL CA: CPT | Mod: HCNC | Performed by: INTERNAL MEDICINE

## 2025-02-27 PROCEDURE — 1101F PT FALLS ASSESS-DOCD LE1/YR: CPT | Mod: HCNC,CPTII,S$GLB, | Performed by: INTERNAL MEDICINE

## 2025-02-27 PROCEDURE — 99999 PR PBB SHADOW E&M-EST. PATIENT-LVL IV: CPT | Mod: PBBFAC,HCNC,, | Performed by: INTERNAL MEDICINE

## 2025-02-27 PROCEDURE — 4010F ACE/ARB THERAPY RXD/TAKEN: CPT | Mod: HCNC,CPTII,S$GLB, | Performed by: INTERNAL MEDICINE

## 2025-02-27 PROCEDURE — 1159F MED LIST DOCD IN RCRD: CPT | Mod: HCNC,CPTII,S$GLB, | Performed by: INTERNAL MEDICINE

## 2025-02-27 PROCEDURE — 1160F RVW MEDS BY RX/DR IN RCRD: CPT | Mod: HCNC,CPTII,S$GLB, | Performed by: INTERNAL MEDICINE

## 2025-02-27 PROCEDURE — 3288F FALL RISK ASSESSMENT DOCD: CPT | Mod: HCNC,CPTII,S$GLB, | Performed by: INTERNAL MEDICINE

## 2025-02-27 RX ORDER — AMLODIPINE BESYLATE 5 MG/1
5 TABLET ORAL DAILY
Qty: 90 TABLET | Refills: 3 | Status: SHIPPED | OUTPATIENT
Start: 2025-02-27 | End: 2026-02-27

## 2025-02-27 RX ORDER — HYDROCODONE BITARTRATE AND ACETAMINOPHEN 5; 325 MG/1; MG/1
1 TABLET ORAL EVERY 12 HOURS PRN
Qty: 60 TABLET | Refills: 0 | Status: SHIPPED | OUTPATIENT
Start: 2025-02-27 | End: 2025-03-26 | Stop reason: SDUPTHER

## 2025-02-27 NOTE — PROGRESS NOTES
Subjective:     Kaleigh Solo is a 69 y.o. female who presents for   Chief Complaint   Patient presents with    Follow-up    Hypertension    Low-back Pain    Hyperlipidemia       HPI    Hypertension: The patient has been taking medications as instructed, no medication side effects noted, no chest pain on exertion, no dyspnea on exertion, and no swelling of ankles.    Restarted amlodipine in December    BP Readings from Last 3 Encounters:   02/27/25 118/82   09/18/24 136/82   07/17/24 (!) 155/73     Lower Back Pain: The patient has had a previous herniated disc. Symptoms have been present for several years and the pain continues. The pain is located in the across the lower back. The pain is described as aching and occurs intermittently.  Symptoms are exacerbated by extension and flexion. Symptoms are improved by narcotic pain medications and rest. She reports improvement with use of Norco.    She has been seeing a chiropractor recently.    Hyperlipidemia: Compliance with treatment has been good. The patient exercises rarely. Patient denies muscle pain associated with his medications. Previous history of cardiac disease includes: none.    Lab Results   Component Value Date    CHOL 195 09/18/2024    HDL 48 09/18/2024        Body mass index is 43.95 kg/m².  Wt Readings from Last 3 Encounters:   02/27/25 109 kg (240 lb 4.8 oz)   09/18/24 116.4 kg (256 lb 9.9 oz)   07/17/24 113.4 kg (250 lb)         Review of Systems   Constitutional:  Negative for chills, diaphoresis, fatigue and fever.   HENT:  Negative for congestion, ear pain, postnasal drip, rhinorrhea and sinus pressure.    Eyes:  Negative for discharge and redness.   Respiratory:  Negative for cough and shortness of breath.    Cardiovascular:  Negative for chest pain, palpitations and leg swelling.   Gastrointestinal:  Negative for abdominal pain, constipation, diarrhea, nausea and vomiting.   Endocrine: Negative for cold intolerance and heat intolerance.    Musculoskeletal:  Positive for arthralgias and back pain. Negative for myalgias.   Skin:  Negative for rash and wound.   Allergic/Immunologic: Positive for environmental allergies.   Neurological:  Negative for dizziness, tremors and headaches.   Psychiatric/Behavioral:  Negative for dysphoric mood. The patient is not nervous/anxious.           Objective:     Physical Exam  Vitals reviewed.   Constitutional:       General: She is awake. She is not in acute distress.     Appearance: Normal appearance. She is well-developed and well-groomed.   HENT:      Head: Normocephalic and atraumatic.      Right Ear: Hearing and external ear normal.      Left Ear: Hearing and external ear normal.      Nose: Nose normal. No congestion.      Mouth/Throat:      Mouth: Mucous membranes are moist.   Eyes:      General: Lids are normal. Vision grossly intact.   Cardiovascular:      Rate and Rhythm: Normal rate and regular rhythm.      Heart sounds: Normal heart sounds. No murmur heard.  Pulmonary:      Effort: Pulmonary effort is normal.      Breath sounds: Normal breath sounds. No decreased breath sounds or wheezing.   Abdominal:      General: Bowel sounds are normal. There is no distension.   Musculoskeletal:         General: Normal range of motion.      Cervical back: Normal range of motion.      Right lower leg: No edema.      Left lower leg: No edema.   Skin:     General: Skin is warm and dry.      Findings: No lesion or rash.   Neurological:      Mental Status: She is alert and oriented to person, place, and time.   Psychiatric:         Attention and Perception: Attention normal.         Mood and Affect: Mood normal.         Behavior: Behavior is cooperative.            Assessment:      1. Essential hypertension    2. Lumbar radiculopathy    3. Pain aggravated by walking    4. Localized edema    5. Leg swelling    6. Primary osteoarthritis of both knees    7. Burning pain    8. Vitamin D insufficiency    9. Need for  pneumococcal vaccination    10. Morbid obesity with BMI of 40.0-44.9, adult           Plan:     1. Essential hypertension  - BP normal today, continue furosemide, losartan, amlodipine  - amLODIPine (NORVASC) 5 MG tablet; Take 1 tablet (5 mg total) by mouth once daily.  Dispense: 90 tablet; Refill: 3    2. Lumbar radiculopathy  - has been stable, takes Norco as needed    3. Pain aggravated by walking  - Ankle Brachial Indices (GERBER); Future    4. Localized edema  - CV US Lower Extremity Veins Bilateral Insufficiency; Future    5. Leg swelling  - Basic Metabolic Panel; Future  - CBC Auto Differential; Future  - CV US Lower Extremity Veins Bilateral Insufficiency; Future    6. Primary osteoarthritis of both knees  - uses Norco as needed, will monitor    7. Burning pain  - Ankle Brachial Indices (GERBER); Future    8. Vitamin D insufficiency  - Vitamin D; Future    9. Need for pneumococcal vaccination  - (VFC) PCV20 (Prevnar 20) IM vaccine (>/= 6 wks)    10. Morbid obesity with BMI of 40.0-44.9, adult  - expresses interest in weight loss but the chronic pain limits her activity, briefly discussed chair-exercises, will monitor    RTC in 3 months for follow-up or sooner if needed    __________________________    Felicity Middleton MD, PharmD  [facility] [place] [facility]  American Board of Obesity Medicine diplomate  Office [phone]

## 2025-02-28 ENCOUNTER — TELEPHONE (OUTPATIENT)
Dept: CARDIOTHORACIC SURGERY | Facility: CLINIC | Age: 70
End: 2025-02-28
Payer: MEDICARE

## 2025-02-28 NOTE — TELEPHONE ENCOUNTER
----- Message from Carol sent at 2/28/2025 11:32 AM CST -----  Who called: PtWhat is the request in detail: Pt would like a call back to get rescheduled for 3/3 and 3/7 appt. She would like for both to be on the same day. Please call to advise.Can the clinic reply by [facility]? No Would the patient rather a call back or a response via [facility]? Call back Best call back number: Telephone Information:Mobile          [phone] Information: Thank you.

## 2025-03-07 ENCOUNTER — HOSPITAL ENCOUNTER (OUTPATIENT)
Dept: RADIOLOGY | Facility: HOSPITAL | Age: 70
Discharge: HOME | End: 2025-03-07
Attending: STUDENT IN AN ORGANIZED HEALTH CARE EDUCATION/TRAINING PROGRAM
Payer: MEDICARE

## 2025-03-07 ENCOUNTER — OFFICE VISIT (OUTPATIENT)
Dept: PODIATRY | Facility: CLINIC | Age: 70
End: 2025-03-07
Payer: MEDICARE

## 2025-03-07 VITALS — HEART RATE: 89 BPM | DIASTOLIC BLOOD PRESSURE: 82 MMHG | SYSTOLIC BLOOD PRESSURE: 150 MMHG

## 2025-03-07 DIAGNOSIS — M21.40 PES PLANUS, UNSPECIFIED LATERALITY: ICD-10-CM

## 2025-03-07 DIAGNOSIS — M79.673 FOOT ARCH PAIN, UNSPECIFIED LATERALITY: Primary | ICD-10-CM

## 2025-03-07 DIAGNOSIS — M79.673 FOOT ARCH PAIN, UNSPECIFIED LATERALITY: ICD-10-CM

## 2025-03-07 DIAGNOSIS — G60.9 IDIOPATHIC PERIPHERAL NEUROPATHY: ICD-10-CM

## 2025-03-07 PROCEDURE — 73630 X-RAY EXAM OF FOOT: CPT | Mod: TC,50,HCNC,PN

## 2025-03-07 PROCEDURE — 99999 PR PBB SHADOW E&M-EST. PATIENT-LVL III: CPT | Mod: PBBFAC,HCNC,, | Performed by: STUDENT IN AN ORGANIZED HEALTH CARE EDUCATION/TRAINING PROGRAM

## 2025-03-07 PROCEDURE — 73630 X-RAY EXAM OF FOOT: CPT | Mod: 26,50,HCNC, | Performed by: RADIOLOGY

## 2025-03-07 NOTE — PATIENT INSTRUCTIONS
[employer], arch supports.    [employer] [address] [facility]:  [address], [place] [address]

## 2025-03-07 NOTE — PROGRESS NOTES
Subjective:     Patient ID: Kaleigh Solo is a 69 y.o. female.    Chief Complaint: Foot Problem (Bilateral, foot pain on top of her feet, no xray) and Foot Pain (Burning pain)    Kaleigh is a 69 y.o. female who presents to the podiatry clinic  with complaint of  bilateral foot pain. Onset of the symptoms was several years ago. Precipitating event: none known. Current symptoms include: swelling and worsening symptoms after a period of activity. Aggravating factors: any weight bearing. Symptoms have gradually worsened. Patient has had no prior foot problems. Evaluation to date: none. Treatment to date: none. Patients rates pain  moderate  on pain scale. Relates history of lowe back pain who she sees a chiropractor for. States has swelling to lower legs after walking, most pronounced to top of her feet. Has burning/numbness to top of feet as well. No further pedal complaints.     Review of Systems   Constitutional: Negative for chills, decreased appetite, diaphoresis and fever.   HENT:  Negative for congestion and hearing loss.    Cardiovascular:  Positive for leg swelling. Negative for chest pain, claudication and syncope.   Respiratory:  Negative for cough and shortness of breath.    Skin:  Positive for dry skin and nail changes. Negative for color change, flushing, itching, poor wound healing and rash.   Musculoskeletal:  Positive for arthritis, back pain and joint pain.   Gastrointestinal:  Negative for nausea and vomiting.   Neurological:  Positive for paresthesias. Negative for focal weakness and weakness.   Psychiatric/Behavioral:  Negative for altered mental status. The patient is not nervous/anxious.         Objective:     Physical Exam  Constitutional:       General: She is not in acute distress.     Appearance: She is well-developed. She is not diaphoretic.   Cardiovascular:      Comments: Dorsalis pedis and posterior tibial pulses are within normal limits. Skin temperature is within normal limits. Toes are  cool to touch and feet are warm proximally. Hair growth is within normal limits. Skin is normotrophic and without hyperpigmentation. No edema noted. No spider veins or varicosities noted, bilaterally.   Musculoskeletal:         General: No tenderness.      Comments: Adequate joint range of motion without pain, limitation, nor crepitation to bilateral feet and ankle joints. Muscle strength is 5/5 in all groups bilaterally.    Pes planus, bilaterally. No pinpoint tenderness on exam today       Lymphadenopathy:      Comments: Negative lymphangitic streaking    Skin:     General: Skin is warm and dry.      Findings: No lesion.      Comments: Skin is warm and dry, no acute signs of infection noted. No open wounds, macerations or hyperkeratotic lesions, bilaterally.     Toenails are well trimmed and of normal morphology, bilaterally.    Neurological:      Mental Status: She is alert and oriented to person, place, and time.      Sensory: Sensory deficit present.      Motor: No abnormal muscle tone.      Comments: Light touch within normal limits   Psychiatric:         Behavior: Behavior normal.         Thought Content: Thought content normal.         Judgment: Judgment normal.           Assessment:      Encounter Diagnoses   Name Primary?    Foot arch pain, unspecified laterality Yes    Pes planus, unspecified laterality     Idiopathic peripheral neuropathy      Plan:     Kaleigh was seen today for foot problem and foot pain.    Diagnoses and all orders for this visit:    Foot arch pain, unspecified laterality  -     X-Ray Foot Complete Bilateral; Future    Pes planus, unspecified laterality  -     X-Ray Foot Complete Bilateral; Future    Idiopathic peripheral neuropathy      I counseled the patient on her conditions, their implications and medical management.  Baseline xray ordered, suspect midfoot arthritis, consider MRI  RICE PRN pain  Supportive tennis shoes with arch supports and compression socks at all times while  ambulating  Rx compound treatment from [facility] containing Gabapentin peripheral neuropathy symptoms  Return to clinic PRN

## 2025-03-17 ENCOUNTER — HOSPITAL ENCOUNTER (OUTPATIENT)
Dept: CARDIOLOGY | Facility: HOSPITAL | Age: 70
Discharge: HOME | End: 2025-03-17
Attending: INTERNAL MEDICINE
Payer: MEDICARE

## 2025-03-17 DIAGNOSIS — R52 PAIN AGGRAVATED BY WALKING: ICD-10-CM

## 2025-03-17 DIAGNOSIS — R60.0 LOCALIZED EDEMA: ICD-10-CM

## 2025-03-17 DIAGNOSIS — M79.89 LEG SWELLING: ICD-10-CM

## 2025-03-17 DIAGNOSIS — R52 BURNING PAIN: ICD-10-CM

## 2025-03-17 LAB
IMMEDIATE ARM BP: 144 MMHG
IMMEDIATE LEFT ABI: 1.05
IMMEDIATE LEFT TIBIAL: 151 MMHG
IMMEDIATE RIGHT ABI: 0.92
IMMEDIATE RIGHT TIBIAL: 133 MMHG
LEFT ABI: 1.21
LEFT ARM BP: 111 MMHG
LEFT DORSALIS PEDIS: 115 MMHG
LEFT GREAT SAPHENOUS DISTAL THIGH DIA: 0.34 CM
LEFT GREAT SAPHENOUS JUNCTION DIA: 0.56 CM
LEFT GREAT SAPHENOUS KNEE DIA: 0.2 CM
LEFT GREAT SAPHENOUS KNEE REFLUX: 5024 MS
LEFT GREAT SAPHENOUS MIDDLE THIGH DIA: 0.5 CM
LEFT GREAT SAPHENOUS PROXIMAL CALF DIA: 0.23 CM
LEFT POSTERIOR TIBIAL: 134 MMHG
LEFT SMALL SAPHENOUS KNEE DIA: 0.22 CM
LEFT SMALL SAPHENOUS SPJ DIA: 0.18 CM
LEFT TBI: 0.79
LEFT TOE PRESSURE: 88 MMHG
RIGHT ABI: 1.11
RIGHT DORSALIS PEDIS: 106 MMHG
RIGHT GREAT SAPHENOUS DISTAL THIGH DIA: 0.35 CM
RIGHT GREAT SAPHENOUS JUNCTION DIA: 0.56 CM
RIGHT GREAT SAPHENOUS KNEE DIA: 0.3 CM
RIGHT GREAT SAPHENOUS MIDDLE THIGH DIA: 0.52 CM
RIGHT GREAT SAPHENOUS PROXIMAL CALF DIA: 0.28 CM
RIGHT POSTERIOR TIBIAL: 123 MMHG
RIGHT SMALL SAPHENOUS KNEE DIA: 0.2 CM
RIGHT SMALL SAPHENOUS SPJ DIA: 0.2 CM
RIGHT TBI: 0.85
RIGHT TOE PRESSURE: 94 MMHG
TREADMILL GRADE: 12 %
TREADMILL SPEED: 2 MPH
TREADMILL TIME: 2 MIN

## 2025-03-17 PROCEDURE — 93924 LWR XTR VASC STDY BILAT: CPT | Mod: HCNC

## 2025-03-17 PROCEDURE — 93970 EXTREMITY STUDY: CPT | Mod: TC,HCNC

## 2025-03-17 PROCEDURE — 93924 LWR XTR VASC STDY BILAT: CPT | Mod: 26,HCNC,, | Performed by: INTERNAL MEDICINE

## 2025-03-17 PROCEDURE — 93970 EXTREMITY STUDY: CPT | Mod: 26,HCNC,, | Performed by: INTERNAL MEDICINE

## 2025-03-24 DIAGNOSIS — E55.9 VITAMIN D INSUFFICIENCY: ICD-10-CM

## 2025-03-24 RX ORDER — ERGOCALCIFEROL 1.25 MG/1
50000 CAPSULE ORAL
Qty: 12 CAPSULE | Refills: 1 | Status: SHIPPED | OUTPATIENT
Start: 2025-03-24

## 2025-03-24 NOTE — TELEPHONE ENCOUNTER
Refill Routing Note   Medication(s) are not appropriate for processing by [facility] for the following reason(s):        Outside of protocol    [facility] action(s):  Route             Appointments  past 12m or future 3m with PCP    Date Provider   Last Visit   2/27/2025 Felicity Middleton MD   Next Visit   6/27/2025 Felicity Middleton MD   ED visits in past 90 days: 0        Note composed:11:09 AM 03/24/2025

## 2025-03-24 NOTE — TELEPHONE ENCOUNTER
No care due was identified.  [facility] Embedded Care Due Messages. Reference number: [id number].   3/24/2025 5:29:54 AM CDT

## 2025-03-26 DIAGNOSIS — M17.0 PRIMARY OSTEOARTHRITIS OF BOTH KNEES: ICD-10-CM

## 2025-03-26 DIAGNOSIS — M54.16 LUMBAR RADICULOPATHY: ICD-10-CM

## 2025-03-26 NOTE — TELEPHONE ENCOUNTER
No care due was identified.  [facility] Embedded Care Due Messages. Reference number: [id number].   3/26/2025 9:09:23 AM CDT

## 2025-03-28 ENCOUNTER — PATIENT MESSAGE (OUTPATIENT)
Dept: INTERNAL MEDICINE | Facility: CLINIC | Age: 70
End: 2025-03-28
Payer: MEDICARE

## 2025-03-28 RX ORDER — HYDROCODONE BITARTRATE AND ACETAMINOPHEN 5; 325 MG/1; MG/1
1 TABLET ORAL EVERY 12 HOURS PRN
Qty: 60 TABLET | Refills: 0 | Status: SHIPPED | OUTPATIENT
Start: 2025-03-28 | End: 2025-04-27

## 2025-04-25 DIAGNOSIS — M17.0 PRIMARY OSTEOARTHRITIS OF BOTH KNEES: ICD-10-CM

## 2025-04-25 DIAGNOSIS — M54.16 LUMBAR RADICULOPATHY: ICD-10-CM

## 2025-04-25 NOTE — TELEPHONE ENCOUNTER
No care due was identified.  [facility] Embedded Care Due Messages. Reference number: [id number].   4/25/2025 9:44:32 AM CDT

## 2025-04-28 RX ORDER — HYDROCODONE BITARTRATE AND ACETAMINOPHEN 5; 325 MG/1; MG/1
1 TABLET ORAL EVERY 12 HOURS PRN
Qty: 60 TABLET | Refills: 0 | Status: SHIPPED | OUTPATIENT
Start: 2025-04-28 | End: 2025-05-28

## 2025-05-02 ENCOUNTER — PATIENT MESSAGE (OUTPATIENT)
Dept: INTERNAL MEDICINE | Facility: CLINIC | Age: 70
End: 2025-05-02
Payer: MEDICARE

## 2025-05-02 DIAGNOSIS — Z12.31 ENCOUNTER FOR SCREENING MAMMOGRAM FOR HIGH-RISK PATIENT: Primary | ICD-10-CM

## 2025-05-29 DIAGNOSIS — M17.0 PRIMARY OSTEOARTHRITIS OF BOTH KNEES: ICD-10-CM

## 2025-05-29 DIAGNOSIS — M54.16 LUMBAR RADICULOPATHY: ICD-10-CM

## 2025-05-29 NOTE — TELEPHONE ENCOUNTER
No care due was identified.  [facility] Embedded Care Due Messages. Reference number: [id number].   5/29/2025 6:09:09 PM CDT

## 2025-05-30 RX ORDER — HYDROCODONE BITARTRATE AND ACETAMINOPHEN 5; 325 MG/1; MG/1
1 TABLET ORAL EVERY 12 HOURS PRN
Qty: 60 TABLET | Refills: 0 | Status: SHIPPED | OUTPATIENT
Start: 2025-05-30 | End: 2025-06-29

## 2025-06-12 ENCOUNTER — HOSPITAL ENCOUNTER (OUTPATIENT)
Dept: RADIOLOGY | Facility: HOSPITAL | Age: 70
Discharge: HOME | End: 2025-06-12
Attending: INTERNAL MEDICINE
Payer: MEDICARE

## 2025-06-12 DIAGNOSIS — Z12.31 ENCOUNTER FOR SCREENING MAMMOGRAM FOR HIGH-RISK PATIENT: ICD-10-CM

## 2025-06-12 PROCEDURE — 77063 BREAST TOMOSYNTHESIS BI: CPT | Mod: TC,HCNC,PO

## 2025-06-27 ENCOUNTER — OFFICE VISIT (OUTPATIENT)
Dept: INTERNAL MEDICINE | Facility: CLINIC | Age: 70
End: 2025-06-27
Payer: MEDICARE

## 2025-06-27 ENCOUNTER — LAB VISIT (OUTPATIENT)
Dept: LAB | Facility: HOSPITAL | Age: 70
End: 2025-06-27
Attending: INTERNAL MEDICINE
Payer: MEDICARE

## 2025-06-27 VITALS
TEMPERATURE: 98 F | HEART RATE: 96 BPM | RESPIRATION RATE: 18 BRPM | SYSTOLIC BLOOD PRESSURE: 126 MMHG | WEIGHT: 240.31 LBS | HEIGHT: 62 IN | DIASTOLIC BLOOD PRESSURE: 82 MMHG | OXYGEN SATURATION: 95 % | BODY MASS INDEX: 44.22 KG/M2

## 2025-06-27 DIAGNOSIS — M54.16 LUMBAR RADICULOPATHY: ICD-10-CM

## 2025-06-27 DIAGNOSIS — E78.2 MIXED HYPERLIPIDEMIA: ICD-10-CM

## 2025-06-27 DIAGNOSIS — F17.210 HEAVY SMOKER (MORE THAN 20 CIGARETTES PER DAY): ICD-10-CM

## 2025-06-27 DIAGNOSIS — J45.20 MILD INTERMITTENT ASTHMA WITHOUT COMPLICATION: ICD-10-CM

## 2025-06-27 DIAGNOSIS — M17.0 PRIMARY OSTEOARTHRITIS OF BOTH KNEES: ICD-10-CM

## 2025-06-27 DIAGNOSIS — I10 ESSENTIAL HYPERTENSION: Primary | ICD-10-CM

## 2025-06-27 LAB
ALBUMIN SERPL BCP-MCNC: 4.1 G/DL (ref 3.5–5.2)
ALP SERPL-CCNC: 90 UNIT/L (ref 40–150)
ALT SERPL W/O P-5'-P-CCNC: 23 UNIT/L (ref 10–44)
ANION GAP (OHS): 10 MMOL/L (ref 8–16)
AST SERPL-CCNC: 19 UNIT/L (ref 11–45)
BILIRUB SERPL-MCNC: 0.5 MG/DL (ref 0.1–1)
BUN SERPL-MCNC: 8 MG/DL (ref 8–23)
CALCIUM SERPL-MCNC: 9.7 MG/DL (ref 8.7–10.5)
CHLORIDE SERPL-SCNC: 103 MMOL/L (ref 95–110)
CO2 SERPL-SCNC: 26 MMOL/L (ref 23–29)
CREAT SERPL-MCNC: 0.9 MG/DL (ref 0.5–1.4)
GFR SERPLBLD CREATININE-BSD FMLA CKD-EPI: >60 ML/MIN/1.73/M2
GLUCOSE SERPL-MCNC: 92 MG/DL (ref 70–110)
POTASSIUM SERPL-SCNC: 4.2 MMOL/L (ref 3.5–5.1)
PROT SERPL-MCNC: 7 GM/DL (ref 6–8.4)
SODIUM SERPL-SCNC: 139 MMOL/L (ref 136–145)

## 2025-06-27 PROCEDURE — 99999 PR PBB SHADOW E&M-EST. PATIENT-LVL IV: CPT | Mod: PBBFAC,HCNC,, | Performed by: INTERNAL MEDICINE

## 2025-06-27 PROCEDURE — 3079F DIAST BP 80-89 MM HG: CPT | Mod: CPTII,HCNC,S$GLB, | Performed by: INTERNAL MEDICINE

## 2025-06-27 PROCEDURE — 1160F RVW MEDS BY RX/DR IN RCRD: CPT | Mod: CPTII,HCNC,S$GLB, | Performed by: INTERNAL MEDICINE

## 2025-06-27 PROCEDURE — 1101F PT FALLS ASSESS-DOCD LE1/YR: CPT | Mod: CPTII,HCNC,S$GLB, | Performed by: INTERNAL MEDICINE

## 2025-06-27 PROCEDURE — 1157F ADVNC CARE PLAN IN RCRD: CPT | Mod: CPTII,HCNC,S$GLB, | Performed by: INTERNAL MEDICINE

## 2025-06-27 PROCEDURE — 1125F AMNT PAIN NOTED PAIN PRSNT: CPT | Mod: CPTII,HCNC,S$GLB, | Performed by: INTERNAL MEDICINE

## 2025-06-27 PROCEDURE — 3008F BODY MASS INDEX DOCD: CPT | Mod: CPTII,HCNC,S$GLB, | Performed by: INTERNAL MEDICINE

## 2025-06-27 PROCEDURE — 3074F SYST BP LT 130 MM HG: CPT | Mod: CPTII,HCNC,S$GLB, | Performed by: INTERNAL MEDICINE

## 2025-06-27 PROCEDURE — 80307 DRUG TEST PRSMV CHEM ANLYZR: CPT | Mod: HCNC

## 2025-06-27 PROCEDURE — 36415 COLL VENOUS BLD VENIPUNCTURE: CPT | Mod: HCNC,PO

## 2025-06-27 PROCEDURE — 4010F ACE/ARB THERAPY RXD/TAKEN: CPT | Mod: CPTII,HCNC,S$GLB, | Performed by: INTERNAL MEDICINE

## 2025-06-27 PROCEDURE — 84295 ASSAY OF SERUM SODIUM: CPT | Mod: HCNC

## 2025-06-27 PROCEDURE — 3288F FALL RISK ASSESSMENT DOCD: CPT | Mod: CPTII,HCNC,S$GLB, | Performed by: INTERNAL MEDICINE

## 2025-06-27 PROCEDURE — 1159F MED LIST DOCD IN RCRD: CPT | Mod: CPTII,HCNC,S$GLB, | Performed by: INTERNAL MEDICINE

## 2025-06-27 PROCEDURE — 99214 OFFICE O/P EST MOD 30 MIN: CPT | Mod: HCNC,S$GLB,, | Performed by: INTERNAL MEDICINE

## 2025-06-27 RX ORDER — HYDROCODONE BITARTRATE AND ACETAMINOPHEN 5; 325 MG/1; MG/1
1 TABLET ORAL EVERY 12 HOURS PRN
Qty: 60 TABLET | Refills: 0 | Status: SHIPPED | OUTPATIENT
Start: 2025-06-30 | End: 2025-07-30

## 2025-06-27 RX ORDER — PRAVASTATIN SODIUM 20 MG/1
20 TABLET ORAL DAILY
Qty: 30 TABLET | Refills: 2 | Status: SHIPPED | OUTPATIENT
Start: 2025-06-27

## 2025-06-27 NOTE — PROGRESS NOTES
Subjective:     Kaleigh Solo is a 70 y.o. female who presents for   Chief Complaint   Patient presents with    Follow-up    Hypertension    Back Pain    Hyperlipidemia       CARLOS Farris reports recent allergic reaction requiring emergency room evaluation. She also experiences seasonal allergies related to blooming trees and bushes. She reports significant dietary challenges with limited food intake due to unpredictable food reactions. She is only able to eat daily due to adverse responses. She describes variable food tolerances, noting certain foods may be tolerable one day but not the next. She recently experienced a severe reaction to a peanut vinaigrette salad, resulting in whole body pain within 30 minutes of ingestion and causing significant functional impairment where she could not stand or sit. While not allergic to peanuts specifically, she suspects a reaction to an unknown ingredient in the vinaigrette. She is coping by minimizing food intake and reducing eating out. She sees her allergist Dr. Venegas regularly.    The patient reports problems with Crestor in the past related to severe leg pains. She previously trialed pravastatin without success and currently denies statin use due to adverse reactions.  After review of records, the patient agrees to try pravastatin again at a lower dose.     Her blood pressure was controlled and she reports compliance with the current regimen.     She is a current smoker with a 50-pack-year history, smoking 1 pack per day. She previously quit for 8 months but discontinued due to significant weight gain and reports reluctance to quit again due to weight concerns.         Review of Systems   Constitutional:  Positive for fatigue. Negative for chills, diaphoresis and fever.   HENT:  Negative for congestion, ear pain, rhinorrhea, sinus pressure and sore throat.    Eyes:  Negative for discharge and visual disturbance.   Respiratory:  Negative for cough and shortness of  breath.    Cardiovascular:  Positive for leg swelling. Negative for chest pain and palpitations.   Gastrointestinal:  Negative for abdominal pain, constipation, diarrhea, nausea and vomiting.   Endocrine: Negative for cold intolerance and heat intolerance.   Musculoskeletal:  Positive for arthralgias, back pain, joint swelling and myalgias (muscle spasms).   Skin:  Negative for color change and wound.   Allergic/Immunologic: Positive for environmental allergies and food allergies.   Neurological:  Negative for dizziness, tremors and headaches.   Hematological:  Negative for adenopathy. Bruises/bleeds easily.   Psychiatric/Behavioral:  Negative for dysphoric mood. The patient is nervous/anxious.           Objective:     Physical Exam  Vitals reviewed.   Constitutional:       General: She is awake. She is not in acute distress.     Appearance: Normal appearance. She is well-developed and well-groomed.   HENT:      Head: Normocephalic and atraumatic.      Right Ear: Hearing and external ear normal.      Left Ear: Hearing and external ear normal.      Nose: Nose normal. No congestion.      Mouth/Throat:      Mouth: Mucous membranes are moist.   Eyes:      General: Lids are normal. Vision grossly intact.   Cardiovascular:      Rate and Rhythm: Normal rate and regular rhythm.      Heart sounds: Normal heart sounds. No murmur heard.  Pulmonary:      Effort: Pulmonary effort is normal.      Breath sounds: Normal breath sounds. No decreased breath sounds or wheezing.   Abdominal:      General: Bowel sounds are normal. There is no distension.   Musculoskeletal:         General: Normal range of motion.      Cervical back: Normal range of motion.      Right lower leg: No edema.      Left lower leg: No edema.   Skin:     General: Skin is warm and dry.      Findings: No lesion or rash.   Neurological:      Mental Status: She is alert and oriented to person, place, and time.   Psychiatric:         Attention and Perception:  Attention normal.         Mood and Affect: Mood normal.         Behavior: Behavior is cooperative.            Assessment:      1. Essential hypertension    2. Lumbar radiculopathy    3. Mild intermittent asthma without complication    4. Primary osteoarthritis of both knees    5. Mixed hyperlipidemia    6. Heavy smoker (more than 20 cigarettes per day)           Plan:       Assessment & Plan    - Reviewed HTN management; BP well-controlled at 126/82.  - Discussed ongoing allergy symptoms and management strategies.  - Evaluated reported muscle pain associated with statin use; started a trial of pravastatin 20 mg daily.  - Considered vitamin D supplementation; levels still slightly low despite weekly supplementation. Continued vitamin D supplementation once weekly.  - Assessed smoking status and history.      _______________________________________________________    ESSENTIAL HYPERTENSION:   Monitored the patient's blood pressure, which is good today at 126/82.   Evaluated as stable and well-controlled. Continue amlodipine, losartan.    HYPERLIPIDEMIA:   Documented leg pain from rosuvastatin, which resolved after discontinuation.   Started pravastatin 20 mg daily as an alternative and will monitor any adverse effects.    CHRONIC LOWER BACK PAIN:   Monitored previously by [facility].    Currently takes Norco regularly, no abnormalities on [place] . Check drug screen.      ALLERGIC RHINITIS:   Monitored seasonal allergies, which are currently worsened by lack of rain but remain manageable.    VITAMIN D DEFICIENCY:   Noted vitamin D level is still slightly low despite supplementation.   Instructed patient to continue current vitamin D supplementation regimen once weekly.    NICOTINE DEPENDENCE:   Documented patient smokes 1 pack of cigarettes daily for 50 years.   Ordered low-dose CT chest for lung cancer screening given smoking history, to be completed in October before the November annual  exam.    FOLLOW-UP AND TESTS:   Ordered metabolic panel and lipid panel.   Return for annual exam in November.         __________________________    Felicity Middleton MD, PharmD  [facility] [place] [facility]  American Board of Obesity Medicine diplomate  Office [phone]

## 2025-06-30 LAB
W AMPHETAMINE, SERUM, QUALITATIVE: NEGATIVE
W BARBITURATE, SERUM, QUALITATIVE: NEGATIVE
W BENZODIAZEPINE, SERUM, QUALITATIVE: NEGATIVE
W COCAINE, SERUM, QUALITATIVE: NEGATIVE
W ETHANOL, SERUM: NEGATIVE
W METHADONE, SERUM, QUALITATIVE: NEGATIVE
W OPIATE, SERUM, QUALITATIVE: NEGATIVE
W PHENCYCLIDINE, SERUM, QUALITATIVE: NEGATIVE
W PROPOXYPHENE, SERUM, QUALITATIVE: NEGATIVE
W THC, SERUM, QUALITATIVE: NEGATIVE

## 2025-07-07 ENCOUNTER — OFFICE VISIT (OUTPATIENT)
Dept: URGENT CARE | Facility: CLINIC | Age: 70
End: 2025-07-07
Payer: MEDICARE

## 2025-07-07 VITALS
HEART RATE: 82 BPM | OXYGEN SATURATION: 94 % | WEIGHT: 240 LBS | DIASTOLIC BLOOD PRESSURE: 98 MMHG | TEMPERATURE: 99 F | BODY MASS INDEX: 44.16 KG/M2 | RESPIRATION RATE: 16 BRPM | SYSTOLIC BLOOD PRESSURE: 158 MMHG | HEIGHT: 62 IN

## 2025-07-07 DIAGNOSIS — H65.193 ACUTE EFFUSION OF BOTH MIDDLE EARS: ICD-10-CM

## 2025-07-07 DIAGNOSIS — R03.0 ELEVATED BLOOD PRESSURE READING: ICD-10-CM

## 2025-07-07 DIAGNOSIS — J06.9 VIRAL URI WITH COUGH: Primary | ICD-10-CM

## 2025-07-07 LAB
CTP QC/QA: YES
CTP QC/QA: YES
POC MOLECULAR INFLUENZA A AGN: NEGATIVE
POC MOLECULAR INFLUENZA B AGN: NEGATIVE
SARS-COV+SARS-COV-2 AG RESP QL IA.RAPID: NEGATIVE

## 2025-07-07 PROCEDURE — 87811 SARS-COV-2 COVID19 W/OPTIC: CPT | Mod: QW,S$GLB,, | Performed by: PHYSICIAN ASSISTANT

## 2025-07-07 PROCEDURE — 71046 X-RAY EXAM CHEST 2 VIEWS: CPT | Mod: S$GLB,,, | Performed by: RADIOLOGY

## 2025-07-07 PROCEDURE — 99214 OFFICE O/P EST MOD 30 MIN: CPT | Mod: S$GLB,,, | Performed by: PHYSICIAN ASSISTANT

## 2025-07-07 PROCEDURE — 87502 INFLUENZA DNA AMP PROBE: CPT | Mod: QW,S$GLB,, | Performed by: PHYSICIAN ASSISTANT

## 2025-07-07 RX ORDER — BENZONATATE 200 MG/1
200 CAPSULE ORAL 3 TIMES DAILY PRN
Qty: 30 CAPSULE | Refills: 0 | Status: SHIPPED | OUTPATIENT
Start: 2025-07-07 | End: 2025-07-17

## 2025-07-07 NOTE — PROGRESS NOTES
"Subjective:      Patient ID: Kaleigh Solo is a 70 y.o. female.    Vitals:  height is 5' 2" (1.575 m) and weight is 108.9 kg (240 lb). Her oral temperature is 98.6 °F (37 °C). Her blood pressure is 158/98 (abnormal) and her pulse is 82. Her respiration is 16 and oxygen saturation is 94% (abnormal).     Chief Complaint: Sinus Problem    This is a 70 y.o. female who presents today with a chief complaint of Saturday fatigue head and chest congestion cough postnasal drip chills and sweats. Denies any fever. Pt took mucinex-dm tylenol and flonase .  Patient has did use her inhaler sometimes not today.  History of asthma and pneumonia.     Pt is willing to go to [facility] if in need of a chest xray.     Sinus Problem  This is a new problem. The current episode started in the past 7 days. The problem is unchanged. There has been no fever. Associated symptoms include chills, congestion, coughing, diaphoresis, headaches, shortness of breath, sinus pressure and a sore throat. Pertinent negatives include no ear pain or neck pain.     Constitution: Positive for appetite change, chills, sweating and fatigue. Negative for fever.   HENT:  Positive for congestion, postnasal drip, sinus pressure and sore throat. Negative for ear pain, ear discharge, tinnitus, hearing loss, dental problem, drooling, mouth sores, tongue pain, tongue lesion, sinus pain, trouble swallowing and voice change.    Neck: Negative for neck pain and neck stiffness.   Cardiovascular:  Negative for chest pain.   Eyes:  Negative for eye discharge and eye itching.   Respiratory:  Positive for cough, sputum production, shortness of breath, wheezing and asthma. Negative for chest tightness and bloody sputum.    Gastrointestinal:  Negative for abdominal pain, nausea, vomiting, constipation and diarrhea.   Musculoskeletal:  Negative for muscle ache.   Skin:  Negative for rash.   Allergic/Immunologic: Positive for asthma.   Neurological:  Positive for " headaches. Negative for dizziness.      Past Medical History:   Diagnosis Date    Arthritis     Asthma     Cervical radiculopathy 10/14/2013    Cervicalgia      tx by chiropractor    Closed dislocation of acromioclavicular joint     Degenerative disc disease     Family history of aortic aneurysm 10/21/2019    Fatty liver     Fibromyalgia     GERD (gastroesophageal reflux disease)     HPV (human papilloma virus) anogenital infection     conization in past-remote history-    HTN (hypertension)     Sciatica     Tobacco use     Ulcer     stomach ulcer       Past Surgical History:   Procedure Laterality Date    acdf  2014    C5-6    BACK SURGERY      CERVICAL    CERVICAL CONIZATION   W/ LASER      KNEE ARTHROSCOPY      right shoulder surger      arthroscopic surgery Dec 2013    TRIAL OF SPINAL CORD NERVE STIMULATOR N/A 2018    Procedure: TRIAL, NEUROSTIMULATOR, SPINAL CORD;  Surgeon: Candice Lopez MD;  Location: [facility];  Service: Pain Management;  Laterality: N/A;  SCS Trial  [facility] notified of date and time       Family History   Problem Relation Name Age of Onset    Cancer Mother          lung-was tobacco user  of lung cancer at 78    Aneurysm Father          abdominal burst-  of stroke at 71 years    Heart disease Father          had HTN       Social History     Socioeconomic History    Marital status:    Tobacco Use    Smoking status: Former     Current packs/day: 0.00     Average packs/day: 1 pack/day for 52.1 years (52.1 ttl pk-yrs)     Types: Cigarettes     Start date: 1972     Quit date: 2024     Years since quittin.4     Passive exposure: Current    Smokeless tobacco: Never   Substance and Sexual Activity    Alcohol use: Yes     Comment: occasional    Drug use: No    Sexual activity: Not Currently   Social History Narrative    , not working, 3 children (1 passed at 9 mo old), tobacco daily, ETOH socially, GYN 2013 dtrs of cesar      Social Drivers of Health     Financial Resource Strain: Low Risk  (2/12/2025)    Overall Financial Resource Strain (CARDIA)     Difficulty of Paying Living Expenses: Not hard at all   Food Insecurity: No Food Insecurity (2/12/2025)    Hunger Vital Sign     Worried About Running Out of Food in the Last Year: Never true     Ran Out of Food in the Last Year: Never true   Transportation Needs: No Transportation Needs (2/12/2025)    PRAPARE - Transportation     Lack of Transportation (Medical): No     Lack of Transportation (Non-Medical): No   Physical Activity: Inactive (2/12/2025)    Exercise Vital Sign     Days of Exercise per Week: 0 days     Minutes of Exercise per Session: 0 min   Stress: No Stress Concern Present (2/12/2025)    [employer] - Occupational Stress Questionnaire     Feeling of Stress : Not at all   Housing Stability: Low Risk  (2/12/2025)    Housing Stability Vital Sign     Unable to Pay for Housing in the Last Year: No     Number of Times Moved in the Last Year: 0     Homeless in the Last Year: No       Current Medications[1]    Review of patient's allergies indicates:   Allergen Reactions    Ambien [zolpidem] Other (See Comments)     Pt hyperactive    Esomeprazole magnesium Swelling    Iodine and iodide containing products Swelling    Lunesta [eszopiclone] Other (See Comments)     Severely depressed    Orange Blisters and Swelling    Pineapple Blisters and Swelling    Shellfish containing products Swelling and Other (See Comments)     Metallic taste in mouth  Swells all over,including the tongue and throat  Feels likes insides are swollen  Allergic to crawfish,Shrimp    Ancef [cefazolin]      Facial swelling  Swelling abdomen    Egg derived Hives and Swelling    Flexeril [cyclobenzaprine] Swelling    Pork/porcine containing products Hives and Swelling    Sulfa (sulfonamide antibiotics) Nausea Only    Align [bifidobacterium infantis] Blisters, Diarrhea and Rash        Objective:     Physical Exam   Constitutional:  Non-toxic appearance. She does not appear ill. No distress.   HENT:   Head: Normocephalic and atraumatic.   Ears:   Right Ear: External ear and ear canal normal. Tympanic membrane is not injected, not erythematous, not retracted and not bulging. A middle ear effusion is present. no impacted cerumen  Left Ear: External ear and ear canal normal. Tympanic membrane is not injected, not erythematous, not retracted and not bulging. A middle ear effusion is present. no impacted cerumen  Nose: Rhinorrhea and congestion present. Right sinus exhibits no maxillary sinus tenderness and no frontal sinus tenderness. Left sinus exhibits no maxillary sinus tenderness and no frontal sinus tenderness.   Mouth/Throat: Mucous membranes are moist. Posterior oropharyngeal erythema present. No oropharyngeal exudate. Tonsils are 1+ on the right. Tonsils are 1+ on the left. No tonsillar exudate.   Eyes: Conjunctivae are normal. Right eye exhibits no discharge. Left eye exhibits no discharge.   Neck: Neck supple.   Cardiovascular: Normal rate, regular rhythm and normal heart sounds.   No murmur heard.Exam reveals no gallop and no friction rub.   Pulmonary/Chest: Effort normal and breath sounds normal. No stridor. No respiratory distress. She has no wheezes. She has no rhonchi. She has no rales.   Abdominal: Normal appearance.   Musculoskeletal:      Cervical back: She exhibits no tenderness.   Lymphadenopathy:     She has no cervical adenopathy.   Neurological: She is alert.   Skin: Skin is warm, dry, not diaphoretic and no rash.   Psychiatric: Her behavior is normal. Mood normal.   Nursing note and vitals reviewed.    Results for orders placed or performed in visit on 07/07/25   SARS Coronavirus 2 Antigen, POCT Manual Read    Collection Time: 07/07/25  1:38 PM   Result Value Ref Range    SARS Coronavirus 2 Antigen Negative Negative, Presumptive Negative     Acceptable Yes     POCT Influenza A/B MOLECULAR    Collection Time: 07/07/25  2:01 PM   Result Value Ref Range    POC Molecular Influenza A Ag Negative Negative    POC Molecular Influenza B Ag Negative Negative     Acceptable Yes      XR CHEST PA AND LATERAL  Result Date: 7/7/2025  EXAMINATION: XR CHEST PA AND LATERAL CLINICAL HISTORY: r/o pneumonia;  Cough, unspecified FINDINGS: Chest two views: Heart size is normal.  There is postoperative change of the C-spine.  There is a posterior column stimulator.  There is DJD.  There is aortic plaque.  Lungs are clear.     No acute process seen. Electronically signed by: Chele Weber MD Date:    07/07/2025 Time:    15:00    Assessment:     1. Viral URI with cough    2. Elevated blood pressure reading    3. Acute effusion of both middle ears        Plan:       Viral URI with cough  -     SARS Coronavirus 2 Antigen, POCT Manual Read  -     POCT Influenza A/B MOLECULAR  -     XR CHEST PA AND LATERAL  -     benzonatate (TESSALON) 200 MG capsule; Take 1 capsule (200 mg total) by mouth 3 (three) times daily as needed for Cough.  Dispense: 30 capsule; Refill: 0    Elevated blood pressure reading    Acute effusion of both middle ears           I have reviewed the patient chart and pertinent past imaging/labs.  Results reviewed     Patient Instructions   I will notify you of official x-ray results and recommendations once available.  Please go to [facility] and [place] the addresses is [address], [place] [address] .  I will also notify your flu test once available.  Your blood pressure is elevated today this could be because of coughing however if it remains high after you are feeling better please follow up with your primary care provider    Reached out the patient with results.  Advised this is viral and antibiotics are not be helpful.  Unable to do any steroids blood pressure is high today and it could suppress her immune system which could make her  symptoms worse.  Advised to use Tessalon as needed for cough.  She will use home Flonase at night to help out with postnasal drip.  She also use over-the-counter Zyrtec or Claritin that she has a home as directed for fluid behind the ears.  She will repeat COVID testing at home in 48 hours and follow up with urgent care as needed         [1]   Current Outpatient Medications   Medication Sig Dispense Refill    albuterol (PROAIR HFA) 90 mcg/actuation inhaler Inhale 2 puffs into the lungs every 6 (six) hours as needed for Wheezing or Shortness of Breath. 18 g 11    amLODIPine (NORVASC) 5 MG tablet Take 1 tablet (5 mg total) by mouth once daily. 90 tablet 3    clotrimazole-betamethasone 1-0.05% (LOTRISONE) cream Apply topically 2 (two) times daily. 45 g 2    cycloSPORINE (RESTASIS) 0.05 % ophthalmic emulsion Place 1 drop into both eyes 2 (two) times daily. 60 each 11    EPINEPHrine (EPIPEN) 0.3 mg/0.3 mL AtIn INJECT INTRAMUSCULARLY AS DIRECTED      ergocalciferol (ERGOCALCIFEROL) 50,000 unit Cap TAKE 1 CAPSULE BY MOUTH EVERY 7 DAYS 12 capsule 1    folic acid (FOLVITE) 800 MCG Tab Take 400 mcg by mouth once daily.      furosemide (LASIX) 20 MG tablet TAKE 1 TABLET BY MOUTH DAILY AS NEEDED FOR LEG SWELLING 90 tablet 3    HYDROcodone-acetaminophen (NORCO) 5-325 mg per tablet Take 1 tablet by mouth every 12 (twelve) hours as needed for Pain. 60 tablet 0    hydrOXYzine HCL (ATARAX) 25 MG tablet Take 1 tablet (25 mg total) by mouth 3 (three) times daily as needed for Itching. 30 tablet 1    hyoscyamine (ANASPAZ,LEVSIN) 0.125 mg Tab Take 125 mcg by mouth every 6 (six) hours as needed.      losartan (COZAAR) 100 MG tablet TAKE 1 TABLET EVERY DAY 90 tablet 2    pravastatin (PRAVACHOL) 20 MG tablet Take 1 tablet (20 mg total) by mouth once daily. 30 tablet 2     No current facility-administered medications for this visit.

## 2025-07-07 NOTE — PATIENT INSTRUCTIONS
I will notify you of official x-ray results and recommendations once available.  Please go to [facility] and [place] the addresses is [facility] [place] [address] .  I will also notify your flu test once available.  Your blood pressure is elevated today this could be because of coughing however if it remains high after you are feeling better please follow up with your primary care provider

## 2025-07-09 DIAGNOSIS — I10 ESSENTIAL HYPERTENSION: ICD-10-CM

## 2025-07-09 NOTE — TELEPHONE ENCOUNTER
Care Due:                  Date            Visit Type   Department     Provider  --------------------------------------------------------------------------------                                EP -                              PRIMARY      MET INTERNAL  Last Visit: 06-      CARE ([facility])   MEDICINE       Felicity Middleton                              EP -                              PRIMARY      MET INTERNAL  Next Visit: 11-      CARE ([facility])   MEDICINE       Felicity Middleton                                                            Last  Test          Frequency    Reason                     Performed    Due Date  --------------------------------------------------------------------------------    Lipid Panel.  12 months..  pravastatin..............  09- 09-    [facility] Embedded Care Due Messages. Reference number: [id number].   7/09/2025 2:50:39 AM CDT

## 2025-07-09 NOTE — TELEPHONE ENCOUNTER
Refill Routing Note   Medication(s) are not appropriate for processing by [facility] for the following reason(s):        Required vitals abnormal    [facility] action(s):  Defer   Requires labs : Yes             Appointments  past 12m or future 3m with PCP    Date Provider   Last Visit   6/27/2025 Felicity Middleton MD   Next Visit   11/28/2025 Felicity Middleton MD   ED visits in past 90 days: 0        Note composed:7:47 AM 07/09/2025

## 2025-07-10 RX ORDER — LOSARTAN POTASSIUM 100 MG/1
100 TABLET ORAL
Qty: 90 TABLET | Refills: 1 | Status: SHIPPED | OUTPATIENT
Start: 2025-07-10

## 2025-08-01 ENCOUNTER — PATIENT MESSAGE (OUTPATIENT)
Dept: INTERNAL MEDICINE | Facility: CLINIC | Age: 70
End: 2025-08-01
Payer: MEDICARE

## 2025-08-01 NOTE — TELEPHONE ENCOUNTER
Pt is requesting a letter to be written for jury duty excuse. Pt states that she cannot sit or stand for long periods of time d/t spinal pain.

## 2025-08-06 ENCOUNTER — PATIENT MESSAGE (OUTPATIENT)
Dept: INTERNAL MEDICINE | Facility: CLINIC | Age: 70
End: 2025-08-06
Payer: MEDICARE

## 2025-08-06 DIAGNOSIS — M17.0 PRIMARY OSTEOARTHRITIS OF BOTH KNEES: ICD-10-CM

## 2025-08-06 DIAGNOSIS — M54.16 LUMBAR RADICULOPATHY: ICD-10-CM

## 2025-08-06 NOTE — TELEPHONE ENCOUNTER
No care due was identified.  [facility] Embedded Care Due Messages. Reference number: [id number].   8/06/2025 3:09:29 PM CDT

## 2025-08-07 RX ORDER — HYDROCODONE BITARTRATE AND ACETAMINOPHEN 5; 325 MG/1; MG/1
1 TABLET ORAL EVERY 12 HOURS PRN
Qty: 60 TABLET | Refills: 0 | Status: SHIPPED | OUTPATIENT
Start: 2025-08-07 | End: 2025-09-06

## 2025-08-07 NOTE — TELEPHONE ENCOUNTER
Will refill Norco X1. Advised patient to return to Pain Management to discuss further treatment with controlled pain medications since last blood test was negative.    Sent [place] to [facility] pharmacy

## (undated) DEVICE — SUT MCRYL PLUS 4-0 PS2 27IN

## (undated) DEVICE — NDL HYPO REG 25G X 1 1/2

## (undated) DEVICE — KIT FREELINK REMOTE CONTROL

## (undated) DEVICE — GAUZE SPONGE 4X4 12PLY

## (undated) DEVICE — SUT VICRYL PLUS 4-0 PS2 27

## (undated) DEVICE — DRESSING TRANS 4X4 TEGADERM

## (undated) DEVICE — DRAPE C-ARMOR EQUIPMENT COVER

## (undated) DEVICE — ELECTRODE REM PLYHSV RETURN 9

## (undated) DEVICE — ELECTRODE BLD EXT INSUL 1

## (undated) DEVICE — APPLICATOR CHLORAPREP ORN 26ML

## (undated) DEVICE — SYR 10CC LUER LOCK

## (undated) DEVICE — DRAPE INCISE IOBAN 2 23X17IN

## (undated) DEVICE — DRESSING AQUACEL FOAM 5 X 5

## (undated) DEVICE — FIXATE SUTURING DEVICE

## (undated) DEVICE — SUT VICRYL 2-0 8-18 CP-2

## (undated) DEVICE — DRAPE SURG W/TWL 17 5/8X23

## (undated) DEVICE — SEE MEDLINE ITEM 153151

## (undated) DEVICE — BINDER ABDOMINAL 9 46-62

## (undated) DEVICE — SYR 10CC LOSS OF RESISTANCE

## (undated) DEVICE — DRESSING LEUKOPLAST FLEX 1X3IN

## (undated) DEVICE — WRENCH HEXAGONAL 3

## (undated) DEVICE — SUT D SPECIAL VICRYL 2-0

## (undated) DEVICE — CLOSURE SKIN STERI STRIP 1/2X4

## (undated) DEVICE — PACK DRAPE UNIVERSAL CONVERTOR

## (undated) DEVICE — SYRINGE 0.9% NACL 10MIL PREFIL

## (undated) DEVICE — DECANTER VIAL ASEPTIC TRANSFER

## (undated) DEVICE — Device